# Patient Record
Sex: FEMALE | Race: WHITE | Employment: OTHER | URBAN - METROPOLITAN AREA
[De-identification: names, ages, dates, MRNs, and addresses within clinical notes are randomized per-mention and may not be internally consistent; named-entity substitution may affect disease eponyms.]

---

## 2018-06-01 LAB
ALBUMIN SERPL-MCNC: NORMAL G/DL
ALP BLD-CCNC: NORMAL U/L
ALT SERPL-CCNC: NORMAL U/L
ANION GAP SERPL CALCULATED.3IONS-SCNC: NORMAL MMOL/L
AST SERPL-CCNC: NORMAL U/L
BILIRUB SERPL-MCNC: NORMAL MG/DL (ref 0.1–1.4)
BUN BLDV-MCNC: NORMAL MG/DL
CALCIUM SERPL-MCNC: NORMAL MG/DL
CHLORIDE BLD-SCNC: NORMAL MMOL/L
CO2: NORMAL MMOL/L
CREAT SERPL-MCNC: 0.9 MG/DL
GFR CALCULATED: NORMAL
GLUCOSE BLD-MCNC: NORMAL MG/DL
POTASSIUM SERPL-SCNC: 4.4 MMOL/L
SODIUM BLD-SCNC: NORMAL MMOL/L
TOTAL PROTEIN: NORMAL

## 2019-05-16 RX ORDER — METOPROLOL SUCCINATE 25 MG/1
25 TABLET, EXTENDED RELEASE ORAL DAILY
COMMUNITY
End: 2019-06-12 | Stop reason: SDUPTHER

## 2019-05-16 RX ORDER — FUROSEMIDE 20 MG/1
20 TABLET ORAL DAILY
COMMUNITY
End: 2019-06-12 | Stop reason: SDUPTHER

## 2019-05-16 RX ORDER — LOVASTATIN 40 MG/1
40 TABLET ORAL NIGHTLY
COMMUNITY
End: 2019-06-12 | Stop reason: SDUPTHER

## 2019-05-16 RX ORDER — FERROUS SULFATE 325(65) MG
325 TABLET ORAL
COMMUNITY

## 2019-05-16 RX ORDER — AMOXICILLIN 250 MG
1 CAPSULE ORAL DAILY
COMMUNITY
End: 2021-04-13

## 2019-05-16 RX ORDER — FLUTICASONE PROPIONATE 50 MCG
2 SPRAY, SUSPENSION (ML) NASAL DAILY
COMMUNITY
End: 2019-11-20 | Stop reason: SDUPTHER

## 2019-06-12 ENCOUNTER — OFFICE VISIT (OUTPATIENT)
Dept: FAMILY MEDICINE CLINIC | Age: 84
End: 2019-06-12
Payer: MEDICARE

## 2019-06-12 VITALS
SYSTOLIC BLOOD PRESSURE: 120 MMHG | WEIGHT: 152.6 LBS | HEART RATE: 68 BPM | BODY MASS INDEX: 28.81 KG/M2 | HEIGHT: 61 IN | DIASTOLIC BLOOD PRESSURE: 76 MMHG

## 2019-06-12 DIAGNOSIS — Z86.73 HISTORY OF ARTERIAL ISCHEMIC STROKE: Primary | ICD-10-CM

## 2019-06-12 DIAGNOSIS — D64.9 ANEMIA, UNSPECIFIED TYPE: ICD-10-CM

## 2019-06-12 DIAGNOSIS — I10 ESSENTIAL HYPERTENSION: ICD-10-CM

## 2019-06-12 DIAGNOSIS — I35.0 AORTIC VALVE STENOSIS, ETIOLOGY OF CARDIAC VALVE DISEASE UNSPECIFIED: ICD-10-CM

## 2019-06-12 DIAGNOSIS — E78.2 MIXED HYPERLIPIDEMIA: ICD-10-CM

## 2019-06-12 DIAGNOSIS — I44.2 COMPLETE HEART BLOCK (HCC): ICD-10-CM

## 2019-06-12 DIAGNOSIS — I50.9 CONGESTIVE HEART FAILURE, UNSPECIFIED HF CHRONICITY, UNSPECIFIED HEART FAILURE TYPE (HCC): ICD-10-CM

## 2019-06-12 DIAGNOSIS — Q21.0 VSD (VENTRICULAR SEPTAL DEFECT): ICD-10-CM

## 2019-06-12 PROCEDURE — 4040F PNEUMOC VAC/ADMIN/RCVD: CPT | Performed by: FAMILY MEDICINE

## 2019-06-12 PROCEDURE — G8419 CALC BMI OUT NRM PARAM NOF/U: HCPCS | Performed by: FAMILY MEDICINE

## 2019-06-12 PROCEDURE — G8427 DOCREV CUR MEDS BY ELIG CLIN: HCPCS | Performed by: FAMILY MEDICINE

## 2019-06-12 PROCEDURE — 1123F ACP DISCUSS/DSCN MKR DOCD: CPT | Performed by: FAMILY MEDICINE

## 2019-06-12 PROCEDURE — 1036F TOBACCO NON-USER: CPT | Performed by: FAMILY MEDICINE

## 2019-06-12 PROCEDURE — 1090F PRES/ABSN URINE INCON ASSESS: CPT | Performed by: FAMILY MEDICINE

## 2019-06-12 PROCEDURE — 99213 OFFICE O/P EST LOW 20 MIN: CPT | Performed by: FAMILY MEDICINE

## 2019-06-12 RX ORDER — METOPROLOL SUCCINATE 25 MG/1
25 TABLET, EXTENDED RELEASE ORAL DAILY
Qty: 90 TABLET | Refills: 1 | Status: SHIPPED | OUTPATIENT
Start: 2019-06-12 | End: 2019-11-20 | Stop reason: SDUPTHER

## 2019-06-12 RX ORDER — POTASSIUM CHLORIDE 750 MG/1
10 TABLET, FILM COATED, EXTENDED RELEASE ORAL DAILY
Qty: 90 TABLET | Refills: 1 | Status: SHIPPED | OUTPATIENT
Start: 2019-06-12 | End: 2019-06-17 | Stop reason: DRUGHIGH

## 2019-06-12 RX ORDER — FUROSEMIDE 20 MG/1
20 TABLET ORAL DAILY
Qty: 90 TABLET | Refills: 1 | Status: SHIPPED | OUTPATIENT
Start: 2019-06-12 | End: 2019-11-20 | Stop reason: SDUPTHER

## 2019-06-12 RX ORDER — LOVASTATIN 40 MG/1
40 TABLET ORAL NIGHTLY
Qty: 90 TABLET | Refills: 1 | Status: SHIPPED | OUTPATIENT
Start: 2019-06-12 | End: 2019-11-20 | Stop reason: SDUPTHER

## 2019-06-12 ASSESSMENT — PATIENT HEALTH QUESTIONNAIRE - PHQ9
SUM OF ALL RESPONSES TO PHQ QUESTIONS 1-9: 0
1. LITTLE INTEREST OR PLEASURE IN DOING THINGS: 0
2. FEELING DOWN, DEPRESSED OR HOPELESS: 0
SUM OF ALL RESPONSES TO PHQ QUESTIONS 1-9: 0
SUM OF ALL RESPONSES TO PHQ9 QUESTIONS 1 & 2: 0

## 2019-06-12 ASSESSMENT — ENCOUNTER SYMPTOMS
ABDOMINAL PAIN: 0
COUGH: 1
SHORTNESS OF BREATH: 0

## 2019-06-12 NOTE — PROGRESS NOTES
service: None     Active member of club or organization: None     Attends meetings of clubs or organizations: None     Relationship status: None    Intimate partner violence:     Fear of current or ex partner: None     Emotionally abused: None     Physically abused: None     Forced sexual activity: None   Other Topics Concern    None   Social History Narrative    None        SURGICAL HISTORY  Past Surgical History:   Procedure Laterality Date    AORTIC VALVE SURGERY  06/29/2017    BRAIN SURGERY  11/27/2017    bleed from stroke    PACEMAKER PLACEMENT  06/29/2017       CURRENT MEDICATIONS  Current Outpatient Medications   Medication Sig Dispense Refill    furosemide (LASIX) 20 MG tablet Take 1 tablet by mouth daily 90 tablet 1    lovastatin (MEVACOR) 40 MG tablet Take 1 tablet by mouth nightly 90 tablet 1    metoprolol succinate (TOPROL XL) 25 MG extended release tablet Take 1 tablet by mouth daily 90 tablet 1    potassium chloride (KLOR-CON 10) 10 MEQ extended release tablet Take 1 tablet by mouth daily 90 tablet 1    aspirin 81 MG tablet Take 81 mg by mouth daily      ferrous sulfate 325 (65 Fe) MG tablet Take 325 mg by mouth daily (with breakfast)      fluticasone (FLONASE) 50 MCG/ACT nasal spray 2 sprays by Each Nare route daily      Multiple Vitamins-Minerals (MULTIVITAMIN ADULT PO) Take 1 tablet by mouth daily      senna-docusate (PERICOLACE) 8.6-50 MG per tablet Take 1 tablet by mouth daily Indications: 1/2 tab daily      Cholecalciferol (VITAMIN D3) 5000 units TABS Take 1 tablet by mouth daily       No current facility-administered medications for this visit. ALLERGIES  Allergies   Allergen Reactions    Alendronate Sodium Other (See Comments)    Ibandronic Acid Other (See Comments)    Milk-Related Compounds        PHYSICAL EXAM    Physical Exam   Constitutional: She is oriented to person, place, and time. She appears well-developed and well-nourished. No distress.    HENT:   Head: Normocephalic and atraumatic. Cardiovascular: Normal rate, regular rhythm and normal heart sounds. Pulmonary/Chest: Effort normal and breath sounds normal.   Patient is speaking in full sentences. Lungs CTA bilaterally. Abdominal: Soft. Bowel sounds are normal.   Neurological: She is alert and oriented to person, place, and time. Skin: Skin is warm and dry. She is not diaphoretic. Psychiatric: She has a normal mood and affect. Thought content normal.   Nursing note and vitals reviewed. ASSESSMENT & PLAN    1. History of arterial ischemic stroke  Continue taking medications as prescribed, and refills will be provided as necessary. Will complete lab work today including a CBC, CMP, lipid panel, thyroid studies, and anemia work-up as she has a history of anemia. 2. Essential hypertension  Continue taking medications as prescribed, and refills will be provided as necessary. Will complete lab work within the next week including a CBC, CMP, lipid panel, thyroid studies, and anemia work-up as she has a history of anemia. - metoprolol succinate (TOPROL XL) 25 MG extended release tablet; Take 1 tablet by mouth daily  Dispense: 90 tablet; Refill: 1  - potassium chloride (KLOR-CON 10) 10 MEQ extended release tablet; Take 1 tablet by mouth daily  Dispense: 90 tablet; Refill: 1  - Comprehensive Metabolic Panel; Future  - CBC Auto Differential; Future    5. Congestive heart failure, unspecified HF chronicity, unspecified heart failure type ([facility] [address]  She is to continue taking medications as prescribed, and refills will be provided as necessary.  - furosemide (LASIX) 20 MG tablet; Take 1 tablet by mouth daily  Dispense: 90 tablet; Refill: 1    6. Mixed hyperlipidemia  She is taking medications as prescribed, and refills will be necessary. We will have fasting lab work completed within the next week. Shins may be adjusted based on the lab work. - lovastatin (MEVACOR) 40 MG tablet;  Take 1 tablet by mouth nightly Dispense: 90 tablet; Refill: 1  - Lipid Panel; Future    8. Anemia, unspecified type  She has a history of anemia, and so we will continue to monitor her levels as she is B12, and an iron tablet. - CBC Auto Differential; Future  - TSH without Reflex; Future  - T4, Free; Future  - Vitamin B12; Future  - Ferritin; Future  - Folate; Future  - Iron Binding Capacity; Future    Patient is to contact us with any questions or concerns, otherwise is to follow-up in 3 months. We will notify her of lab work results and adjust occasions as needed based off of those results. Electronically signed by JOSÉ LUIS Reagan on 6/12/2019      Please note that this chart was generated using dragon dictation software. Although every effort was made to ensure the accuracy of this automated transcription, some errors in transcription may have occurred.

## 2019-06-17 ENCOUNTER — TELEPHONE (OUTPATIENT)
Dept: FAMILY MEDICINE CLINIC | Age: 84
End: 2019-06-17

## 2019-06-17 DIAGNOSIS — I10 ESSENTIAL HYPERTENSION: ICD-10-CM

## 2019-06-17 RX ORDER — POTASSIUM CHLORIDE 20 MEQ/1
20 TABLET, EXTENDED RELEASE ORAL DAILY
Qty: 90 TABLET | Refills: 1 | Status: SHIPPED | OUTPATIENT
Start: 2019-06-17 | End: 2019-11-20 | Stop reason: SDUPTHER

## 2019-06-17 RX ORDER — POTASSIUM CHLORIDE 750 MG/1
20 TABLET, FILM COATED, EXTENDED RELEASE ORAL DAILY
Qty: 90 TABLET | Refills: 1 | Status: SHIPPED | OUTPATIENT
Start: 2019-06-17 | End: 2019-06-17

## 2019-06-17 RX ORDER — POTASSIUM CHLORIDE 1.5 G/1.77G
20 POWDER, FOR SOLUTION ORAL DAILY
COMMUNITY
End: 2019-06-17

## 2019-06-17 NOTE — TELEPHONE ENCOUNTER
SPOKE WITH YANG LYNCH DAUGHTER STATES [facility] RECENTLY DELIVERED KLOR CON 10 MEQ 1QD THE PT TAKES 20 MEQ . PT RECEIVED A 90 DAY SUPPLY THIS PAST Friday, PT WOULD LIKE TO BE ADVISED ON WHAT HER BEST OPTION IS TO DO SO INSURANCE WILL COVER THIS PRESCRIPTION. WILL ROUTE TO  TO REVIEW AND ADVISE.   Electronically signed by Nohemi James LPN on 7/09/8650 at 3:21 PM

## 2019-06-17 NOTE — TELEPHONE ENCOUNTER
See if we can send in 90 day supply of 20 meq /day  RF x1- with note saying this is the correct dose. Pt should keep the 10 meq supply in reserve and could use 2 /day(of the 10 meq pils) till the 20 meq pills come in.

## 2019-06-17 NOTE — TELEPHONE ENCOUNTER
SENT KLOR CON 20 MEQ 1 QD #90/1 PER DR VARGAS.    SENT ELECTRONICALLY  PM  OK TO SEND PER  [facility]  Electronically signed by Razia Nieves LPN on 9/03/2277 at 2:44 PM

## 2019-06-28 ENCOUNTER — TELEPHONE (OUTPATIENT)
Dept: FAMILY MEDICINE CLINIC | Age: 84
End: 2019-06-28

## 2019-10-22 ENCOUNTER — TELEPHONE (OUTPATIENT)
Dept: FAMILY MEDICINE CLINIC | Age: 84
End: 2019-10-22

## 2019-11-20 ENCOUNTER — OFFICE VISIT (OUTPATIENT)
Dept: FAMILY MEDICINE CLINIC | Age: 84
End: 2019-11-20
Payer: MEDICARE

## 2019-11-20 VITALS
HEART RATE: 68 BPM | SYSTOLIC BLOOD PRESSURE: 110 MMHG | DIASTOLIC BLOOD PRESSURE: 48 MMHG | HEIGHT: 61 IN | WEIGHT: 150 LBS | BODY MASS INDEX: 28.32 KG/M2

## 2019-11-20 DIAGNOSIS — I10 ESSENTIAL HYPERTENSION: Primary | ICD-10-CM

## 2019-11-20 DIAGNOSIS — E78.49 OTHER HYPERLIPIDEMIA: ICD-10-CM

## 2019-11-20 DIAGNOSIS — Z23 NEED FOR PROPHYLACTIC VACCINATION AND INOCULATION AGAINST VARICELLA: ICD-10-CM

## 2019-11-20 DIAGNOSIS — I10 ESSENTIAL HYPERTENSION: ICD-10-CM

## 2019-11-20 DIAGNOSIS — D64.9 ANEMIA, UNSPECIFIED TYPE: ICD-10-CM

## 2019-11-20 DIAGNOSIS — E78.2 MIXED HYPERLIPIDEMIA: ICD-10-CM

## 2019-11-20 DIAGNOSIS — I50.9 CONGESTIVE HEART FAILURE, UNSPECIFIED HF CHRONICITY, UNSPECIFIED HEART FAILURE TYPE (HCC): ICD-10-CM

## 2019-11-20 DIAGNOSIS — Z23 NEEDS FLU SHOT: ICD-10-CM

## 2019-11-20 DIAGNOSIS — I63.529 CEREBROVASCULAR ACCIDENT (CVA) DUE TO STENOSIS OF ANTERIOR CEREBRAL ARTERY, UNSPECIFIED BLOOD VESSEL LATERALITY (HCC): ICD-10-CM

## 2019-11-20 DIAGNOSIS — Z95.0 CARDIAC PACEMAKER: ICD-10-CM

## 2019-11-20 PROBLEM — I63.9 CEREBROVASCULAR ACCIDENT (CVA) DUE TO VASCULAR STENOSIS (HCC): Status: ACTIVE | Noted: 2019-11-20

## 2019-11-20 LAB
A/G RATIO: 1.7 (ref 1.1–2.2)
ALBUMIN SERPL-MCNC: 4 G/DL (ref 3.4–5)
ALP BLD-CCNC: 107 U/L (ref 40–129)
ALT SERPL-CCNC: 10 U/L (ref 10–40)
ANION GAP SERPL CALCULATED.3IONS-SCNC: 13 MMOL/L (ref 3–16)
AST SERPL-CCNC: 22 U/L (ref 15–37)
BASOPHILS ABSOLUTE: 0.1 K/UL (ref 0–0.2)
BASOPHILS RELATIVE PERCENT: 1 %
BILIRUB SERPL-MCNC: 0.5 MG/DL (ref 0–1)
BUN BLDV-MCNC: 35 MG/DL (ref 7–20)
CALCIUM SERPL-MCNC: 9.6 MG/DL (ref 8.3–10.6)
CHLORIDE BLD-SCNC: 102 MMOL/L (ref 99–110)
CHOLESTEROL, FASTING: 153 MG/DL (ref 0–199)
CO2: 25 MMOL/L (ref 21–32)
CREAT SERPL-MCNC: 0.9 MG/DL (ref 0.6–1.2)
EOSINOPHILS ABSOLUTE: 0.1 K/UL (ref 0–0.6)
EOSINOPHILS RELATIVE PERCENT: 2.1 %
FERRITIN: 253.9 NG/ML (ref 15–150)
FOLATE: >20 NG/ML (ref 4.78–24.2)
GFR AFRICAN AMERICAN: >60
GFR NON-AFRICAN AMERICAN: 59
GLOBULIN: 2.3 G/DL
GLUCOSE BLD-MCNC: 82 MG/DL (ref 70–99)
HCT VFR BLD CALC: 35.6 % (ref 36–48)
HDLC SERPL-MCNC: 67 MG/DL (ref 40–60)
HEMOGLOBIN: 11.7 G/DL (ref 12–16)
LDL CHOLESTEROL CALCULATED: 69 MG/DL
LYMPHOCYTES ABSOLUTE: 1.2 K/UL (ref 1–5.1)
LYMPHOCYTES RELATIVE PERCENT: 18.5 %
MAGNESIUM: 2.2 MG/DL (ref 1.8–2.4)
MCH RBC QN AUTO: 31.2 PG (ref 26–34)
MCHC RBC AUTO-ENTMCNC: 32.8 G/DL (ref 31–36)
MCV RBC AUTO: 95 FL (ref 80–100)
MONOCYTES ABSOLUTE: 0.7 K/UL (ref 0–1.3)
MONOCYTES RELATIVE PERCENT: 10.8 %
NEUTROPHILS ABSOLUTE: 4.3 K/UL (ref 1.7–7.7)
NEUTROPHILS RELATIVE PERCENT: 67.6 %
PDW BLD-RTO: 15.7 % (ref 12.4–15.4)
PLATELET # BLD: 207 K/UL (ref 135–450)
PMV BLD AUTO: 8.8 FL (ref 5–10.5)
POTASSIUM SERPL-SCNC: 5 MMOL/L (ref 3.5–5.1)
RBC # BLD: 3.75 M/UL (ref 4–5.2)
SODIUM BLD-SCNC: 140 MMOL/L (ref 136–145)
T4 FREE: 1.3 NG/DL (ref 0.9–1.8)
TOTAL IRON BINDING CAPACITY: 243 UG/DL (ref 260–445)
TOTAL PROTEIN: 6.3 G/DL (ref 6.4–8.2)
TRIGLYCERIDE, FASTING: 85 MG/DL (ref 0–150)
TSH SERPL DL<=0.05 MIU/L-ACNC: 1.77 UIU/ML (ref 0.27–4.2)
VITAMIN B-12: 825 PG/ML (ref 211–911)
VLDLC SERPL CALC-MCNC: 17 MG/DL
WBC # BLD: 6.3 K/UL (ref 4–11)

## 2019-11-20 PROCEDURE — G8482 FLU IMMUNIZE ORDER/ADMIN: HCPCS | Performed by: FAMILY MEDICINE

## 2019-11-20 PROCEDURE — 1123F ACP DISCUSS/DSCN MKR DOCD: CPT | Performed by: FAMILY MEDICINE

## 2019-11-20 PROCEDURE — 99214 OFFICE O/P EST MOD 30 MIN: CPT | Performed by: FAMILY MEDICINE

## 2019-11-20 PROCEDURE — G8417 CALC BMI ABV UP PARAM F/U: HCPCS | Performed by: FAMILY MEDICINE

## 2019-11-20 PROCEDURE — 1036F TOBACCO NON-USER: CPT | Performed by: FAMILY MEDICINE

## 2019-11-20 PROCEDURE — 1090F PRES/ABSN URINE INCON ASSESS: CPT | Performed by: FAMILY MEDICINE

## 2019-11-20 PROCEDURE — G8427 DOCREV CUR MEDS BY ELIG CLIN: HCPCS | Performed by: FAMILY MEDICINE

## 2019-11-20 PROCEDURE — 90653 IIV ADJUVANT VACCINE IM: CPT | Performed by: FAMILY MEDICINE

## 2019-11-20 PROCEDURE — G0008 ADMIN INFLUENZA VIRUS VAC: HCPCS | Performed by: FAMILY MEDICINE

## 2019-11-20 PROCEDURE — 4040F PNEUMOC VAC/ADMIN/RCVD: CPT | Performed by: FAMILY MEDICINE

## 2019-11-20 PROCEDURE — G8598 ASA/ANTIPLAT THER USED: HCPCS | Performed by: FAMILY MEDICINE

## 2019-11-20 RX ORDER — POTASSIUM CHLORIDE 20 MEQ/1
20 TABLET, EXTENDED RELEASE ORAL DAILY
Qty: 90 TABLET | Refills: 1 | Status: SHIPPED | OUTPATIENT
Start: 2019-11-20 | End: 2020-04-08

## 2019-11-20 RX ORDER — METOPROLOL SUCCINATE 25 MG/1
25 TABLET, EXTENDED RELEASE ORAL DAILY
Qty: 90 TABLET | Refills: 1 | Status: SHIPPED | OUTPATIENT
Start: 2019-11-20 | End: 2020-06-19 | Stop reason: SDUPTHER

## 2019-11-20 RX ORDER — FLUTICASONE PROPIONATE 50 MCG
2 SPRAY, SUSPENSION (ML) NASAL DAILY
Qty: 3 BOTTLE | Refills: 1 | Status: SHIPPED | OUTPATIENT
Start: 2019-11-20 | End: 2020-05-08

## 2019-11-20 RX ORDER — FUROSEMIDE 20 MG/1
20 TABLET ORAL DAILY
Qty: 90 TABLET | Refills: 1 | Status: SHIPPED | OUTPATIENT
Start: 2019-11-20 | End: 2020-04-08

## 2019-11-20 RX ORDER — LOVASTATIN 40 MG/1
40 TABLET ORAL NIGHTLY
Qty: 90 TABLET | Refills: 1 | Status: SHIPPED | OUTPATIENT
Start: 2019-11-20 | End: 2020-04-08

## 2019-11-20 ASSESSMENT — ENCOUNTER SYMPTOMS
DIARRHEA: 0
VOMITING: 0
NAUSEA: 0
EYE PAIN: 0
SHORTNESS OF BREATH: 0
WHEEZING: 0
ABDOMINAL PAIN: 0
CHEST TIGHTNESS: 0
TROUBLE SWALLOWING: 0
BLOOD IN STOOL: 0

## 2020-04-08 RX ORDER — LOVASTATIN 40 MG/1
40 TABLET ORAL NIGHTLY
Qty: 90 TABLET | Refills: 0 | Status: SHIPPED | OUTPATIENT
Start: 2020-04-08 | End: 2020-06-19 | Stop reason: SDUPTHER

## 2020-04-08 RX ORDER — FUROSEMIDE 20 MG/1
20 TABLET ORAL DAILY
Qty: 90 TABLET | Refills: 0 | Status: SHIPPED | OUTPATIENT
Start: 2020-04-08 | End: 2020-06-19 | Stop reason: SDUPTHER

## 2020-04-08 RX ORDER — POTASSIUM CHLORIDE 20 MEQ/1
20 TABLET, EXTENDED RELEASE ORAL DAILY
Qty: 90 TABLET | Refills: 0 | Status: SHIPPED | OUTPATIENT
Start: 2020-04-08 | End: 2020-06-19 | Stop reason: SDUPTHER

## 2020-05-08 RX ORDER — FLUTICASONE PROPIONATE 50 MCG
SPRAY, SUSPENSION (ML) NASAL
Qty: 3 BOTTLE | Refills: 1 | Status: SHIPPED | OUTPATIENT
Start: 2020-05-08 | End: 2020-10-13 | Stop reason: SDUPTHER

## 2020-06-17 ENCOUNTER — VIRTUAL VISIT (OUTPATIENT)
Dept: FAMILY MEDICINE CLINIC | Age: 85
End: 2020-06-17
Payer: MEDICARE

## 2020-06-17 PROCEDURE — 99442 PR PHYS/QHP TELEPHONE EVALUATION 11-20 MIN: CPT | Performed by: FAMILY MEDICINE

## 2020-06-17 PROCEDURE — 1090F PRES/ABSN URINE INCON ASSESS: CPT | Performed by: FAMILY MEDICINE

## 2020-06-17 PROCEDURE — 1123F ACP DISCUSS/DSCN MKR DOCD: CPT | Performed by: FAMILY MEDICINE

## 2020-06-17 PROCEDURE — G8427 DOCREV CUR MEDS BY ELIG CLIN: HCPCS | Performed by: FAMILY MEDICINE

## 2020-06-17 PROCEDURE — 4040F PNEUMOC VAC/ADMIN/RCVD: CPT | Performed by: FAMILY MEDICINE

## 2020-06-17 PROCEDURE — G8417 CALC BMI ABV UP PARAM F/U: HCPCS | Performed by: FAMILY MEDICINE

## 2020-06-17 PROCEDURE — 1036F TOBACCO NON-USER: CPT | Performed by: FAMILY MEDICINE

## 2020-06-17 ASSESSMENT — PATIENT HEALTH QUESTIONNAIRE - PHQ9
2. FEELING DOWN, DEPRESSED OR HOPELESS: 1
SUM OF ALL RESPONSES TO PHQ QUESTIONS 1-9: 2
SUM OF ALL RESPONSES TO PHQ QUESTIONS 1-9: 2
SUM OF ALL RESPONSES TO PHQ9 QUESTIONS 1 & 2: 2
1. LITTLE INTEREST OR PLEASURE IN DOING THINGS: 1

## 2020-06-17 NOTE — PROGRESS NOTES
Charline Calvin is a 80 y.o. female evaluated via telephone on 6/17/2020. Consent:  She and/or health care decision maker is aware that that she may receive a bill for this telephone service, depending on her insurance coverage, and has provided verbal consent to proceed: Yes      Documentation:  I communicated with the patient and/or health care decision maker about hyperlipidemia, hypertension, advanced age, history of CHF and history of CVA. [place] and has very little outside exposure. Has had no fever cough or evidence of infection. Patient reports that her appetite is been good if no evidence of CHF no increase edema orthopnea reported. Patient denies any neurologic changes also. Mood has been reasonable and meds are well-tolerated. Her last labs were November 2019 and they were overall satisfactory. At this point I would keep her on the same regimen and provide refills on meds as needed. I will see her back in 4 months or sooner as needed call with questions or problems. Continue with healthy lifestyle and social distancing. Details of this discussion including any medical advice provided as above. I affirm this is a Patient Initiated Episode with a Patient who has not had a related appointment within my department in the past 7 days or scheduled within the next 24 hours.     Patient identification was verified at the start of the visit: Yes    Total Time: minutes: 11-20 minutes    Note: not billable if this call serves to triage the patient into an appointment for the relevant concern      Karis Paulino

## 2020-06-19 RX ORDER — LOVASTATIN 40 MG/1
40 TABLET ORAL NIGHTLY
Qty: 90 TABLET | Refills: 0 | Status: SHIPPED | OUTPATIENT
Start: 2020-06-19 | End: 2020-08-28

## 2020-06-19 RX ORDER — FUROSEMIDE 20 MG/1
20 TABLET ORAL DAILY
Qty: 90 TABLET | Refills: 0 | Status: SHIPPED | OUTPATIENT
Start: 2020-06-19 | End: 2020-08-28

## 2020-06-19 RX ORDER — POTASSIUM CHLORIDE 20 MEQ/1
20 TABLET, EXTENDED RELEASE ORAL DAILY
Qty: 90 TABLET | Refills: 0 | Status: SHIPPED | OUTPATIENT
Start: 2020-06-19 | End: 2020-08-28

## 2020-06-19 RX ORDER — METOPROLOL SUCCINATE 25 MG/1
25 TABLET, EXTENDED RELEASE ORAL DAILY
Qty: 90 TABLET | Refills: 1 | Status: SHIPPED | OUTPATIENT
Start: 2020-06-19 | End: 2020-10-13 | Stop reason: SDUPTHER

## 2020-08-28 RX ORDER — FUROSEMIDE 20 MG/1
20 TABLET ORAL DAILY
Qty: 90 TABLET | Refills: 0 | Status: SHIPPED | OUTPATIENT
Start: 2020-08-28 | End: 2020-10-13 | Stop reason: SDUPTHER

## 2020-08-28 RX ORDER — LOVASTATIN 40 MG/1
TABLET ORAL
Qty: 90 TABLET | Refills: 0 | Status: SHIPPED | OUTPATIENT
Start: 2020-08-28 | End: 2020-10-13 | Stop reason: SDUPTHER

## 2020-08-28 RX ORDER — POTASSIUM CHLORIDE 20 MEQ/1
20 TABLET, EXTENDED RELEASE ORAL DAILY
Qty: 90 TABLET | Refills: 0 | Status: SHIPPED | OUTPATIENT
Start: 2020-08-28 | End: 2020-10-13 | Stop reason: SDUPTHER

## 2020-08-28 NOTE — TELEPHONE ENCOUNTER
Requested Prescriptions     Signed Prescriptions Disp Refills    potassium chloride (KLOR-CON M) 20 MEQ extended release tablet 90 tablet 0     Sig: TAKE 1 TABLET BY MOUTH  DAILY     Authorizing Provider: Tanya Abernathy User: EFRAIN Millard    furosemide (LASIX) 20 MG tablet 90 tablet 0     Sig: TAKE 1 TABLET BY MOUTH  DAILY     Authorizing Provider: Tanya Abernathy User: EFRAIN Millard    lovastatin (MEVACOR) 40 MG tablet 90 tablet 0     Sig: TAKE 1 TABLET BY MOUTH AT  NIGHT     Authorizing Provider: Tanya Abernathy User: Justice Tomas

## 2020-10-13 ENCOUNTER — OFFICE VISIT (OUTPATIENT)
Dept: FAMILY MEDICINE CLINIC | Age: 85
End: 2020-10-13
Payer: MEDICARE

## 2020-10-13 VITALS
TEMPERATURE: 98.2 F | HEART RATE: 66 BPM | DIASTOLIC BLOOD PRESSURE: 68 MMHG | SYSTOLIC BLOOD PRESSURE: 118 MMHG | BODY MASS INDEX: 29.83 KG/M2 | HEIGHT: 61 IN | WEIGHT: 158 LBS | OXYGEN SATURATION: 98 %

## 2020-10-13 DIAGNOSIS — I63.529 CEREBROVASCULAR ACCIDENT (CVA) DUE TO STENOSIS OF ANTERIOR CEREBRAL ARTERY, UNSPECIFIED BLOOD VESSEL LATERALITY (HCC): ICD-10-CM

## 2020-10-13 DIAGNOSIS — I10 ESSENTIAL HYPERTENSION: ICD-10-CM

## 2020-10-13 PROBLEM — I44.2 CHB (COMPLETE HEART BLOCK) (HCC): Status: ACTIVE | Noted: 2020-10-13

## 2020-10-13 LAB
A/G RATIO: 1.7 (ref 1.1–2.2)
ALBUMIN SERPL-MCNC: 3.7 G/DL (ref 3.4–5)
ALP BLD-CCNC: 119 U/L (ref 40–129)
ALT SERPL-CCNC: 16 U/L (ref 10–40)
ANION GAP SERPL CALCULATED.3IONS-SCNC: 7 MMOL/L (ref 3–16)
AST SERPL-CCNC: 22 U/L (ref 15–37)
BILIRUB SERPL-MCNC: 0.4 MG/DL (ref 0–1)
BUN BLDV-MCNC: 39 MG/DL (ref 7–20)
CALCIUM SERPL-MCNC: 9.7 MG/DL (ref 8.3–10.6)
CHLORIDE BLD-SCNC: 104 MMOL/L (ref 99–110)
CHOLESTEROL, TOTAL: 142 MG/DL (ref 0–199)
CO2: 29 MMOL/L (ref 21–32)
CREAT SERPL-MCNC: 1 MG/DL (ref 0.6–1.2)
GFR AFRICAN AMERICAN: >60
GFR NON-AFRICAN AMERICAN: 52
GLOBULIN: 2.2 G/DL
GLUCOSE BLD-MCNC: 89 MG/DL (ref 70–99)
HCT VFR BLD CALC: 33.4 % (ref 36–48)
HDLC SERPL-MCNC: 64 MG/DL (ref 40–60)
HEMOGLOBIN: 11 G/DL (ref 12–16)
LDL CHOLESTEROL CALCULATED: 64 MG/DL
MCH RBC QN AUTO: 30.9 PG (ref 26–34)
MCHC RBC AUTO-ENTMCNC: 32.8 G/DL (ref 31–36)
MCV RBC AUTO: 94.2 FL (ref 80–100)
PDW BLD-RTO: 15.2 % (ref 12.4–15.4)
PLATELET # BLD: 190 K/UL (ref 135–450)
PMV BLD AUTO: 8.4 FL (ref 5–10.5)
POTASSIUM SERPL-SCNC: 5.1 MMOL/L (ref 3.5–5.1)
RBC # BLD: 3.54 M/UL (ref 4–5.2)
SODIUM BLD-SCNC: 140 MMOL/L (ref 136–145)
TOTAL PROTEIN: 5.9 G/DL (ref 6.4–8.2)
TRIGL SERPL-MCNC: 70 MG/DL (ref 0–150)
VLDLC SERPL CALC-MCNC: 14 MG/DL
WBC # BLD: 5.9 K/UL (ref 4–11)

## 2020-10-13 PROCEDURE — 99214 OFFICE O/P EST MOD 30 MIN: CPT | Performed by: FAMILY MEDICINE

## 2020-10-13 PROCEDURE — 1090F PRES/ABSN URINE INCON ASSESS: CPT | Performed by: FAMILY MEDICINE

## 2020-10-13 PROCEDURE — 90694 VACC AIIV4 NO PRSRV 0.5ML IM: CPT | Performed by: FAMILY MEDICINE

## 2020-10-13 PROCEDURE — G0008 ADMIN INFLUENZA VIRUS VAC: HCPCS | Performed by: FAMILY MEDICINE

## 2020-10-13 PROCEDURE — G8427 DOCREV CUR MEDS BY ELIG CLIN: HCPCS | Performed by: FAMILY MEDICINE

## 2020-10-13 PROCEDURE — 1036F TOBACCO NON-USER: CPT | Performed by: FAMILY MEDICINE

## 2020-10-13 PROCEDURE — 1123F ACP DISCUSS/DSCN MKR DOCD: CPT | Performed by: FAMILY MEDICINE

## 2020-10-13 PROCEDURE — 4040F PNEUMOC VAC/ADMIN/RCVD: CPT | Performed by: FAMILY MEDICINE

## 2020-10-13 PROCEDURE — G8484 FLU IMMUNIZE NO ADMIN: HCPCS | Performed by: FAMILY MEDICINE

## 2020-10-13 PROCEDURE — G8417 CALC BMI ABV UP PARAM F/U: HCPCS | Performed by: FAMILY MEDICINE

## 2020-10-13 RX ORDER — LOVASTATIN 40 MG/1
TABLET ORAL
Qty: 90 TABLET | Refills: 0 | Status: SHIPPED | OUTPATIENT
Start: 2020-10-13 | End: 2020-12-01

## 2020-10-13 RX ORDER — FUROSEMIDE 20 MG/1
20 TABLET ORAL DAILY
Qty: 90 TABLET | Refills: 0 | Status: SHIPPED | OUTPATIENT
Start: 2020-10-13 | End: 2020-12-01

## 2020-10-13 RX ORDER — METOPROLOL SUCCINATE 25 MG/1
25 TABLET, EXTENDED RELEASE ORAL DAILY
Qty: 90 TABLET | Refills: 1 | Status: SHIPPED | OUTPATIENT
Start: 2020-10-13 | End: 2021-04-13 | Stop reason: SDUPTHER

## 2020-10-13 RX ORDER — FLUTICASONE PROPIONATE 50 MCG
SPRAY, SUSPENSION (ML) NASAL
Qty: 3 BOTTLE | Refills: 1 | Status: SHIPPED | OUTPATIENT
Start: 2020-10-13 | End: 2021-04-13 | Stop reason: SDUPTHER

## 2020-10-13 RX ORDER — POTASSIUM CHLORIDE 20 MEQ/1
20 TABLET, EXTENDED RELEASE ORAL DAILY
Qty: 90 TABLET | Refills: 0 | Status: SHIPPED | OUTPATIENT
Start: 2020-10-13 | End: 2020-12-01

## 2020-10-13 ASSESSMENT — ENCOUNTER SYMPTOMS
BLOOD IN STOOL: 0
NAUSEA: 0
EYE PAIN: 0
ABDOMINAL PAIN: 0
DIARRHEA: 0
WHEEZING: 0
CHEST TIGHTNESS: 0
TROUBLE SWALLOWING: 0
VOMITING: 0
SHORTNESS OF BREATH: 0

## 2020-10-13 NOTE — PROGRESS NOTES
Vaccine Information Sheet, \"Influenza - Inactivated\"  given to Edwin Faria, or parent/legal guardian of  Edwin Faria and verbalized understanding. Patient responses:    Have you ever had a reaction to a flu vaccine? No  Do you have any current illness? No  Have you ever had Guillian Index Syndrome? No  Do you have a serious allergy to any of the follow: Neomycin, Polymyxin, Thimerosal, eggs or egg products? No    Flu vaccine given per order. Please see immunization tab. Risks and benefits explained. Current VIS given.       Immunizations Administered     Name Date Dose Route    Influenza, Quadv, adjuvanted, 65 yrs +, IM, PF (Fluad) 10/13/2020 0.5 mL Intramuscular    Site: Deltoid- Left    Lot: [id number]    NDC: 39982-311-63

## 2020-10-13 NOTE — PROGRESS NOTES
10/13/2020    Adrien Loving    Chief Complaint   Patient presents with   Olga Mcdonough Follow-up     4 months    Immunizations     pt requests flu shot    Cough     pts daughter states pt's cough has worsened    Medication Refill     needs all meds refiled       HPI  History was obtained from the patient and her daughter. Moni Quigley is a 80 y.o. female who presents today with routine follow-up on previous CVA, hypertension, hyperlipidemia, CHF, and pacemaker placement (for complete heart block). Patient denies chest pain or shortness of breath. She has been using a cane and has not fallen. Does have a slight increase in cough but is not associated with purulent sputum production or increased shortness of breath. No associated fever or chills. Patient does follow-up closely with cardiology, and on review she will need some refills along with screening labs and a high-dose flu shot. Mell Escobedo REVIEW OF SYMPTOMS    Review of Systems   Constitutional: Negative for activity change and fatigue. HENT: Negative for congestion, hearing loss, mouth sores and trouble swallowing. Eyes: Negative for pain and visual disturbance. Respiratory: Negative for chest tightness, shortness of breath and wheezing. Cardiovascular: Negative for chest pain and palpitations. Gastrointestinal: Negative for abdominal pain, blood in stool, diarrhea, nausea and vomiting. Endocrine: Negative for polydipsia and polyuria. Genitourinary: Negative for dysuria, frequency and urgency. Musculoskeletal: Negative for arthralgias, gait problem and neck stiffness. Skin: Negative for rash. Allergic/Immunologic: Negative for environmental allergies. Neurological: Negative for dizziness, seizures, speech difficulty and weakness. Hematological: Does not bruise/bleed easily. Psychiatric/Behavioral: Negative for agitation, confusion, hallucinations and self-injury. The patient is not nervous/anxious.         PAST MEDICAL HISTORY  No past medical history on file. FAMILY HISTORY  Family History   Problem Relation Age of Onset    Heart Disease Father     Heart Attack Father     Cancer Other        SOCIAL HISTORY  Social History     Socioeconomic History    Marital status:       Spouse name: None    Number of children: None    Years of education: None    Highest education level: None   Occupational History    None   Social Needs    Financial resource strain: None    Food insecurity     Worry: None     Inability: None    Transportation needs     Medical: None     Non-medical: None   Tobacco Use    Smoking status: Never Smoker    Smokeless tobacco: Never Used   Substance and Sexual Activity    Alcohol use: None    Drug use: None    Sexual activity: None   Lifestyle    Physical activity     Days per week: None     Minutes per session: None    Stress: None   Relationships    Social connections     Talks on phone: None     Gets together: None     Attends Islam service: None     Active member of club or organization: None     Attends meetings of clubs or organizations: None     Relationship status: None    Intimate partner violence     Fear of current or ex partner: None     Emotionally abused: None     Physically abused: None     Forced sexual activity: None   Other Topics Concern    None   Social History Narrative    None        SURGICAL HISTORY  Past Surgical History:   Procedure Laterality Date    AORTIC VALVE SURGERY  06/29/2017    BRAIN SURGERY  11/27/2017    bleed from stroke    PACEMAKER PLACEMENT  06/29/2017                 CURRENT MEDICATIONS  Current Outpatient Medications   Medication Sig Dispense Refill    metoprolol succinate (TOPROL XL) 25 MG extended release tablet Take 1 tablet by mouth daily 90 tablet 1    lovastatin (MEVACOR) 40 MG tablet TAKE 1 TABLET BY MOUTH AT  NIGHT 90 tablet 0    furosemide (LASIX) 20 MG tablet Take 1 tablet by mouth daily 90 tablet 0    fluticasone (FLONASE) 50 MCG/ACT nasal spray USE 2 SPRAYS IN EACH  NOSTRIL DAILY 3 Bottle 1    potassium chloride (KLOR-CON M) 20 MEQ extended release tablet Take 1 tablet by mouth daily 90 tablet 0    aspirin 81 MG tablet Take 81 mg by mouth daily      ferrous sulfate 325 (65 Fe) MG tablet Take 325 mg by mouth daily (with breakfast)      Multiple Vitamins-Minerals (MULTIVITAMIN ADULT PO) Take 1 tablet by mouth daily      senna-docusate (PERICOLACE) 8.6-50 MG per tablet Take 1 tablet by mouth daily Indications: 1/2 tab daily      Cholecalciferol (VITAMIN D3) 5000 units TABS Take 1 tablet by mouth daily       No current facility-administered medications for this visit. ALLERGIES  Allergies   Allergen Reactions    Alendronate Other (See Comments)    Alendronate Sodium Other (See Comments)    Ibandronic Acid Other (See Comments)    Milk-Related Compounds        PHYSICAL EXAM    /68   Pulse 66   Temp 98.2 °F (36.8 °C)   Ht 5' 1\" (1.549 m)   Wt 158 lb (71.7 kg)   SpO2 98%   BMI 29.85 kg/m²     Physical Exam  Vitals signs and nursing note reviewed. Constitutional:       Appearance: Normal appearance. She is well-developed. She is not ill-appearing. HENT:      Head: Normocephalic and atraumatic. Nose: Nose normal. No congestion. Mouth/Throat:      Mouth: Mucous membranes are moist.      Pharynx: Oropharynx is clear. No oropharyngeal exudate. Eyes:      Pupils: Pupils are equal, round, and reactive to light. Neck:      Musculoskeletal: Normal range of motion and neck supple. Cardiovascular:      Rate and Rhythm: Normal rate and regular rhythm. Heart sounds: Murmur present. No gallop. Pulmonary:      Effort: Pulmonary effort is normal.      Breath sounds: Normal breath sounds. Abdominal:      Palpations: Abdomen is soft. Musculoskeletal: Normal range of motion. General: Tenderness present. No swelling or deformity. Skin:     General: Skin is warm and dry.    Neurological:      General: No focal deficit present. Mental Status: She is alert and oriented to person, place, and time. Cranial Nerves: No cranial nerve deficit. Psychiatric:         Mood and Affect: Mood normal.         Behavior: Behavior normal.         Thought Content: Thought content normal.         ASSESSMENT & PLAN     Diagnosis Orders   1. Cerebrovascular accident (CVA) due to stenosis of anterior cerebral artery, unspecified blood vessel laterality ([facility] [address]  LIPID PANEL   2. Essential hypertension  metoprolol succinate (TOPROL XL) 25 MG extended release tablet    CBC    COMPREHENSIVE METABOLIC PANEL   3. Mixed hyperlipidemia  lovastatin (MEVACOR) 40 MG tablet   4. Congestive heart failure, unspecified HF chronicity, unspecified heart failure type (HCC)  furosemide (LASIX) 20 MG tablet   5. CHB (complete heart block) (HCC)     This point she did receive a high-dose flu shot. We will check a CBC, CMP, lipid panel, and provide refills on meds as needed. She is encouraged to stay physically active as possible, socially distance as needed, and call with questions or problems. Please note she is careful to ambulate with her cane. Return in about 6 months (around 4/13/2021).          Electronically signed by Augie Neville MD on 10/13/2020

## 2021-04-13 ENCOUNTER — VIRTUAL VISIT (OUTPATIENT)
Dept: FAMILY MEDICINE CLINIC | Age: 86
End: 2021-04-13
Payer: MEDICARE

## 2021-04-13 DIAGNOSIS — I63.529 CEREBROVASCULAR ACCIDENT (CVA) DUE TO STENOSIS OF ANTERIOR CEREBRAL ARTERY, UNSPECIFIED BLOOD VESSEL LATERALITY (HCC): ICD-10-CM

## 2021-04-13 DIAGNOSIS — I44.2 CHB (COMPLETE HEART BLOCK) (HCC): ICD-10-CM

## 2021-04-13 DIAGNOSIS — E78.49 OTHER HYPERLIPIDEMIA: ICD-10-CM

## 2021-04-13 DIAGNOSIS — Z95.2 S/P AVR (AORTIC VALVE REPLACEMENT): ICD-10-CM

## 2021-04-13 DIAGNOSIS — I10 ESSENTIAL HYPERTENSION: ICD-10-CM

## 2021-04-13 DIAGNOSIS — Z95.0 CARDIAC PACEMAKER: Primary | ICD-10-CM

## 2021-04-13 PROCEDURE — G8427 DOCREV CUR MEDS BY ELIG CLIN: HCPCS | Performed by: FAMILY MEDICINE

## 2021-04-13 PROCEDURE — 99214 OFFICE O/P EST MOD 30 MIN: CPT | Performed by: FAMILY MEDICINE

## 2021-04-13 PROCEDURE — G8417 CALC BMI ABV UP PARAM F/U: HCPCS | Performed by: FAMILY MEDICINE

## 2021-04-13 PROCEDURE — 1036F TOBACCO NON-USER: CPT | Performed by: FAMILY MEDICINE

## 2021-04-13 PROCEDURE — 4040F PNEUMOC VAC/ADMIN/RCVD: CPT | Performed by: FAMILY MEDICINE

## 2021-04-13 PROCEDURE — 1090F PRES/ABSN URINE INCON ASSESS: CPT | Performed by: FAMILY MEDICINE

## 2021-04-13 PROCEDURE — 1123F ACP DISCUSS/DSCN MKR DOCD: CPT | Performed by: FAMILY MEDICINE

## 2021-04-13 RX ORDER — METOPROLOL SUCCINATE 25 MG/1
25 TABLET, EXTENDED RELEASE ORAL DAILY
Qty: 90 TABLET | Refills: 1 | Status: SHIPPED | OUTPATIENT
Start: 2021-04-13 | End: 2021-12-23 | Stop reason: SDUPTHER

## 2021-04-13 RX ORDER — CALCIUM CARBONATE/VITAMIN D3 500 MG-10
TABLET ORAL
COMMUNITY

## 2021-04-13 RX ORDER — FLUTICASONE PROPIONATE 50 MCG
SPRAY, SUSPENSION (ML) NASAL
Qty: 3 BOTTLE | Refills: 1 | Status: SHIPPED | OUTPATIENT
Start: 2021-04-13 | End: 2022-10-21 | Stop reason: SDUPTHER

## 2021-04-13 RX ORDER — SENNA PLUS 8.6 MG/1
1 TABLET ORAL DAILY
COMMUNITY
End: 2021-08-20

## 2021-04-13 ASSESSMENT — PATIENT HEALTH QUESTIONNAIRE - PHQ9
SUM OF ALL RESPONSES TO PHQ QUESTIONS 1-9: 0
SUM OF ALL RESPONSES TO PHQ QUESTIONS 1-9: 0
1. LITTLE INTEREST OR PLEASURE IN DOING THINGS: 0
2. FEELING DOWN, DEPRESSED OR HOPELESS: 0
SUM OF ALL RESPONSES TO PHQ9 QUESTIONS 1 & 2: 0

## 2021-04-14 ASSESSMENT — ENCOUNTER SYMPTOMS
CHEST TIGHTNESS: 0
ABDOMINAL PAIN: 0
VOMITING: 0
TROUBLE SWALLOWING: 0
NAUSEA: 0
DIARRHEA: 0
BLOOD IN STOOL: 0
WHEEZING: 0
EYE PAIN: 0
SHORTNESS OF BREATH: 0

## 2021-04-14 NOTE — PROGRESS NOTES
2021    TELEHEALTH EVALUATION -- Audio/Visual (During [facility] public health emergency)    HPI:    Abel Ledezma (:  5/15/1929) has requested an audio/video evaluation for the following concern(s):    History of CVA, hypertension, history of CHF, history of complete heart block status post pacer, and hyperlipidemia. Shyla Marshall was family support continues to do well she is now 80 she follows with cardiology in [facility]. Mood remains positive. Vital signs today 658 systolic over 70-84 diastolic oximetry in the upper 90s. She does need to get Covid virus vaccine as soon as possible. She is reported no falls and no chest pain no increased shortness of breath noted. Appetite has been reasonable her last labs were done in 2020 these include a CBC, CMP and a lipid panel and they were satisfactory. Mood appears to be appropriate denies other intercurrent issues at this time    Review of Systems   Constitutional: Negative for activity change and fatigue. HENT: Negative for congestion, hearing loss, mouth sores and trouble swallowing. Eyes: Negative for pain and visual disturbance. Respiratory: Negative for chest tightness, shortness of breath and wheezing. Cardiovascular: Negative for chest pain and palpitations. Gastrointestinal: Negative for abdominal pain, blood in stool, diarrhea, nausea and vomiting. Endocrine: Negative. Genitourinary: Negative for dysuria, frequency and urgency. Musculoskeletal: Negative for arthralgias, gait problem and neck stiffness. Skin: Negative for rash. Allergic/Immunologic: Negative for environmental allergies. Neurological: Negative for dizziness, seizures, speech difficulty and weakness. Patient with history of CVA- no new or change in neurologic symptoms   Hematological: Does not bruise/bleed easily. Psychiatric/Behavioral: Negative for agitation, confusion, hallucinations and suicidal ideas. The patient is not nervous/anxious. person/place/time [x]Able to follow commands      Eyes:  EOM    [x]  Normal  [] Abnormal-  Sclera  [x]  Normal  [] Abnormal -         Discharge []  None visible  [] Abnormal -    HENT:   [x] Normocephalic, atraumatic. [] Abnormal   [x] Mouth/Throat: Mucous membranes are moist.     External Ears [] Normal  [] Abnormal-     Neck: [x] No visualized mass     Pulmonary/Chest: [x] Respiratory effort normal.  [x] No visualized signs of difficulty breathing or respiratory distress        [] Abnormal-      Musculoskeletal:   [] Normal gait with no signs of ataxia         [x] Normal range of motion of neck        [] Abnormal-       Neurological:        [] No Facial Asymmetry (Cranial nerve 7 motor function) (limited exam to video visit)          [x] No gaze palsy        [] Abnormal-         Skin:        [x] No significant exanthematous lesions or discoloration noted on facial skin         [] Abnormal-            Psychiatric:       [x] Normal Affect [] No Hallucinations        [] Abnormal-     Other pertinent observable physical exam findings-     ASSESSMENT/PLAN:  1. Essential hypertension    - metoprolol succinate (TOPROL XL) 25 MG extended release tablet; Take 1 tablet by mouth daily  Dispense: 90 tablet; Refill: 1    2. Cardiac pacemaker      3. Other hyperlipidemia      4. Cerebrovascular accident (CVA) due to stenosis of anterior cerebral artery, unspecified blood vessel laterality ([facility] [address]      5. CHB (complete heart block) (HCC)      6. S/P AVR (aortic valve replacement)  This point refills will be provided. Questions answered and encouragement given. Continue to stay as active as possible but continue to socially distance. Advise get Covid vaccine as soon as possible. Call with questions or problems. Return in about 6 months (around 10/13/2021). Kal Montez, was evaluated through a synchronous (real-time) audio-video encounter.  The patient (or guardian if applicable) is aware that this is a billable service. Verbal consent to proceed has been obtained within the past 12 months. The visit was conducted pursuant to the emergency declaration under the [address] [place] [address] and the [employer] General Act. Patient identification was verified, and a caregiver was present when appropriate. The patient was located in a state where the provider was credentialed to provide care. Total time spent on this encounter: Not billed by time    --Gianluca Jeronimo MD on 4/14/2021 at 7:56 AM    An electronic signature was used to authenticate this note.

## 2021-05-28 ENCOUNTER — OFFICE VISIT (OUTPATIENT)
Dept: FAMILY MEDICINE CLINIC | Age: 86
End: 2021-05-28
Payer: MEDICARE

## 2021-05-28 VITALS
SYSTOLIC BLOOD PRESSURE: 124 MMHG | HEIGHT: 61 IN | BODY MASS INDEX: 29.07 KG/M2 | HEART RATE: 75 BPM | WEIGHT: 154 LBS | DIASTOLIC BLOOD PRESSURE: 61 MMHG | RESPIRATION RATE: 16 BRPM | OXYGEN SATURATION: 95 %

## 2021-05-28 DIAGNOSIS — L03.115 CELLULITIS OF RIGHT LOWER EXTREMITY: Primary | ICD-10-CM

## 2021-05-28 PROCEDURE — 99213 OFFICE O/P EST LOW 20 MIN: CPT | Performed by: STUDENT IN AN ORGANIZED HEALTH CARE EDUCATION/TRAINING PROGRAM

## 2021-05-28 RX ORDER — CEPHALEXIN 500 MG/1
500 CAPSULE ORAL 4 TIMES DAILY
Qty: 40 CAPSULE | Refills: 0 | Status: SHIPPED | OUTPATIENT
Start: 2021-05-28 | End: 2021-06-07

## 2021-05-28 NOTE — PROGRESS NOTES
5/28/2021    Dominique French    Chief Complaint   Patient presents with   Tico Gaitan Wound Check     Had an incident where she scraped leg on screen door. HPI  History was obtained from patient. Accompanied by daughter HCP Maryellen Barba is a 80 y.o. female with a PMHx as listed below who presents today for wound check    5/15 injury scrapped leg on bottom of metal door. At first had some pustular drainage but now resolved. Daughter/HCP states the redness around would has been slowly growing. Emmy has been using silver sulfadiazine and at times triple antibiotic around wound  Last Tdap 11/14/2017  No hx. Of MRSA infection    1. Cellulitis of right lower extremity             REVIEW OF SYMPTOMS    Review of Systems   Constitutional: Negative for chills and fatigue. HENT: Negative for congestion and sore throat. Respiratory: Negative for shortness of breath and wheezing. Cardiovascular: Negative for chest pain and palpitations. Gastrointestinal: Negative for abdominal pain and nausea. Genitourinary: Negative for frequency and urgency. Skin: Positive for wound (right leg). Neurological: Negative for light-headedness. PAST MEDICAL HISTORY  History reviewed. No pertinent past medical history. FAMILY HISTORY  Family History   Problem Relation Age of Onset    Heart Disease Father     Heart Attack Father     Cancer Other        SOCIAL HISTORY  Social History     Socioeconomic History    Marital status:       Spouse name: None    Number of children: None    Years of education: None    Highest education level: None   Occupational History    None   Tobacco Use    Smoking status: Never Smoker    Smokeless tobacco: Never Used   Substance and Sexual Activity    Alcohol use: None    Drug use: None    Sexual activity: None   Other Topics Concern    None   Social History Narrative    None     Social Determinants of Health     Financial Resource Strain:     Difficulty of Paying sulfate 325 (65 Fe) MG tablet Take 325 mg by mouth daily (with breakfast)      Multiple Vitamins-Minerals (MULTIVITAMIN ADULT PO) Take 1 tablet by mouth daily      Cholecalciferol (VITAMIN D3) 5000 units TABS Take 1 tablet by mouth daily       No current facility-administered medications for this visit. ALLERGIES  Allergies   Allergen Reactions    Alendronate Other (See Comments)    Alendronate Sodium Other (See Comments)    Ibandronic Acid Other (See Comments)    Milk-Related Compounds        PHYSICAL EXAM    /61   Pulse 75   Resp 16   Ht 5' 1\" (1.549 m)   Wt 154 lb (69.9 kg)   SpO2 95%   BMI 29.10 kg/m²     Physical Exam  Constitutional:       Appearance: Normal appearance. HENT:      Head: Normocephalic and atraumatic. Eyes:      Extraocular Movements: Extraocular movements intact. Pupils: Pupils are equal, round, and reactive to light. Cardiovascular:      Rate and Rhythm: Normal rate and regular rhythm. Pulses: Normal pulses. Heart sounds: No murmur heard. No friction rub. No gallop. Skin:     General: Skin is warm and dry. Comments: Around 2 cm in diameter right leg scab/abrasion superficial depth up to epidermis with some mild pustular drainage erythema around 1cm in diameter around. No warmth   Neurological:      General: No focal deficit present. Mental Status: She is alert. Psychiatric:         Mood and Affect: Mood normal.         Behavior: Behavior normal.         ASSESSMENT & PLAN    1. Cellulitis of right lower extremity  -start keflex, superficial good granulation tissue healing well  -received tdap 11/2017  - cephALEXin (KEFLEX) 500 MG capsule; Take 1 capsule by mouth 4 times daily for 10 days  Dispense: 40 capsule; Refill: 0      Return for as scheduled.          Electronically signed by Cleopatra Strong DO on 5/28/2021

## 2021-06-03 DIAGNOSIS — I50.9 CONGESTIVE HEART FAILURE, UNSPECIFIED HF CHRONICITY, UNSPECIFIED HEART FAILURE TYPE (HCC): ICD-10-CM

## 2021-06-03 DIAGNOSIS — E78.2 MIXED HYPERLIPIDEMIA: ICD-10-CM

## 2021-06-03 RX ORDER — POTASSIUM CHLORIDE 20 MEQ/1
20 TABLET, EXTENDED RELEASE ORAL DAILY
Qty: 90 TABLET | Refills: 1 | Status: SHIPPED | OUTPATIENT
Start: 2021-06-03 | End: 2021-10-26

## 2021-06-03 RX ORDER — LOVASTATIN 40 MG/1
TABLET ORAL
Qty: 90 TABLET | Refills: 1 | Status: SHIPPED | OUTPATIENT
Start: 2021-06-03 | End: 2021-10-26

## 2021-06-03 RX ORDER — FUROSEMIDE 20 MG/1
20 TABLET ORAL DAILY
Qty: 90 TABLET | Refills: 1 | Status: SHIPPED | OUTPATIENT
Start: 2021-06-03 | End: 2021-10-26

## 2021-06-13 ASSESSMENT — ENCOUNTER SYMPTOMS
SHORTNESS OF BREATH: 0
ABDOMINAL PAIN: 0
SORE THROAT: 0
WHEEZING: 0
NAUSEA: 0

## 2021-07-09 ENCOUNTER — APPOINTMENT (OUTPATIENT)
Dept: CT IMAGING | Age: 86
DRG: 314 | End: 2021-07-09
Payer: MEDICARE

## 2021-07-09 ENCOUNTER — APPOINTMENT (OUTPATIENT)
Dept: GENERAL RADIOLOGY | Age: 86
DRG: 314 | End: 2021-07-09
Payer: MEDICARE

## 2021-07-09 ENCOUNTER — HOSPITAL ENCOUNTER (INPATIENT)
Age: 86
LOS: 11 days | Discharge: NURSING, REHAB OR LONG-TERM CARE | DRG: 314 | End: 2021-07-22
Attending: EMERGENCY MEDICINE
Payer: MEDICARE

## 2021-07-09 DIAGNOSIS — J90 BILATERAL PLEURAL EFFUSION: ICD-10-CM

## 2021-07-09 DIAGNOSIS — Z79.2 LONG TERM (CURRENT) USE OF ANTIBIOTICS: ICD-10-CM

## 2021-07-09 DIAGNOSIS — R53.1 GENERALIZED WEAKNESS: ICD-10-CM

## 2021-07-09 DIAGNOSIS — B95.5 BACTEREMIA DUE TO STREPTOCOCCUS: ICD-10-CM

## 2021-07-09 DIAGNOSIS — R78.81 BACTEREMIA DUE TO STREPTOCOCCUS: ICD-10-CM

## 2021-07-09 DIAGNOSIS — Z79.899 LONG TERM CURRENT USE OF DIURETIC: ICD-10-CM

## 2021-07-09 DIAGNOSIS — R77.8 TROPONIN I ABOVE REFERENCE RANGE: Primary | ICD-10-CM

## 2021-07-09 LAB
ALBUMIN SERPL-MCNC: 3.7 GM/DL (ref 3.4–5)
ALP BLD-CCNC: 134 IU/L (ref 40–128)
ALT SERPL-CCNC: 15 U/L (ref 10–40)
ANION GAP SERPL CALCULATED.3IONS-SCNC: 11 MMOL/L (ref 4–16)
AST SERPL-CCNC: 31 IU/L (ref 15–37)
BACTERIA: NEGATIVE /HPF
BASOPHILS ABSOLUTE: 0 K/CU MM
BASOPHILS RELATIVE PERCENT: 0.2 % (ref 0–1)
BILIRUB SERPL-MCNC: 0.9 MG/DL (ref 0–1)
BILIRUBIN URINE: NEGATIVE MG/DL
BLOOD, URINE: ABNORMAL
BUN BLDV-MCNC: 31 MG/DL (ref 6–23)
CALCIUM SERPL-MCNC: 9.6 MG/DL (ref 8.3–10.6)
CHLORIDE BLD-SCNC: 105 MMOL/L (ref 99–110)
CLARITY: CLEAR
CO2: 25 MMOL/L (ref 21–32)
COLOR: YELLOW
CREAT SERPL-MCNC: 0.9 MG/DL (ref 0.6–1.1)
DIFFERENTIAL TYPE: ABNORMAL
EKG ATRIAL RATE: 79 BPM
EKG DIAGNOSIS: NORMAL
EKG P AXIS: 58 DEGREES
EKG P-R INTERVAL: 122 MS
EKG Q-T INTERVAL: 436 MS
EKG QRS DURATION: 128 MS
EKG QTC CALCULATION (BAZETT): 499 MS
EKG R AXIS: -39 DEGREES
EKG T AXIS: 50 DEGREES
EKG VENTRICULAR RATE: 79 BPM
EOSINOPHILS ABSOLUTE: 0 K/CU MM
EOSINOPHILS RELATIVE PERCENT: 0 % (ref 0–3)
GFR AFRICAN AMERICAN: >60 ML/MIN/1.73M2
GFR NON-AFRICAN AMERICAN: 59 ML/MIN/1.73M2
GLUCOSE BLD-MCNC: 108 MG/DL (ref 70–99)
GLUCOSE, URINE: NEGATIVE MG/DL
HCT VFR BLD CALC: 35.1 % (ref 37–47)
HEMOGLOBIN: 11 GM/DL (ref 12.5–16)
IMMATURE NEUTROPHIL %: 0.7 % (ref 0–0.43)
KETONES, URINE: ABNORMAL MG/DL
LACTATE: 1.2 MMOL/L (ref 0.4–2)
LEUKOCYTE ESTERASE, URINE: ABNORMAL
LYMPHOCYTES ABSOLUTE: 0.3 K/CU MM
LYMPHOCYTES RELATIVE PERCENT: 2.7 % (ref 24–44)
MCH RBC QN AUTO: 30.1 PG (ref 27–31)
MCHC RBC AUTO-ENTMCNC: 31.3 % (ref 32–36)
MCV RBC AUTO: 95.9 FL (ref 78–100)
MONOCYTES ABSOLUTE: 1.1 K/CU MM
MONOCYTES RELATIVE PERCENT: 8.5 % (ref 0–4)
MUCUS: ABNORMAL HPF
NITRITE URINE, QUANTITATIVE: NEGATIVE
NUCLEATED RBC %: 0 %
PDW BLD-RTO: 14.7 % (ref 11.7–14.9)
PH, URINE: 7 (ref 5–8)
PLATELET # BLD: 171 K/CU MM (ref 140–440)
PMV BLD AUTO: 9.4 FL (ref 7.5–11.1)
POTASSIUM SERPL-SCNC: 4.6 MMOL/L (ref 3.5–5.1)
PRO-BNP: 6441 PG/ML
PROTEIN UA: NEGATIVE MG/DL
RBC # BLD: 3.66 M/CU MM (ref 4.2–5.4)
RBC URINE: 4 /HPF (ref 0–6)
SARS-COV-2, NAAT: NOT DETECTED
SEGMENTED NEUTROPHILS ABSOLUTE COUNT: 11 K/CU MM
SEGMENTED NEUTROPHILS RELATIVE PERCENT: 87.9 % (ref 36–66)
SODIUM BLD-SCNC: 141 MMOL/L (ref 135–145)
SOURCE: NORMAL
SPECIFIC GRAVITY UA: 1.02 (ref 1–1.03)
SQUAMOUS EPITHELIAL: 1 /HPF
TOTAL CK: 581 IU/L (ref 26–140)
TOTAL IMMATURE NEUTOROPHIL: 0.09 K/CU MM
TOTAL NUCLEATED RBC: 0 K/CU MM
TOTAL PROTEIN: 6.6 GM/DL (ref 6.4–8.2)
TRICHOMONAS: ABNORMAL /HPF
TROPONIN T: 0.01 NG/ML
UROBILINOGEN, URINE: NEGATIVE MG/DL (ref 0.2–1)
WBC # BLD: 12.5 K/CU MM (ref 4–10.5)
WBC UA: 1 /HPF (ref 0–5)

## 2021-07-09 PROCEDURE — 87635 SARS-COV-2 COVID-19 AMP PRB: CPT

## 2021-07-09 PROCEDURE — 6370000000 HC RX 637 (ALT 250 FOR IP): Performed by: INTERNAL MEDICINE

## 2021-07-09 PROCEDURE — 87077 CULTURE AEROBIC IDENTIFY: CPT

## 2021-07-09 PROCEDURE — 81001 URINALYSIS AUTO W/SCOPE: CPT

## 2021-07-09 PROCEDURE — 82550 ASSAY OF CK (CPK): CPT

## 2021-07-09 PROCEDURE — 80053 COMPREHEN METABOLIC PANEL: CPT

## 2021-07-09 PROCEDURE — 92610 EVALUATE SWALLOWING FUNCTION: CPT

## 2021-07-09 PROCEDURE — 74177 CT ABD & PELVIS W/CONTRAST: CPT

## 2021-07-09 PROCEDURE — 84484 ASSAY OF TROPONIN QUANT: CPT

## 2021-07-09 PROCEDURE — 6360000002 HC RX W HCPCS: Performed by: INTERNAL MEDICINE

## 2021-07-09 PROCEDURE — 99285 EMERGENCY DEPT VISIT HI MDM: CPT

## 2021-07-09 PROCEDURE — 93005 ELECTROCARDIOGRAM TRACING: CPT | Performed by: EMERGENCY MEDICINE

## 2021-07-09 PROCEDURE — 83605 ASSAY OF LACTIC ACID: CPT

## 2021-07-09 PROCEDURE — 96372 THER/PROPH/DIAG INJ SC/IM: CPT

## 2021-07-09 PROCEDURE — 70450 CT HEAD/BRAIN W/O DYE: CPT

## 2021-07-09 PROCEDURE — 87150 DNA/RNA AMPLIFIED PROBE: CPT

## 2021-07-09 PROCEDURE — 71260 CT THORAX DX C+: CPT

## 2021-07-09 PROCEDURE — 2580000003 HC RX 258: Performed by: INTERNAL MEDICINE

## 2021-07-09 PROCEDURE — 85025 COMPLETE CBC W/AUTO DIFF WBC: CPT

## 2021-07-09 PROCEDURE — 87040 BLOOD CULTURE FOR BACTERIA: CPT

## 2021-07-09 PROCEDURE — 6370000000 HC RX 637 (ALT 250 FOR IP): Performed by: EMERGENCY MEDICINE

## 2021-07-09 PROCEDURE — 87086 URINE CULTURE/COLONY COUNT: CPT

## 2021-07-09 PROCEDURE — 36415 COLL VENOUS BLD VENIPUNCTURE: CPT

## 2021-07-09 PROCEDURE — 99223 1ST HOSP IP/OBS HIGH 75: CPT | Performed by: INTERNAL MEDICINE

## 2021-07-09 PROCEDURE — 93010 ELECTROCARDIOGRAM REPORT: CPT | Performed by: INTERNAL MEDICINE

## 2021-07-09 PROCEDURE — 6360000004 HC RX CONTRAST MEDICATION: Performed by: EMERGENCY MEDICINE

## 2021-07-09 PROCEDURE — 2580000003 HC RX 258: Performed by: EMERGENCY MEDICINE

## 2021-07-09 PROCEDURE — 71045 X-RAY EXAM CHEST 1 VIEW: CPT

## 2021-07-09 PROCEDURE — G0378 HOSPITAL OBSERVATION PER HR: HCPCS

## 2021-07-09 PROCEDURE — 87186 SC STD MICRODIL/AGAR DIL: CPT

## 2021-07-09 PROCEDURE — 83880 ASSAY OF NATRIURETIC PEPTIDE: CPT

## 2021-07-09 RX ORDER — METOPROLOL SUCCINATE 25 MG/1
25 TABLET, EXTENDED RELEASE ORAL DAILY
Status: DISCONTINUED | OUTPATIENT
Start: 2021-07-10 | End: 2021-07-23 | Stop reason: HOSPADM

## 2021-07-09 RX ORDER — SODIUM CHLORIDE 0.9 % (FLUSH) 0.9 %
10 SYRINGE (ML) INJECTION PRN
Status: DISCONTINUED | OUTPATIENT
Start: 2021-07-09 | End: 2021-07-23 | Stop reason: HOSPADM

## 2021-07-09 RX ORDER — 0.9 % SODIUM CHLORIDE 0.9 %
500 INTRAVENOUS SOLUTION INTRAVENOUS ONCE
Status: COMPLETED | OUTPATIENT
Start: 2021-07-09 | End: 2021-07-09

## 2021-07-09 RX ORDER — SODIUM CHLORIDE 0.9 % (FLUSH) 0.9 %
5-40 SYRINGE (ML) INJECTION PRN
Status: DISCONTINUED | OUTPATIENT
Start: 2021-07-09 | End: 2021-07-23 | Stop reason: HOSPADM

## 2021-07-09 RX ORDER — POLYETHYLENE GLYCOL 3350 17 G/17G
17 POWDER, FOR SOLUTION ORAL DAILY PRN
Status: DISCONTINUED | OUTPATIENT
Start: 2021-07-09 | End: 2021-07-23 | Stop reason: HOSPADM

## 2021-07-09 RX ORDER — ACETAMINOPHEN 650 MG/1
650 SUPPOSITORY RECTAL EVERY 6 HOURS PRN
Status: DISCONTINUED | OUTPATIENT
Start: 2021-07-09 | End: 2021-07-23 | Stop reason: HOSPADM

## 2021-07-09 RX ORDER — ATORVASTATIN CALCIUM 10 MG/1
10 TABLET, FILM COATED ORAL DAILY
Status: CANCELLED | OUTPATIENT
Start: 2021-07-09

## 2021-07-09 RX ORDER — ASPIRIN 81 MG/1
81 TABLET, CHEWABLE ORAL DAILY
Status: DISCONTINUED | OUTPATIENT
Start: 2021-07-10 | End: 2021-07-23 | Stop reason: HOSPADM

## 2021-07-09 RX ORDER — ONDANSETRON 2 MG/ML
4 INJECTION INTRAMUSCULAR; INTRAVENOUS EVERY 6 HOURS PRN
Status: DISCONTINUED | OUTPATIENT
Start: 2021-07-09 | End: 2021-07-23 | Stop reason: HOSPADM

## 2021-07-09 RX ORDER — ACETAMINOPHEN 325 MG/1
650 TABLET ORAL ONCE
Status: COMPLETED | OUTPATIENT
Start: 2021-07-09 | End: 2021-07-09

## 2021-07-09 RX ORDER — ONDANSETRON 4 MG/1
4 TABLET, ORALLY DISINTEGRATING ORAL EVERY 8 HOURS PRN
Status: DISCONTINUED | OUTPATIENT
Start: 2021-07-09 | End: 2021-07-23 | Stop reason: HOSPADM

## 2021-07-09 RX ORDER — FLUTICASONE PROPIONATE 50 MCG
1 SPRAY, SUSPENSION (ML) NASAL DAILY
Status: DISCONTINUED | OUTPATIENT
Start: 2021-07-09 | End: 2021-07-23 | Stop reason: HOSPADM

## 2021-07-09 RX ORDER — SODIUM CHLORIDE 9 MG/ML
25 INJECTION, SOLUTION INTRAVENOUS PRN
Status: DISCONTINUED | OUTPATIENT
Start: 2021-07-09 | End: 2021-07-23 | Stop reason: HOSPADM

## 2021-07-09 RX ORDER — M-VIT,TX,IRON,MINS/CALC/FOLIC 27MG-0.4MG
1 TABLET ORAL DAILY
Status: DISCONTINUED | OUTPATIENT
Start: 2021-07-10 | End: 2021-07-23 | Stop reason: HOSPADM

## 2021-07-09 RX ORDER — ACETAMINOPHEN 325 MG/1
650 TABLET ORAL EVERY 6 HOURS PRN
Status: DISCONTINUED | OUTPATIENT
Start: 2021-07-09 | End: 2021-07-23 | Stop reason: HOSPADM

## 2021-07-09 RX ORDER — SODIUM CHLORIDE 0.9 % (FLUSH) 0.9 %
5-40 SYRINGE (ML) INJECTION EVERY 12 HOURS SCHEDULED
Status: DISCONTINUED | OUTPATIENT
Start: 2021-07-09 | End: 2021-07-23 | Stop reason: HOSPADM

## 2021-07-09 RX ADMIN — ACETAMINOPHEN 650 MG: 325 TABLET ORAL at 20:59

## 2021-07-09 RX ADMIN — ENOXAPARIN SODIUM 40 MG: 40 INJECTION SUBCUTANEOUS at 19:52

## 2021-07-09 RX ADMIN — IOPAMIDOL 80 ML: 755 INJECTION, SOLUTION INTRAVENOUS at 13:04

## 2021-07-09 RX ADMIN — SODIUM CHLORIDE, PRESERVATIVE FREE 10 ML: 5 INJECTION INTRAVENOUS at 21:00

## 2021-07-09 RX ADMIN — FLUTICASONE PROPIONATE 1 SPRAY: 50 SPRAY, METERED NASAL at 19:52

## 2021-07-09 RX ADMIN — SODIUM CHLORIDE, PRESERVATIVE FREE 10 ML: 5 INJECTION INTRAVENOUS at 13:04

## 2021-07-09 RX ADMIN — ACETAMINOPHEN 650 MG: 325 TABLET ORAL at 11:35

## 2021-07-09 RX ADMIN — SODIUM CHLORIDE 500 ML: 9 INJECTION, SOLUTION INTRAVENOUS at 11:46

## 2021-07-09 ASSESSMENT — PAIN SCALES - GENERAL
PAINLEVEL_OUTOF10: 0
PAINLEVEL_OUTOF10: 0

## 2021-07-09 NOTE — CONSULTS
CARDIOLOGY CONSULT NOTE   Reason for consultation:  abnormal troponin    Referring physician:  No admitting provider for patient encounter. Primary care physician: Concepcion Hidalgo MD      Dear  Dr. Kye Camargo admitting provider for patient encounter. Thanks for the consult. Chief Complaints :  Chief Complaint   Patient presents with    Altered Mental Status    Fatigue    Fever        History of present illness:Juana is a 80 y. o.year old who presents due to profound weakness apparently she was sitting on a commode and could not get up family numbers found that she was extremely weak and tired she was brought in. She does have history of complete heart block s/p pacemaker and paroxysmal atrial fibrillation she also is noted to have aortic stenosis s/p Watson sapient valve with perivalvular leak placed in 2017  Denying any chest pain but she is a poor historian  Follows up with cardiology Dr. Mecca Montalvo at [facility]  Her CK levels are noted to be elevated associated mildly elevated troponin level this x-ray is concerning for pulmonary congestion, echo shows preserved EF with LVH in 2018    Past medical history:    has no past medical history on file. Past surgical history:   has a past surgical history that includes Aortic valve surgery (06/29/2017); pacemaker placement (06/29/2017); and brain surgery (11/27/2017). Social History:   reports that she has never smoked.  She has never used smokeless tobacco.  Family history:   no family history of CAD, STROKE of DM at early age    Allergies   Allergen Reactions    Alendronate Other (See Comments)    Alendronate Sodium Other (See Comments)    Ibandronic Acid Other (See Comments)    Milk-Related Compounds        sodium chloride flush 0.9 % injection 10 mL, PRN      Current Facility-Administered Medications   Medication Dose Route Frequency Provider Last Rate Last Admin    sodium chloride flush 0.9 % injection 10 mL  10 mL Intravenous PRN Jeanine Singleton, MD   10 mL at 07/09/21 1304     Current Outpatient Medications   Medication Sig Dispense Refill    furosemide (LASIX) 20 MG tablet Take 1 tablet by mouth daily 90 tablet 1    potassium chloride (KLOR-CON M) 20 MEQ extended release tablet Take 1 tablet by mouth daily 90 tablet 1    lovastatin (MEVACOR) 40 MG tablet One daily 90 tablet 1    Calcium Carb-Cholecalciferol (OYSTER SHELL CALCIUM) 500-400 MG-UNIT TABS Calcium 500 + D 500 mg (1,250 mg)-400 unit tablet   Take by oral route.  senna (SENOKOT) 8.6 MG tablet Take 1 tablet by mouth daily      metoprolol succinate (TOPROL XL) 25 MG extended release tablet Take 1 tablet by mouth daily 90 tablet 1    fluticasone (FLONASE) 50 MCG/ACT nasal spray USE 2 SPRAYS IN EACH  NOSTRIL DAILY 3 Bottle 1    aspirin 81 MG tablet Take 81 mg by mouth daily      ferrous sulfate 325 (65 Fe) MG tablet Take 325 mg by mouth daily (with breakfast)      Multiple Vitamins-Minerals (MULTIVITAMIN ADULT PO) Take 1 tablet by mouth daily      Cholecalciferol (VITAMIN D3) 5000 units TABS Take 1 tablet by mouth daily       Review of Systems:   · Constitutional: No Fever or Weight Loss   · Eyes: No Decreased Vision  · ENT: No Headaches, Hearing Loss or Vertigo  · Cardiovascular: As per HPI  · Respiratory: As per HPI  · Gastrointestinal: No abdominal pain, appetite loss, blood in stools, constipation, diarrhea or heartburn  · Genitourinary: No dysuria, trouble voiding, or hematuria  · Musculoskeletal:  No gait disturbance, weakness or joint complaints  · Integumentary: No rash or pruritis  · Neurological: No TIA or stroke symptoms  · Psychiatric: No anxiety or depression  · Endocrine: No malaise, fatigue or temperature intolerance  · Hematologic/Lymphatic: No bleeding problems, blood clots or swollen lymph nodes  · Allergic/Immunologic: No nasal congestion or hives  All systems negative except as marked.         Physical Examination:    Vitals:    07/09/21 1408 07/09/21 1409 07/09/21 1410 07/09/21 1411   BP:       Pulse: 75 73 74 65   Resp:       Temp:       TempSrc:       SpO2: 95% 94% 95% 95%   Weight:       Height:           General Appearance:  No distress, conversant    Constitutional:  Well developed, Well nourished, No acute distress, Non-toxic appearance. HENT:  Normocephalic, Atraumatic, Bilateral external ears normal, Oropharynx moist, No oral exudates, Nose normal. Neck- Normal range of motion, No tenderness, Supple, No stridor,no apical-carotid delay  Lymphatics : no palpable lymph nodes  Eyes:  PERRL, EOMI, Conjunctiva normal, No discharge. Respiratory:  Normal breath sounds, No respiratory distress, No wheezing, No chest tenderness. ,no use of accessory muscles, crackles Present  Cardiovascular: (PMI) apex non displaced,no lifts no thrills, ankle swelling Present , 1+, s1 and s2 audible,Murmur. Present, JVD not noted    Abdomen /GI:  Bowel sounds normal, Soft, No tenderness, No masses, No gross visceromegaly   :  No costovertebral angle tenderness   Musculoskeletal:  No edema, no tenderness, no deformities.  Back- no tenderness  Integument:  Well hydrated, no rash   Lymphatic:  No lymphadenopathy noted   Neurologic:  Alert & oriented x 3, CN 2-12 normal, normal motor function, normal sensory function, no focal deficits noted           Medical decision making and Data review:    Lab Review   Recent Labs     07/09/21  1114   WBC 12.5*   HGB 11.0*   HCT 35.1*         Recent Labs     07/09/21  1114      K 4.6      CO2 25   BUN 31*   CREATININE 0.9     Recent Labs     07/09/21  1114   AST 31   ALT 15   BILITOT 0.9   ALKPHOS 134*     Recent Labs     07/09/21  1114   TROPONINT 0.015*       Recent Labs     07/09/21  1114   PROBNP 6,441*     No results found for: INR, PROTIME    EKG: (reviewed by myself)    ECHO:(reviewed by myself)    Chest Xray:(reviewed by myself)  CT HEAD WO CONTRAST    Result Date: 7/9/2021  EXAMINATION: CT OF THE HEAD WITHOUT CONTRAST  7/9/2021 12:51 pm TECHNIQUE: CT of the head was performed without the administration of intravenous contrast. Dose modulation, iterative reconstruction, and/or weight based adjustment of the mA/kV was utilized to reduce the radiation dose to as low as reasonably achievable. COMPARISON: None. HISTORY: ORDERING SYSTEM PROVIDED HISTORY: generalized weakness, ams TECHNOLOGIST PROVIDED HISTORY: Reason for exam:->generalized weakness, ams Has a \"code stroke\" or \"stroke alert\" been called? ->No Decision Support Exception - unselect if not a suspected or confirmed emergency medical condition->Emergency Medical Condition (MA) Reason for Exam: generalized weakness, ams Acuity: Acute Type of Exam: Initial FINDINGS: BRAIN/VENTRICLES: There is no acute intracranial hemorrhage, mass effect or midline shift. No abnormal extra-axial fluid collection. The gray-white differentiation is maintained without evidence of an acute infarct. There is no evidence of hydrocephalus. Periventricular white matter low densities most likely due to chronic microvascular ischemia. Bilateral basal ganglia calcifications are present. ORBITS: The visualized portion of the orbits demonstrate no acute abnormality. SINUSES: The visualized paranasal sinuses and mastoid air cells demonstrate no acute abnormality. SOFT TISSUES/SKULL:  2 separate carter hole defects of the left-sided calvarium noted. No acute osseous findings. No acute intracranial abnormality. CT CHEST W CONTRAST    Result Date: 7/9/2021  EXAMINATION: CT OF THE CHEST WITH CONTRAST; CT OF THE ABDOMEN AND PELVIS WITH CONTRAST 7/9/2021 12:52 pm TECHNIQUE: CT of the chest was performed with the administration of intravenous contrast. Multiplanar reformatted images are provided for review.  Dose modulation, iterative reconstruction, and/or weight based adjustment of the mA/kV was utilized to reduce the radiation dose to as low as reasonably achievable.; CT of the abdomen and pelvis was performed with the administration of intravenous contrast. Multiplanar reformatted images are provided for review. Dose modulation, iterative reconstruction, and/or weight based adjustment of the mA/kV was utilized to reduce the radiation dose to as low as reasonably achievable. COMPARISON: 11/14/2017 HISTORY: ORDERING SYSTEM PROVIDED HISTORY: ?ptx on cxr, generalized weakness, fever TECHNOLOGIST PROVIDED HISTORY: Reason for exam:->?ptx on cxr, generalized weakness, fever Decision Support Exception - unselect if not a suspected or confirmed emergency medical condition->Emergency Medical Condition (MA) Reason for Exam: ?ptx on cxr, generalized weakness, fever Acuity: Acute Type of Exam: Initial; ORDERING SYSTEM PROVIDED HISTORY: fever, AMS, generalized weakness TECHNOLOGIST PROVIDED HISTORY: Reason for exam:->fever, AMS, generalized weakness Additional Contrast?->None Decision Support Exception - unselect if not a suspected or confirmed emergency medical condition->Emergency Medical Condition (MA) Reason for Exam: fever, AMS, generalized weakness Acuity: Acute Type of Exam: Initial Chest: Mediastinum: No acute abnormality of the aorta or other mediastinal structures. No pericardial effusion. Normal size lymph nodes, no evidence of adenopathy. Lungs/pleura: Small bilateral dependent pleural effusions with adjacent compressive atelectasis. No pneumothorax. Detailed lung evaluation limited due to motion. No consolidation Soft Tissues/Bones: No acute fracture. No acute findings of the chest wall. Extensive degenerative changes of the thoracic spine. No abscess of the soft tissues of the chest wall. Abdomen/Pelvis: Organs: No acute findings of the organs throughout the abdomen. No evidence of pancreatitis or pyelonephritis. No ureteral stone or hydronephrosis. Normal appearance of the gallbladder. No acute findings of the liver. No evidence of biliary dilation/obstruction. 1.9 cm cyst at the dome incidentally noted. Detailed evaluation of the organs limited due to motion. Cyst of the central right kidney measuring 4 cm. Several low-density lesions of the spleen measuring 1.4 cm and less most likely cysts or hemangiomata. GI/Bowel: No obstruction or other acute findings. No evidence diverticulitis or colitis. There is a large amount of colonic diverticulosis but no evidence of diverticulitis. Pelvis: No evidence of cystitis. Bladder appears normal. Peritoneum/Retroperitoneum: No ascites or free air. No abscess. Bones/Soft Tissues: No acute fracture of the abdomen and pelvis. No abscess. Chest: Small bilateral dependent pleural effusions mild adjacent atelectasis. No evidence of acute process otherwise. Abdomen/pelvis: No evidence of acute process. CT ABDOMEN PELVIS W IV CONTRAST Additional Contrast? None    Result Date: 7/9/2021  EXAMINATION: CT OF THE CHEST WITH CONTRAST; CT OF THE ABDOMEN AND PELVIS WITH CONTRAST 7/9/2021 12:52 pm TECHNIQUE: CT of the chest was performed with the administration of intravenous contrast. Multiplanar reformatted images are provided for review. Dose modulation, iterative reconstruction, and/or weight based adjustment of the mA/kV was utilized to reduce the radiation dose to as low as reasonably achievable.; CT of the abdomen and pelvis was performed with the administration of intravenous contrast. Multiplanar reformatted images are provided for review. Dose modulation, iterative reconstruction, and/or weight based adjustment of the mA/kV was utilized to reduce the radiation dose to as low as reasonably achievable.  COMPARISON: 11/14/2017 HISTORY: ORDERING SYSTEM PROVIDED HISTORY: ?ptx on cxr, generalized weakness, fever TECHNOLOGIST PROVIDED HISTORY: Reason for exam:->?ptx on cxr, generalized weakness, fever Decision Support Exception - unselect if not a suspected or confirmed emergency medical condition->Emergency Medical Condition (MA) Reason for Exam: ?ptx on cxr, generalized weakness, fever Acuity: Acute Type of Exam: Initial; ORDERING SYSTEM PROVIDED HISTORY: fever, AMS, generalized weakness TECHNOLOGIST PROVIDED HISTORY: Reason for exam:->fever, AMS, generalized weakness Additional Contrast?->None Decision Support Exception - unselect if not a suspected or confirmed emergency medical condition->Emergency Medical Condition (MA) Reason for Exam: fever, AMS, generalized weakness Acuity: Acute Type of Exam: Initial Chest: Mediastinum: No acute abnormality of the aorta or other mediastinal structures. No pericardial effusion. Normal size lymph nodes, no evidence of adenopathy. Lungs/pleura: Small bilateral dependent pleural effusions with adjacent compressive atelectasis. No pneumothorax. Detailed lung evaluation limited due to motion. No consolidation Soft Tissues/Bones: No acute fracture. No acute findings of the chest wall. Extensive degenerative changes of the thoracic spine. No abscess of the soft tissues of the chest wall. Abdomen/Pelvis: Organs: No acute findings of the organs throughout the abdomen. No evidence of pancreatitis or pyelonephritis. No ureteral stone or hydronephrosis. Normal appearance of the gallbladder. No acute findings of the liver. No evidence of biliary dilation/obstruction. 1.9 cm cyst at the dome incidentally noted. Detailed evaluation of the organs limited due to motion. Cyst of the central right kidney measuring 4 cm. Several low-density lesions of the spleen measuring 1.4 cm and less most likely cysts or hemangiomata. GI/Bowel: No obstruction or other acute findings. No evidence diverticulitis or colitis. There is a large amount of colonic diverticulosis but no evidence of diverticulitis. Pelvis: No evidence of cystitis. Bladder appears normal. Peritoneum/Retroperitoneum: No ascites or free air. No abscess. Bones/Soft Tissues: No acute fracture of the abdomen and pelvis. No abscess.      Chest: Small bilateral dependent pleural effusions mild in 2018 with TAVR Watson valve  DVT prophylaxis if no contraindication  6. Dyslipidemia: continue statins           Thank you  much for consult and giving us the opportunity in contributing in the care of this patient. Please feel free to call me for any questions.        Renuka Hewitt MD, 7/9/2021 5:32 PM

## 2021-07-09 NOTE — ED NOTES
Bed: ED-30  Expected date:   Expected time:   Means of arrival:   Comments:  CVA alert     Oni Mark RN  07/09/21 0173

## 2021-07-09 NOTE — ED NOTES
Called to order pt food and didn't get an answer 3x in the last hour.       Fadumo West RN  07/09/21 6255

## 2021-07-09 NOTE — PROGRESS NOTES
Speech Language Pathology  Facility/Department: [id number] [facility]   CLINICAL BEDSIDE SWALLOW EVALUATION    NAME: Dante Blanca  : 5/15/1929  MRN: [id number]     IMPRESSIONS: Dante Blanca was referred for a bedside swallowing evaluation following admission to [address] [place] [address] for general weakness and AMS. No medical hx found in chart, per daughter pt has hx of CVA with residual aphasia. Speech/language/cognition were functional for completion of assessment. Pt was evaluated seated upright in bed, alert and cooperative. Oral mechanism examination was WFL/no asymmetry. Pt accepted PO trials of thin liquids by cup/straw, puree and regular solids. [facility] characterized by adequate mastication, oral clearance and AP transit. Pharyngeal stage appears WFL with no s/s of aspiration across PO trials. Recommend regular diet/thin liquids. Results/recommendations discussed with pt and pt's daughter who indicated understanding. No further SLP needs identified at this time. ADMISSION DATE: 2021  ADMITTING DIAGNOSIS: has Essential hypertension; Other hyperlipidemia; Cardiac pacemaker; Congestive heart failure ([address]  Cerebrovascular accident (CVA) due to vascular stenosis (HCC); CHB (complete heart block) ([address] and S/P AVR (aortic valve replacement) on their problem list.  ONSET DATE: this admission    Recent Chest Xray/CT of Chest: see chart    Date of Eval: 2021  Evaluating Therapist: Jackelin Burton    Current Diet level:  Current Diet : NPO  Current Liquid Diet : NPO      Primary Complaint  Patient Complaint: denies symptoms of dysphagia    Pain:  Pain Assessment  Pain Level: 0    Reason for Referral  Dante Blanca was referred for a bedside swallow evaluation to assess the efficiency of her swallow function, identify signs and symptoms of aspiration and make recommendations regarding safe dietary consistencies, effective compensatory strategies, and safe eating environment. Impression  Dysphagia Diagnosis: Swallow function appears grossly intact  Dysphagia Outcome Severity Scale: Level 6: Within functional limits/Modified independence     Treatment Plan  Requires SLP Intervention: No  Duration/Frequency of Treatment: n/a  D/C Recommendations: To be determined       Recommended Diet and Intervention  Diet Solids Recommendation: Regular  Liquid Consistency Recommendation: Thin  Recommended Form of Meds: PO          Compensatory Swallowing Strategies       Treatment/Goals  Short-term Goals  Timeframe for Short-term Goals: n/a    General  Chart Reviewed: Yes  Behavior/Cognition: Alert; Cooperative  Respiratory Status: Room air  O2 Device: None (Room air)  Communication Observation: Aphasia (per daughter report)  Follows Directions: Simple  Dentition: Adequate  Patient Positioning: Upright in bed  Baseline Vocal Quality: Normal  Prior Dysphagia History: none known prior to admission  Consistencies Administered: Reg solid; Dysphagia Pureed (Dysphagia I); Thin - straw; Thin - cup           Vision/Hearing  Vision  Vision: Impaired  Vision Exceptions: Wears glasses at all times  Hearing  Hearing: Exceptions to [facility]  Hearing Exceptions: Hard of hearing/hearing concerns ([place] vs auditory comprehension deficits)    Oral Motor Deficits  Oral/Motor  Oral Motor: Within functional limits    Oral Phase Dysfunction  Oral Phase  Oral Phase: WFL     Indicators of Pharyngeal Phase Dysfunction   Pharyngeal Phase  Pharyngeal Phase: WFL    Prognosis  Individuals consulted  Consulted and agree with results and recommendations: Patient; Family member  Family member consulted: daughter    Education  Patient Education: results/recommendations  Patient Education Response: Verbalizes understanding  Safety Devices in place: Yes  Type of devices:  All fall risk precautions in place       Therapy Time  SLP Individual Minutes  Time In: 1200  Time Out: 9222  Minutes: 15 Concha Rich SLP student  7/9/2021 1:17 PM

## 2021-07-09 NOTE — ED NOTES
[place], lactic,type screen and 1st set of cultures drawn on patient.      Papito Nieto  07/09/21 1127

## 2021-07-09 NOTE — ED PROVIDER NOTES
Disease Father     Heart Attack Father     Cancer Other      Social History     Socioeconomic History    Marital status:      Spouse name: Not on file    Number of children: Not on file    Years of education: Not on file    Highest education level: Not on file   Occupational History    Not on file   Tobacco Use    Smoking status: Never Smoker    Smokeless tobacco: Never Used   Substance and Sexual Activity    Alcohol use: Not on file    Drug use: Not on file    Sexual activity: Not on file   Other Topics Concern    Not on file   Social History Narrative    Not on file     Social Determinants of Health     Financial Resource Strain:     Difficulty of Paying Living Expenses:    Food Insecurity:     Worried About Running Out of Food in the Last Year:     [address] in the Last Year:    Transportation Needs:     Lack of Transportation (Medical):      Lack of Transportation (Non-Medical):    Physical Activity:     Days of Exercise per Week:     Minutes of Exercise per Session:    Stress:     Feeling of Stress :    Social Connections:     Frequency of Communication with Friends and Family:     Frequency of Social Gatherings with Friends and Family:     Attends Sikh Services:     Active Member of Clubs or Organizations:     Attends Club or Organization Meetings:     Marital Status:    Intimate Partner Violence:     Fear of Current or Ex-Partner:     Emotionally Abused:     Physically Abused:     Sexually Abused:      Current Facility-Administered Medications   Medication Dose Route Frequency Provider Last Rate Last Admin    sodium chloride flush 0.9 % injection 10 mL  10 mL Intravenous PRN Chata Townsend MD   10 mL at 07/09/21 1304     Current Outpatient Medications   Medication Sig Dispense Refill    furosemide (LASIX) 20 MG tablet Take 1 tablet by mouth daily 90 tablet 1    potassium chloride (KLOR-CON M) 20 MEQ extended release tablet Take 1 tablet by mouth daily 90 tablet 1    lovastatin (MEVACOR) 40 MG tablet One daily 90 tablet 1    Calcium Carb-Cholecalciferol (OYSTER SHELL CALCIUM) 500-400 MG-UNIT TABS Calcium 500 + D 500 mg (1,250 mg)-400 unit tablet   Take by oral route.  senna (SENOKOT) 8.6 MG tablet Take 1 tablet by mouth daily      metoprolol succinate (TOPROL XL) 25 MG extended release tablet Take 1 tablet by mouth daily 90 tablet 1    fluticasone (FLONASE) 50 MCG/ACT nasal spray USE 2 SPRAYS IN EACH  NOSTRIL DAILY 3 Bottle 1    aspirin 81 MG tablet Take 81 mg by mouth daily      ferrous sulfate 325 (65 Fe) MG tablet Take 325 mg by mouth daily (with breakfast)      Multiple Vitamins-Minerals (MULTIVITAMIN ADULT PO) Take 1 tablet by mouth daily      Cholecalciferol (VITAMIN D3) 5000 units TABS Take 1 tablet by mouth daily       Allergies   Allergen Reactions    Alendronate Other (See Comments)    Alendronate Sodium Other (See Comments)    Ibandronic Acid Other (See Comments)    Milk-Related Compounds        Nursing Notes Reviewed    Physical Exam:  ED Triage Vitals   Enc Vitals Group      BP       Pulse       Resp       Temp       Temp src       SpO2       Weight       Height       Head Circumference       Peak Flow       Pain Score       Pain Loc       Pain Edu? Excl. in [address]? My pulse ox interpretation is  normal    General appearance:  No acute distress. Appears stated age. Pleasant. Skin:  Warm. Dry. No diaphoresis. Eye:  Extraocular movements intact. Pupils are equal round react to light. Ears, nose, mouth and throat:  Oral mucosa moist   Neck:  Trachea midline. No meningismus. Extremity:  No swelling. Normal ROM     Heart:  Regular rate and rhythm, normal S1 & S2, systolic ejection murmur heard best along left sternal border. Perfusion:  Intact. Respiratory:  Lungs clear to auscultation bilaterally. Respirations nonlabored. Abdominal:  Normal bowel sounds. Soft. Nontender. Non distended.   Back:  No CVA tenderness to palpation     Neurological:  Alert and oriented times 3. No focal neuro deficits. NIH Stroke Scale     Interval: Baseline  Time: 2:03 PM  Person Administering Scale: Jeannie Singleton MD   Administer stroke scale items in the order listed. Record performance in each category after each subscale exam. Do not go back and change scores. Follow directions provided for each exam technique. Scores should reflect what the patient does, not what the clinician thinks the patient can do. The clinician should record answers while administering the exam and work quickly. Except where indicated, the patient should not be coached (i.e., repeated requests to patient to make a special effort). 1a  Level of consciousness: 0=alert; keenly responsive   1b. LOC questions:  0=Performs both tasks correctly   1c. LOC commands: 0=Performs both tasks correctly   2. Best Gaze: 0=normal   3. Visual: 0=No visual loss   4. Facial Palsy: 0=Normal symmetric movement   5a. Motor left arm: 0=No drift, limb holds 90 (or 45) degrees for full 10 seconds   5b. Motor right arm: 0=No drift, limb holds 90 (or 45) degrees for full 10 seconds   6a. motor left le=No drift, limb holds 90 (or 45) degrees for full 10 seconds   6b  Motor right le=No drift, limb holds 90 (or 45) degrees for full 10 seconds   7. Limb Ataxia: 0=Absent   8. Sensory: 0=Normal; no sensory loss   9. Best Language:  0=No aphasia, normal   10. Dysarthria: 0=Normal   11. Extinction and Inattention: 0=No abnormality   12.  Distal motor function: 0=Normal    Total:  0                 Psychiatric:  Appropriate    I have reviewed and interpreted all of the currently available lab results from this visit (if applicable):  Results for orders placed or performed during the hospital encounter of 21   COVID-19, Rapid    Specimen: Nasopharyngeal   Result Value Ref Range    Source UNKNOWN     SARS-CoV-2, NAAT NOT DETECTED NOT DETECTED   CBC auto diff   Result Value Ref Range    WBC 12.5 (H) 4.0 - 10.5 K/CU MM    RBC 3.66 (L) 4.2 - 5.4 M/CU MM    Hemoglobin 11.0 (L) 12.5 - 16.0 GM/DL    Hematocrit 35.1 (L) 37 - 47 %    MCV 95.9 78 - 100 FL    MCH 30.1 27 - 31 PG    MCHC 31.3 (L) 32.0 - 36.0 %    RDW 14.7 11.7 - 14.9 %    Platelets 671 389 - 169 K/CU MM    MPV 9.4 7.5 - 11.1 FL    Differential Type AUTOMATED DIFFERENTIAL     Segs Relative 87.9 (H) 36 - 66 %    Lymphocytes % 2.7 (L) 24 - 44 %    Monocytes % 8.5 (H) 0 - 4 %    Eosinophils % 0.0 0 - 3 %    Basophils % 0.2 0 - 1 %    Segs Absolute 11.0 K/CU MM    Lymphocytes Absolute 0.3 K/CU MM    Monocytes Absolute 1.1 K/CU MM    Eosinophils Absolute 0.0 K/CU MM    Basophils Absolute 0.0 K/CU MM    Nucleated RBC % 0.0 %    Total Nucleated RBC 0.0 K/CU MM    Total Immature Neutrophil 0.09 K/CU MM    Immature Neutrophil % 0.7 (H) 0 - 0.43 %   CMP   Result Value Ref Range    Sodium 141 135 - 145 MMOL/L    Potassium 4.6 3.5 - 5.1 MMOL/L    Chloride 105 99 - 110 mMol/L    CO2 25 21 - 32 MMOL/L    BUN 31 (H) 6 - 23 MG/DL    CREATININE 0.9 0.6 - 1.1 MG/DL    Glucose 108 (H) 70 - 99 MG/DL    Calcium 9.6 8.3 - 10.6 MG/DL    Albumin 3.7 3.4 - 5.0 GM/DL    Total Protein 6.6 6.4 - 8.2 GM/DL    Total Bilirubin 0.9 0.0 - 1.0 MG/DL    ALT 15 10 - 40 U/L    AST 31 15 - 37 IU/L    Alkaline Phosphatase 134 (H) 40 - 128 IU/L    GFR Non- 59 (L) >60 mL/min/1.73m2    GFR African American >60 >60 mL/min/1.73m2    Anion Gap 11 4 - 16   Lactic Acid, Plasma   Result Value Ref Range    Lactate 1.2 0.4 - 2.0 mMOL/L   Troponin   Result Value Ref Range    Troponin T 0.015 (H) <0.01 NG/ML   CK   Result Value Ref Range    Total  (H) 26 - 140 IU/L   Urinalysis with microscopic   Result Value Ref Range    Color, UA YELLOW YELLOW    Clarity, UA CLEAR CLEAR    Glucose, Urine NEGATIVE NEGATIVE MG/DL    Bilirubin Urine NEGATIVE NEGATIVE MG/DL    Ketones, Urine SMALL (A) NEGATIVE MG/DL    Specific Gravity, UA 1.021 1.001 - 1.035    Blood, Urine SMALL (A) NEGATIVE    pH, Urine 7.0 5.0 - 8.0    Protein, UA NEGATIVE NEGATIVE MG/DL    Urobilinogen, Urine NEGATIVE 0.2 - 1.0 MG/DL    Nitrite Urine, Quantitative NEGATIVE NEGATIVE    Leukocyte Esterase, Urine LARGE (A) NEGATIVE    RBC, UA 4 0 - 6 /HPF    WBC, UA 1 0 - 5 /HPF    Bacteria, UA NEGATIVE NEGATIVE /HPF    Squam Epithel, UA 1 /HPF    Mucus, UA RARE (A) NEGATIVE HPF    Trichomonas, UA NONE SEEN NONE SEEN /HPF   EKG 12 Lead   Result Value Ref Range    Ventricular Rate 79 BPM    Atrial Rate 79 BPM    P-R Interval 122 ms    QRS Duration 128 ms    Q-T Interval 436 ms    QTc Calculation (Bazett) 499 ms    P Axis 58 degrees    R Axis -39 degrees    T Axis 50 degrees    Diagnosis       Atrial-sensed ventricular-paced rhythm  Abnormal ECG  No previous ECGs available        Radiographs (if obtained):  [] The following radiograph was interpreted by myself in the absence of a radiologist:   [x] Radiologist's Report Reviewed:  CT ABDOMEN PELVIS W IV CONTRAST Additional Contrast? None   Final Result   Chest: Small bilateral dependent pleural effusions mild adjacent atelectasis. No evidence of acute process otherwise. Abdomen/pelvis: No evidence of acute process. CT CHEST W CONTRAST   Final Result   Chest: Small bilateral dependent pleural effusions mild adjacent atelectasis. No evidence of acute process otherwise. Abdomen/pelvis: No evidence of acute process. CT HEAD WO CONTRAST   Final Result   No acute intracranial abnormality. XR CHEST PORTABLE   Preliminary Result   1. Low lung volumes with pulmonary vascular congestion. 2. Suspected skin fold along the right lateral and upper chest. Underlying   pneumothorax cannot entirely be excluded. Follow-up radiographs are   recommended.                EKG (if obtained): (All EKG's are interpreted by myself in the absence of a cardiologist)  12 lead EKG per my interpretation:  Paced (AS-)  Axis is   Left axis deviation  QTc is slightly prolonged at 499. There is no specific T wave changes appreciated. There is no specific ST wave changes appreciated. No STEMI    Prior EKG to compare with was NOT available. Chart review shows recent radiographs:  No results found. MDM:  Pt presents as above. Emergent conditions considered. Presentation prompted initial immediate evaluation. Patient with NIH of 0 on arrival and code stroke is not pursued because of this. Patient is with generalized weakness and also with borderline fever I suspect metabolic process. EKG with atrial sensed V paced rhythm as above. CBC is with small leukocytosis and mild anemia. CMP is with mild hyperglycemia without anion gap metabolic acidosis. Lactic acid is not suggestive of significant tissue perfusion. CK elevated to 581 but not suggestive of rhabdomyolysis. Troponin is abnormal at 0.015 however patient's EKG is paced. Troponin will need to be trended. Urinalysis is not consistent with a urinary tract infection. CT imaging of patient's torso is negative for acute process but does demonstrate small bilateral dependent pleural effusions. CT head is negative for acute intracranial process. On reassessment patient reports that she feels much improved. Patient with no true fever here in the emergency department and is with an abnormal troponin. Given patient's age her generalized weakness may be secondary to underlying cardiac process. I did discuss the above with patient as well as patient's daughter and POA. They are amendable to pursuing further evaluation in the hospital.  Patient to be discussed with hospitalist and patient to be admitted to medicine. Questions sought and answered with the patient. They voice understanding and agree with plan. Clinical Impression:  1. Troponin I above reference range    2. Generalized weakness    3.  Bilateral pleural effusion      Disposition referral (if applicable):  No follow-up provider specified. Disposition medications (if applicable):  New Prescriptions    No medications on file       Comment: Please note this report has been produced using speech recognition software and may contain errors related to that system including errors in grammar, punctuation, and spelling, as well as words and phrases that may be inappropriate. If there are any questions or concerns please feel free to contact the dictating provider for clarification.        Lena Bianchi MD  07/09/21 2461

## 2021-07-09 NOTE — H&P
History and Physical      Name:  Dante Blanca /Age/Sex: 5/15/1929  (80 y.o. female)   MRN & CSN:  [id number] & [id number] Admission Date/Time: 2021 11:08 AM   Location:  ED30/ED-30 PCP: Parker Coker MD       Hospital Day: 1    Assessment and Plan:   Dante Blanca is a 80 y.o.  female  who presents with generalized weakness    1) Rhabdomyolysis; mild  -CK elevated  -Hold statin   -IVF not started at this point due to CT chest evidence of pleural effusion.   -Encouraged adequate oral intake     2) Generalized weakness  -Improved on exam  -PT/OT ordered    3) Elevated Troponin  -Not likely ACS  -Will trend    Other chronic medical condition; medication resumed unless contraindicated     Essential HTN  HLD  S/P PPM    Diet ADULT DIET; Regular; 3 carb choices (45 gm/meal); Low Sodium (2 gm)   DVT Prophylaxis [] Lovenox, []  Heparin, [] SCDs, [] Ambulation   GI Prophylaxis [] PPI,  [] H2 Blocker,  [] Carafate,  [] Diet/Tube Feeds   Code Status No Order   Disposition Patient requires continued admission due to weakness   MDM [] Low, [x] Moderate,[]  High  Patient's risk as above due to weakness     History of Present Illness:     Chief Complaint: <principal problem not specified>  Dante Blanca is a 80 y.o.  female  who presents with generalized weakness. Hx was obtained from patient and daughter. She was reportedly found sitting on the toilet seat and could not get up without help from daughter. Denied any chest pain, SOB, palpitations, dizziness, N/V/D. Patient is relatively independent and was in apparent good state of health yesterday. No focal weakness or speech abnormality reported. As a result, EMS was called. In the ED, CT head was negative for acute intracranial process.  Patient will be admitted for further treatment and possible rehab if indicated       Ten point ROS reviewed negative, unless as noted above    Objective:   No intake or output data in the 24 hours ending 07/09/21 1513   Vitals:   Vitals:    07/09/21 1411   BP:    Pulse: 65   Resp:    Temp:    SpO2: 95%     Physical Exam:   GEN Awake female, laying in bed in no apparent distress. Appears given age. EYES Pupils are equally round. No scleral erythema, discharge, or conjunctivitis. HENT Mucous membranes are moist. Oral pharynx without exudates, no evidence of thrush. NECK Supple, no apparent thyromegaly or masses. RESP Clear to auscultation, no wheezes, rales or rhonchi. Symmetric chest movement while on room air. CARDIO/VASC S1/S2 auscultated. Regular rate without appreciable murmurs, rubs, or gallops. No JVD or carotid bruits. Peripheral pulses equal bilaterally and palpable. No peripheral edema. GI Abdomen is soft without significant tenderness, masses, or guarding. Bowel sounds are normoactive. Rectal exam deferred.  No costovertebral angle tenderness. Goel catheter is not present. HEME/LYMPH No palpable cervical lymphadenopathy and no hepatosplenomegaly. No petechiae or ecchymoses. MSK No gross joint deformities. SKIN Normal coloration, warm, dry. NEURO Cranial nerves appear grossly intact, normal speech, no lateralizing weakness. PSYCH Awake, alert, oriented x 4. Affect appropriate. Past Medical History:    History reviewed. No pertinent past medical history. PSHX:  has a past surgical history that includes Aortic valve surgery (06/29/2017); pacemaker placement (06/29/2017); and brain surgery (11/27/2017). Allergies: Allergies   Allergen Reactions    Alendronate Other (See Comments)    Alendronate Sodium Other (See Comments)    Ibandronic Acid Other (See Comments)    Milk-Related Compounds        FAM HX: family history includes Cancer in an other family member; Heart Attack in her father; Heart Disease in her father. Soc HX:   Social History     Socioeconomic History    Marital status:       Spouse name: None    Number of children: None    Years of education: None    Highest education level: None   Occupational History    None   Tobacco Use    Smoking status: Never Smoker    Smokeless tobacco: Never Used   Substance and Sexual Activity    Alcohol use: None    Drug use: None    Sexual activity: None   Other Topics Concern    None   Social History Narrative    None     Social Determinants of Health     Financial Resource Strain:     Difficulty of Paying Living Expenses:    Food Insecurity:     Worried About Running Out of Food in the Last Year:     Ran Out of Food in the Last Year:    Transportation Needs:     Lack of Transportation (Medical):  Lack of Transportation (Non-Medical):    Physical Activity:     Days of Exercise per Week:     Minutes of Exercise per Session:    Stress:     Feeling of Stress :    Social Connections:     Frequency of Communication with Friends and Family:     Frequency of Social Gatherings with Friends and Family:     Attends Pentecostalism Services:     Active Member of Clubs or Organizations:     Attends Club or Organization Meetings:     Marital Status:    Intimate Partner Violence:     Fear of Current or Ex-Partner:     Emotionally Abused:     Physically Abused:     Sexually Abused:        Medications:   Medications:    Infusions:   PRN Meds: sodium chloride flush, 10 mL, PRN      Prior to Admission medications    Medication Sig Start Date End Date Taking? Authorizing Provider   furosemide (LASIX) 20 MG tablet Take 1 tablet by mouth daily 6/3/21   Ashley Gunter MD   potassium chloride (KLOR-CON M) 20 MEQ extended release tablet Take 1 tablet by mouth daily 6/3/21   Ashley Gunter MD   lovastatin (MEVACOR) 40 MG tablet One daily 6/3/21   Ashley Gunter MD   Calcium Carb-Cholecalciferol (OYSTER SHELL CALCIUM) 500-400 MG-UNIT TABS Calcium 500 + D 500 mg (1,250 mg)-400 unit tablet   Take by oral route.     Historical Provider, MD   senna (SENOKOT) 8.6 MG tablet Take 1 tablet by mouth daily    Historical Provider, MD metoprolol succinate (TOPROL XL) 25 MG extended release tablet Take 1 tablet by mouth daily 4/13/21   Louis Guillen MD   fluticasone [facility]) 50 MCG/ACT nasal spray USE 2 SPRAYS IN [facility]  NOSTRIL DAILY 4/13/21   Louis Guillen MD   aspirin 81 MG tablet Take 81 mg by mouth daily    Historical Provider, MD   ferrous sulfate 325 (65 Fe) MG tablet Take 325 mg by mouth daily (with breakfast)    Historical Provider, MD   Multiple Vitamins-Minerals (MULTIVITAMIN ADULT PO) Take 1 tablet by mouth daily    Historical Provider, MD   Cholecalciferol (VITAMIN D3) 5000 units TABS Take 1 tablet by mouth daily    Historical Provider, MD           Electronically signed by Kimberly Jeronimo MD on 7/9/2021 at 3:13 PM

## 2021-07-10 LAB
ANION GAP SERPL CALCULATED.3IONS-SCNC: 10 MMOL/L (ref 4–16)
BUN BLDV-MCNC: 29 MG/DL (ref 6–23)
CALCIUM SERPL-MCNC: 8.7 MG/DL (ref 8.3–10.6)
CHLORIDE BLD-SCNC: 104 MMOL/L (ref 99–110)
CO2: 25 MMOL/L (ref 21–32)
CREAT SERPL-MCNC: 1 MG/DL (ref 0.6–1.1)
CULTURE: NORMAL
GFR AFRICAN AMERICAN: >60 ML/MIN/1.73M2
GFR NON-AFRICAN AMERICAN: 52 ML/MIN/1.73M2
GLUCOSE BLD-MCNC: 91 MG/DL (ref 70–99)
Lab: NORMAL
POTASSIUM SERPL-SCNC: 4.3 MMOL/L (ref 3.5–5.1)
SODIUM BLD-SCNC: 139 MMOL/L (ref 135–145)
SPECIMEN: NORMAL
TOTAL CK: 2715 IU/L (ref 26–140)
TROPONIN T: 0.05 NG/ML

## 2021-07-10 PROCEDURE — 96372 THER/PROPH/DIAG INJ SC/IM: CPT

## 2021-07-10 PROCEDURE — 6360000002 HC RX W HCPCS: Performed by: INTERNAL MEDICINE

## 2021-07-10 PROCEDURE — 6370000000 HC RX 637 (ALT 250 FOR IP): Performed by: INTERNAL MEDICINE

## 2021-07-10 PROCEDURE — 2580000003 HC RX 258: Performed by: INTERNAL MEDICINE

## 2021-07-10 PROCEDURE — 99232 SBSQ HOSP IP/OBS MODERATE 35: CPT | Performed by: INTERNAL MEDICINE

## 2021-07-10 PROCEDURE — G0378 HOSPITAL OBSERVATION PER HR: HCPCS

## 2021-07-10 PROCEDURE — APPSS60 APP SPLIT SHARED TIME 46-60 MINUTES: Performed by: NURSE PRACTITIONER

## 2021-07-10 PROCEDURE — 2580000003 HC RX 258: Performed by: NURSE PRACTITIONER

## 2021-07-10 PROCEDURE — 80048 BASIC METABOLIC PNL TOTAL CA: CPT

## 2021-07-10 PROCEDURE — 82550 ASSAY OF CK (CPK): CPT

## 2021-07-10 PROCEDURE — 6370000000 HC RX 637 (ALT 250 FOR IP): Performed by: NURSE PRACTITIONER

## 2021-07-10 PROCEDURE — 96367 TX/PROPH/DG ADDL SEQ IV INF: CPT

## 2021-07-10 PROCEDURE — 96365 THER/PROPH/DIAG IV INF INIT: CPT

## 2021-07-10 PROCEDURE — 84484 ASSAY OF TROPONIN QUANT: CPT

## 2021-07-10 PROCEDURE — 36415 COLL VENOUS BLD VENIPUNCTURE: CPT

## 2021-07-10 PROCEDURE — 94761 N-INVAS EAR/PLS OXIMETRY MLT: CPT

## 2021-07-10 PROCEDURE — 6360000002 HC RX W HCPCS: Performed by: NURSE PRACTITIONER

## 2021-07-10 RX ORDER — SODIUM CHLORIDE 9 MG/ML
INJECTION, SOLUTION INTRAVENOUS CONTINUOUS
Status: DISCONTINUED | OUTPATIENT
Start: 2021-07-10 | End: 2021-07-15

## 2021-07-10 RX ORDER — PANTOPRAZOLE SODIUM 40 MG/1
40 TABLET, DELAYED RELEASE ORAL
Status: DISCONTINUED | OUTPATIENT
Start: 2021-07-10 | End: 2021-07-23 | Stop reason: HOSPADM

## 2021-07-10 RX ADMIN — SODIUM CHLORIDE, PRESERVATIVE FREE 10 ML: 5 INJECTION INTRAVENOUS at 22:39

## 2021-07-10 RX ADMIN — PANTOPRAZOLE SODIUM 40 MG: 40 TABLET, DELAYED RELEASE ORAL at 08:16

## 2021-07-10 RX ADMIN — ASPIRIN 81 MG: 81 TABLET, CHEWABLE ORAL at 08:06

## 2021-07-10 RX ADMIN — METOPROLOL SUCCINATE 25 MG: 25 TABLET, EXTENDED RELEASE ORAL at 08:06

## 2021-07-10 RX ADMIN — SODIUM CHLORIDE: 9 INJECTION, SOLUTION INTRAVENOUS at 09:55

## 2021-07-10 RX ADMIN — SODIUM CHLORIDE, PRESERVATIVE FREE 10 ML: 5 INJECTION INTRAVENOUS at 08:06

## 2021-07-10 RX ADMIN — ENOXAPARIN SODIUM 40 MG: 40 INJECTION SUBCUTANEOUS at 08:06

## 2021-07-10 RX ADMIN — SODIUM CHLORIDE: 9 INJECTION, SOLUTION INTRAVENOUS at 22:39

## 2021-07-10 RX ADMIN — VANCOMYCIN HYDROCHLORIDE 1000 MG: 1 INJECTION, POWDER, LYOPHILIZED, FOR SOLUTION INTRAVENOUS at 14:17

## 2021-07-10 RX ADMIN — CEFEPIME HYDROCHLORIDE 2000 MG: 2 INJECTION, POWDER, FOR SOLUTION INTRAVENOUS at 12:03

## 2021-07-10 RX ADMIN — Medication 1 TABLET: at 08:06

## 2021-07-10 RX ADMIN — FLUTICASONE PROPIONATE 1 SPRAY: 50 SPRAY, METERED NASAL at 08:06

## 2021-07-10 ASSESSMENT — PAIN SCALES - GENERAL
PAINLEVEL_OUTOF10: 0
PAINLEVEL_OUTOF10: 0

## 2021-07-10 NOTE — PROGRESS NOTES
Skin assessment completed with Germania Pantoja RN. Skin warm dry and intact, red spots to BLE noted and generalized bruising,  no other issues noted at this time.  Bed in lowest position, call light within reach, will continue to monitor

## 2021-07-10 NOTE — PROGRESS NOTES
[address] [facility]  consulted by Naveen Sanabria CNP for monitoring and adjustment. Indication for treatment: Bloodstream infection  Goal trough: 15 mcg/mL    Pertinent Laboratory Values:   Temp Readings from Last 3 Encounters:   07/10/21 98.6 °F (37 °C) (Oral)   10/13/20 98.2 °F (36.8 °C)     Recent Labs     07/09/21  1114   WBC 12.5*   LACTATE 1.2     Recent Labs     07/09/21  1114 07/10/21  0605   BUN 31* 29*   CREATININE 0.9 1.0     Estimated Creatinine Clearance: 32 mL/min (based on SCr of 1 mg/dL). No intake or output data in the 24 hours ending 07/10/21 1241    Pertinent Cultures:  Date    Source    Results  7/9               Urine     Negative  7/9                Blood     Streptococcus sp. (2/4 btls)    Vancomycin level:   TROUGH:  No results for input(s): VANCOTROUGH in the last 72 hours. RANDOM:  No results for input(s): VANCORANDOM in the last 72 hours. Assessment:  WBC and temperature: elevated, TMax 99.9 F  SCr, BUN, and urine output: WNL, no I/O data  Day(s) of therapy: # 1  Vancomycin concentration: to be collected. Plan:  Started on Vancomycin 1000mg IV every 24 hours  Predicted trough 17 and   Pharmacy will continue to monitor patient and adjust therapy as indicated    Sujata PreciadoJose FOR 7/12 @ 1300. Thank you for the consult.   Carrie Navarro, [facility], [facility]   7/10/2021 12:41 PM

## 2021-07-10 NOTE — PROGRESS NOTES
Progress Note  Date:7/10/2021       Room:-A  Patient Name:Juana Kline     Date of Birth:[birth year]     Age:89 y.o. Subjective    Subjective:  Symptoms:  Stable. No shortness of breath, cough, chest pain, anorexia or diarrhea. Diet:  Adequate intake. No nausea or vomiting. Activity level: Returning to normal.    Pain:  She reports pain is improving. Pain is well controlled. Review of Systems   Constitutional: Negative. Negative for fever. HENT: Negative for congestion and sore throat. Eyes: Negative. Respiratory: Negative for cough and shortness of breath. Cardiovascular: Negative for chest pain and leg swelling. Gastrointestinal: Negative for abdominal pain, anorexia, constipation, diarrhea, nausea and vomiting. Endocrine: Negative. Genitourinary: Negative for dysuria and hematuria. Musculoskeletal: Negative for neck pain. Skin: Negative for wound. Neurological: Negative for dizziness and light-headedness. All other systems reviewed and are negative. Objective         Vitals Last 24 Hours:  TEMPERATURE:  Temp  Av.2 °F (37.3 °C)  Min: 98.1 °F (36.7 °C)  Max: 100.8 °F (38.2 °C)  RESPIRATIONS RANGE: Resp  Av.5  Min: 16  Max: 22  PULSE OXIMETRY RANGE: SpO2  Av %  Min: 94 %  Max: 100 %  PULSE RANGE: Pulse  Av.1  Min: 65  Max: 105  BLOOD PRESSURE RANGE: Systolic (06LJB), DMR:680 , Min:108 , IFI:148   ; Diastolic (78VXT), LPK:55, Min:35, Max:78    I/O (24Hr): No intake or output data in the 24 hours ending 07/10/21 0932  Objective:  General Appearance:  Comfortable and in no acute distress. Vital signs: (most recent): Blood pressure (!) 108/48, pulse 99, temperature 98.4 °F (36.9 °C), temperature source Oral, resp. rate 18, height 5' 1\" (1.549 m), weight 156 lb 15.5 oz (71.2 kg), SpO2 94 %. Vital signs are normal.  No fever. Output: Producing urine and producing stool.     HEENT: Normal HEENT exam.    Lungs:  Normal effort and normal respiratory rate. She is not in respiratory distress. Heart: Normal rate. S1 normal and S2 normal.  No murmur, gallop or friction rub. Chest: Symmetric chest wall expansion. No chest wall tenderness. Abdomen: Abdomen is soft and flat. Bowel sounds are normal.   There is no abdominal tenderness. Pulses: Distal pulses are intact. Skin:  Warm. Labs/Imaging/Diagnostics    Labs:  CBC:  Recent Labs     07/09/21  1114   WBC 12.5*   RBC 3.66*   HGB 11.0*   HCT 35.1*   MCV 95.9   RDW 14.7        CHEMISTRIES:  Recent Labs     07/09/21  1114 07/10/21  0605    139   K 4.6 4.3    104   CO2 25 25   BUN 31* 29*   CREATININE 0.9 1.0   GLUCOSE 108* 91     PT/INR:No results for input(s): PROTIME, INR in the last 72 hours. APTT:No results for input(s): APTT in the last 72 hours. LIVER PROFILE:  Recent Labs     07/09/21  1114   AST 31   ALT 15   BILITOT 0.9   ALKPHOS 134*       Imaging Last 24 Hours:  CT HEAD WO CONTRAST    Result Date: 7/9/2021  EXAMINATION: CT OF THE HEAD WITHOUT CONTRAST  7/9/2021 12:51 pm TECHNIQUE: CT of the head was performed without the administration of intravenous contrast. Dose modulation, iterative reconstruction, and/or weight based adjustment of the mA/kV was utilized to reduce the radiation dose to as low as reasonably achievable. COMPARISON: None. HISTORY: ORDERING SYSTEM PROVIDED HISTORY: generalized weakness, ams TECHNOLOGIST PROVIDED HISTORY: Reason for exam:->generalized weakness, ams Has a \"code stroke\" or \"stroke alert\" been called? ->No Decision Support Exception - unselect if not a suspected or confirmed emergency medical condition->Emergency Medical Condition (MA) Reason for Exam: generalized weakness, ams Acuity: Acute Type of Exam: Initial FINDINGS: BRAIN/VENTRICLES: There is no acute intracranial hemorrhage, mass effect or midline shift. No abnormal extra-axial fluid collection.   The gray-white differentiation is maintained without evidence of an acute infarct. There is no evidence of hydrocephalus. Periventricular white matter low densities most likely due to chronic microvascular ischemia. Bilateral basal ganglia calcifications are present. ORBITS: The visualized portion of the orbits demonstrate no acute abnormality. SINUSES: The visualized paranasal sinuses and mastoid air cells demonstrate no acute abnormality. SOFT TISSUES/SKULL:  2 separate carter hole defects of the left-sided calvarium noted. No acute osseous findings. No acute intracranial abnormality. CT CHEST W CONTRAST    Result Date: 7/9/2021  EXAMINATION: CT OF THE CHEST WITH CONTRAST; CT OF THE ABDOMEN AND PELVIS WITH CONTRAST 7/9/2021 12:52 pm TECHNIQUE: CT of the chest was performed with the administration of intravenous contrast. Multiplanar reformatted images are provided for review. Dose modulation, iterative reconstruction, and/or weight based adjustment of the mA/kV was utilized to reduce the radiation dose to as low as reasonably achievable.; CT of the abdomen and pelvis was performed with the administration of intravenous contrast. Multiplanar reformatted images are provided for review. Dose modulation, iterative reconstruction, and/or weight based adjustment of the mA/kV was utilized to reduce the radiation dose to as low as reasonably achievable.  COMPARISON: 11/14/2017 HISTORY: ORDERING SYSTEM PROVIDED HISTORY: ?ptx on cxr, generalized weakness, fever TECHNOLOGIST PROVIDED HISTORY: Reason for exam:->?ptx on cxr, generalized weakness, fever Decision Support Exception - unselect if not a suspected or confirmed emergency medical condition->Emergency Medical Condition (MA) Reason for Exam: ?ptx on cxr, generalized weakness, fever Acuity: Acute Type of Exam: Initial; ORDERING SYSTEM PROVIDED HISTORY: fever, AMS, generalized weakness TECHNOLOGIST PROVIDED HISTORY: Reason for exam:->fever, AMS, generalized weakness Additional Contrast?->None Decision Support Exception - unselect if not a suspected or confirmed emergency medical condition->Emergency Medical Condition (MA) Reason for Exam: fever, AMS, generalized weakness Acuity: Acute Type of Exam: Initial Chest: Mediastinum: No acute abnormality of the aorta or other mediastinal structures. No pericardial effusion. Normal size lymph nodes, no evidence of adenopathy. Lungs/pleura: Small bilateral dependent pleural effusions with adjacent compressive atelectasis. No pneumothorax. Detailed lung evaluation limited due to motion. No consolidation Soft Tissues/Bones: No acute fracture. No acute findings of the chest wall. Extensive degenerative changes of the thoracic spine. No abscess of the soft tissues of the chest wall. Abdomen/Pelvis: Organs: No acute findings of the organs throughout the abdomen. No evidence of pancreatitis or pyelonephritis. No ureteral stone or hydronephrosis. Normal appearance of the gallbladder. No acute findings of the liver. No evidence of biliary dilation/obstruction. 1.9 cm cyst at the dome incidentally noted. Detailed evaluation of the organs limited due to motion. Cyst of the central right kidney measuring 4 cm. Several low-density lesions of the spleen measuring 1.4 cm and less most likely cysts or hemangiomata. GI/Bowel: No obstruction or other acute findings. No evidence diverticulitis or colitis. There is a large amount of colonic diverticulosis but no evidence of diverticulitis. Pelvis: No evidence of cystitis. Bladder appears normal. Peritoneum/Retroperitoneum: No ascites or free air. No abscess. Bones/Soft Tissues: No acute fracture of the abdomen and pelvis. No abscess. Chest: Small bilateral dependent pleural effusions mild adjacent atelectasis. No evidence of acute process otherwise. Abdomen/pelvis: No evidence of acute process.      CT ABDOMEN PELVIS W IV CONTRAST Additional Contrast? None    Result Date: 7/9/2021  EXAMINATION: CT OF THE CHEST WITH CONTRAST; CT OF THE ABDOMEN AND PELVIS WITH CONTRAST 7/9/2021 12:52 pm TECHNIQUE: CT of the chest was performed with the administration of intravenous contrast. Multiplanar reformatted images are provided for review. Dose modulation, iterative reconstruction, and/or weight based adjustment of the mA/kV was utilized to reduce the radiation dose to as low as reasonably achievable.; CT of the abdomen and pelvis was performed with the administration of intravenous contrast. Multiplanar reformatted images are provided for review. Dose modulation, iterative reconstruction, and/or weight based adjustment of the mA/kV was utilized to reduce the radiation dose to as low as reasonably achievable. COMPARISON: 11/14/2017 HISTORY: ORDERING SYSTEM PROVIDED HISTORY: ?ptx on cxr, generalized weakness, fever TECHNOLOGIST PROVIDED HISTORY: Reason for exam:->?ptx on cxr, generalized weakness, fever Decision Support Exception - unselect if not a suspected or confirmed emergency medical condition->Emergency Medical Condition (MA) Reason for Exam: ?ptx on cxr, generalized weakness, fever Acuity: Acute Type of Exam: Initial; ORDERING SYSTEM PROVIDED HISTORY: fever, AMS, generalized weakness TECHNOLOGIST PROVIDED HISTORY: Reason for exam:->fever, AMS, generalized weakness Additional Contrast?->None Decision Support Exception - unselect if not a suspected or confirmed emergency medical condition->Emergency Medical Condition (MA) Reason for Exam: fever, AMS, generalized weakness Acuity: Acute Type of Exam: Initial Chest: Mediastinum: No acute abnormality of the aorta or other mediastinal structures. No pericardial effusion. Normal size lymph nodes, no evidence of adenopathy. Lungs/pleura: Small bilateral dependent pleural effusions with adjacent compressive atelectasis. No pneumothorax. Detailed lung evaluation limited due to motion. No consolidation Soft Tissues/Bones: No acute fracture. No acute findings of the chest wall.  Extensive degenerative changes of the thoracic spine. No abscess of the soft tissues of the chest wall. Abdomen/Pelvis: Organs: No acute findings of the organs throughout the abdomen. No evidence of pancreatitis or pyelonephritis. No ureteral stone or hydronephrosis. Normal appearance of the gallbladder. No acute findings of the liver. No evidence of biliary dilation/obstruction. 1.9 cm cyst at the dome incidentally noted. Detailed evaluation of the organs limited due to motion. Cyst of the central right kidney measuring 4 cm. Several low-density lesions of the spleen measuring 1.4 cm and less most likely cysts or hemangiomata. GI/Bowel: No obstruction or other acute findings. No evidence diverticulitis or colitis. There is a large amount of colonic diverticulosis but no evidence of diverticulitis. Pelvis: No evidence of cystitis. Bladder appears normal. Peritoneum/Retroperitoneum: No ascites or free air. No abscess. Bones/Soft Tissues: No acute fracture of the abdomen and pelvis. No abscess. Chest: Small bilateral dependent pleural effusions mild adjacent atelectasis. No evidence of acute process otherwise. Abdomen/pelvis: No evidence of acute process. XR CHEST PORTABLE    Result Date: 7/9/2021  EXAMINATION: ONE XRAY VIEW OF THE CHEST 7/9/2021 11:48 am COMPARISON: None. HISTORY: ORDERING SYSTEM PROVIDED HISTORY: generalized weakness TECHNOLOGIST PROVIDED HISTORY: Reason for exam:->generalized weakness Reason for Exam: generalized weakness FINDINGS: The cardiac silhouette is at the upper limits of normal in size. There is a left-sided cardiac device as well as sequela of TAVR. There is sequela of old granulomatous disease. The lung volumes are low with mild pulmonary vascular congestion. There is a suspected skin fold along the right lateral chest.  Pneumothorax cannot entirely be excluded. Follow-up radiograph is recommended. Degenerative changes are noted in the [facility] and glenohumeral joints.      1. Low lung volumes with pulmonary vascular congestion. 2. Suspected skin fold along the right lateral and upper chest. Underlying pneumothorax cannot entirely be excluded. Follow-up radiographs are recommended. Assessment//Plan           Hospital Problems         Last Modified POA    Essential hypertension 7/9/2021 Yes    Cardiac pacemaker 7/9/2021 Yes    Cerebrovascular accident (CVA) due to vascular stenosis ([facility] [address] 7/9/2021 Yes    S/P AVR (aortic valve replacement) 7/9/2021 Yes    Weakness 7/9/2021 Yes        Assessment & Plan  Merissa Escobar is a 80 y.o.  female  who presents with generalized weakness     1) Rhabdomyolysis; mild  -CK elevated  -Hold statin , lasix   -CK trending down- Continue  MIV low dose 0.9 NS @ 75cc/hr- Gentle hydration 2/2 bilateral pleural effusions seen on CT chest   Continue Chem7, CK trend - Creatinine remains stable      2) Generalized weakness  -Improved on exam  -PT/OT ordered     3) Elevated Troponin  -Not likely ACS, likely 2/2 #1  -Will trend  -Echo pending   -EKG reviewed, V Paced rhythm     4) GBS Bacteremia-  + Strep SSP   - Start Vancoymcin and cefepime   -Pharmacy consult to dose   -Surface echo completed, pending, Seen by Cardio d/t h/o TAVR   - No evidence of soft tissue infection or septic joints on exam - Denies recent wounds      Other chronic medical condition; medication resumed unless contraindicated      Essential HTN  HLD  S/P PPM     Diet ADULT DIET; Regular; Low Sodium (2 gm)   DVT Prophylaxis [x]? Lovenox, []? Heparin, []? SCDs, []? Ambulation   GI Prophylaxis [x]? PPI,  []? H2 Blocker,  []? Carafate,  []? Diet/Tube Feeds   Code Status Full code    Disposition Patient requires continued admission due to elevated CK, weakness , positive blood cultures   MDM []? Low, []? Moderate,[x]?   High  Patient's risk as above due to weakness       Electronically signed by FELICIA Morgan - CNP on 7/10/21 at 7:34 AM EDT

## 2021-07-10 NOTE — PROGRESS NOTES
Cardiology Progress Note     Today's Plan: Sign off    Admit Date:  7/9/2021    Consult reason/ Seen today for: abnormal tropoinin    Subjective and  Overnight Events:  Patient reports weakness improved, denies any shortness of breath, or chest pain. [id number]      Assessment / Plan / Recommendation:     1. Fatigue tiredness weakness: Suspect rhabdomyolysis. 2. We will get serial troponin levels supportive care but I do not think she is having acute coronary syndrome. Mild elevation in troponins notable elevated CK likely secondary to rhabdomyolysis. 3. History of A. fib flutter not on anticoagulation, continue with ASA. 4. Is post pacemaker  5. Get echo she has significant systolic murmur s/p AVR in 2018 with TAVR Watson valve. Patient can also get echo as outpatient. 6. DVT prophylaxis if no contraindication  7. Dyslipidemia: continue statins   8. We will sign off, please reconsult for any new cardiac complaints concerns. History of Presenting Illness:    Chief complain on admission : 80 y. o.year old who is admitted for  Chief Complaint   Patient presents with    Altered Mental Status    Fatigue    Fever        Past medical history:    has no past medical history on file. Past surgical history:   has a past surgical history that includes Aortic valve surgery (06/29/2017); pacemaker placement (06/29/2017); and brain surgery (11/27/2017). Social History:   reports that she has never smoked. She has never used smokeless tobacco.  Family history:  family history includes Cancer in an other family member; Heart Attack in her father; Heart Disease in her father.     Allergies   Allergen Reactions    Alendronate Other (See Comments)    Alendronate Sodium Other (See Comments)    Ibandronic Acid Other (See Comments)    Milk-Related Compounds        Review of Systems:  Review of Systems   Cardiovascular: Negative for chest pain, palpitations and leg swelling. Musculoskeletal: Negative. Skin: Negative. Neurological: Negative for dizziness and weakness. All other systems reviewed and are negative. /78   Pulse 105   Temp 98.6 °F (37 °C) (Oral)   Resp 16   Ht 5' 1\" (1.549 m)   Wt 156 lb 15.5 oz (71.2 kg)   SpO2 97%   BMI 29.66 kg/m²   No intake or output data in the 24 hours ending 07/10/21 1120    Physical Exam:  Constitutional:  Well developed, Well nourished, No acute distress  HENT:  Normocephalic, Atraumatic, Bilateral external ears normal,  Nose normal.   Neck- trachea is midline  Eyes:  PERRL, Conjunctiva normal  Respiratory:  Normal breath sounds, No respiratory distress, No wheezing, No chest tenderness. Cardiovascular:  Normal heart rate, Normal rhythm, 3/6 murmurs appreciated, No rubs appreciated, No gallops appreciated, JVP not elevated  Abdomen/GI:  Soft, No tenderness  Musculoskeletal:  Intact distal pulses, no edema, No tenderness, No cyanosis, No clubbing. Integument:  Warm, Dry  Lymphatic:  No lymphadenopathy noted.    Neurologic:  Alert & oriented  Psychiatric:  Affect and Mood :pleasant     Medications:    pantoprazole  40 mg Oral QAM AC    vancomycin  1,000 mg Intravenous Q24H    cefepime  2,000 mg Intravenous Q12H    aspirin  81 mg Oral Daily    fluticasone  1 spray Each Nostril Daily    metoprolol succinate  25 mg Oral Daily    therapeutic multivitamin-minerals  1 tablet Oral Daily    sodium chloride flush  5-40 mL Intravenous 2 times per day    enoxaparin  40 mg Subcutaneous Daily      sodium chloride 75 mL/hr at 07/10/21 0955    sodium chloride       sodium chloride flush, sodium chloride flush, sodium chloride, ondansetron **OR** ondansetron, polyethylene glycol, acetaminophen **OR** acetaminophen    Lab Data:  CBC:   Recent Labs     07/09/21  1114   WBC 12.5*   HGB 11.0*   HCT 35.1*   MCV 95.9        BMP:   Recent Labs     07/09/21  1114 07/10/21  4427 pain or ischemic changes on EKG. As per clinical history patient was unable to get off commode. Suspect rhabdomyolysis and type II MI. No ischemic work-up planned at this point.   Continue with aspirin  Recommend outpatient follow-up  Please call us with any questions      Dr. Sobia Delatorre MD

## 2021-07-10 NOTE — ED NOTES
Pt assisted on and off bedpan. Pt c/o being cold and is shaking vigorously. Pt medicated with tylenol for fever. Daughter updated in plan of care.  Call light within reach     Myesha Ortiz RN  07/09/21 [id number]

## 2021-07-10 NOTE — ED NOTES
Consulted pts hospitalist to get something for her temperature of 99.9.       David Reyes, ALEXANDR  07/09/21 6215

## 2021-07-11 PROBLEM — R78.81 BACTEREMIA: Status: ACTIVE | Noted: 2021-07-11

## 2021-07-11 LAB
ANION GAP SERPL CALCULATED.3IONS-SCNC: 9 MMOL/L (ref 4–16)
BASOPHILS ABSOLUTE: 0 K/CU MM
BASOPHILS RELATIVE PERCENT: 0.3 % (ref 0–1)
BUN BLDV-MCNC: 32 MG/DL (ref 6–23)
CALCIUM SERPL-MCNC: 8.2 MG/DL (ref 8.3–10.6)
CHLORIDE BLD-SCNC: 106 MMOL/L (ref 99–110)
CO2: 22 MMOL/L (ref 21–32)
CREAT SERPL-MCNC: 0.9 MG/DL (ref 0.6–1.1)
CULTURE: ABNORMAL
CULTURE: ABNORMAL
DIFFERENTIAL TYPE: ABNORMAL
EOSINOPHILS ABSOLUTE: 0.1 K/CU MM
EOSINOPHILS RELATIVE PERCENT: 0.9 % (ref 0–3)
GFR AFRICAN AMERICAN: >60 ML/MIN/1.73M2
GFR NON-AFRICAN AMERICAN: 59 ML/MIN/1.73M2
GLUCOSE BLD-MCNC: 99 MG/DL (ref 70–99)
HCT VFR BLD CALC: 32.4 % (ref 37–47)
HEMOGLOBIN: 10 GM/DL (ref 12.5–16)
IMMATURE NEUTROPHIL %: 0.4 % (ref 0–0.43)
LYMPHOCYTES ABSOLUTE: 1 K/CU MM
LYMPHOCYTES RELATIVE PERCENT: 9.4 % (ref 24–44)
Lab: ABNORMAL
MCH RBC QN AUTO: 30 PG (ref 27–31)
MCHC RBC AUTO-ENTMCNC: 30.9 % (ref 32–36)
MCV RBC AUTO: 97.3 FL (ref 78–100)
MONOCYTES ABSOLUTE: 1.6 K/CU MM
MONOCYTES RELATIVE PERCENT: 15.2 % (ref 0–4)
NUCLEATED RBC %: 0 %
PDW BLD-RTO: 15.2 % (ref 11.7–14.9)
PLATELET # BLD: 134 K/CU MM (ref 140–440)
PMV BLD AUTO: 9.8 FL (ref 7.5–11.1)
POTASSIUM SERPL-SCNC: 4.1 MMOL/L (ref 3.5–5.1)
RBC # BLD: 3.33 M/CU MM (ref 4.2–5.4)
SEGMENTED NEUTROPHILS ABSOLUTE COUNT: 7.7 K/CU MM
SEGMENTED NEUTROPHILS RELATIVE PERCENT: 73.8 % (ref 36–66)
SODIUM BLD-SCNC: 137 MMOL/L (ref 135–145)
SPECIMEN: ABNORMAL
TOTAL CK: 1355 IU/L (ref 26–140)
TOTAL IMMATURE NEUTOROPHIL: 0.04 K/CU MM
TOTAL NUCLEATED RBC: 0 K/CU MM
WBC # BLD: 10.5 K/CU MM (ref 4–10.5)

## 2021-07-11 PROCEDURE — 85025 COMPLETE CBC W/AUTO DIFF WBC: CPT

## 2021-07-11 PROCEDURE — 6370000000 HC RX 637 (ALT 250 FOR IP): Performed by: NURSE PRACTITIONER

## 2021-07-11 PROCEDURE — 80048 BASIC METABOLIC PNL TOTAL CA: CPT

## 2021-07-11 PROCEDURE — 36415 COLL VENOUS BLD VENIPUNCTURE: CPT

## 2021-07-11 PROCEDURE — 6360000002 HC RX W HCPCS: Performed by: NURSE PRACTITIONER

## 2021-07-11 PROCEDURE — 96366 THER/PROPH/DIAG IV INF ADDON: CPT

## 2021-07-11 PROCEDURE — 97161 PT EVAL LOW COMPLEX 20 MIN: CPT

## 2021-07-11 PROCEDURE — 97116 GAIT TRAINING THERAPY: CPT

## 2021-07-11 PROCEDURE — G0378 HOSPITAL OBSERVATION PER HR: HCPCS

## 2021-07-11 PROCEDURE — 1200000000 HC SEMI PRIVATE

## 2021-07-11 PROCEDURE — 93005 ELECTROCARDIOGRAM TRACING: CPT | Performed by: NURSE PRACTITIONER

## 2021-07-11 PROCEDURE — 2580000003 HC RX 258: Performed by: NURSE PRACTITIONER

## 2021-07-11 PROCEDURE — 82550 ASSAY OF CK (CPK): CPT

## 2021-07-11 PROCEDURE — 6360000002 HC RX W HCPCS: Performed by: INTERNAL MEDICINE

## 2021-07-11 PROCEDURE — 6370000000 HC RX 637 (ALT 250 FOR IP): Performed by: INTERNAL MEDICINE

## 2021-07-11 PROCEDURE — 2580000003 HC RX 258: Performed by: INTERNAL MEDICINE

## 2021-07-11 PROCEDURE — 96372 THER/PROPH/DIAG INJ SC/IM: CPT

## 2021-07-11 RX ADMIN — VANCOMYCIN HYDROCHLORIDE 1000 MG: 1 INJECTION, POWDER, LYOPHILIZED, FOR SOLUTION INTRAVENOUS at 15:12

## 2021-07-11 RX ADMIN — CEFEPIME HYDROCHLORIDE 2000 MG: 2 INJECTION, POWDER, FOR SOLUTION INTRAVENOUS at 10:30

## 2021-07-11 RX ADMIN — PANTOPRAZOLE SODIUM 40 MG: 40 TABLET, DELAYED RELEASE ORAL at 06:56

## 2021-07-11 RX ADMIN — FLUTICASONE PROPIONATE 1 SPRAY: 50 SPRAY, METERED NASAL at 10:35

## 2021-07-11 RX ADMIN — ASPIRIN 81 MG: 81 TABLET, CHEWABLE ORAL at 10:30

## 2021-07-11 RX ADMIN — CEFEPIME HYDROCHLORIDE 2000 MG: 2 INJECTION, POWDER, FOR SOLUTION INTRAVENOUS at 00:40

## 2021-07-11 RX ADMIN — ENOXAPARIN SODIUM 40 MG: 40 INJECTION SUBCUTANEOUS at 10:31

## 2021-07-11 RX ADMIN — SODIUM CHLORIDE: 9 INJECTION, SOLUTION INTRAVENOUS at 15:12

## 2021-07-11 RX ADMIN — METOPROLOL SUCCINATE 25 MG: 25 TABLET, EXTENDED RELEASE ORAL at 10:30

## 2021-07-11 RX ADMIN — CEFEPIME HYDROCHLORIDE 2000 MG: 2 INJECTION, POWDER, FOR SOLUTION INTRAVENOUS at 23:51

## 2021-07-11 RX ADMIN — SODIUM CHLORIDE, PRESERVATIVE FREE 10 ML: 5 INJECTION INTRAVENOUS at 20:26

## 2021-07-11 RX ADMIN — SODIUM CHLORIDE, PRESERVATIVE FREE 10 ML: 5 INJECTION INTRAVENOUS at 10:31

## 2021-07-11 RX ADMIN — Medication 1 TABLET: at 10:30

## 2021-07-11 ASSESSMENT — ENCOUNTER SYMPTOMS
EYES NEGATIVE: 1
COUGH: 0
VOMITING: 0
SHORTNESS OF BREATH: 0
ABDOMINAL PAIN: 0
SORE THROAT: 0
CONSTIPATION: 0
NAUSEA: 0
DIARRHEA: 0

## 2021-07-11 ASSESSMENT — PAIN SCALES - GENERAL
PAINLEVEL_OUTOF10: 0
PAINLEVEL_OUTOF10: 0

## 2021-07-11 NOTE — PROGRESS NOTES
[address] [facility]  consulted by Viry Strange CNP for monitoring and adjustment. Indication for treatment: Bloodstream infection  Goal trough: 15 mcg/mL    Pertinent Laboratory Values:   Temp Readings from Last 3 Encounters:   07/11/21 97.5 °F (36.4 °C) (Oral)   10/13/20 98.2 °F (36.8 °C)     Recent Labs     07/09/21  1114 07/11/21  0524   WBC 12.5* 10.5   LACTATE 1.2  --      Recent Labs     07/09/21  1114 07/10/21  0605 07/11/21  0524   BUN 31* 29* 32*   CREATININE 0.9 1.0 0.9     Estimated Creatinine Clearance: 36 mL/min (based on SCr of 0.9 mg/dL). Intake/Output Summary (Last 24 hours) at 7/11/2021 1435  Last data filed at 7/11/2021 0918  Gross per 24 hour   Intake 1113.75 ml   Output 250 ml   Net 863.75 ml       Pertinent Cultures:  Date    Source    Results  7/9               Urine     Negative  7/9                Blood     Streptococcus sp. (4/4 btls)    Vancomycin level:   TROUGH:  No results for input(s): VANCOTROUGH in the last 72 hours. RANDOM:  No results for input(s): VANCORANDOM in the last 72 hours. Assessment:  · WBC and temperature: elevated, TMax 99.9 F  · SCr, BUN, and urine output: WNL, low UOP data  · Day(s) of therapy: # 3  · Vancomycin concentration: to be collected. Plan:  · Started on Vancomycin 1000mg IV every 24 hours  · Predicted trough 17 and   · Level due tomorrow PM  · Pharmacy will continue to monitor patient and adjust therapy as indicated    Aris 3 7/12 @ 1300. Thank you for the consult.   Marvin Marcus, [facility],  7/11/2021 2:35 PM

## 2021-07-11 NOTE — PROGRESS NOTES
Output: Producing urine and producing stool. HEENT: Normal HEENT exam.    Lungs:  Normal effort and normal respiratory rate. She is not in respiratory distress. Heart: Normal rate. S1 normal and S2 normal.  No murmur, gallop or friction rub. Chest: Symmetric chest wall expansion. No chest wall tenderness. Abdomen: Abdomen is soft and flat. Bowel sounds are normal.   There is no abdominal tenderness. Pulses: Distal pulses are intact. Skin:  Warm. Labs/Imaging/Diagnostics    Labs:  CBC:  Recent Labs     07/09/21  1114 07/11/21  0524   WBC 12.5* 10.5   RBC 3.66* 3.33*   HGB 11.0* 10.0*   HCT 35.1* 32.4*   MCV 95.9 97.3   RDW 14.7 15.2*    134*     CHEMISTRIES:  Recent Labs     07/09/21  1114 07/10/21  0605 07/11/21  0524    139 137   K 4.6 4.3 4.1    104 106   CO2 25 25 22   BUN 31* 29* 32*   CREATININE 0.9 1.0 0.9   GLUCOSE 108* 91 99     PT/INR:No results for input(s): PROTIME, INR in the last 72 hours. APTT:No results for input(s): APTT in the last 72 hours. LIVER PROFILE:  Recent Labs     07/09/21  1114   AST 31   ALT 15   BILITOT 0.9   ALKPHOS 134*       Imaging Last 24 Hours:  CT HEAD WO CONTRAST    Result Date: 7/9/2021  EXAMINATION: CT OF THE HEAD WITHOUT CONTRAST  7/9/2021 12:51 pm TECHNIQUE: CT of the head was performed without the administration of intravenous contrast. Dose modulation, iterative reconstruction, and/or weight based adjustment of the mA/kV was utilized to reduce the radiation dose to as low as reasonably achievable. COMPARISON: None. HISTORY: ORDERING SYSTEM PROVIDED HISTORY: generalized weakness, ams TECHNOLOGIST PROVIDED HISTORY: Reason for exam:->generalized weakness, ams Has a \"code stroke\" or \"stroke alert\" been called? ->No Decision Support Exception - unselect if not a suspected or confirmed emergency medical condition->Emergency Medical Condition (MA) Reason for Exam: generalized weakness, ams Acuity: Acute Type of Exam: Initial FINDINGS: BRAIN/VENTRICLES: There is no acute intracranial hemorrhage, mass effect or midline shift. No abnormal extra-axial fluid collection. The gray-white differentiation is maintained without evidence of an acute infarct. There is no evidence of hydrocephalus. Periventricular white matter low densities most likely due to chronic microvascular ischemia. Bilateral basal ganglia calcifications are present. ORBITS: The visualized portion of the orbits demonstrate no acute abnormality. SINUSES: The visualized paranasal sinuses and mastoid air cells demonstrate no acute abnormality. SOFT TISSUES/SKULL:  2 separate carter hole defects of the left-sided calvarium noted. No acute osseous findings. No acute intracranial abnormality. CT CHEST W CONTRAST    Result Date: 7/9/2021  EXAMINATION: CT OF THE CHEST WITH CONTRAST; CT OF THE ABDOMEN AND PELVIS WITH CONTRAST 7/9/2021 12:52 pm TECHNIQUE: CT of the chest was performed with the administration of intravenous contrast. Multiplanar reformatted images are provided for review. Dose modulation, iterative reconstruction, and/or weight based adjustment of the mA/kV was utilized to reduce the radiation dose to as low as reasonably achievable.; CT of the abdomen and pelvis was performed with the administration of intravenous contrast. Multiplanar reformatted images are provided for review. Dose modulation, iterative reconstruction, and/or weight based adjustment of the mA/kV was utilized to reduce the radiation dose to as low as reasonably achievable.  COMPARISON: 11/14/2017 HISTORY: ORDERING SYSTEM PROVIDED HISTORY: ?ptx on cxr, generalized weakness, fever TECHNOLOGIST PROVIDED HISTORY: Reason for exam:->?ptx on cxr, generalized weakness, fever Decision Support Exception - unselect if not a suspected or confirmed emergency medical condition->Emergency Medical Condition (MA) Reason for Exam: ?ptx on cxr, generalized weakness, fever Acuity: Acute Type of Exam: Initial; ORDERING SYSTEM PROVIDED HISTORY: fever, AMS, generalized weakness TECHNOLOGIST PROVIDED HISTORY: Reason for exam:->fever, AMS, generalized weakness Additional Contrast?->None Decision Support Exception - unselect if not a suspected or confirmed emergency medical condition->Emergency Medical Condition (MA) Reason for Exam: fever, AMS, generalized weakness Acuity: Acute Type of Exam: Initial Chest: Mediastinum: No acute abnormality of the aorta or other mediastinal structures. No pericardial effusion. Normal size lymph nodes, no evidence of adenopathy. Lungs/pleura: Small bilateral dependent pleural effusions with adjacent compressive atelectasis. No pneumothorax. Detailed lung evaluation limited due to motion. No consolidation Soft Tissues/Bones: No acute fracture. No acute findings of the chest wall. Extensive degenerative changes of the thoracic spine. No abscess of the soft tissues of the chest wall. Abdomen/Pelvis: Organs: No acute findings of the organs throughout the abdomen. No evidence of pancreatitis or pyelonephritis. No ureteral stone or hydronephrosis. Normal appearance of the gallbladder. No acute findings of the liver. No evidence of biliary dilation/obstruction. 1.9 cm cyst at the dome incidentally noted. Detailed evaluation of the organs limited due to motion. Cyst of the central right kidney measuring 4 cm. Several low-density lesions of the spleen measuring 1.4 cm and less most likely cysts or hemangiomata. GI/Bowel: No obstruction or other acute findings. No evidence diverticulitis or colitis. There is a large amount of colonic diverticulosis but no evidence of diverticulitis. Pelvis: No evidence of cystitis. Bladder appears normal. Peritoneum/Retroperitoneum: No ascites or free air. No abscess. Bones/Soft Tissues: No acute fracture of the abdomen and pelvis. No abscess. Chest: Small bilateral dependent pleural effusions mild adjacent atelectasis.  No evidence of acute process otherwise. Abdomen/pelvis: No evidence of acute process. CT ABDOMEN PELVIS W IV CONTRAST Additional Contrast? None    Result Date: 7/9/2021  EXAMINATION: CT OF THE CHEST WITH CONTRAST; CT OF THE ABDOMEN AND PELVIS WITH CONTRAST 7/9/2021 12:52 pm TECHNIQUE: CT of the chest was performed with the administration of intravenous contrast. Multiplanar reformatted images are provided for review. Dose modulation, iterative reconstruction, and/or weight based adjustment of the mA/kV was utilized to reduce the radiation dose to as low as reasonably achievable.; CT of the abdomen and pelvis was performed with the administration of intravenous contrast. Multiplanar reformatted images are provided for review. Dose modulation, iterative reconstruction, and/or weight based adjustment of the mA/kV was utilized to reduce the radiation dose to as low as reasonably achievable. COMPARISON: 11/14/2017 HISTORY: ORDERING SYSTEM PROVIDED HISTORY: ?ptx on cxr, generalized weakness, fever TECHNOLOGIST PROVIDED HISTORY: Reason for exam:->?ptx on cxr, generalized weakness, fever Decision Support Exception - unselect if not a suspected or confirmed emergency medical condition->Emergency Medical Condition (MA) Reason for Exam: ?ptx on cxr, generalized weakness, fever Acuity: Acute Type of Exam: Initial; ORDERING SYSTEM PROVIDED HISTORY: fever, AMS, generalized weakness TECHNOLOGIST PROVIDED HISTORY: Reason for exam:->fever, AMS, generalized weakness Additional Contrast?->None Decision Support Exception - unselect if not a suspected or confirmed emergency medical condition->Emergency Medical Condition (MA) Reason for Exam: fever, AMS, generalized weakness Acuity: Acute Type of Exam: Initial Chest: Mediastinum: No acute abnormality of the aorta or other mediastinal structures. No pericardial effusion. Normal size lymph nodes, no evidence of adenopathy.  Lungs/pleura: Small bilateral dependent pleural effusions with adjacent compressive atelectasis. No pneumothorax. Detailed lung evaluation limited due to motion. No consolidation Soft Tissues/Bones: No acute fracture. No acute findings of the chest wall. Extensive degenerative changes of the thoracic spine. No abscess of the soft tissues of the chest wall. Abdomen/Pelvis: Organs: No acute findings of the organs throughout the abdomen. No evidence of pancreatitis or pyelonephritis. No ureteral stone or hydronephrosis. Normal appearance of the gallbladder. No acute findings of the liver. No evidence of biliary dilation/obstruction. 1.9 cm cyst at the dome incidentally noted. Detailed evaluation of the organs limited due to motion. Cyst of the central right kidney measuring 4 cm. Several low-density lesions of the spleen measuring 1.4 cm and less most likely cysts or hemangiomata. GI/Bowel: No obstruction or other acute findings. No evidence diverticulitis or colitis. There is a large amount of colonic diverticulosis but no evidence of diverticulitis. Pelvis: No evidence of cystitis. Bladder appears normal. Peritoneum/Retroperitoneum: No ascites or free air. No abscess. Bones/Soft Tissues: No acute fracture of the abdomen and pelvis. No abscess. Chest: Small bilateral dependent pleural effusions mild adjacent atelectasis. No evidence of acute process otherwise. Abdomen/pelvis: No evidence of acute process. XR CHEST PORTABLE    Result Date: 7/9/2021  EXAMINATION: ONE XRAY VIEW OF THE CHEST 7/9/2021 11:48 am COMPARISON: None. HISTORY: ORDERING SYSTEM PROVIDED HISTORY: generalized weakness TECHNOLOGIST PROVIDED HISTORY: Reason for exam:->generalized weakness Reason for Exam: generalized weakness FINDINGS: The cardiac silhouette is at the upper limits of normal in size. There is a left-sided cardiac device as well as sequela of TAVR. There is sequela of old granulomatous disease. The lung volumes are low with mild pulmonary vascular congestion.   There is a suspected skin fold along the right lateral chest.  Pneumothorax cannot entirely be excluded. Follow-up radiograph is recommended. Degenerative changes are noted in the [facility] and glenohumeral joints. 1. Low lung volumes with pulmonary vascular congestion. 2. Suspected skin fold along the right lateral and upper chest. Underlying pneumothorax cannot entirely be excluded. Follow-up radiographs are recommended. Assessment//Plan           Hospital Problems         Last Modified POA    Essential hypertension 7/9/2021 Yes    Cardiac pacemaker 7/9/2021 Yes    Cerebrovascular accident (CVA) due to vascular stenosis ([address] 7/9/2021 Yes    S/P AVR (aortic valve replacement) 7/9/2021 Yes    Weakness 7/9/2021 Yes    Troponin I above reference range 7/10/2021 Yes        Assessment & Plan  Thiago Mendoza is a 80 y.o.  female  who presents with generalized weakness     1) Rhabdomyolysis; mild  -CK elevated  -Hold statin , lasix   -CK trending down- Continue  MIV low dose 0.9 NS @ 75cc/hr- Gentle hydration 2/2 bilateral pleural effusions seen on CT chest   Continue Chem7, CK trend - Creatinine remains stable      2) Generalized weakness  -Improved on exam  -PT/OT ordered     3) Elevated Troponin  -Not likely ACS, likely 2/2 #1  -Will trend  -Echo pending   -EKG reviewed, V Paced rhythm     4) GBS Bacteremia-  + Strep SSP   - Start Vancoymcin and cefepime   -Pharmacy consult to dose   -Consult ID for asssistance   -Surface echo completed, pending, Seen by Cardio d/t h/o TAVR   - No evidence of soft tissue infection or septic joints on exam - Denies recent wounds      Other chronic medical condition; medication resumed unless contraindicated      Essential HTN  HLD  S/P PPM     Diet ADULT DIET; Regular; Low Sodium (2 gm)   DVT Prophylaxis [x]? Lovenox, []? Heparin, []? SCDs, []? Ambulation   GI Prophylaxis [x]? PPI,  []? H2 Blocker,  []? Carafate,  []?  Diet/Tube Feeds   Code Status Full code    Disposition Patient requires continued

## 2021-07-11 NOTE — CONSULTS
[address], 5/15/1929, 2018/2018-A, 7/11/2021    History  [place]:  The primary encounter diagnosis was Troponin I above reference range. Diagnoses of Generalized weakness and Bilateral pleural effusion were also pertinent to this visit. Patient  has no past medical history on file. Patient  has a past surgical history that includes Aortic valve surgery (06/29/2017); pacemaker placement (06/29/2017); and brain surgery (11/27/2017). Subjective:  Patient states: \"Oh good! We get to walk? \"    Pain:  Denies pain. Communication with other providers:  Handoff to RN  Restrictions: general precautions, fall risk    Home Setup/Prior level of function  Social/Functional History  Lives With: Daughter  Type of Home: House  Home Layout: One level  Home Access: Stairs to enter without rails  Entrance Stairs - Number of Steps: 1  Bathroom Shower/Tub: Tub/Shower unit  Bathroom Toilet: Standard  Home Equipment: Cane (Uses cane in community but not in home)  ADL Assistance: Independent  Homemaking Assistance: Independent  Homemaking Responsibilities: Yes  Ambulation Assistance: Independent  Transfer Assistance: Independent  Active : No    Examination of body systems (includes body structures/functions, activity/participation limitations):  · Observation:  Pt up in chair upon arrival and agreeable to therapy  · Vision:  Wears glasses at all times  · Hearing:  Slightly [place]  · Cardiopulmonary:  No O2 needs  · Cognition: WFL, see OT/SLP note for further evaluation. Musculoskeletal  · ROM R/L:  WFL. · Strength R/L:  4/5, minimal impairment in function and endurance.     · Neuro:  Denies any N/t      Mobility:  · Rolling L/R:  NT, pt up in chair at beginning and end of session  · Supine to sit:  NT, pt up in chair at beginning and end of session  · Transfers: Pt completed STS to/from chair and to/from commode CGA with cues for hand placement on grab bars/chair  · Sitting balance:  good. · Standing balance:  Fair, pt with good standing balance with unilateral UE support but slight LOB requiring min A to recover when no UE support. · Gait: Pt ambulated 50' + 15' CGA while pushing own IV pole with no LOB throughout. Pt ambulated with decreased sherwin, narrow EFFIE, and decreased step length and height bilaterally. Cues provided for pathway negotiation throughout. [facility] 6 Clicks Inpatient Mobility:  AM-PAC Inpatient Mobility Raw Score : 18    Safety: patient left in chair with alarm on, call light within reach, RN notified, gait belt used. Assessment:  Pt is a 80 y.o. female admitted to the hospital for weakness. Pt is typically modified independent with all ambulation and transfers with a SPC. Pt currently requires CGA for transfers and ambulating 50' CGA while pushing IV pole. Pt is presenting with decreased endurance, impaired balance, impaired transfers, and impaired gait. Pt would benefit from continued acute care PT as well as [place] [id number] PT upon discharge to continue to address impairments. Complexity: Low    Prognosis: Good, no significant barriers to participation at this time.      Plan Times per week: 3+/week     Equipment: none, pt owns all necessary equipment    Goals:  Short term goals  Time Frame for Short term goals: 1 week  Short term goal 1: Pt to complete all bed mobility mod I  Short term goal 2: Pt to complete all STS transfers to/from bed, commode, and chair mod I  Short term goal 3: Pt to ambulate 48' with LRAD mod I       Treatment plan:  Bed mobility, transfers, balance, gait, TA, TX    Recommendations for NURSING mobility: amb with CGA and cane    Time:    Time in: 1202  Time out: 1227  Timed treatment minutes: 10  Total time: 25    Electronically signed by:    Ran Mendiola PT  7/11/2021, 12:57 PM

## 2021-07-12 LAB
ANION GAP SERPL CALCULATED.3IONS-SCNC: 9 MMOL/L (ref 4–16)
BASOPHILS ABSOLUTE: 0 K/CU MM
BASOPHILS RELATIVE PERCENT: 0.3 % (ref 0–1)
BUN BLDV-MCNC: 24 MG/DL (ref 6–23)
CALCIUM SERPL-MCNC: 8.4 MG/DL (ref 8.3–10.6)
CHLORIDE BLD-SCNC: 108 MMOL/L (ref 99–110)
CO2: 20 MMOL/L (ref 21–32)
CREAT SERPL-MCNC: 0.7 MG/DL (ref 0.6–1.1)
DIFFERENTIAL TYPE: ABNORMAL
DOSE AMOUNT: ABNORMAL
DOSE TIME: ABNORMAL
EOSINOPHILS ABSOLUTE: 0.2 K/CU MM
EOSINOPHILS RELATIVE PERCENT: 2.8 % (ref 0–3)
GFR AFRICAN AMERICAN: >60 ML/MIN/1.73M2
GFR NON-AFRICAN AMERICAN: >60 ML/MIN/1.73M2
GLUCOSE BLD-MCNC: 97 MG/DL (ref 70–99)
HCT VFR BLD CALC: 32.1 % (ref 37–47)
HEMOGLOBIN: 10.2 GM/DL (ref 12.5–16)
IMMATURE NEUTROPHIL %: 0.2 % (ref 0–0.43)
LV EF: 53 %
LVEF MODALITY: NORMAL
LYMPHOCYTES ABSOLUTE: 0.8 K/CU MM
LYMPHOCYTES RELATIVE PERCENT: 12.9 % (ref 24–44)
MCH RBC QN AUTO: 30.8 PG (ref 27–31)
MCHC RBC AUTO-ENTMCNC: 31.8 % (ref 32–36)
MCV RBC AUTO: 97 FL (ref 78–100)
MONOCYTES ABSOLUTE: 1.4 K/CU MM
MONOCYTES RELATIVE PERCENT: 21.3 % (ref 0–4)
PDW BLD-RTO: 15.3 % (ref 11.7–14.9)
PLATELET # BLD: 138 K/CU MM (ref 140–440)
PMV BLD AUTO: 9.6 FL (ref 7.5–11.1)
POTASSIUM SERPL-SCNC: 3.8 MMOL/L (ref 3.5–5.1)
RBC # BLD: 3.31 M/CU MM (ref 4.2–5.4)
SEGMENTED NEUTROPHILS ABSOLUTE COUNT: 4.1 K/CU MM
SEGMENTED NEUTROPHILS RELATIVE PERCENT: 62.5 % (ref 36–66)
SODIUM BLD-SCNC: 137 MMOL/L (ref 135–145)
TOTAL CK: 606 IU/L (ref 26–140)
TOTAL IMMATURE NEUTOROPHIL: 0.01 K/CU MM
VANCOMYCIN TROUGH: 9 UG/ML (ref 10–20)
WBC # BLD: 6.5 K/CU MM (ref 4–10.5)

## 2021-07-12 PROCEDURE — 94761 N-INVAS EAR/PLS OXIMETRY MLT: CPT

## 2021-07-12 PROCEDURE — 2580000003 HC RX 258: Performed by: INTERNAL MEDICINE

## 2021-07-12 PROCEDURE — 6360000002 HC RX W HCPCS: Performed by: INTERNAL MEDICINE

## 2021-07-12 PROCEDURE — 80048 BASIC METABOLIC PNL TOTAL CA: CPT

## 2021-07-12 PROCEDURE — 1200000000 HC SEMI PRIVATE

## 2021-07-12 PROCEDURE — 6360000002 HC RX W HCPCS: Performed by: NURSE PRACTITIONER

## 2021-07-12 PROCEDURE — 99223 1ST HOSP IP/OBS HIGH 75: CPT | Performed by: INTERNAL MEDICINE

## 2021-07-12 PROCEDURE — 80202 ASSAY OF VANCOMYCIN: CPT

## 2021-07-12 PROCEDURE — 6370000000 HC RX 637 (ALT 250 FOR IP): Performed by: NURSE PRACTITIONER

## 2021-07-12 PROCEDURE — 82550 ASSAY OF CK (CPK): CPT

## 2021-07-12 PROCEDURE — 2580000003 HC RX 258: Performed by: NURSE PRACTITIONER

## 2021-07-12 PROCEDURE — 97110 THERAPEUTIC EXERCISES: CPT

## 2021-07-12 PROCEDURE — 6370000000 HC RX 637 (ALT 250 FOR IP): Performed by: INTERNAL MEDICINE

## 2021-07-12 PROCEDURE — 93306 TTE W/DOPPLER COMPLETE: CPT

## 2021-07-12 PROCEDURE — 97116 GAIT TRAINING THERAPY: CPT

## 2021-07-12 PROCEDURE — 99233 SBSQ HOSP IP/OBS HIGH 50: CPT | Performed by: INTERNAL MEDICINE

## 2021-07-12 PROCEDURE — 36415 COLL VENOUS BLD VENIPUNCTURE: CPT

## 2021-07-12 PROCEDURE — 85025 COMPLETE CBC W/AUTO DIFF WBC: CPT

## 2021-07-12 RX ADMIN — Medication 1 TABLET: at 07:54

## 2021-07-12 RX ADMIN — CEFTRIAXONE SODIUM 2000 MG: 2 INJECTION, POWDER, FOR SOLUTION INTRAMUSCULAR; INTRAVENOUS at 10:50

## 2021-07-12 RX ADMIN — SODIUM CHLORIDE: 9 INJECTION, SOLUTION INTRAVENOUS at 11:57

## 2021-07-12 RX ADMIN — FLUTICASONE PROPIONATE 1 SPRAY: 50 SPRAY, METERED NASAL at 07:55

## 2021-07-12 RX ADMIN — PANTOPRAZOLE SODIUM 40 MG: 40 TABLET, DELAYED RELEASE ORAL at 06:31

## 2021-07-12 RX ADMIN — METOPROLOL SUCCINATE 25 MG: 25 TABLET, EXTENDED RELEASE ORAL at 07:54

## 2021-07-12 RX ADMIN — ENOXAPARIN SODIUM 40 MG: 40 INJECTION SUBCUTANEOUS at 07:54

## 2021-07-12 RX ADMIN — ASPIRIN 81 MG: 81 TABLET, CHEWABLE ORAL at 07:54

## 2021-07-12 RX ADMIN — VANCOMYCIN HYDROCHLORIDE 1000 MG: 1 INJECTION, POWDER, LYOPHILIZED, FOR SOLUTION INTRAVENOUS at 15:53

## 2021-07-12 ASSESSMENT — ENCOUNTER SYMPTOMS
SHORTNESS OF BREATH: 0
NAUSEA: 0
COUGH: 0
SORE THROAT: 0
EYES NEGATIVE: 1
CONSTIPATION: 0
ABDOMINAL PAIN: 0
DIARRHEA: 0
VOMITING: 0

## 2021-07-12 ASSESSMENT — PAIN SCALES - GENERAL
PAINLEVEL_OUTOF10: 0

## 2021-07-12 NOTE — PROGRESS NOTES
Physical Therapy Treatment Note  Name: Elva Newell MRN: [id number] :   5/15/1929   Date:  2021   Admission Date: 2021 Room:  -A     Restrictions/Precautions:        general precautions, fall risk    Communication with other providers:  RN    Subjective:  Patient states:  \"I'm ready to get up and walk! \"  Pain:   Location, Type, Intensity (0/10 to 10/10):  Pt denies pain this session. Objective:    Observation:  Pt up in chair upon entry and agreeable to therapy. Treatment, including education/measures:  Transfers: Pt completed STS to/from chair CGA. Pt required cuing for proper hand placement, and slow eccentric control when sitting. Gait: Pt ambulated 150' CGA with SPC. Pt demonstrated decreased sherwin, decreased step length bilaterally, and narrow EFFIE. Pt required cues for pathway negotiation throughout ambulation. Trialed SPC this session; pt with more stability and further distance that last session. Therapeutic Exercise:   STS CGA to/from chair x10  Pt required 3 attempts for first STS before being able to complete. Pt used UE assistance on arm rests during each attempt. Pt demonstrated decreased eccentric control, cues provided for slow descent. Assessment / Impression:    Pt left in up in chair with chair alarm on and call light within reach at end of session. Patient's tolerance of treatment:  good   Adverse Reaction: n/a  Significant change in status and impact:  n/a  Barriers to improvement:  Decreased endurance, decreased strength, impaired transfers, impaired gait, impaired balance    Plan for Next Session:    Continue progressing ambulation distances as tolerated.   Time in:  1057  Time out:  1129  Timed treatment minutes:  32  Total treatment time:  32    Previously filed items:  Social/Functional History  Lives With: Daughter  Type of Home: House  Home Layout: One level  Home Access: Stairs to enter without rails  Entrance Stairs - Number of Steps: 1  Bathroom Shower/Tub: Tub/Shower unit  Bathroom Toilet: Standard  Home Equipment: Cane (Uses cane in community but not in home)  ADL Assistance: [address]: Independent  Homemaking Responsibilities: Yes  Ambulation Assistance: Independent  Transfer Assistance: Independent  Active : No  Short term goals  Time Frame for Short term goals: 1 week  Short term goal 1: Pt to complete all bed mobility mod I  Short term goal 2: Pt to complete all STS transfers to/from bed, commode, and chair mod I  Short term goal 3: Pt to ambulate 48' with LRAD mod I       Electronically signed by:    Robb Daugherty  7/12/2021, 11:54 AM  \"I, the qualified professional, was present and in the room for the entire session. The student participates in the delivery of services when the qualified practitioner is directing the service, making the skilled judgment, and is responsible for the assessment and treatment. \"

## 2021-07-12 NOTE — CONSULTS
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 CT of the abdomen and pelvis shows colonic diverticulosis, but no mass.  Hyperlipidemia   Hypertension   CHF   Multi-morbidity: per PMHx  Plan:   Therapeutic: Discontinue cefepime,  Continue vancomycin and ceftriaxone   Diagnostic: Repeat blood cultures every 48 hours, CRP, ESR, procalcitonin   F/u identification and susceptibility of cultures, will then adjust antimicrobials when available   F/u test: Transthoracic echocardiogram, if negative the patient will require VIKTORIA and EP consult. 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 A day prior to admission the lady was been able to walk around, however the following morning she was found on the toilet unable to get up. She initially received a clinical diagnosis of mild rhabdomyolysis, generalized weakness, elevated troponins. COVID-19 nucleic acid antigen test was negative. 2 sets of blood cultures drawn on 2021 are positive for Streptococcus species. She denies any nausea, vomiting or diarrheashe denies any recent dental procedure. .    Infectious diseases service was consulted to evaluate the pt, and recommend further investigative and therapeutic measures.   Review and summary of old records:  ROS: Other systems reviewed Including eyes, ENT, respiratory, cardiovascular, GI, , dermatologic, neurologic, psych, hem/lymphatic, musculoskeletal and endocrine were negative other than what is mentioned above. Patient Active Problem List    Diagnosis Date Noted    Bacteremia 07/11/2021    Troponin I above reference range     Weakness 07/09/2021    S/P AVR (aortic valve replacement) 04/13/2021    CHB (complete heart block) ([facility] 10/13/2020    Essential hypertension 11/20/2019    Other hyperlipidemia 11/20/2019    Cardiac pacemaker 11/20/2019    Congestive heart failure ([address] 11/20/2019    Cerebrovascular accident (CVA) due to vascular stenosis ([facility] 11/20/2019     History reviewed. No pertinent past medical history. Past Surgical History:   Procedure Laterality Date    AORTIC VALVE SURGERY  06/29/2017    BRAIN SURGERY  11/27/2017    bleed from stroke    PACEMAKER PLACEMENT  06/29/2017      Family History   Problem Relation Age of Onset    Heart Disease Father     Heart Attack Father     Cancer Other       Infectious disease related family history - not contibutory. SOCIAL HISTORY  Social History     Tobacco Use    Smoking status: Never Smoker    Smokeless tobacco: Never Used   Substance Use Topics    Alcohol use: Not on file       Born:  [facility] Occupation:   No recent travel of significance.  No recent unusual exposures.  NO pets    ? ALLERGIES  Allergies   Allergen Reactions    Alendronate Other (See Comments)    Alendronate Sodium Other (See Comments)    Ibandronic Acid Other (See Comments)    Milk-Related Compounds       MEDICATIONS  Reviewed and are per the chart/EMR.    IMMUNIZATION HISTORY  Immunization History   Administered Date(s) Administered    Influenza A ([id number]) Vaccine PF IM 01/21/2010    Influenza Vaccine, unspecified formulation 11/19/2009, 10/01/2012, 10/10/2014    Influenza Virus Vaccine 11/19/2009, 10/01/2012, 10/10/2014, 09/22/2015    Influenza, High Dose (Fluzone 65 yrs and older) 10/18/2016, 09/22/2017, 10/10/2018    Influenza, Quadv, adjuvanted, 65 yrs +, IM, PF (Fluad) 10/13/2020    Influenza, Triv, inactivated, subunit, adjuvanted, IM (Fluad 65 yrs and older) 11/20/2019    Pneumococcal Conjugate 13-valent ([id number]) 12/02/2014    Pneumococcal Polysaccharide ([id number]) 08/27/2003, 01/01/2017    Tdap (Boostrix, Adacel) 04/04/2012, 01/01/2017, 11/14/2017    Zoster Live (Zostavax) 08/27/2007    Zoster Recombinant (Shingrix) 12/02/2019     ? Antibiotics:     ?  -------------------------------------------------------------------------------------------------------------------    Vital Signs:  Vitals:    07/12/21 0402   BP: (!) 117/42   Pulse: 85   Resp: 16   Temp: 97.9 °F (36.6 °C)   SpO2: 95%         Exam:    VS: noted; wt 71.8 kg   Gen: alert and oriented X3, no distress  Skin: no stigmata of endocarditis  Wounds: C/D/I  HEMT: AT/NC Oropharynx pink, moist, and without lesions or exudates; dentition in good state of repair  Eyes: PERRLA, EOMI, conjunctiva pink, sclera anicteric. Neck: Supple. Trachea midline. No LAD. Chest: no distress and CTA. Good air movement. Heart: systolic murmur in all cardiac areas  Abd: soft, non-distended, no tenderness, no hepatomegaly. Normoactive bowel sounds. Ext: no clubbing, cyanosis, or edema  Catheter Site: without erythema or tenderness  LDA: CVC:  Neuro: Mental status intact. CN 2-12 intact and no focal sensory or motor deficits    ? Diagnostic Studies: reviewed  ? ? I have examined this patient and available medical records on this date and have made the above observations, conclusions and recommendations.   Electronically signed by: Electronically signed by Asha Gann MD on 7/12/2021 at 6:48 AM

## 2021-07-12 NOTE — PROGRESS NOTES
Cardiology Progress Note     Today's Plan: Request for VIKTORIA    Admit Date:  7/9/2021    Consult reason/ Seen today for: abnormal tropoinin    Subjective and  Overnight Events:      No acute events overnight. [id number]      Assessment / Plan / Recommendation:       1. Bacteremia: ID following. Patient has prior history of TAVR as well as pacemaker. Will obtain VIKTORIA in the morning to rule out infective endocarditis. 2. Mildly elevated troponin non-ACS: No further work-up. Continue with aspirin  3. S/p perm pacemaker: Pacemaker dependent. 4. Aortic stenosis s/p TAVR. Echo today shows preserved EF. Moderate perivalvular leak. Slight prolapse of the anterior mitral leaflet with moderate MR. Will obtain VIKTORIA in the morning. 5. Generalized weakness: Possible secondary to rhabdomyolysis versus sepsis. Chief complain on admission : 80 y. o.year old who is admitted for  Chief Complaint   Patient presents with    Altered Mental Status    Fatigue    Fever        Past medical history:    has no past medical history on file. Past surgical history:   has a past surgical history that includes Aortic valve surgery (06/29/2017); pacemaker placement (06/29/2017); and brain surgery (11/27/2017). Social History:   reports that she has never smoked. She has never used smokeless tobacco.  Family history:  family history includes Cancer in an other family member; Heart Attack in her father; Heart Disease in her father. Allergies   Allergen Reactions    Alendronate Other (See Comments)    Alendronate Sodium Other (See Comments)    Ibandronic Acid Other (See Comments)    Milk-Related Compounds        Review of Systems:  Review of Systems   Cardiovascular: Negative for chest pain, palpitations and leg swelling. Musculoskeletal: Negative. Skin: Negative. Neurological: Negative for dizziness and weakness.    All other systems reviewed and are negative. BP (!) 132/116   Pulse 96   Temp 97.9 °F (36.6 °C) (Oral)   Resp 14   Ht 5' 1\" (1.549 m)   Wt 158 lb 4.6 oz (71.8 kg)   SpO2 99%   BMI 29.91 kg/m²       Intake/Output Summary (Last 24 hours) at 7/12/2021 1737  Last data filed at 7/12/2021 1200  Gross per 24 hour   Intake 1952.5 ml   Output 1250 ml   Net 702.5 ml       Physical Exam:  Constitutional:  Well developed, Well nourished, No acute distress  HENT:  Normocephalic, Atraumatic, Bilateral external ears normal,  Nose normal.   Neck- trachea is midline  Eyes:  PERRL, Conjunctiva normal  Respiratory:  Normal breath sounds, No respiratory distress, No wheezing, No chest tenderness. Cardiovascular:  Normal heart rate, Normal rhythm, 3/6 murmurs appreciated, No rubs appreciated, No gallops appreciated, JVP not elevated  Abdomen/GI:  Soft, No tenderness  Musculoskeletal:  Intact distal pulses, no edema, No tenderness, No cyanosis, No clubbing. Integument:  Warm, Dry  Lymphatic:  No lymphadenopathy noted.    Neurologic:  Alert & oriented  Psychiatric:  Affect and Mood :pleasant     Medications:    cefTRIAXone (ROCEPHIN) IV  2,000 mg Intravenous Q24H    pantoprazole  40 mg Oral QAM AC    vancomycin  1,000 mg Intravenous Q24H    aspirin  81 mg Oral Daily    fluticasone  1 spray Each Nostril Daily    metoprolol succinate  25 mg Oral Daily    therapeutic multivitamin-minerals  1 tablet Oral Daily    sodium chloride flush  5-40 mL Intravenous 2 times per day    enoxaparin  40 mg Subcutaneous Daily      sodium chloride 50 mL/hr at 07/12/21 1157    sodium chloride       sodium chloride flush, sodium chloride flush, sodium chloride, ondansetron **OR** ondansetron, polyethylene glycol, acetaminophen **OR** acetaminophen    Lab Data:  CBC:   Recent Labs     07/11/21  0524 07/12/21  0620   WBC 10.5 6.5   HGB 10.0* 10.2*   HCT 32.4* 32.1*   MCV 97.3 97.0   * 138*     BMP:   Recent Labs     07/10/21  0605 07/11/21  0524 07/12/21  0620    137 137   K 4.3 4.1 3.8    106 108   CO2 25 22 20*   BUN 29* 32* 24*   CREATININE 1.0 0.9 0.7     PT/INR: No results for input(s): PROTIME, INR in the last 72 hours. BNP:    No results for input(s): PROBNP in the last 72 hours. TROPONIN:   Recent Labs     07/10/21  0605   TROPONINT 0.053*          Impression:  Active Problems:    Essential hypertension    Cardiac pacemaker    Cerebrovascular accident (CVA) due to vascular stenosis (HCC)    S/P AVR (aortic valve replacement)    Weakness    Troponin I above reference range    Bacteremia  Resolved Problems:    * No resolved hospital problems. *       All labs, medications and tests reviewed by myself, continue all other medications of all above medical condition listed as is except for changes mentioned above. Thank you   Please call with questions.     Electronically signed by Marielos Flynn MD on 7/12/2021 at 5:37 PM

## 2021-07-12 NOTE — CARE COORDINATION
Spoke with patient in room . Patient up in bedside chair. Patient is from home with her daughter and reported using a cane outside of the home and added that her daughter supervises her when she is doing her ADL's. Patient has PCP and insurnace to assist with RX coverage. CM discussed therapy recommending HHC and patient politely declined. Patient plans to return home with her daughter and denied having any discharge needs. Case Management available in case needs would arise.

## 2021-07-12 NOTE — PROGRESS NOTES
[address]  consulted by Lilliana Johnson CNP for monitoring and adjustment. Indication for treatment: Endocarditis   Goal trough: 15 mcg/mL, -600     Pertinent Laboratory Values:   Temp Readings from Last 3 Encounters:   07/12/21 97.6 °F (36.4 °C) (Oral)   10/13/20 98.2 °F (36.8 °C)     Recent Labs     07/09/21  1114 07/11/21  0524 07/12/21  0620   WBC 12.5* 10.5 6.5   LACTATE 1.2  --   --      Recent Labs     07/10/21  0605 07/11/21  0524 07/12/21  0620   BUN 29* 32* 24*   CREATININE 1.0 0.9 0.7     Estimated Creatinine Clearance: 46 mL/min (based on SCr of 0.7 mg/dL). Intake/Output Summary (Last 24 hours) at 7/12/2021 1106  Last data filed at 7/12/2021 0752  Gross per 24 hour   Intake 1302.5 ml   Output 1250 ml   Net 52.5 ml       Pertinent Cultures:  Date    Source    Results  7/9               Urine     Negative  7/9                Blood     Streptococcus sp. (4/4 btls)    Vancomycin level:   TROUGH:    Recent Labs     07/12/21  1215   VANCOTROUGH 9.0*     RANDOM:  No results for input(s): VANCORANDOM in the last 72 hours. Assessment:  · WBC and temperature: WBC WNL, afebrile   · SCr, BUN, and urine output: stable  · Day(s) of therapy: #3  · Vancomycin concentration:  · 7/12: 9.0, therapeutic on 1000 mg q24h ()    Plan:  · Continue vancomycin 1000mg q24h  · Clearance is as expected with level prior to third dose   · Accumulation will continue  · Therapeutic AUC at steady state   · Pharmacy will continue to monitor patient and adjust therapy as indicated    Thank you for the consult.   [facility]  7/12/2021 11:06 AM

## 2021-07-13 PROBLEM — B95.5 BACTEREMIA DUE TO STREPTOCOCCUS: Status: ACTIVE | Noted: 2021-07-11

## 2021-07-13 LAB
ANION GAP SERPL CALCULATED.3IONS-SCNC: 13 MMOL/L (ref 4–16)
BASOPHILS ABSOLUTE: 0 K/CU MM
BASOPHILS RELATIVE PERCENT: 0.6 % (ref 0–1)
BUN BLDV-MCNC: 20 MG/DL (ref 6–23)
CALCIUM SERPL-MCNC: 8.2 MG/DL (ref 8.3–10.6)
CHLORIDE BLD-SCNC: 106 MMOL/L (ref 99–110)
CO2: 19 MMOL/L (ref 21–32)
CREAT SERPL-MCNC: 0.7 MG/DL (ref 0.6–1.1)
CULTURE: ABNORMAL
CULTURE: ABNORMAL
DIFFERENTIAL TYPE: ABNORMAL
EOSINOPHILS ABSOLUTE: 0.2 K/CU MM
EOSINOPHILS RELATIVE PERCENT: 2.4 % (ref 0–3)
ERYTHROCYTE SEDIMENTATION RATE: 20 MM/HR (ref 0–30)
GFR AFRICAN AMERICAN: >60 ML/MIN/1.73M2
GFR NON-AFRICAN AMERICAN: >60 ML/MIN/1.73M2
GLUCOSE BLD-MCNC: 92 MG/DL (ref 70–99)
HCT VFR BLD CALC: 31.9 % (ref 37–47)
HEMOGLOBIN: 9.8 GM/DL (ref 12.5–16)
HIGH SENSITIVE C-REACTIVE PROTEIN: 49.5 MG/L
IMMATURE NEUTROPHIL %: 0.6 % (ref 0–0.43)
LYMPHOCYTES ABSOLUTE: 1.1 K/CU MM
LYMPHOCYTES RELATIVE PERCENT: 16.4 % (ref 24–44)
Lab: ABNORMAL
MCH RBC QN AUTO: 29.3 PG (ref 27–31)
MCHC RBC AUTO-ENTMCNC: 30.7 % (ref 32–36)
MCV RBC AUTO: 95.5 FL (ref 78–100)
MONOCYTES ABSOLUTE: 1.3 K/CU MM
MONOCYTES RELATIVE PERCENT: 19 % (ref 0–4)
NUCLEATED RBC %: 0 %
PDW BLD-RTO: 15.4 % (ref 11.7–14.9)
PLATELET # BLD: 172 K/CU MM (ref 140–440)
PMV BLD AUTO: 9.9 FL (ref 7.5–11.1)
POTASSIUM SERPL-SCNC: 4.1 MMOL/L (ref 3.5–5.1)
PROCALCITONIN: 0.32
RBC # BLD: 3.34 M/CU MM (ref 4.2–5.4)
SEGMENTED NEUTROPHILS ABSOLUTE COUNT: 4.2 K/CU MM
SEGMENTED NEUTROPHILS RELATIVE PERCENT: 61 % (ref 36–66)
SODIUM BLD-SCNC: 138 MMOL/L (ref 135–145)
SPECIMEN: ABNORMAL
TOTAL CK: 306 IU/L (ref 26–140)
TOTAL IMMATURE NEUTOROPHIL: 0.04 K/CU MM
TOTAL NUCLEATED RBC: 0 K/CU MM
WBC # BLD: 6.9 K/CU MM (ref 4–10.5)

## 2021-07-13 PROCEDURE — 97535 SELF CARE MNGMENT TRAINING: CPT

## 2021-07-13 PROCEDURE — 36415 COLL VENOUS BLD VENIPUNCTURE: CPT

## 2021-07-13 PROCEDURE — APPSS60 APP SPLIT SHARED TIME 46-60 MINUTES: Performed by: NURSE PRACTITIONER

## 2021-07-13 PROCEDURE — 85025 COMPLETE CBC W/AUTO DIFF WBC: CPT

## 2021-07-13 PROCEDURE — 86141 C-REACTIVE PROTEIN HS: CPT

## 2021-07-13 PROCEDURE — 99233 SBSQ HOSP IP/OBS HIGH 50: CPT | Performed by: INTERNAL MEDICINE

## 2021-07-13 PROCEDURE — 82550 ASSAY OF CK (CPK): CPT

## 2021-07-13 PROCEDURE — 97165 OT EVAL LOW COMPLEX 30 MIN: CPT

## 2021-07-13 PROCEDURE — 94761 N-INVAS EAR/PLS OXIMETRY MLT: CPT

## 2021-07-13 PROCEDURE — 7100000001 HC PACU RECOVERY - ADDTL 15 MIN

## 2021-07-13 PROCEDURE — 6370000000 HC RX 637 (ALT 250 FOR IP): Performed by: NURSE PRACTITIONER

## 2021-07-13 PROCEDURE — 93312 ECHO TRANSESOPHAGEAL: CPT

## 2021-07-13 PROCEDURE — 84145 PROCALCITONIN (PCT): CPT

## 2021-07-13 PROCEDURE — 80048 BASIC METABOLIC PNL TOTAL CA: CPT

## 2021-07-13 PROCEDURE — 6370000000 HC RX 637 (ALT 250 FOR IP): Performed by: INTERNAL MEDICINE

## 2021-07-13 PROCEDURE — 85652 RBC SED RATE AUTOMATED: CPT

## 2021-07-13 PROCEDURE — 2580000003 HC RX 258: Performed by: NURSE PRACTITIONER

## 2021-07-13 PROCEDURE — 1200000000 HC SEMI PRIVATE

## 2021-07-13 PROCEDURE — APPNB60 APP NON BILLABLE TIME 46-60 MINS: Performed by: NURSE PRACTITIONER

## 2021-07-13 PROCEDURE — 87040 BLOOD CULTURE FOR BACTERIA: CPT

## 2021-07-13 PROCEDURE — 6360000002 HC RX W HCPCS: Performed by: NURSE PRACTITIONER

## 2021-07-13 PROCEDURE — 6360000002 HC RX W HCPCS: Performed by: INTERNAL MEDICINE

## 2021-07-13 PROCEDURE — 7100000000 HC PACU RECOVERY - FIRST 15 MIN

## 2021-07-13 PROCEDURE — 99223 1ST HOSP IP/OBS HIGH 75: CPT | Performed by: INTERNAL MEDICINE

## 2021-07-13 RX ADMIN — METOPROLOL SUCCINATE 25 MG: 25 TABLET, EXTENDED RELEASE ORAL at 14:27

## 2021-07-13 RX ADMIN — SODIUM CHLORIDE: 9 INJECTION, SOLUTION INTRAVENOUS at 11:17

## 2021-07-13 RX ADMIN — PANTOPRAZOLE SODIUM 40 MG: 40 TABLET, DELAYED RELEASE ORAL at 14:35

## 2021-07-13 RX ADMIN — Medication 1 TABLET: at 14:27

## 2021-07-13 RX ADMIN — ENOXAPARIN SODIUM 40 MG: 40 INJECTION SUBCUTANEOUS at 14:26

## 2021-07-13 RX ADMIN — ASPIRIN 81 MG: 81 TABLET, CHEWABLE ORAL at 14:35

## 2021-07-13 RX ADMIN — VANCOMYCIN HYDROCHLORIDE 1000 MG: 1 INJECTION, POWDER, LYOPHILIZED, FOR SOLUTION INTRAVENOUS at 14:38

## 2021-07-13 ASSESSMENT — ENCOUNTER SYMPTOMS
EYES NEGATIVE: 1
CONSTIPATION: 0
PHOTOPHOBIA: 0
WHEEZING: 0
COLOR CHANGE: 0
NAUSEA: 0
ABDOMINAL PAIN: 0
SHORTNESS OF BREATH: 0
VOMITING: 0
DIARRHEA: 0
CHEST TIGHTNESS: 0
BLOOD IN STOOL: 0
COUGH: 0
EYE PAIN: 0
BACK PAIN: 0
SORE THROAT: 0

## 2021-07-13 ASSESSMENT — PAIN SCALES - GENERAL: PAINLEVEL_OUTOF10: 0

## 2021-07-13 NOTE — PROCEDURES
VIKTORIA    Rule out infective endocarditis. ASA Mallampati 2/2    Full report to follow. Normal functioning bioprosthetic aortic valve with no vegetation or AI noted. Moderate to severe MR, no vegetation noted. Pacer wire noted crossing from SVC, RA, RV.   No vegetation noted  PFO bubble study negative    Conclusion:    No evidence of infective endocarditis noted on today's exam.

## 2021-07-13 NOTE — FLOWSHEET NOTE
Initial SC assessment. Pt sitting on the side of her bed working on a puzzle. Pt accepted a visit. Pt became tearful when talking about what happened and the recent death of her son in law. \"We have had a hard time in our family lately. \" Pt appeared confused about the details of her health and what brought her to the hospital. Provided active listening and encouragement.

## 2021-07-13 NOTE — PROGRESS NOTES
Cardiology Progress Note     Today's Plan:VIKTORIA today     Admit Date:  7/9/2021    Consult reason/ Seen today for: abnormal tropoinin    Subjective and  Overnight Events:  Patient having intermittent confusion. [id number]      Assessment / Plan / Recommendation:       1. Bacteremia: ID following. Patient has prior history of TAVR as well as pacemaker. Will obtain VIKTORIA today to rule out infective endocarditis. 2. Mildly elevated troponin non-ACS: No further work-up. Continue with aspirin  3. S/p perm pacemaker: Pacemaker dependent. 4. Aortic stenosis s/p TAVR. Echo today shows preserved EF. Moderate perivalvular leak. Slight prolapse of the anterior mitral leaflet with moderate MR. Will obtain VIKTORIA today. 5. Generalized weakness: Possible secondary to rhabdomyolysis versus sepsis. Chief complain on admission : 80 y. o.year old who is admitted for  Chief Complaint   Patient presents with    Altered Mental Status    Fatigue    Fever        Past medical history:    has no past medical history on file. Past surgical history:   has a past surgical history that includes Aortic valve surgery (06/29/2017); pacemaker placement (06/29/2017); and brain surgery (11/27/2017). Social History:   reports that she has never smoked. She has never used smokeless tobacco.  Family history:  family history includes Cancer in an other family member; Heart Attack in her father; Heart Disease in her father. Allergies   Allergen Reactions    Alendronate Other (See Comments)    Alendronate Sodium Other (See Comments)    Ibandronic Acid Other (See Comments)    Milk-Related Compounds        Review of Systems:  Review of Systems   Cardiovascular: Negative for chest pain, palpitations and leg swelling. Musculoskeletal: Negative. Skin: Negative. Neurological: Negative for dizziness and weakness.    All other systems reviewed and are negative. /71   Pulse 101   Temp 98.2 °F (36.8 °C) (Oral)   Resp 20   Ht 5' 1\" (1.549 m)   Wt 158 lb 4.6 oz (71.8 kg)   SpO2 99%   BMI 29.91 kg/m²       Intake/Output Summary (Last 24 hours) at 7/13/2021 1055  Last data filed at 7/13/2021 0405  Gross per 24 hour   Intake 1438.33 ml   Output 300 ml   Net 1138.33 ml       Physical Exam:  Constitutional:  Well developed, Well nourished, No acute distress  HENT:  Normocephalic, Atraumatic, Bilateral external ears normal,  Nose normal.   Neck- trachea is midline  Eyes:  PERRL, Conjunctiva normal  Respiratory:  Normal breath sounds, No respiratory distress, No wheezing, No chest tenderness. Cardiovascular:  Normal heart rate, Normal rhythm, 3/6 murmurs appreciated, No rubs appreciated, No gallops appreciated, JVP not elevated  Abdomen/GI:  Soft, No tenderness  Musculoskeletal:  Intact distal pulses, no edema, No tenderness, No cyanosis, No clubbing. Integument:  Warm, Dry  Lymphatic:  No lymphadenopathy noted.    Neurologic:  Alert & oriented  Psychiatric:  Affect and Mood :pleasant     Medications:    cefTRIAXone (ROCEPHIN) IV  2,000 mg Intravenous Q24H    pantoprazole  40 mg Oral QAM AC    vancomycin  1,000 mg Intravenous Q24H    aspirin  81 mg Oral Daily    fluticasone  1 spray Each Nostril Daily    metoprolol succinate  25 mg Oral Daily    therapeutic multivitamin-minerals  1 tablet Oral Daily    sodium chloride flush  5-40 mL Intravenous 2 times per day    enoxaparin  40 mg Subcutaneous Daily      sodium chloride 50 mL/hr at 07/12/21 1157    sodium chloride       sodium chloride flush, sodium chloride flush, sodium chloride, ondansetron **OR** ondansetron, polyethylene glycol, acetaminophen **OR** acetaminophen    Lab Data:  CBC:   Recent Labs     07/11/21  0524 07/12/21  0620 07/13/21  0655   WBC 10.5 6.5 6.9   HGB 10.0* 10.2* 9.8*   HCT 32.4* 32.1* 31.9*   MCV 97.3 97.0 95.5   * 138* 172     BMP:   Recent Labs 07/11/21  0524 07/12/21  0620 07/13/21  0655    137 138   K 4.1 3.8 4.1    108 106   CO2 22 20* 19*   BUN 32* 24* 20   CREATININE 0.9 0.7 0.7     PT/INR: No results for input(s): PROTIME, INR in the last 72 hours. BNP:    No results for input(s): PROBNP in the last 72 hours. TROPONIN:   No results for input(s): TROPONINT in the last 72 hours. Impression:  Active Problems:    Essential hypertension    Cardiac pacemaker    Cerebrovascular accident (CVA) due to vascular stenosis (HCC)    S/P AVR (aortic valve replacement)    Weakness    Troponin I above reference range    Bacteremia due to Streptococcus  Resolved Problems:    * No resolved hospital problems. *       All labs, medications and tests reviewed by myself, continue all other medications of all above medical condition listed as is except for changes mentioned above. Thank you   Please call with questions. Electronically signed by Lashaun Rubio. Aaron , FELICIA - CNP on 7/13/2021 at 10:55 AM         CARDIOLOGY ATTENDING ADDENDUM    I have seen, spoken to and examined this patient personally, independently of the nurse practitioner. I have reviewed the hospital care given to date and reviewed all pertinent labs and imaging. The plan was developed mutually at the time of the visit with the patient,  NP   and myself. I have spoken with patient, nursing staff and provided written and verbal instructions . The above note has been reviewed and I agree with the assessment, diagnosis, and treatment plan with changes made by me as follows       HPI:  I have reviewed the above HPI  And agree with above   Please review addendum/changes made to note above     Interval history:        Physical Exam:  General:  Awake, alert, NAD  Head:normal  Eye:normal  Neck:  No JVD   Chest:  Clear to auscultation, respiration easy  Cardiovascular:  s1s2  Abdomen:   nontender  Extremities:  0 edema  Pulses; palpable  Neuro: grossly normal      MEDICAL DECISION MAKING;    I agree with the above plan, which was planned by myself and discussed with NP.       Denies any active complaints  VIKTORIA today        Dr. Hamilton Lee MD

## 2021-07-13 NOTE — PROGRESS NOTES
Infectious Disease Progress Note  2021   Patient Name: Angelica Carballo : 5/15/1929       Reason for visit: F/u Streptococcus sanguis bacteremia, suspected prosthetic valve endocarditis versus cardiac implantable electronic device endocarditis ? History:? Interval history noted  Denies n/v/d/f or untoward effects of antimicrobials  Physical Exam:  Vital Signs: /63   Pulse 102   Temp 97.8 °F (36.6 °C) (Oral)   Resp 18   Ht 5' 1\" (1.549 m)   Wt 158 lb 4.6 oz (71.8 kg)   SpO2 95%   BMI 29.91 kg/m²     Gen: alert and oriented X3, no distress  Skin: no stigmata of endocarditis  Wounds: C/D/I  HEMT: AT/NC Oropharynx pink, moist, and without lesions or exudates; dentition in good state of repair  Eyes: PERRLA, EOMI, conjunctiva pink, sclera anicteric. Neck: Supple. Trachea midline. No LAD. Chest: no distress and CTA. Good air movement. Heart: systolic murmur in all cardiac areas  Abd: soft, non-distended, no tenderness, no hepatomegaly. Normoactive bowel sounds. Ext: no clubbing, cyanosis, or edema  Catheter Site: without erythema or tenderness  LDA: CVC:  Neuro: Mental status intact. CN 2-12 intact and no focal sensory or motor deficits     Radiologic / Imaging / TESTING  No results found. Labs:    Recent Results (from the past 24 hour(s))   Vancomycin, trough    Collection Time: 21 12:15 PM   Result Value Ref Range    Vancomycin Tr 9.0 (L) 10 - 20 UG/ML    DOSE AMOUNT DOSE AMT.  GIVEN - 1000mg     DOSE TIME DOSE TIME GIVEN - 1300      CULTURE results: Invalid input(s): BLOOD CULTURE,  URINE CULTURE, SURGICAL CULTURE    Diagnosis:  Patient Active Problem List   Diagnosis    Essential hypertension    Other hyperlipidemia    Cardiac pacemaker    Congestive heart failure ([facility] [address]    Cerebrovascular accident (CVA) due to vascular stenosis (HCC)    CHB (complete heart block) (HCC)    S/P AVR (aortic valve replacement)    Weakness    Troponin I above reference range    Bacteremia due to Streptococcus       Active Problems  Active Problems:    Cardiac pacemaker    S/P AVR (aortic valve replacement)    Bacteremia due to Streptococcus    Essential hypertension    Cerebrovascular accident (CVA) due to vascular stenosis (HCC)    Weakness    Troponin I above reference range  Resolved Problems:    * No resolved hospital problems. *      Impression and plan   Summary and rationale: Patient is a 80 y.o.  female with a medical history of hypertension, hyperlipidemia, CHF, status post TAVR for aortic stenosis, status post permanent pacemaker admitted for fever and generalized weakness. 2 out of 2 sets of blood cultures on 7/9/2021 + for Streptococcus sanguis.   Need to rule out endocarditis-prosthetic valve versus CI ED   Clinical status: Improving   Therapeutic: Continue vancomycin and ceftriaxone; pending susceptibility results   Diagnostic:   F/u: VIKTORIA   Other:        Electronically signed by: Electronically signed by Cristhian Velasquez MD on 7/13/2021 at 6:27 AM

## 2021-07-13 NOTE — PLAN OF CARE
Problem: Falls - Risk of:  Goal: Will remain free from falls  Description: Will remain free from falls  7/13/2021 1217 by Shelby Erickson RN  Outcome: Ongoing  7/13/2021 0348 by Shahzad Mcduffie RN  Outcome: Ongoing  Goal: Absence of physical injury  Description: Absence of physical injury  7/13/2021 1217 by Shelby Erickson RN  Outcome: Ongoing  7/13/2021 0348 by Shahzad Mcduffie RN  Outcome: Ongoing     Problem: Pain:  Goal: Pain level will decrease  Description: Pain level will decrease  7/13/2021 1217 by Shelby Erickson RN  Outcome: Ongoing  7/13/2021 0348 by Shahzad Mcduffie RN  Outcome: Ongoing  Goal: Control of acute pain  Description: Control of acute pain  7/13/2021 1217 by Shelby Erickson RN  Outcome: Ongoing  7/13/2021 0348 by Shahzad Mcduffie RN  Outcome: Ongoing  Goal: Control of chronic pain  Description: Control of chronic pain  7/13/2021 1217 by Shelby Erickson RN  Outcome: Ongoing  7/13/2021 0348 by Shahzad Mcduffie RN  Outcome: Ongoing  Goal: Patient's pain/discomfort is manageable  Description: Patient's pain/discomfort is manageable  7/13/2021 1217 by Shelby Erickson RN  Outcome: Ongoing  7/13/2021 0348 by Shahzad Mcduffie RN  Outcome: Ongoing     Problem: Infection:  Goal: Will remain free from infection  Description: Will remain free from infection  7/13/2021 1217 by Shelby Erickson RN  Outcome: Ongoing  7/13/2021 0348 by Shahzad Mcduffie RN  Outcome: Ongoing     Problem: Safety:  Goal: Free from accidental physical injury  Description: Free from accidental physical injury  7/13/2021 1217 by Shelby Erickson RN  Outcome: Ongoing  7/13/2021 0348 by Shahzad Mcduffie RN  Outcome: Ongoing  Goal: Free from intentional harm  Description: Free from intentional harm  7/13/2021 1217 by Shelby Erickson RN  Outcome: Ongoing  7/13/2021 0348 by Shahzad Mcduffie RN  Outcome: Ongoing     Problem: Daily Care:  Goal: Daily care needs are met  Description: Daily care needs are met  7/13/2021 1217 by Shelby Erickson RN  Outcome: Ongoing  7/13/2021 0348 by Rajesh Quarles RN  Outcome: Ongoing     Problem: Skin Integrity:  Goal: Skin integrity will stabilize  Description: Skin integrity will stabilize  7/13/2021 1217 by Eric Rajput RN  Outcome: Ongoing  7/13/2021 0348 by Rajesh Quarles RN  Outcome: Ongoing     Problem: Discharge Planning:  Goal: Patients continuum of care needs are met  Description: Patients continuum of care needs are met  7/13/2021 1217 by Eric Rajput RN  Outcome: Ongoing  7/13/2021 0348 by Rajesh Quarles RN  Outcome: Ongoing     Problem: Skin Integrity:  Goal: Will show no infection signs and symptoms  Description: Will show no infection signs and symptoms  Outcome: Ongoing  Goal: Absence of new skin breakdown  Description: Absence of new skin breakdown  Outcome: Ongoing

## 2021-07-13 NOTE — PROGRESS NOTES
[facility] [facility]  consulted by Dea Tse CNP for monitoring and adjustment. Indication for treatment: Endocarditis   Goal trough: 10-15 mcg/mL, AUC <500 (strep sp.)      Pertinent Laboratory Values:   Temp Readings from Last 3 Encounters:   07/13/21 98.2 °F (36.8 °C) (Oral)   10/13/20 98.2 °F (36.8 °C)     Recent Labs     07/11/21  0524 07/12/21  0620 07/13/21  0655   WBC 10.5 6.5 6.9     Recent Labs     07/11/21  0524 07/12/21  0620 07/13/21  0655   BUN 32* 24* 20   CREATININE 0.9 0.7 0.7     Estimated Creatinine Clearance: 46 mL/min (based on SCr of 0.7 mg/dL). Intake/Output Summary (Last 24 hours) at 7/13/2021 1026  Last data filed at 7/13/2021 0405  Gross per 24 hour   Intake 1438.33 ml   Output 300 ml   Net 1138.33 ml       Pertinent Cultures:  Date    Source    Results  7/9               Urine     Negative  7/9                Blood     Streptococcus sp. (4/4 btls)  7/12    Blood    Pending     Vancomycin level:   TROUGH:    Recent Labs     07/12/21  1215   VANCOTROUGH 9.0*     RANDOM:  No results for input(s): VANCORANDOM in the last 72 hours. Assessment:  · WBC and temperature: WBC WNL, afebrile   · SCr, BUN, and urine output: stable  · Day(s) of therapy: 4  · Vancomycin concentration:  · 7/12: 9.0, therapeutic on 1000 mg q24h ()     Plan:  · Continue vancomycin 1000mg q24h  · Clearance is as expected with level prior to third dose   · Accumulation will continue, repeat level 7/15 AM   · Therapeutic AUC at steady state   · Pharmacy will continue to monitor patient and adjust therapy as indicated    Thank you for the consult.   Jules Jerome [facility]  7/13/2021 10:26 AM

## 2021-07-13 NOTE — PROGRESS NOTES
Occupational Therapy   Occupational Therapy Initial Assessment  Date: 2021   Patient Name: Adrien Loving  MRN: [id number]     : 5/15/1929    Date of Service: 2021    Discharge Recommendations:  Home with assist PRN, Home with Home health OT, S Level 1  OT Equipment Recommendations  Other: defer    Assessment   Performance deficits / Impairments: Decreased functional mobility ; Decreased endurance;Decreased ADL status; Decreased balance;Decreased strength;Decreased safe awareness;Decreased high-level IADLs  Assessment: Pt is a 80 y.o. F admitted from home. Pt at baseline is independent w/ functional mobility/transfers using a cane. Pt currently presents w/ above deficits and would benefit from continued acute care OT services w/ discharge to home w/ [facility] in order to return to [facility]. Treatment Diagnosis: weakness  Prognosis: Good  Decision Making: Low Complexity  OT Education: OT Role;Plan of Care;Precautions;Transfer Training;Energy Conservation;Equipment  REQUIRES OT FOLLOW UP: Yes  Activity Tolerance  Activity Tolerance: Patient Tolerated treatment well  Safety Devices  Safety Devices in place: Yes  Type of devices: Left in chair; All fall risk precautions in place;Call light within reach;Nurse notified; Chair alarm in place;Gait belt  Restraints  Initially in place: No           Patient Diagnosis(es): The primary encounter diagnosis was Troponin I above reference range. Diagnoses of Generalized weakness and Bilateral pleural effusion were also pertinent to this visit. has no past medical history on file. has a past surgical history that includes Aortic valve surgery (2017); pacemaker placement (2017); and brain surgery (2017).     Treatment Diagnosis: weakness      Restrictions  Restrictions/Precautions  Restrictions/Precautions: Fall Risk, General Precautions  Required Braces or Orthoses?: No    Subjective   General  Chart Reviewed: Yes  Patient assessed for rehabilitation services?: Yes  Response to previous treatment: Patient with no complaints from previous session  Family / Caregiver Present: No  Subjective  Subjective: Pt states: \"I need to brush my teeth and then we'll walk. \"     Social/Functional History  Social/Functional History  Lives With: Daughter  Type of Home: House  Home Layout: One level  Home Access: Stairs to enter without rails  Entrance Stairs - Number of Steps: 1  Bathroom Shower/Tub: Tub/Shower unit  Bathroom Toilet: Standard  Home Equipment: Cane (Uses cane in community but not in home)  ADL Assistance: Independent  Homemaking Assistance: Independent  Homemaking Responsibilities: Yes  Ambulation Assistance: Independent  Transfer Assistance: Independent  Active : No  Additional Comments: Pt reports 1 fall in 6 months. Objective        Orientation  Overall Orientation Status: Within Normal Limits  Observation/Palpation  Posture: Good  Observation: Pt seated in recliner and agreeable to therapy. Balance  Sitting Balance: Independent  Standing Balance: Stand by assistance  Standing Balance  Comment: Pt completed oral hygiene in stand at sink ~ 3mins. Functional Mobility  Functional - Mobility Device: Rolling Walker  Activity: To/from bathroom; Other (Pt completed functional mobility ~ 120ft in hallway.)  Assist Level: Contact guard assistance  Functional Mobility Comments: Pt required min VCs for walker placement.   ADL  Feeding: Independent  Grooming: Stand by assistance (Pt completed oral hygiene in stand at sink.)  UE Bathing: Supervision  LE Bathing: Contact guard assistance  UE Dressing: Supervision  LE Dressing: Contact guard assistance  Toileting: Contact guard assistance  Tone RUE  RUE Tone: Normotonic  Tone LUE  LUE Tone: Normotonic  Coordination  Movements Are Fluid And Coordinated: Yes        Transfers  Sit to stand: Contact guard assistance  Stand to sit: Contact guard assistance  Transfer Comments: w/ RW  Vision - Basic Assessment  Prior Vision: Wears glasses all the time  Visual History: No significant visual history  Patient Visual Report: No visual complaint reported. Cognition  Overall Cognitive Status: Exceptions  Arousal/Alertness: Appropriate responses to stimuli  Following Commands: Follows all commands without difficulty  Attention Span: Attends with cues to redirect  Safety Judgement: Decreased awareness of need for assistance;Decreased awareness of need for safety  Problem Solving: Decreased awareness of errors  Insights: Decreased awareness of deficits  Initiation: Does not require cues  Sequencing: Does not require cues  Perception  Overall Perceptual Status: WFL     Sensation  Overall Sensation Status: WNL        LUE AROM (degrees)  LUE AROM : WNL  Left Hand AROM (degrees)  Left Hand AROM: WNL  RUE AROM (degrees)  RUE AROM : WNL  Right Hand AROM (degrees)  Right Hand AROM: WNL  LUE Strength  Gross LUE Strength: WFL  L Hand General: 4+/5  RUE Strength  Gross RUE Strength: WFL  R Hand General: 4+/5       Therapeutic Activity Training:   Therapeutic activity training was instructed today. Cues were given for safety, sequence, UE/LE placement, awareness, and balance. Activities performed today included functional mobility training, stand-sit, sit-stand. Self Care Training:   Cues were given for safety, sequence, UE/LE placement, visual cues, and balance. Activities performed today included oral hygiene.               Plan   Plan  Times per week: 2x+  Times per day: Daily  Current Treatment Recommendations: Strengthening, Patient/Caregiver Education & Training, Home Management Training, Equipment Evaluation, Education, & procurement, Balance Training, Functional Mobility Training, Positioning, Endurance Training, Cognitive/Perceptual Training, Safety Education & Training, Self-Care / ADL      [facility] Score        [facility] Inpatient Daily Activity Raw Score: 20 (07/13/21 6068)  AM-[facility] Inpatient ADL T-Scale Score : 42.03 (07/13/21 [id number])  ADL Inpatient CMS 0-100% Score: 38.32 (07/13/21 1638)  ADL Inpatient CMS G-Code Modifier : Gerhard Yao (07/13/21 1638)    Goals  Short term goals  Time Frame for Short term goals: discharge  Short term goal 1: Pt will complete UB/LB bathing independently. Short term goal 2: Pt will complete UB/LB dressing independently. Short term goal 3: Pt will complete all aspects of toileting independently. Short term goal 4: Pt will complete oral hygiene/grooming in stand at sink independently. Short term goal 5: Pt will perform ther ex/ther act to inc UB strength from 4+/5 to 5/5.        Therapy Time   Individual Concurrent Group Co-treatment   Time In 9311         Time Out 1622         Minutes 19             Total treatment time: 19  Treatment minutes: Nica Connolly S/OT

## 2021-07-13 NOTE — CONSULTS
furosemide (LASIX) 20 MG tablet Take 1 tablet by mouth daily 90 tablet 1    potassium chloride (KLOR-CON M) 20 MEQ extended release tablet Take 1 tablet by mouth daily 90 tablet 1    lovastatin (MEVACOR) 40 MG tablet One daily 90 tablet 1    Calcium Carb-Cholecalciferol (OYSTER SHELL CALCIUM) 500-400 MG-UNIT TABS Calcium 500 + D 500 mg (1,250 mg)-400 unit tablet   Take by oral route.  senna (SENOKOT) 8.6 MG tablet Take 1 tablet by mouth daily      metoprolol succinate (TOPROL XL) 25 MG extended release tablet Take 1 tablet by mouth daily 90 tablet 1    fluticasone (FLONASE) 50 MCG/ACT nasal spray USE 2 SPRAYS IN EACH  NOSTRIL DAILY 3 Bottle 1    aspirin 81 MG tablet Take 81 mg by mouth daily      ferrous sulfate 325 (65 Fe) MG tablet Take 325 mg by mouth daily (with breakfast)      Multiple Vitamins-Minerals (MULTIVITAMIN ADULT PO) Take 1 tablet by mouth daily      Cholecalciferol (VITAMIN D3) 5000 units TABS Take 1 tablet by mouth daily         Review of Systems:   Review of Systems   Constitutional: Positive for activity change, fatigue and fever. Negative for chills. HENT: Negative for congestion, ear pain and tinnitus. Eyes: Negative for photophobia, pain and visual disturbance. Respiratory: Negative for cough, chest tightness, shortness of breath and wheezing. Cardiovascular: Negative for chest pain, palpitations and leg swelling. Gastrointestinal: Negative for abdominal pain, blood in stool, constipation, diarrhea, nausea and vomiting. Endocrine: Negative for cold intolerance and heat intolerance. Genitourinary: Negative for dysuria, flank pain and hematuria. Musculoskeletal: Positive for arthralgias. Negative for back pain, myalgias and neck stiffness. Skin: Negative for color change and rash. Allergic/Immunologic: Negative for food allergies. Neurological: Positive for weakness. Negative for dizziness, light-headedness, numbness and headaches.    Hematological: Does not bruise/bleed easily. Psychiatric/Behavioral: Positive for confusion. Negative for agitation and behavioral problems. Examination:      Vitals:    07/13/21 1310 07/13/21 1322 07/13/21 1400 07/13/21 1427   BP: (!) 118/41 (!) 107/44  118/74   Pulse: 90 92 97 94   Resp: 20 16     Temp:       TempSrc:       SpO2: 99% 96%     Weight:       Height:            Body mass index is 29.91 kg/m². Physical Exam  Constitutional:       Appearance: Normal appearance. She is not ill-appearing. HENT:      Head: Normocephalic and atraumatic. Mouth/Throat:      Mouth: Mucous membranes are moist.   Eyes:      Conjunctiva/sclera: Conjunctivae normal.   Cardiovascular:      Rate and Rhythm: Normal rate and regular rhythm. Heart sounds: No murmur heard. Pulmonary:      Effort: Pulmonary effort is normal.      Breath sounds: No rales. Abdominal:      General: Abdomen is flat. Palpations: Abdomen is soft. Musculoskeletal:         General: No tenderness. Normal range of motion. Cervical back: Normal range of motion. Right lower leg: No edema. Left lower leg: No edema. Skin:     General: Skin is warm and dry. Findings: Lesion (Scabs noted on the lower legs) present. Neurological:      General: No focal deficit present. Mental Status: She is alert and oriented to person, place, and time.                CBC:   Lab Results   Component Value Date    WBC 6.9 07/13/2021    HGB 9.8 07/13/2021    HCT 31.9 07/13/2021     07/13/2021     Lipids:  Lab Results   Component Value Date    CHOL 142 10/13/2020    TRIG 70 10/13/2020    HDL 64 (H) 10/13/2020    LDLCALC 64 10/13/2020     PT/INR: No results found for: INR     BMP:    Lab Results   Component Value Date     07/13/2021    K 4.1 07/13/2021     07/13/2021    CO2 19 (L) 07/13/2021    BUN 20 07/13/2021     CMP:   Lab Results   Component Value Date    AST 31 07/09/2021    PROT 6.6 07/09/2021    BILITOT 0.9 07/09/2021 ALKPHOS 134 (H) 07/09/2021     TSH:    Lab Results   Component Value Date    TSH 1.77 11/20/2019       EKGINTERPRETATION - EKG Interpretation:        IMPRESSION / RECOMMENDATIONS:     1.  Streptococcus sanguis bacteremia  2. Mild elevated troponin  3. Complete heart block s/p BiV pacer  4. Aortic stenosis s/p TAVR      Patient is in complete heart block and dependent on the device  Patient has We checked for underlying and she has escape rhythm in 30s. VIKTORIA was performed and no vegetations were noted  Patient daughter reports that infection was from her leg. I discussed with him that if the recurrent bacterial cultures have been positive despite antibiotics then we might have to consider that the device can be infected because leads are in the blood. But given the patient is dependent on device and they are hesitant to have any procedure done which is reasonable    If recurrent blood cultures are positive then patient will need long-term antibiotics and that is what the patient and family wants rather than any procedure        Thanks again for allowing me to participate in care of this patient. Please call me if you have any questions. With best regards. Charles Quintana MD, 7/13/2021 3:11 PM     Please note this report has been partially produced using speech recognition software and may contain errors related to that system including errors in grammar, punctuation, and spelling, as well as words and phrases that may be inappropriate. If there are any questions or concerns please feel free to contact the dictating provider for clarification.

## 2021-07-14 ENCOUNTER — APPOINTMENT (OUTPATIENT)
Dept: ULTRASOUND IMAGING | Age: 86
DRG: 314 | End: 2021-07-14
Payer: MEDICARE

## 2021-07-14 ENCOUNTER — TELEPHONE (OUTPATIENT)
Dept: FAMILY MEDICINE CLINIC | Age: 86
End: 2021-07-14

## 2021-07-14 LAB
ANION GAP SERPL CALCULATED.3IONS-SCNC: 9 MMOL/L (ref 4–16)
BASOPHILS ABSOLUTE: 0.1 K/CU MM
BASOPHILS RELATIVE PERCENT: 0.8 % (ref 0–1)
BUN BLDV-MCNC: 24 MG/DL (ref 6–23)
CALCIUM SERPL-MCNC: 8.1 MG/DL (ref 8.3–10.6)
CHLORIDE BLD-SCNC: 109 MMOL/L (ref 99–110)
CO2: 21 MMOL/L (ref 21–32)
CREAT SERPL-MCNC: 0.8 MG/DL (ref 0.6–1.1)
DIFFERENTIAL TYPE: ABNORMAL
EKG ATRIAL RATE: 300 BPM
EKG DIAGNOSIS: NORMAL
EKG Q-T INTERVAL: 418 MS
EKG QRS DURATION: 142 MS
EKG QTC CALCULATION (BAZETT): 470 MS
EKG R AXIS: 196 DEGREES
EKG T AXIS: 38 DEGREES
EKG VENTRICULAR RATE: 76 BPM
EOSINOPHILS ABSOLUTE: 0.2 K/CU MM
EOSINOPHILS RELATIVE PERCENT: 2.8 % (ref 0–3)
GFR AFRICAN AMERICAN: >60 ML/MIN/1.73M2
GFR NON-AFRICAN AMERICAN: >60 ML/MIN/1.73M2
GLUCOSE BLD-MCNC: 101 MG/DL (ref 70–99)
HCT VFR BLD CALC: 30.7 % (ref 37–47)
HEMOGLOBIN: 9.6 GM/DL (ref 12.5–16)
HIGH SENSITIVE C-REACTIVE PROTEIN: 41 MG/L
IMMATURE NEUTROPHIL %: 2 % (ref 0–0.43)
LYMPHOCYTES ABSOLUTE: 1.1 K/CU MM
LYMPHOCYTES RELATIVE PERCENT: 15.1 % (ref 24–44)
MCH RBC QN AUTO: 30.8 PG (ref 27–31)
MCHC RBC AUTO-ENTMCNC: 31.3 % (ref 32–36)
MCV RBC AUTO: 98.4 FL (ref 78–100)
MONOCYTES ABSOLUTE: 1.2 K/CU MM
MONOCYTES RELATIVE PERCENT: 15.9 % (ref 0–4)
NUCLEATED RBC %: 0 %
PDW BLD-RTO: 15.3 % (ref 11.7–14.9)
PLATELET # BLD: 171 K/CU MM (ref 140–440)
PMV BLD AUTO: 9.3 FL (ref 7.5–11.1)
POTASSIUM SERPL-SCNC: 4.1 MMOL/L (ref 3.5–5.1)
RBC # BLD: 3.12 M/CU MM (ref 4.2–5.4)
SEGMENTED NEUTROPHILS ABSOLUTE COUNT: 4.8 K/CU MM
SEGMENTED NEUTROPHILS RELATIVE PERCENT: 63.4 % (ref 36–66)
SODIUM BLD-SCNC: 139 MMOL/L (ref 135–145)
TOTAL CK: 179 IU/L (ref 26–140)
TOTAL IMMATURE NEUTOROPHIL: 0.15 K/CU MM
TOTAL NUCLEATED RBC: 0 K/CU MM
WBC # BLD: 7.5 K/CU MM (ref 4–10.5)

## 2021-07-14 PROCEDURE — 2580000003 HC RX 258: Performed by: INTERNAL MEDICINE

## 2021-07-14 PROCEDURE — 2580000003 HC RX 258: Performed by: NURSE PRACTITIONER

## 2021-07-14 PROCEDURE — 93010 ELECTROCARDIOGRAM REPORT: CPT | Performed by: INTERNAL MEDICINE

## 2021-07-14 PROCEDURE — 6360000002 HC RX W HCPCS: Performed by: INTERNAL MEDICINE

## 2021-07-14 PROCEDURE — 36415 COLL VENOUS BLD VENIPUNCTURE: CPT

## 2021-07-14 PROCEDURE — 85025 COMPLETE CBC W/AUTO DIFF WBC: CPT

## 2021-07-14 PROCEDURE — 86141 C-REACTIVE PROTEIN HS: CPT

## 2021-07-14 PROCEDURE — 99233 SBSQ HOSP IP/OBS HIGH 50: CPT | Performed by: INTERNAL MEDICINE

## 2021-07-14 PROCEDURE — 82550 ASSAY OF CK (CPK): CPT

## 2021-07-14 PROCEDURE — 76937 US GUIDE VASCULAR ACCESS: CPT

## 2021-07-14 PROCEDURE — 6360000002 HC RX W HCPCS: Performed by: NURSE PRACTITIONER

## 2021-07-14 PROCEDURE — 6370000000 HC RX 637 (ALT 250 FOR IP): Performed by: NURSE PRACTITIONER

## 2021-07-14 PROCEDURE — 6370000000 HC RX 637 (ALT 250 FOR IP): Performed by: INTERNAL MEDICINE

## 2021-07-14 PROCEDURE — 80048 BASIC METABOLIC PNL TOTAL CA: CPT

## 2021-07-14 PROCEDURE — 94761 N-INVAS EAR/PLS OXIMETRY MLT: CPT

## 2021-07-14 PROCEDURE — 1200000000 HC SEMI PRIVATE

## 2021-07-14 RX ADMIN — SODIUM CHLORIDE, PRESERVATIVE FREE 10 ML: 5 INJECTION INTRAVENOUS at 20:16

## 2021-07-14 RX ADMIN — SODIUM CHLORIDE, PRESERVATIVE FREE 10 ML: 5 INJECTION INTRAVENOUS at 08:55

## 2021-07-14 RX ADMIN — CEFTRIAXONE SODIUM 2000 MG: 2 INJECTION, POWDER, FOR SOLUTION INTRAMUSCULAR; INTRAVENOUS at 08:55

## 2021-07-14 RX ADMIN — ACETAMINOPHEN 650 MG: 325 TABLET ORAL at 10:32

## 2021-07-14 RX ADMIN — FLUTICASONE PROPIONATE 1 SPRAY: 50 SPRAY, METERED NASAL at 10:32

## 2021-07-14 RX ADMIN — METOPROLOL SUCCINATE 25 MG: 25 TABLET, EXTENDED RELEASE ORAL at 08:55

## 2021-07-14 RX ADMIN — ASPIRIN 81 MG: 81 TABLET, CHEWABLE ORAL at 08:55

## 2021-07-14 RX ADMIN — Medication 1 TABLET: at 08:55

## 2021-07-14 RX ADMIN — ENOXAPARIN SODIUM 40 MG: 40 INJECTION SUBCUTANEOUS at 08:55

## 2021-07-14 RX ADMIN — SODIUM CHLORIDE: 9 INJECTION, SOLUTION INTRAVENOUS at 16:03

## 2021-07-14 RX ADMIN — PANTOPRAZOLE SODIUM 40 MG: 40 TABLET, DELAYED RELEASE ORAL at 06:31

## 2021-07-14 RX ADMIN — VANCOMYCIN HYDROCHLORIDE 1000 MG: 1 INJECTION, POWDER, LYOPHILIZED, FOR SOLUTION INTRAVENOUS at 13:38

## 2021-07-14 ASSESSMENT — PAIN SCALES - GENERAL
PAINLEVEL_OUTOF10: 0
PAINLEVEL_OUTOF10: 2
PAINLEVEL_OUTOF10: 0
PAINLEVEL_OUTOF10: 0

## 2021-07-14 NOTE — CARE COORDINATION
LSW followed up with pt regarding her discharge plans. Pt still plans to go home with dght. Pt continues to decline HC services. CM available if needed.

## 2021-07-14 NOTE — TELEPHONE ENCOUNTER
Patient is currently in [facility] she is discharged, Angélica Cancino (daughter) would like to have patient released to a Step-Down Unit and Respite Care. Angélica Cancino does not know how to start this process. Angélica Cancino said patient will most likely have to have a drip line ongoing. Hughfercho Cancino does not know how to do anything like that to assist her Mother.     Please advise

## 2021-07-14 NOTE — CARE COORDINATION
LSW received a call from pt's Charisse/POA  Adolm Erm. Anthony Delgado is wanting pt to go to a NH possibly after discharge. Anthony Delgado informed this LSW that Dr. Seferino Espinosa told her that pt will be coming home on IV anti-bio. LSW read Dr. Joceline Auguste note and it is not indicated however it looks like he is waiting on results. Anthony Danny is wanting pt to possibly go to [facility] skilled for a few days for the IV and possible rehab. LSW looked at PT./Ot recommendations for [facility].. LSW explained how Medicare works however this LSW will have [facility] look over pt info and see if they can find smoothing to skill pt. Anthony Delgado asked if LSW could find out what a respite stay will cost if pt can not go skilled. LSW called Tea with [facility] and gave referral.  De Paula stated they will look over pt information and get back with tis LSW. LSW asked if pt may have to come under respite what would be the cost.  De Paula stated it would be cheaper for pt to go to the [facility] for respite and if pt is only needed IV every 24 hours they can accommodate. The cost would be $1200 for a week. De Paula is looking at pt info to check if pt can be skilled first through Medicare. Waiting on a return call form Tea.

## 2021-07-14 NOTE — PROGRESS NOTES
ATTENDING PHYSICIAN'S PROGRESS NOTES    Patient:  Eunice Tabares      Unit/Bed:2018/2018-A    YOB: 1929    MRN: [id number]     Acct: [de-identified]     Admit date: 7/9/2021    Patient Seen, Chart, Consults notes, Labs, Radiology studies reviewed. SUBJECTIVE:   Day 3 of stay     Eunice Tabares is a 80 y.o.  female  who presents with generalized weakness. Patient was admitted for rhabdomyolysis and generalized weakness. Patient has past medical history of hypertension, hyperlipidemia, CHF, post s/p LOPEZ for aortic stenosis, status post permanent pacemaker implantation. Patient was admitted for fever, generalized weakness and rhabdomyolysis. 2 out of 2 sets sets of blood cultures 7/9/2021 grew Streptococcus Sanguis. Will need to rule out endocarditis-prosthetic valve versus cardiovascular implantable electronic device infection ( [facility]) per ID. Interval history:   Patient was seen in her room. She reports she is feeling okay. Denies chest pain, shortness of breath or palpitation. It is noticed that her legs are very swollen. Patient reports the legs have been swollen and this worsened in the hospital stay. Likely this is because she did not ambulate a lot. I ordered the venous Doppler to rule out a DVT of the lower extremities. But nurse reports that the patient declined the study in front of the ultrasound staffing. Patient underwent VIKTORIA, which showed no vegetation in the heart valve. All other ROS negative except noted in HPI    Past, Family, Social History unchanged from admission. Diet:  ADULT DIET;  Regular    Medications:  Scheduled Meds:   cefTRIAXone (ROCEPHIN) IV  2,000 mg Intravenous Q24H    pantoprazole  40 mg Oral QAM AC    vancomycin  1,000 mg Intravenous Q24H    aspirin  81 mg Oral Daily    fluticasone  1 spray Each Nostril Daily    metoprolol succinate  25 mg Oral Daily    therapeutic multivitamin-minerals  1 tablet Oral Daily    sodium chloride flush  5-40 mL Intravenous 2 times per day    enoxaparin  40 mg Subcutaneous Daily     Continuous Infusions:   sodium chloride 50 mL/hr at 07/14/21 1603    sodium chloride       PRN Meds:sodium chloride flush, sodium chloride flush, sodium chloride, ondansetron **OR** ondansetron, polyethylene glycol, acetaminophen **OR** acetaminophen    OBJECTIVE:    CBC:   Recent Labs     07/12/21 0620 07/13/21 0655 07/14/21  0350   WBC 6.5 6.9 7.5   HGB 10.2* 9.8* 9.6*   * 172 171     BMP:    Recent Labs     07/12/21 0620 07/13/21  0655 07/14/21  0350    138 139   K 3.8 4.1 4.1    106 109   CO2 20* 19* 21   BUN 24* 20 24*   CREATININE 0.7 0.7 0.8   GLUCOSE 97 92 101*     Calcium:  Recent Labs     07/14/21  0350   CALCIUM 8.1*       Recent Labs     07/12/21 0620 07/13/21 0655 07/14/21  0350   CKTOTAL 606* 306* 179*       Result Date: 7/9/2021  EXAMINATION: CT OF THE HEAD WITHOUT CONTRAST  7/9/2021 12:51 pm TECHNIQUE: CT of the head was performed without the administration of intravenous contrast. Dose modulation, iterative reconstruction, and/or weight based adjustment of the mA/kV was utilized to reduce the radiation dose to as low as reasonably achievable. COMPARISON: None. HISTORY: ORDERING SYSTEM PROVIDED HISTORY: generalized weakness, ams TECHNOLOGIST PROVIDED HISTORY: Reason for exam:->generalized weakness, ams Has a \"code stroke\" or \"stroke alert\" been called? ->No Decision Support Exception - unselect if not a suspected or confirmed emergency medical condition->Emergency Medical Condition (MA) Reason for Exam: generalized weakness, ams Acuity: Acute Type of Exam: Initial FINDINGS: BRAIN/VENTRICLES: There is no acute intracranial hemorrhage, mass effect or midline shift. No abnormal extra-axial fluid collection. The gray-white differentiation is maintained without evidence of an acute infarct. There is no evidence of hydrocephalus.  Periventricular white matter low densities most likely due to chronic microvascular ischemia. Bilateral basal ganglia calcifications are present. ORBITS: The visualized portion of the orbits demonstrate no acute abnormality. SINUSES: The visualized paranasal sinuses and mastoid air cells demonstrate no acute abnormality. SOFT TISSUES/SKULL:  2 separate carter hole defects of the left-sided calvarium noted. No acute osseous findings. No acute intracranial abnormality. CT CHEST W CONTRAST    Result Date: 7/9/2021  EXAMINATION: CT OF THE CHEST WITH CONTRAST; CT OF THE ABDOMEN AND PELVIS WITH CONTRAST 7/9/2021 12:52 pm   COMPARISON: 11/14/2017 HISTORY: ORDERING SYSTEM PROVIDED HISTORY: ?ptx on cxr, generalized weakness, fever Exception - unselect if not a suspected or confirmed emergency medical condition->Emergency Medical Condition (MA) Reason for Exam: fever, AMS, generalized weakness Acuity: Acute Type of Exam:     Initial Chest: Mediastinum: No acute abnormality of the aorta or other mediastinal structures. No pericardial effusion. Normal size lymph nodes, no evidence of adenopathy. Lungs/pleura: Small bilateral dependent pleural effusions with adjacent compressive atelectasis. No pneumothorax. Detailed lung evaluation limited due to motion. No consolidation Soft Tissues/Bones: No acute fracture. No acute findings of the chest wall. Extensive degenerative changes of the thoracic spine. No abscess of the soft tissues of the chest wall. Abdomen/Pelvis: Organs: No acute findings of the organs throughout the abdomen. No evidence of pancreatitis or pyelonephritis. No ureteral stone or hydronephrosis. Normal appearance of the gallbladder. No acute findings of the liver. No evidence of biliary dilation/obstruction. 1.9 cm cyst at the dome incidentally noted. Detailed evaluation of the organs limited due to motion. Cyst of the central right kidney measuring 4 cm.   Several low-density lesions of the spleen measuring 1.4 cm and less most likely cysts or hemangiomata. GI/Bowel: No obstruction or other acute findings. No evidence diverticulitis or colitis. There is a large amount of colonic diverticulosis but no evidence of diverticulitis. Pelvis: No evidence of cystitis. Bladder appears normal. Peritoneum/Retroperitoneum: No ascites or free air. No abscess. Bones/Soft Tissues: No acute fracture of the abdomen and pelvis. No abscess. Chest: Small bilateral dependent pleural effusions mild adjacent atelectasis. No evidence of acute process otherwise. Abdomen/pelvis: No evidence of acute process. CT ABDOMEN PELVIS W IV CONTRAST Additional Contrast? None    Result Date: 7/9/2021     Initial Chest: Mediastinum: No acute abnormality of the aorta or other mediastinal structures. No pericardial effusion. Normal size lymph nodes, no evidence of adenopathy. Lungs/pleura: Small bilateral dependent pleural effusions with adjacent compressive atelectasis. No pneumothorax. Detailed lung evaluation limited due to motion. No consolidation Soft Tissues/Bones: No acute fracture. No acute findings of the chest wall. Extensive degenerative changes of the thoracic spine. No abscess of the soft tissues of the chest wall. Abdomen/Pelvis: Organs: No acute findings of the organs throughout the abdomen. No evidence of pancreatitis or pyelonephritis. No ureteral stone or hydronephrosis. Normal appearance of the gallbladder. No acute findings of the liver. No evidence of biliary dilation/obstruction. 1.9 cm cyst at the dome incidentally noted. Detailed evaluation of the organs limited due to motion. Cyst of the central right kidney measuring 4 cm. Several low-density lesions of the spleen measuring 1.4 cm and less most likely cysts or hemangiomata. GI/Bowel: No obstruction or other acute findings. No evidence diverticulitis or colitis. There is a large amount of colonic diverticulosis but no evidence of diverticulitis. Pelvis: No evidence of cystitis.   Bladder appears normal. Peritoneum/Retroperitoneum: No ascites or free air. No abscess. Bones/Soft Tissues: No acute fracture of the abdomen and pelvis. No abscess. Chest: Small bilateral dependent pleural effusions mild adjacent atelectasis. No evidence of acute process otherwise. Abdomen/pelvis: No evidence of acute process. XR CHEST PORTABLE    Result Date: 7/9/2021  EXAMINATION: ONE XRAY VIEW OF THE CHEST 7/9/2021 11:48 am COMPARISON: None. HISTORY: ORDERING SYSTEM PROVIDED HISTORY: generalized weakness TECHNOLOGIST PROVIDED HISTORY: Reason for exam:->generalized weakness Reason for Exam: generalized weakness FINDINGS: The cardiac silhouette is at the upper limits of normal in size. There is a left-sided cardiac device as well as sequela of TAVR. There is sequela of old granulomatous disease. The lung volumes are low with mild pulmonary vascular congestion. There is a suspected skin fold along the right lateral chest.  Pneumothorax cannot entirely be excluded. Follow-up radiograph is recommended. Degenerative changes are noted in the [facility] and glenohumeral joints. 1. Low lung volumes with pulmonary vascular congestion. 2. Suspected skin fold along the right lateral and upper chest. Underlying pneumothorax cannot entirely be excluded. Follow-up radiographs are recommended. Physical Exam:  Vitals: BP (!) 104/42   Pulse 98   Temp 97.8 °F (36.6 °C) (Oral)   Resp 18   Ht 5' 1\" (1.549 m)   Wt 175 lb 0.7 oz (79.4 kg)   SpO2 96%   BMI 33.07 kg/m²   24 hour intake/output:No intake or output data in the 24 hours ending 07/14/21 1734  Last 3 weights:   Wt Readings from Last 3 Encounters:   07/14/21 175 lb 0.7 oz (79.4 kg)   05/28/21 154 lb (69.9 kg)   10/13/20 158 lb (71.7 kg)       General: Elderly frail woman, sitting in the chair, not in acute distress  Head: Normocephalic atraumatic  Eyes: PERRL, EOMI, anicteric sclerae  ENT: No nasal discharge, oral mucosa moist, hearing normal  Neck: Supple, trachea in dysfunction. Severe biatrial enlargement. PPM wiring visualized within the right heart. History of TAVR (#29 Watson Nancy 3) with moderate perivalvular leak. Slight prolapse of the anterior mitral valve leaflet with moderate mitral   valve regurgitation. Severe tricuspid regurgitation; RVSP: 69 mmHg. Mild to moderate pulmonic regurgitation present. No evidence of any pericardial effusion. Right pleural effusion. 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 CT negative for evidence of infection              - Seen by ID- Abx changed to Vancomycin and Ceftriaxone- Continue              - Repeat blood cultures every 48 hours, CRP, ESR, procalcitonin             -TTE  completed,no evidence of vegitation             -Underwent VIKTORIA 7/13/2021 without significant evidence of vegetations, per EP discussion documented w/ family, patient will likely stay on suppressive abx without aggressive intervention of pacemaker removal   -Repeat blood cultures sent per ID recs, last blood cxs from 7/9/2021 positive               Echocardiogram 7/13/2021:    Summary  Pacemaker wire is free from vegetation. S/p TAVR: #29 Watson Nancy 3  Moderate to severe mitral regurgitation is present. There was no thrombus noted in the left atrial appendage. Negative bubble study.  No PFO or ASD noted.       Other chronic medical condition; medication resumed unless contraindicated      Essential HTN  HLD  S/P PPM   S/P AVT    Electronically signed by Riky Linn MD on 7/14/2021 at 5:34 PM    Rounding Hospitalist     Correction of the Physical Exam  - MSK: bL lower extremities pitting edema 3+    A/P  LE edema  -likely from CHF (diastolic dysfunction grade III)  -was on lasix, and has been holding due to rhabdomyolysis  -patient declined the venous US to rule out DVT, however

## 2021-07-14 NOTE — PROGRESS NOTES
Progress Note  Date:2021       Room:-A  Patient Name:Juana Garcia     Date of Birth:[birth year]     Age:89 y.o. Subjective    Subjective:  Symptoms:  Stable. No shortness of breath, cough, chest pain, anorexia or diarrhea. Diet:  Adequate intake. No nausea or vomiting. Activity level: Returning to normal.    Pain:  She reports pain is improving. Pain is well controlled. Plan for VIKTORIA today, no acute issues reported    Review of Systems   Constitutional: Negative. Negative for fever. HENT: Negative for congestion and sore throat. Eyes: Negative. Respiratory: Negative for cough and shortness of breath. Cardiovascular: Negative for chest pain and leg swelling. Gastrointestinal: Negative for abdominal pain, anorexia, constipation, diarrhea, nausea and vomiting. Endocrine: Negative. Genitourinary: Negative for dysuria and hematuria. Musculoskeletal: Negative for neck pain. Skin: Negative for wound. Neurological: Negative for dizziness and light-headedness. All other systems reviewed and are negative. Objective         Vitals Last 24 Hours:  TEMPERATURE:  Temp  Av.8 °F (36.6 °C)  Min: 97.4 °F (36.3 °C)  Max: 98.2 °F (36.8 °C)  RESPIRATIONS RANGE: Resp  Av.1  Min: 15  Max: 20  PULSE OXIMETRY RANGE: SpO2  Av.8 %  Min: 95 %  Max: 100 %  PULSE RANGE: Pulse  Av.9  Min: 83  Max: 102  BLOOD PRESSURE RANGE: Systolic (45XND), USM:665 , Min:107 , NGC:845   ; Diastolic (76OEA), VXQ:07, Min:40, Max:107    I/O (24Hr): Intake/Output Summary (Last 24 hours) at 2021  Last data filed at 2021 0405  Gross per 24 hour   Intake    Output 300 ml   Net -300 ml     Objective:  General Appearance:  Comfortable and in no acute distress. Vital signs: (most recent): Blood pressure 118/74, pulse 94, temperature 98.2 °F (36.8 °C), temperature source Oral, resp. rate 16, height 5' 1\" (1.549 m), weight 158 lb 4.6 oz (71.8 kg), SpO2 96 %. Vital signs are normal.  No fever. Output: Producing urine and producing stool. HEENT: Normal HEENT exam.    Lungs:  Normal effort and normal respiratory rate. She is not in respiratory distress. Heart: Normal rate. S1 normal and S2 normal.  No murmur, gallop or friction rub. Chest: Symmetric chest wall expansion. No chest wall tenderness. Abdomen: Abdomen is soft and flat. Bowel sounds are normal.   There is no abdominal tenderness. Pulses: Distal pulses are intact. Skin:  Warm. Labs/Imaging/Diagnostics    Labs:  CBC:  Recent Labs     07/11/21 0524 07/12/21 0620 07/13/21  0655   WBC 10.5 6.5 6.9   RBC 3.33* 3.31* 3.34*   HGB 10.0* 10.2* 9.8*   HCT 32.4* 32.1* 31.9*   MCV 97.3 97.0 95.5   RDW 15.2* 15.3* 15.4*   * 138* 172     CHEMISTRIES:  Recent Labs     07/11/21 0524 07/12/21 0620 07/13/21  0655    137 138   K 4.1 3.8 4.1    108 106   CO2 22 20* 19*   BUN 32* 24* 20   CREATININE 0.9 0.7 0.7   GLUCOSE 99 97 92     PT/INR:No results for input(s): PROTIME, INR in the last 72 hours. APTT:No results for input(s): APTT in the last 72 hours. LIVER PROFILE:  No results for input(s): AST, ALT, BILIDIR, BILITOT, ALKPHOS in the last 72 hours. Imaging Last 24 Hours:  CT HEAD WO CONTRAST    Result Date: 7/9/2021  EXAMINATION: CT OF THE HEAD WITHOUT CONTRAST  7/9/2021 12:51 pm TECHNIQUE: CT of the head was performed without the administration of intravenous contrast. Dose modulation, iterative reconstruction, and/or weight based adjustment of the mA/kV was utilized to reduce the radiation dose to as low as reasonably achievable. COMPARISON: None. HISTORY: ORDERING SYSTEM PROVIDED HISTORY: generalized weakness, ams TECHNOLOGIST PROVIDED HISTORY: Reason for exam:->generalized weakness, ams Has a \"code stroke\" or \"stroke alert\" been called? ->No Decision Support Exception - unselect if not a suspected or confirmed emergency medical condition->Emergency Medical Condition (MA) ?ptx on cxr, generalized weakness, fever Acuity: Acute Type of Exam: Initial; ORDERING SYSTEM PROVIDED HISTORY: fever, AMS, generalized weakness TECHNOLOGIST PROVIDED HISTORY: Reason for exam:->fever, AMS, generalized weakness Additional Contrast?->None Decision Support Exception - unselect if not a suspected or confirmed emergency medical condition->Emergency Medical Condition (MA) Reason for Exam: fever, AMS, generalized weakness Acuity: Acute Type of Exam: Initial Chest: Mediastinum: No acute abnormality of the aorta or other mediastinal structures. No pericardial effusion. Normal size lymph nodes, no evidence of adenopathy. Lungs/pleura: Small bilateral dependent pleural effusions with adjacent compressive atelectasis. No pneumothorax. Detailed lung evaluation limited due to motion. No consolidation Soft Tissues/Bones: No acute fracture. No acute findings of the chest wall. Extensive degenerative changes of the thoracic spine. No abscess of the soft tissues of the chest wall. Abdomen/Pelvis: Organs: No acute findings of the organs throughout the abdomen. No evidence of pancreatitis or pyelonephritis. No ureteral stone or hydronephrosis. Normal appearance of the gallbladder. No acute findings of the liver. No evidence of biliary dilation/obstruction. 1.9 cm cyst at the dome incidentally noted. Detailed evaluation of the organs limited due to motion. Cyst of the central right kidney measuring 4 cm. Several low-density lesions of the spleen measuring 1.4 cm and less most likely cysts or hemangiomata. GI/Bowel: No obstruction or other acute findings. No evidence diverticulitis or colitis. There is a large amount of colonic diverticulosis but no evidence of diverticulitis. Pelvis: No evidence of cystitis. Bladder appears normal. Peritoneum/Retroperitoneum: No ascites or free air. No abscess. Bones/Soft Tissues: No acute fracture of the abdomen and pelvis. No abscess.      Chest: Small bilateral dependent pleural effusions mild adjacent atelectasis. No evidence of acute process otherwise. Abdomen/pelvis: No evidence of acute process. CT ABDOMEN PELVIS W IV CONTRAST Additional Contrast? None    Result Date: 7/9/2021  EXAMINATION: CT OF THE CHEST WITH CONTRAST; CT OF THE ABDOMEN AND PELVIS WITH CONTRAST 7/9/2021 12:52 pm TECHNIQUE: CT of the chest was performed with the administration of intravenous contrast. Multiplanar reformatted images are provided for review. Dose modulation, iterative reconstruction, and/or weight based adjustment of the mA/kV was utilized to reduce the radiation dose to as low as reasonably achievable.; CT of the abdomen and pelvis was performed with the administration of intravenous contrast. Multiplanar reformatted images are provided for review. Dose modulation, iterative reconstruction, and/or weight based adjustment of the mA/kV was utilized to reduce the radiation dose to as low as reasonably achievable. COMPARISON: 11/14/2017 HISTORY: ORDERING SYSTEM PROVIDED HISTORY: ?ptx on cxr, generalized weakness, fever TECHNOLOGIST PROVIDED HISTORY: Reason for exam:->?ptx on cxr, generalized weakness, fever Decision Support Exception - unselect if not a suspected or confirmed emergency medical condition->Emergency Medical Condition (MA) Reason for Exam: ?ptx on cxr, generalized weakness, fever Acuity: Acute Type of Exam: Initial; ORDERING SYSTEM PROVIDED HISTORY: fever, AMS, generalized weakness TECHNOLOGIST PROVIDED HISTORY: Reason for exam:->fever, AMS, generalized weakness Additional Contrast?->None Decision Support Exception - unselect if not a suspected or confirmed emergency medical condition->Emergency Medical Condition (MA) Reason for Exam: fever, AMS, generalized weakness Acuity: Acute Type of Exam: Initial Chest: Mediastinum: No acute abnormality of the aorta or other mediastinal structures. No pericardial effusion. Normal size lymph nodes, no evidence of adenopathy. Lungs/pleura: Small bilateral dependent pleural effusions with adjacent compressive atelectasis. No pneumothorax. Detailed lung evaluation limited due to motion. No consolidation Soft Tissues/Bones: No acute fracture. No acute findings of the chest wall. Extensive degenerative changes of the thoracic spine. No abscess of the soft tissues of the chest wall. Abdomen/Pelvis: Organs: No acute findings of the organs throughout the abdomen. No evidence of pancreatitis or pyelonephritis. No ureteral stone or hydronephrosis. Normal appearance of the gallbladder. No acute findings of the liver. No evidence of biliary dilation/obstruction. 1.9 cm cyst at the dome incidentally noted. Detailed evaluation of the organs limited due to motion. Cyst of the central right kidney measuring 4 cm. Several low-density lesions of the spleen measuring 1.4 cm and less most likely cysts or hemangiomata. GI/Bowel: No obstruction or other acute findings. No evidence diverticulitis or colitis. There is a large amount of colonic diverticulosis but no evidence of diverticulitis. Pelvis: No evidence of cystitis. Bladder appears normal. Peritoneum/Retroperitoneum: No ascites or free air. No abscess. Bones/Soft Tissues: No acute fracture of the abdomen and pelvis. No abscess. Chest: Small bilateral dependent pleural effusions mild adjacent atelectasis. No evidence of acute process otherwise. Abdomen/pelvis: No evidence of acute process. XR CHEST PORTABLE    Result Date: 7/9/2021  EXAMINATION: ONE XRAY VIEW OF THE CHEST 7/9/2021 11:48 am COMPARISON: None. HISTORY: ORDERING SYSTEM PROVIDED HISTORY: generalized weakness TECHNOLOGIST PROVIDED HISTORY: Reason for exam:->generalized weakness Reason for Exam: generalized weakness FINDINGS: The cardiac silhouette is at the upper limits of normal in size. There is a left-sided cardiac device as well as sequela of TAVR. There is sequela of old granulomatous disease.   The lung volumes are low with mild pulmonary vascular congestion. There is a suspected skin fold along the right lateral chest.  Pneumothorax cannot entirely be excluded. Follow-up radiograph is recommended. Degenerative changes are noted in the [facility] and glenohumeral joints. 1. Low lung volumes with pulmonary vascular congestion. 2. Suspected skin fold along the right lateral and upper chest. Underlying pneumothorax cannot entirely be excluded. Follow-up radiographs are recommended.      Assessment//Plan           Hospital Problems         Last Modified POA    Essential hypertension 7/9/2021 Yes    Cardiac pacemaker 7/13/2021 Yes    Cerebrovascular accident (CVA) due to vascular stenosis ([address] 7/9/2021 Yes    S/P AVR (aortic valve replacement) 7/13/2021 Yes    Weakness 7/9/2021 Yes    Troponin I above reference range 7/10/2021 Yes    Bacteremia due to Streptococcus 7/13/2021 Yes        Assessment & Plan  Weston Rivas is a 80 y.o.  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 CT negative for evidence of infection    - Seen by ID- Abx changed to Vancomycin and Ceftriaxone- Continue    - Repeat blood cultures every 48 hours, CRP, ESR, procalcitonin   -Surface echo completed,no evidence of vegitation   -Underwent VIKTORIA today w/out significant evidence of vegetations, per EP discussion documented w/ family, patient will likely stay on suppressive abx without aggressive intervention of pacemaker removal   -I ordered STAT blood cultures per ID recs, last blood cxs from 7/9/2021 positive        Other chronic medical condition; medication resumed unless contraindicated      Essential HTN  HLD  S/P PPM     Diet ADULT DIET; Regular; Low Sodium (2 gm)   DVT Prophylaxis [x]? Lovenox, []? Heparin, []? SCDs, []? Ambulation   GI Prophylaxis [x]? PPI,  []? H2 Blocker,  []? Carafate,  []? Diet/Tube Feeds   Code Status Full code    Disposition Patient requires continued admission due to elevated CK, weakness , positive blood cultures   MDM []? Low, []? Moderate,[x]?   High  Patient's risk as above due to weakness       Electronically signed by FELICIA Dorantes CNP on 7/10/21 at 7:34 AM EDT

## 2021-07-14 NOTE — PROGRESS NOTES
Infectious Disease Progress Note  2021   Patient Name: Zev Montes : 5/15/1929       Reason for visit: F/u Streptococcus sanguis bacteremia, suspected prosthetic valve endocarditis versus cardiac implantable electronic device endocarditis ? History:? Interval history noted  Denies n/v/d/f or untoward effects of antimicrobials  Physical Exam:  Vital Signs: BP (!) 128/45   Pulse 102   Temp 97.8 °F (36.6 °C) (Oral)   Resp 17   Ht 5' 1\" (1.549 m)   Wt 175 lb 0.7 oz (79.4 kg)   SpO2 95%   BMI 33.07 kg/m²     Gen: alert and oriented X3, no distress  Skin: no stigmata of endocarditis  Wounds: C/D/I  HEMT: AT/NC Oropharynx pink, moist, and without lesions or exudates; dentition in good state of repair  Eyes: PERRLA, EOMI, conjunctiva pink, sclera anicteric. Neck: Supple. Trachea midline. No LAD. Chest: no distress and CTA. Good air movement. Heart: systolic murmur in all cardiac areas  Abd: soft, non-distended, no tenderness, no hepatomegaly. Normoactive bowel sounds. Ext: no clubbing, cyanosis, or edema  Catheter Site: without erythema or tenderness  LDA: CVC:  Neuro: Mental status intact. CN 2-12 intact and no focal sensory or motor deficits     Radiologic / Imaging / TESTING  No results found.      Labs:    Recent Results (from the past 24 hour(s))   Basic metabolic panel    Collection Time: 21  3:50 AM   Result Value Ref Range    Sodium 139 135 - 145 MMOL/L    Potassium 4.1 3.5 - 5.1 MMOL/L    Chloride 109 99 - 110 mMol/L    CO2 21 21 - 32 MMOL/L    Anion Gap 9 4 - 16    BUN 24 (H) 6 - 23 MG/DL    CREATININE 0.8 0.6 - 1.1 MG/DL    Glucose 101 (H) 70 - 99 MG/DL    Calcium 8.1 (L) 8.3 - 10.6 MG/DL    GFR Non-African American >60 >60 mL/min/1.73m2    GFR African American >60 >60 mL/min/1.73m2   CK    Collection Time: 21  3:50 AM   Result Value Ref Range    Total  (H) 26 - 140 IU/L   CBC auto differential    Collection Time: 21  3:50 AM   Result Value Ref Range    WBC 7.5 4.0 - 10.5 K/CU MM    RBC 3.12 (L) 4.2 - 5.4 M/CU MM    Hemoglobin 9.6 (L) 12.5 - 16.0 GM/DL    Hematocrit 30.7 (L) 37 - 47 %    MCV 98.4 78 - 100 FL    MCH 30.8 27 - 31 PG    MCHC 31.3 (L) 32.0 - 36.0 %    RDW 15.3 (H) 11.7 - 14.9 %    Platelets 036 596 - 812 K/CU MM    MPV 9.3 7.5 - 11.1 FL    Differential Type AUTOMATED DIFFERENTIAL     Segs Relative 63.4 36 - 66 %    Lymphocytes % 15.1 (L) 24 - 44 %    Monocytes % 15.9 (H) 0 - 4 %    Eosinophils % 2.8 0 - 3 %    Basophils % 0.8 0 - 1 %    Segs Absolute 4.8 K/CU MM    Lymphocytes Absolute 1.1 K/CU MM    Monocytes Absolute 1.2 K/CU MM    Eosinophils Absolute 0.2 K/CU MM    Basophils Absolute 0.1 K/CU MM    Nucleated RBC % 0.0 %    Total Nucleated RBC 0.0 K/CU MM    Total Immature Neutrophil 0.15 K/CU MM    Immature Neutrophil % 2.0 (H) 0 - 0.43 %   C-Reactive Protein    Collection Time: 07/14/21  3:50 AM   Result Value Ref Range    CRP, High Sensitivity 41.0 mg/L     CULTURE results: Invalid input(s): BLOOD CULTURE,  URINE CULTURE, SURGICAL CULTURE    Diagnosis:  Patient Active Problem List   Diagnosis    Essential hypertension    Other hyperlipidemia    Cardiac pacemaker    Congestive heart failure (HCC)    Cerebrovascular accident (CVA) due to vascular stenosis (HCC)    CHB (complete heart block) (HCC)    S/P AVR (aortic valve replacement)    Weakness    Troponin I above reference range    Bacteremia due to Streptococcus       Active Problems  Active Problems:    Cardiac pacemaker    S/P AVR (aortic valve replacement)    Bacteremia due to Streptococcus    Essential hypertension    Cerebrovascular accident (CVA) due to vascular stenosis (HCC)    Weakness    Troponin I above reference range  Resolved Problems:    * No resolved hospital problems. *      Impression and plan   Summary and rationale: Patient is a 80 y.o.   female with a medical history of hypertension, hyperlipidemia, CHF, status post TAVR for aortic stenosis, status post permanent pacemaker admitted for fever and generalized weakness. 2 out of 2 sets of blood cultures on 7/9/2021 + for Streptococcus sanguis. Probable GI source. Need to rule out endocarditis-prosthetic valve versus CI ED. No vegetation seen on VIKTORIA but this does not rule out involvement.  Clinical status: Improving   Therapeutic: Continue vancomycin and ceftriaxone; pending susceptibility results.  Will prefer ampicillin (narrow spectrum lessens risk of C difficile infection), and requires 6 weeks of treatment   Diagnostic: rend CRP and pct   F/u:    Other:        Electronically signed by: Electronically signed by Bryce Tubbs MD on 7/14/2021 at 8:19 AM

## 2021-07-14 NOTE — PROGRESS NOTES
[address] [facility]  consulted by Bonita Darnell CNP for monitoring and adjustment. Indication for treatment: Endocarditis   Goal trough: 10-15 mcg/mL, AUC <500 (strep sp.)      Pertinent Laboratory Values:   Temp Readings from Last 3 Encounters:   07/14/21 97.8 °F (36.6 °C) (Oral)   10/13/20 98.2 °F (36.8 °C)     Recent Labs     07/12/21  0620 07/13/21  0655 07/14/21  0350   WBC 6.5 6.9 7.5     Recent Labs     07/12/21  0620 07/13/21  0655 07/14/21  0350   BUN 24* 20 24*   CREATININE 0.7 0.7 0.8     Estimated Creatinine Clearance: 43 mL/min (based on SCr of 0.8 mg/dL). No intake or output data in the 24 hours ending 07/14/21 1114    Pertinent Cultures:  Date    Source    Results  7/9               Urine     Negative  7/9                Blood     Streptococcus sp. (4/4 btls)  7/12    Blood    Pending     Vancomycin level:   TROUGH:    Recent Labs     07/12/21  1215   VANCOTROUGH 9.0*     RANDOM:  No results for input(s): VANCORANDOM in the last 72 hours. Assessment:  · WBC and temperature: WBC WNL, afebrile   · SCr, BUN, and urine output: stable  · Day(s) of therapy: 5  · Vancomycin concentration:  · 7/12: 9.0, therapeutic on 1000 mg q24h ()     Plan:  · Continue vancomycin 1000 mg q24h  · Clearance is as expected with level prior to third dose   · Accumulation will continue, repeat level 7/15 AM   · Therapeutic AUC at steady state   · Pharmacy will continue to monitor patient and adjust therapy as indicated    Thank you for the consult.   Arleth Santos, [facility]  7/14/2021 11:14 AM

## 2021-07-15 ENCOUNTER — APPOINTMENT (OUTPATIENT)
Dept: ULTRASOUND IMAGING | Age: 86
DRG: 314 | End: 2021-07-15
Payer: MEDICARE

## 2021-07-15 LAB
ALBUMIN SERPL-MCNC: 3.1 GM/DL (ref 3.4–5)
ALP BLD-CCNC: 117 IU/L (ref 40–128)
ALT SERPL-CCNC: 44 U/L (ref 10–40)
ANION GAP SERPL CALCULATED.3IONS-SCNC: 9 MMOL/L (ref 4–16)
AST SERPL-CCNC: 46 IU/L (ref 15–37)
BASOPHILS ABSOLUTE: 0.1 K/CU MM
BASOPHILS RELATIVE PERCENT: 0.5 % (ref 0–1)
BILIRUB SERPL-MCNC: 0.2 MG/DL (ref 0–1)
BUN BLDV-MCNC: 27 MG/DL (ref 6–23)
CALCIUM SERPL-MCNC: 8.6 MG/DL (ref 8.3–10.6)
CHLORIDE BLD-SCNC: 106 MMOL/L (ref 99–110)
CO2: 23 MMOL/L (ref 21–32)
CREAT SERPL-MCNC: 0.9 MG/DL (ref 0.6–1.1)
DIFFERENTIAL TYPE: ABNORMAL
EOSINOPHILS ABSOLUTE: 0.3 K/CU MM
EOSINOPHILS RELATIVE PERCENT: 2.9 % (ref 0–3)
GFR AFRICAN AMERICAN: >60 ML/MIN/1.73M2
GFR NON-AFRICAN AMERICAN: 59 ML/MIN/1.73M2
GLUCOSE BLD-MCNC: 102 MG/DL (ref 70–99)
HCT VFR BLD CALC: 32.1 % (ref 37–47)
HEMOGLOBIN: 9.6 GM/DL (ref 12.5–16)
HIGH SENSITIVE C-REACTIVE PROTEIN: 34.4 MG/L
IMMATURE NEUTROPHIL %: 3 % (ref 0–0.43)
LYMPHOCYTES ABSOLUTE: 1.3 K/CU MM
LYMPHOCYTES RELATIVE PERCENT: 11.5 % (ref 24–44)
MCH RBC QN AUTO: 29.6 PG (ref 27–31)
MCHC RBC AUTO-ENTMCNC: 29.9 % (ref 32–36)
MCV RBC AUTO: 99.1 FL (ref 78–100)
MONOCYTES ABSOLUTE: 1.5 K/CU MM
MONOCYTES RELATIVE PERCENT: 12.9 % (ref 0–4)
NUCLEATED RBC %: 0.3 %
PDW BLD-RTO: 15.3 % (ref 11.7–14.9)
PLATELET # BLD: 213 K/CU MM (ref 140–440)
PMV BLD AUTO: 9.4 FL (ref 7.5–11.1)
POTASSIUM SERPL-SCNC: 4.5 MMOL/L (ref 3.5–5.1)
PROCALCITONIN: 0.2
RBC # BLD: 3.24 M/CU MM (ref 4.2–5.4)
SEGMENTED NEUTROPHILS ABSOLUTE COUNT: 7.7 K/CU MM
SEGMENTED NEUTROPHILS RELATIVE PERCENT: 69.2 % (ref 36–66)
SODIUM BLD-SCNC: 138 MMOL/L (ref 135–145)
TOTAL CK: 119 IU/L (ref 26–140)
TOTAL IMMATURE NEUTOROPHIL: 0.34 K/CU MM
TOTAL NUCLEATED RBC: 0 K/CU MM
TOTAL PROTEIN: 5.1 GM/DL (ref 6.4–8.2)
WBC # BLD: 11.2 K/CU MM (ref 4–10.5)

## 2021-07-15 PROCEDURE — 85025 COMPLETE CBC W/AUTO DIFF WBC: CPT

## 2021-07-15 PROCEDURE — 93970 EXTREMITY STUDY: CPT

## 2021-07-15 PROCEDURE — 94761 N-INVAS EAR/PLS OXIMETRY MLT: CPT

## 2021-07-15 PROCEDURE — 36415 COLL VENOUS BLD VENIPUNCTURE: CPT

## 2021-07-15 PROCEDURE — 6360000002 HC RX W HCPCS: Performed by: INTERNAL MEDICINE

## 2021-07-15 PROCEDURE — 2580000003 HC RX 258: Performed by: INTERNAL MEDICINE

## 2021-07-15 PROCEDURE — 6370000000 HC RX 637 (ALT 250 FOR IP): Performed by: INTERNAL MEDICINE

## 2021-07-15 PROCEDURE — 97116 GAIT TRAINING THERAPY: CPT

## 2021-07-15 PROCEDURE — 80053 COMPREHEN METABOLIC PANEL: CPT

## 2021-07-15 PROCEDURE — 80048 BASIC METABOLIC PNL TOTAL CA: CPT

## 2021-07-15 PROCEDURE — 86141 C-REACTIVE PROTEIN HS: CPT

## 2021-07-15 PROCEDURE — 82550 ASSAY OF CK (CPK): CPT

## 2021-07-15 PROCEDURE — 87040 BLOOD CULTURE FOR BACTERIA: CPT

## 2021-07-15 PROCEDURE — 97530 THERAPEUTIC ACTIVITIES: CPT

## 2021-07-15 PROCEDURE — 80202 ASSAY OF VANCOMYCIN: CPT

## 2021-07-15 PROCEDURE — 1200000000 HC SEMI PRIVATE

## 2021-07-15 PROCEDURE — 6370000000 HC RX 637 (ALT 250 FOR IP): Performed by: NURSE PRACTITIONER

## 2021-07-15 PROCEDURE — 84145 PROCALCITONIN (PCT): CPT

## 2021-07-15 PROCEDURE — 97110 THERAPEUTIC EXERCISES: CPT

## 2021-07-15 RX ADMIN — ASPIRIN 81 MG: 81 TABLET, CHEWABLE ORAL at 08:13

## 2021-07-15 RX ADMIN — CEFTRIAXONE SODIUM 2000 MG: 2 INJECTION, POWDER, FOR SOLUTION INTRAMUSCULAR; INTRAVENOUS at 08:10

## 2021-07-15 RX ADMIN — SODIUM CHLORIDE, PRESERVATIVE FREE 10 ML: 5 INJECTION INTRAVENOUS at 08:14

## 2021-07-15 RX ADMIN — PANTOPRAZOLE SODIUM 40 MG: 40 TABLET, DELAYED RELEASE ORAL at 06:12

## 2021-07-15 RX ADMIN — METOPROLOL SUCCINATE 25 MG: 25 TABLET, EXTENDED RELEASE ORAL at 08:13

## 2021-07-15 RX ADMIN — SODIUM CHLORIDE, PRESERVATIVE FREE 10 ML: 5 INJECTION INTRAVENOUS at 20:23

## 2021-07-15 RX ADMIN — ENOXAPARIN SODIUM 40 MG: 40 INJECTION SUBCUTANEOUS at 08:13

## 2021-07-15 RX ADMIN — Medication 1 TABLET: at 08:13

## 2021-07-15 RX ADMIN — FLUTICASONE PROPIONATE 1 SPRAY: 50 SPRAY, METERED NASAL at 08:18

## 2021-07-15 ASSESSMENT — PAIN SCALES - GENERAL
PAINLEVEL_OUTOF10: 0

## 2021-07-15 NOTE — PROGRESS NOTES
Infectious Disease Progress Note  7/15/2021   Patient Name: Unice Gaucher : 5/15/1929       Reason for visit: F/u Streptococcus sanguis bacteremia, suspected prosthetic valve endocarditis versus cardiac implantable electronic device endocarditis ? History:? Interval history noted  Denies n/v/d/f or untoward effects of antimicrobials  Physical Exam:  Vital Signs: BP (!) 149/58   Pulse 117   Temp 97.8 °F (36.6 °C) (Oral)   Resp 24   Ht 5' 1\" (1.549 m)   Wt 175 lb 0.7 oz (79.4 kg)   SpO2 98%   BMI 33.07 kg/m²     Gen: alert and oriented X3, no distress  Skin: no stigmata of endocarditis  Wounds: C/D/I  HEMT: AT/NC Oropharynx pink, moist, and without lesions or exudates; dentition in good state of repair  Eyes: PERRLA, EOMI, conjunctiva pink, sclera anicteric. Neck: Supple. Trachea midline. No LAD. Chest: no distress and CTA. Good air movement. Heart: systolic murmur in all cardiac areas  Abd: soft, non-distended, no tenderness, no hepatomegaly. Normoactive bowel sounds. Ext: no clubbing, cyanosis, or edema  Catheter Site: without erythema or tenderness  LDA: CVC:  Neuro: Mental status intact. CN 2-12 intact and no focal sensory or motor deficits     Radiologic / Imaging / TESTING  No results found.      Labs:    Recent Results (from the past 24 hour(s))   CK    Collection Time: 07/15/21  2:02 AM   Result Value Ref Range    Total  26 - 140 IU/L   CBC auto differential    Collection Time: 07/15/21  2:02 AM   Result Value Ref Range    WBC 11.2 (H) 4.0 - 10.5 K/CU MM    RBC 3.24 (L) 4.2 - 5.4 M/CU MM    Hemoglobin 9.6 (L) 12.5 - 16.0 GM/DL    Hematocrit 32.1 (L) 37 - 47 %    MCV 99.1 78 - 100 FL    MCH 29.6 27 - 31 PG    MCHC 29.9 (L) 32.0 - 36.0 %    RDW 15.3 (H) 11.7 - 14.9 %    Platelets 360 598 - 984 K/CU MM    MPV 9.4 7.5 - 11.1 FL    Differential Type AUTOMATED DIFFERENTIAL     Segs Relative 69.2 (H) 36 - 66 %    Lymphocytes % 11.5 (L) 24 - 44 %    Monocytes % 12.9 (H) 0 - 4 %    Eosinophils % 2.9 0 - 3 %    Basophils % 0.5 0 - 1 %    Segs Absolute 7.7 K/CU MM    Lymphocytes Absolute 1.3 K/CU MM    Monocytes Absolute 1.5 K/CU MM    Eosinophils Absolute 0.3 K/CU MM    Basophils Absolute 0.1 K/CU MM    Nucleated RBC % 0.3 %    Total Nucleated RBC 0.0 K/CU MM    Total Immature Neutrophil 0.34 K/CU MM    Immature Neutrophil % 3.0 (H) 0 - 0.43 %   C-Reactive Protein    Collection Time: 07/15/21  2:02 AM   Result Value Ref Range    CRP, High Sensitivity 34.4 mg/L   Procalcitonin    Collection Time: 07/15/21  2:02 AM   Result Value Ref Range    Procalcitonin 0.197    Comprehensive Metabolic Panel    Collection Time: 07/15/21  2:02 AM   Result Value Ref Range    Sodium 138 135 - 145 MMOL/L    Potassium 4.5 3.5 - 5.1 MMOL/L    Chloride 106 99 - 110 mMol/L    CO2 23 21 - 32 MMOL/L    BUN 27 (H) 6 - 23 MG/DL    CREATININE 0.9 0.6 - 1.1 MG/DL    Glucose 102 (H) 70 - 99 MG/DL    Calcium 8.6 8.3 - 10.6 MG/DL    Albumin 3.1 (L) 3.4 - 5.0 GM/DL    Total Protein 5.1 (L) 6.4 - 8.2 GM/DL    Total Bilirubin 0.2 0.0 - 1.0 MG/DL    ALT 44 (H) 10 - 40 U/L    AST 46 (H) 15 - 37 IU/L    Alkaline Phosphatase 117 40 - 128 IU/L    GFR Non- 59 (L) >60 mL/min/1.73m2    GFR African American >60 >60 mL/min/1.73m2    Anion Gap 9 4 - 16     CULTURE results: Invalid input(s): BLOOD CULTURE,  URINE CULTURE, SURGICAL CULTURE    Diagnosis:  Patient Active Problem List   Diagnosis    Essential hypertension    Other hyperlipidemia    Cardiac pacemaker    Congestive heart failure (HCC)    Cerebrovascular accident (CVA) due to vascular stenosis (HCC)    CHB (complete heart block) (HCC)    S/P AVR (aortic valve replacement)    Weakness    Troponin I above reference range    Bacteremia due to Streptococcus       Active Problems  Active Problems:    Cardiac pacemaker    S/P AVR (aortic valve replacement)    Bacteremia due to Streptococcus    Essential hypertension    Cerebrovascular accident (CVA) due to vascular stenosis ([address]    Weakness    Troponin I above reference range  Resolved Problems:    * No resolved hospital problems. *      Impression and plan   Summary and rationale: Patient is a 80 y.o.  female with a medical history of hypertension, hyperlipidemia, CHF, status post TAVR for aortic stenosis, status post permanent pacemaker admitted for fever and generalized weakness. 2 out of 2 sets of blood cultures on 7/9/2021 + for Streptococcus sanguis. Probable GI source. Need to rule out endocarditis-prosthetic valve versus CI ED. No vegetation seen on VIKTORIA but this does not rule out involvement.  Clinical status: Improving   Therapeutic: Continue vancomycin and ceftriaxone; pending susceptibility results.  Will prefer ampicillin (narrow spectrum lessens risk of C difficile infection), and requires 6 weeks of treatment   Diagnostic: rend CRP and pct   F/u:    Other:        Electronically signed by: Electronically signed by Yomaira Wing MD on 7/15/2021 at 1:54 PM

## 2021-07-15 NOTE — PROGRESS NOTES
Physical Therapy Treatment Note  Name: Zev Montes MRN: [id number] :   5/15/1929   Date:  7/15/2021   Admission Date: 2021 Room:  -A     Restrictions/Precautions:  Restrictions/Precautions  Restrictions/Precautions: Fall Risk, General Precautions  Required Braces or Orthoses?: No     general precautions, fall risk    Communication with other providers:  RN, noted inflammation of R forearm and bilateral LEs, RN aware and in room at end of session. Subjective:  Patient states:  \"I feel like I can stand up from a chair better at home than I do here! \"  Pain:   Location, Type, Intensity (0/10 to 10/10):  Pt denies pain this session. Objective:    Observation:  Pt up in chair upon entry and agreeable to therapy. Treatment, including education/measures:  Transfers: Pt completed STS transfer to/from chair and to/from commode x2 with CGA. Pt required cuing for cane management, proper stance and foot placement prior to stand, proper hand placement, as well as using UEs on to control slow descent to chair. Gait: Pt ambulated 150' CGA with SPC on R. Pt demonstrated initial need for UE support on walls, but was able to correct as ambulation progressed. Pt demonstrated decreased sherwin, decreased step length bilaterally, and narrow EFFIE. Pt required cues for pathway negotiation, and was able to correct. Standing balance: Fair; Pt stood ~3 minutes x2 with CGA. Therapeutic Exercise:  STS CGA to/from chair x14 with intermittent seated rest breaks. Pt required verbal and tactile cues for maintaining R LE close to chair when standing. Pt cued for placing both UEs on surface when standing and sitting, and using UEs for slow descent to chair. Pt initially required Min A for sitting control, but progressed to CGA with cuing. Assessment / Impression:    Pt left up in chair with family and nurse in room, call light within reach at end of session.   Patient's tolerance of treatment:

## 2021-07-15 NOTE — PROGRESS NOTES
Occupational Therapy      Attempted to see pt today in AM and PM. PT saw pt in AM and in PM pt requested therapy to return at a later time due to fatigue.  Will re-attempt later when pt is appropriate    Mario Regulus OTR/L [id number]  1:53 PM,7/15/2021

## 2021-07-15 NOTE — PLAN OF CARE
PerfectServed Dr Juliana Ramsay regarding venous ultrasound.   Pt refused exam, order cancelled by [address] department

## 2021-07-15 NOTE — PLAN OF CARE
Attempted to perform US portably. When tech arrived to room, Pt stated that she has not spoken to her daughter yet and would like to speak with her daughter before having the [address]. Pt then asked what the US was for and the tech informed her that the [address] was to examine the veins in her legs to rule out a blood clot as the cause of her swelling. PT stated that she knows the cause of her swelling and it is not due to blood clots. PT then stated that her legs are swelling d/t not being able to walk daily. The tech called Kari Mason, Pt's RN ([id number]), and informed her of PT's statements and refusal.  RN stated that the PT has stated all of the same things to the hospitalist and that she would inform the hospitalist of the PT's refusal of US at this time.

## 2021-07-16 LAB
ALBUMIN SERPL-MCNC: 2.9 GM/DL (ref 3.4–5)
ALP BLD-CCNC: 84 IU/L (ref 40–128)
ALT SERPL-CCNC: 37 U/L (ref 10–40)
ANION GAP SERPL CALCULATED.3IONS-SCNC: 9 MMOL/L (ref 4–16)
AST SERPL-CCNC: 32 IU/L (ref 15–37)
BASOPHILS ABSOLUTE: 0.1 K/CU MM
BASOPHILS RELATIVE PERCENT: 0.4 % (ref 0–1)
BILIRUB SERPL-MCNC: 0.3 MG/DL (ref 0–1)
BUN BLDV-MCNC: 20 MG/DL (ref 6–23)
CALCIUM SERPL-MCNC: 8.6 MG/DL (ref 8.3–10.6)
CHLORIDE BLD-SCNC: 107 MMOL/L (ref 99–110)
CO2: 24 MMOL/L (ref 21–32)
CREAT SERPL-MCNC: 0.7 MG/DL (ref 0.6–1.1)
DIFFERENTIAL TYPE: ABNORMAL
EOSINOPHILS ABSOLUTE: 0.3 K/CU MM
EOSINOPHILS RELATIVE PERCENT: 2.2 % (ref 0–3)
GFR AFRICAN AMERICAN: >60 ML/MIN/1.73M2
GFR NON-AFRICAN AMERICAN: >60 ML/MIN/1.73M2
GLUCOSE BLD-MCNC: 87 MG/DL (ref 70–99)
HCT VFR BLD CALC: 30.2 % (ref 37–47)
HEMOGLOBIN: 9.3 GM/DL (ref 12.5–16)
HIGH SENSITIVE C-REACTIVE PROTEIN: 84.5 MG/L
IMMATURE NEUTROPHIL %: 2.3 % (ref 0–0.43)
LACTIC ACID, SEPSIS: 1.2 MMOL/L (ref 0.5–1.9)
LYMPHOCYTES ABSOLUTE: 1.4 K/CU MM
LYMPHOCYTES RELATIVE PERCENT: 10.2 % (ref 24–44)
MCH RBC QN AUTO: 30 PG (ref 27–31)
MCHC RBC AUTO-ENTMCNC: 30.8 % (ref 32–36)
MCV RBC AUTO: 97.4 FL (ref 78–100)
MONOCYTES ABSOLUTE: 1.5 K/CU MM
MONOCYTES RELATIVE PERCENT: 11.4 % (ref 0–4)
NUCLEATED RBC %: 0 %
PDW BLD-RTO: 15.6 % (ref 11.7–14.9)
PLATELET # BLD: 226 K/CU MM (ref 140–440)
PMV BLD AUTO: 9.4 FL (ref 7.5–11.1)
POTASSIUM SERPL-SCNC: 3.9 MMOL/L (ref 3.5–5.1)
PROCALCITONIN: 0.17
RBC # BLD: 3.1 M/CU MM (ref 4.2–5.4)
SEGMENTED NEUTROPHILS ABSOLUTE COUNT: 9.9 K/CU MM
SEGMENTED NEUTROPHILS RELATIVE PERCENT: 73.5 % (ref 36–66)
SODIUM BLD-SCNC: 140 MMOL/L (ref 135–145)
TOTAL CK: 71 IU/L (ref 26–140)
TOTAL IMMATURE NEUTOROPHIL: 0.31 K/CU MM
TOTAL NUCLEATED RBC: 0 K/CU MM
TOTAL PROTEIN: 5 GM/DL (ref 6.4–8.2)
TROPONIN T: 0.01 NG/ML
WBC # BLD: 13.5 K/CU MM (ref 4–10.5)

## 2021-07-16 PROCEDURE — 6360000002 HC RX W HCPCS: Performed by: INTERNAL MEDICINE

## 2021-07-16 PROCEDURE — 6370000000 HC RX 637 (ALT 250 FOR IP): Performed by: NURSE PRACTITIONER

## 2021-07-16 PROCEDURE — 6370000000 HC RX 637 (ALT 250 FOR IP): Performed by: INTERNAL MEDICINE

## 2021-07-16 PROCEDURE — 84484 ASSAY OF TROPONIN QUANT: CPT

## 2021-07-16 PROCEDURE — 83605 ASSAY OF LACTIC ACID: CPT

## 2021-07-16 PROCEDURE — 94761 N-INVAS EAR/PLS OXIMETRY MLT: CPT

## 2021-07-16 PROCEDURE — 86141 C-REACTIVE PROTEIN HS: CPT

## 2021-07-16 PROCEDURE — 80053 COMPREHEN METABOLIC PANEL: CPT

## 2021-07-16 PROCEDURE — 2580000003 HC RX 258: Performed by: INTERNAL MEDICINE

## 2021-07-16 PROCEDURE — 80048 BASIC METABOLIC PNL TOTAL CA: CPT

## 2021-07-16 PROCEDURE — 82550 ASSAY OF CK (CPK): CPT

## 2021-07-16 PROCEDURE — 36415 COLL VENOUS BLD VENIPUNCTURE: CPT

## 2021-07-16 PROCEDURE — 87040 BLOOD CULTURE FOR BACTERIA: CPT

## 2021-07-16 PROCEDURE — 84145 PROCALCITONIN (PCT): CPT

## 2021-07-16 PROCEDURE — 85025 COMPLETE CBC W/AUTO DIFF WBC: CPT

## 2021-07-16 PROCEDURE — 1200000000 HC SEMI PRIVATE

## 2021-07-16 RX ADMIN — ASPIRIN 81 MG: 81 TABLET, CHEWABLE ORAL at 09:06

## 2021-07-16 RX ADMIN — FLUTICASONE PROPIONATE 1 SPRAY: 50 SPRAY, METERED NASAL at 09:06

## 2021-07-16 RX ADMIN — METOPROLOL SUCCINATE 25 MG: 25 TABLET, EXTENDED RELEASE ORAL at 09:08

## 2021-07-16 RX ADMIN — CEFTRIAXONE SODIUM 2000 MG: 2 INJECTION, POWDER, FOR SOLUTION INTRAMUSCULAR; INTRAVENOUS at 09:06

## 2021-07-16 RX ADMIN — Medication 1 TABLET: at 09:06

## 2021-07-16 RX ADMIN — PANTOPRAZOLE SODIUM 40 MG: 40 TABLET, DELAYED RELEASE ORAL at 05:57

## 2021-07-16 RX ADMIN — ENOXAPARIN SODIUM 40 MG: 40 INJECTION SUBCUTANEOUS at 09:08

## 2021-07-16 ASSESSMENT — PAIN SCALES - GENERAL
PAINLEVEL_OUTOF10: 0

## 2021-07-16 NOTE — PROGRESS NOTES
ATTENDING PHYSICIAN'S PROGRESS NOTES    Patient:  Luis Friedman      Unit/Bed:2018/2018-A    YOB: 1929    MRN: [id number]     Acct: [de-identified]     Admit date: 7/9/2021    Patient Seen, Chart, Consults notes, Labs, Radiology studies reviewed. SUBJECTIVE:   Day 4 of stay    Deric Dela Cruz a 80 y. o.  female  who presents with generalized weakness. Patient has past medical history of hypertension, hyperlipidemia, CHF, post s/p LOPEZ for aortic stenosis, status post permanent pacemaker implantation. Patient was admitted for fever, generalized weakness and rhabdomyolysis. 2 out of 2 sets sets of blood cultures 7/9/2021 grew Streptococcus Sanguis. Will need to rule out endocarditis-prosthetic valve versus cardiovascular implantable electronic device infection ( [facility]) per ID. Interval history:   Patient was seen in her room. No acute events overnight. Her power of , the daughter, was present during the round. The daughter would like to have the venous Doppler done to rule out DVT. The study was reordered. The daughter told the patient that she should have the study done . The patient was very unwilling to have this but would agree with the decision of her daughter. IVF with NS was discontinued. VIKTORIA negative for intracardiac infection. Will awaiting ID for discharge antibiotics regimen    All other ROS negative except noted in HPI    Past, Family, Social History unchanged from admission. Diet:  ADULT DIET;  Regular    Medications:  Scheduled Meds:   cefTRIAXone (ROCEPHIN) IV  2,000 mg Intravenous Q24H    pantoprazole  40 mg Oral QAM AC    aspirin  81 mg Oral Daily    fluticasone  1 spray Each Nostril Daily    metoprolol succinate  25 mg Oral Daily    therapeutic multivitamin-minerals  1 tablet Oral Daily    sodium chloride flush  5-40 mL Intravenous 2 times per day    enoxaparin  40 mg Subcutaneous Daily     Continuous Infusions:   sodium chloride PRN Meds:sodium chloride flush, sodium chloride flush, sodium chloride, ondansetron **OR** ondansetron, polyethylene glycol, acetaminophen **OR** acetaminophen    OBJECTIVE:    CBC:   Recent Labs     07/13/21  0655 07/14/21 0350 07/15/21  0202   WBC 6.9 7.5 11.2*   HGB 9.8* 9.6* 9.6*    171 213     BMP:    Recent Labs     07/13/21  0655 07/14/21  0350 07/15/21  0202    139 138   K 4.1 4.1 4.5    109 106   CO2 19* 21 23   BUN 20 24* 27*   CREATININE 0.7 0.8 0.9   GLUCOSE 92 101* 102*     Calcium:  Recent Labs     07/15/21  0202   CALCIUM 8.6     Hepatic:   Recent Labs     07/15/21  0202   ALKPHOS 117   ALT 44*   AST 46*   PROT 5.1*   BILITOT 0.2   LABALBU 3.1*     Troponin:   Recent Labs     07/13/21  0655 07/14/21  0350 07/15/21  0202   CKTOTAL 306* 179* 119     Radiology reports as per the Radiologist  Radiology: CT HEAD WO CONTRAST    Result Date: 7/9/2021  EXAMINATION: CT OF THE HEAD WITHOUT CONTRAST  7/9/2021 12:51 pm TECHNIQUE: CT of the head was performed without the administration of intravenous contrast. Dose modulation, iterative reconstruction, and/or weight based adjustment of the mA/kV was utilized to reduce the radiation dose to as low as reasonably achievable. COMPARISON: None. HISTORY: ORDERING SYSTEM PROVIDED HISTORY: generalized weakness, ams TECHNOLOGIST PROVIDED HISTORY: Reason for exam:->generalized weakness, ams Has a \"code stroke\" or \"stroke alert\" been called? ->No Decision Support Exception - unselect if not a suspected or confirmed emergency medical condition->Emergency Medical Condition (MA) Reason for Exam: generalized weakness, ams Acuity: Acute Type of Exam: Initial FINDINGS: BRAIN/VENTRICLES: There is no acute intracranial hemorrhage, mass effect or midline shift. No abnormal extra-axial fluid collection. The gray-white differentiation is maintained without evidence of an acute infarct. There is no evidence of hydrocephalus.  Periventricular white matter low densities most likely due to chronic microvascular ischemia. Bilateral basal ganglia calcifications are present. ORBITS: The visualized portion of the orbits demonstrate no acute abnormality. SINUSES: The visualized paranasal sinuses and mastoid air cells demonstrate no acute abnormality. SOFT TISSUES/SKULL:  2 separate carter hole defects of the left-sided calvarium noted. No acute osseous findings. No acute intracranial abnormality. CT CHEST W CONTRAST    Result Date: 7/9/2021  EXAMINATION: CT OF THE CHEST WITH CONTRAST; CT OF THE ABDOMEN AND PELVIS WITH CONTRAST 7/9/2021 12:52 pm TECHNIQUE: CT of the chest was performed with the administration of intravenous contrast. Multiplanar reformatted images are provided for review. Dose modulation, iterative reconstruction, and/or weight based adjustment of the mA/kV was utilized to reduce the radiation dose to as low as reasonably achievable.; CT of the abdomen and pelvis was performed with the administration of intravenous contrast. Multiplanar reformatted images are provided for review. Dose modulation, iterative reconstruction, and/or weight based adjustment of the mA/kV was utilized to reduce the radiation dose to as low as reasonably achievable.  COMPARISON: 11/14/2017 HISTORY: ORDERING SYSTEM PROVIDED HISTORY: ?ptx on cxr, generalized weakness, fever TECHNOLOGIST PROVIDED HISTORY: Reason for exam:->?ptx on cxr, generalized weakness, fever Decision Support Exception - unselect if not a suspected or confirmed emergency medical condition->Emergency Medical Condition (MA) Reason for Exam: ?ptx on cxr, generalized weakness, fever Acuity: Acute Type of Exam: Initial; ORDERING SYSTEM PROVIDED HISTORY: fever, AMS, generalized weakness TECHNOLOGIST PROVIDED HISTORY: Reason for exam:->fever, AMS, generalized weakness Additional Contrast?->None Decision Support Exception - unselect if not a suspected or confirmed emergency medical condition->Emergency Medical Condition (MA) Reason for Exam: fever, AMS, generalized weakness Acuity: Acute Type of Exam: Initial Chest: Mediastinum: No acute abnormality of the aorta or other mediastinal structures. No pericardial effusion. Normal size lymph nodes, no evidence of adenopathy. Lungs/pleura: Small bilateral dependent pleural effusions with adjacent compressive atelectasis. No pneumothorax. Detailed lung evaluation limited due to motion. No consolidation Soft Tissues/Bones: No acute fracture. No acute findings of the chest wall. Extensive degenerative changes of the thoracic spine. No abscess of the soft tissues of the chest wall. Abdomen/Pelvis: Organs: No acute findings of the organs throughout the abdomen. No evidence of pancreatitis or pyelonephritis. No ureteral stone or hydronephrosis. Normal appearance of the gallbladder. No acute findings of the liver. No evidence of biliary dilation/obstruction. 1.9 cm cyst at the dome incidentally noted. Detailed evaluation of the organs limited due to motion. Cyst of the central right kidney measuring 4 cm. Several low-density lesions of the spleen measuring 1.4 cm and less most likely cysts or hemangiomata. GI/Bowel: No obstruction or other acute findings. No evidence diverticulitis or colitis. There is a large amount of colonic diverticulosis but no evidence of diverticulitis. Pelvis: No evidence of cystitis. Bladder appears normal. Peritoneum/Retroperitoneum: No ascites or free air. No abscess. Bones/Soft Tissues: No acute fracture of the abdomen and pelvis. No abscess. Chest: Small bilateral dependent pleural effusions mild adjacent atelectasis. No evidence of acute process otherwise. Abdomen/pelvis: No evidence of acute process.      CT ABDOMEN PELVIS W IV CONTRAST Additional Contrast? None    Result Date: 7/9/2021  EXAMINATION: CT OF THE CHEST WITH CONTRAST; CT OF THE ABDOMEN AND PELVIS WITH CONTRAST 7/9/2021 12:52 pm TECHNIQUE: CT of the chest was performed with the administration of intravenous contrast. Multiplanar reformatted images are provided for review. Dose modulation, iterative reconstruction, and/or weight based adjustment of the mA/kV was utilized to reduce the radiation dose to as low as reasonably achievable.; CT of the abdomen and pelvis was performed with the administration of intravenous contrast. Multiplanar reformatted images are provided for review. Dose modulation, iterative reconstruction, and/or weight based adjustment of the mA/kV was utilized to reduce the radiation dose to as low as reasonably achievable. COMPARISON: 11/14/2017 HISTORY: ORDERING SYSTEM PROVIDED HISTORY: ?ptx on cxr, generalized weakness, fever TECHNOLOGIST PROVIDED HISTORY: Reason for exam:->?ptx on cxr, generalized weakness, fever Decision Support Exception - unselect if not a suspected or confirmed emergency medical condition->Emergency Medical Condition (MA) Reason for Exam: ?ptx on cxr, generalized weakness, fever Acuity: Acute Type of Exam: Initial; ORDERING SYSTEM PROVIDED HISTORY: fever, AMS, generalized weakness TECHNOLOGIST PROVIDED HISTORY: Reason for exam:->fever, AMS, generalized weakness Additional Contrast?->None Decision Support Exception - unselect if not a suspected or confirmed emergency medical condition->Emergency Medical Condition (MA) Reason for Exam: fever, AMS, generalized weakness Acuity: Acute Type of Exam: Initial Chest: Mediastinum: No acute abnormality of the aorta or other mediastinal structures. No pericardial effusion. Normal size lymph nodes, no evidence of adenopathy. Lungs/pleura: Small bilateral dependent pleural effusions with adjacent compressive atelectasis. No pneumothorax. Detailed lung evaluation limited due to motion. No consolidation Soft Tissues/Bones: No acute fracture. No acute findings of the chest wall. Extensive degenerative changes of the thoracic spine. No abscess of the soft tissues of the chest wall. Abdomen/Pelvis: Organs:  No acute findings of the organs throughout the abdomen. No evidence of pancreatitis or pyelonephritis. No ureteral stone or hydronephrosis. Normal appearance of the gallbladder. No acute findings of the liver. No evidence of biliary dilation/obstruction. 1.9 cm cyst at the dome incidentally noted. Detailed evaluation of the organs limited due to motion. Cyst of the central right kidney measuring 4 cm. Several low-density lesions of the spleen measuring 1.4 cm and less most likely cysts or hemangiomata. GI/Bowel: No obstruction or other acute findings. No evidence diverticulitis or colitis. There is a large amount of colonic diverticulosis but no evidence of diverticulitis. Pelvis: No evidence of cystitis. Bladder appears normal. Peritoneum/Retroperitoneum: No ascites or free air. No abscess. Bones/Soft Tissues: No acute fracture of the abdomen and pelvis. No abscess. Chest: Small bilateral dependent pleural effusions mild adjacent atelectasis. No evidence of acute process otherwise. Abdomen/pelvis: No evidence of acute process. XR CHEST PORTABLE    Result Date: 7/9/2021  EXAMINATION: ONE XRAY VIEW OF THE CHEST 7/9/2021 11:48 am COMPARISON: None. HISTORY: ORDERING SYSTEM PROVIDED HISTORY: generalized weakness TECHNOLOGIST PROVIDED HISTORY: Reason for exam:->generalized weakness Reason for Exam: generalized weakness FINDINGS: The cardiac silhouette is at the upper limits of normal in size. There is a left-sided cardiac device as well as sequela of TAVR. There is sequela of old granulomatous disease. The lung volumes are low with mild pulmonary vascular congestion. There is a suspected skin fold along the right lateral chest.  Pneumothorax cannot entirely be excluded. Follow-up radiograph is recommended. Degenerative changes are noted in the [facility] and glenohumeral joints. 1. Low lung volumes with pulmonary vascular congestion.  2. Suspected skin fold along the right lateral and upper chest. Underlying pneumothorax cannot entirely be excluded. Follow-up radiographs are recommended. Physical Exam:  Vitals: BP (!) 112/49   Pulse 101   Temp 99.8 °F (37.7 °C) (Oral)   Resp 19   Ht 5' 1\" (1.549 m)   Wt 175 lb 0.7 oz (79.4 kg)   SpO2 95%   BMI 33.07 kg/m²   24 hour intake/output:    Intake/Output Summary (Last 24 hours) at 7/15/2021 2106  Last data filed at 7/15/2021 2023  Gross per 24 hour   Intake 283 ml   Output    Net 283 ml     Last 3 weights: Wt Readings from Last 3 Encounters:   07/14/21 175 lb 0.7 oz (79.4 kg)   05/28/21 154 lb (69.9 kg)   10/13/20 158 lb (71.7 kg)     General: Elderly frail woman, sitting in the chair, not in acute distress  Head: Normocephalic atraumatic  Eyes: PERRL, EOMI, anicteric sclerae  ENT: No nasal discharge, oral mucosa moist, hearing normal  Neck: Supple, trachea in midline, no thyromegaly, no JVD, no carotid bruits  Chest: Normal respiratory effort, symmetric expansion of the chest, no rales, rhonchi or wheezes; PM  in the precordial region; no signs of infection (redness, swelling or skin break)  CV: S1-S2 audible, regular rate and rhythm, 2/6 systolic murmur rub noted; no gallop, pulses 2+ upper extremities  Abdomen: Bowel sounds present, abdomen soft, nontender nondistended  Musculoskeletal: bL lower extremities pitting edema 3+. No cyanosis or clubbing.   No obvious deformity, range of motion grossly within normal limit  Neurology: AAOx3, CN 2-12 grossly intact, no focal deficits  Psychiatric: Normal mood, affect is congruent with age and the environment  Skin: Warm and dry, no rash in the exposed post area       DVT prophylaxis: [] Lovenox                                 [] SCDs                                 [] SQ Heparin                                 [] Encourage ambulation           [] Already on Anticoagulation                 ASSESSMENT / PLAN :    Active Problems:    Essential hypertension    Cardiac pacemaker    Cerebrovascular accident (CVA) due to vascular stenosis (HCC)    S/P AVR (aortic valve replacement)    Weakness    Troponin I above reference range    Bacteremia due to Streptococcus  Resolved Problems:    * No resolved hospital problems. *    A/P    Onur Wooduser is a 80 y. o.  female  who presents with generalized weakness     1) Rhabdomyolysis; mild  -CK elevated  -Hold statin , lasix   -CK trending down and wnl now, will discontinue NS    -Continue Chem7, CK trend - Creatinine remains stable      2) Generalized weakness  -Improved on exam  -PT/OT ordered     3) Elevated Troponin  -Not likely ACS, likely 2/2 #1  -Will trend  -Echo as below , EF preserved 50-55%.  Diastolic dysfunction grade III;   -EKG reviewed, V Paced rhythm       Echocardiogram 7/12/2021       Left ventricular systolic function is normal.   Ejection fraction is visually estimated at 50-55%.   Grade III diastolic dysfunction.   Severe biatrial enlargement.   PPM wiring visualized within the right heart.   History of TAVR (#29 Watson Nancy 3) with moderate perivalvular leak.   Slight prolapse of the anterior mitral valve leaflet with moderate mitral   valve regurgitation.   Severe tricuspid regurgitation; RVSP: 69 mmHg.   Mild to moderate pulmonic regurgitation present.   No evidence of any pericardial effusion.   Right pleural effusion.           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 CT negative for evidence of infection; VIKTORIA:  Pacemaker wire is free from vegetation.             - Seen by ID- Abx changed to Vancomycin and Ceftriaxone- Continue              - follow up with repeat blood cultures every 48 hours, CRP, ESR, procalcitonin             -TTE  completed,no evidence of vegitation             -Underwent VIKTORIA 7/13/2021 without significant evidence of vegetations, per EP discussion documented w/ family, patient will likely stay on suppressive abx without aggressive intervention of pacemaker removal   -Repeat blood cultures sent per ID recs, last blood cxs from 7/9/2021 positive               Echocardiogram VIKTORIA 7/13/2021:    Summary  Pacemaker wire is free from vegetation. S/p TAVR: #29 Watson Nancy 3  Moderate to severe mitral regurgitation is present. There was no thrombus noted in the left atrial appendage. Negative bubble study. No PFO or ASD noted.       5) LE edema  -likely from CHF (diastolic dysfunction grade III)  -was on lasix, and has been holding due to rhabdomyolysis  -re order venous US to rule out DVT per patient's POA, her daughter's request  - NS ivf was discontinued      Other chronic medical condition; medication resumed unless contraindicated      Essential HTN  HLD  S/P PPM   S/P AVT       Electronically signed by Blaine Cutler MD on 7/15/2021 at 9:06 PM    [address] [place] [address]

## 2021-07-16 NOTE — PROGRESS NOTES
Nutrition Assessment     Type and Reason for Visit: Initial, RD Nutrition Re-Screen/LOS    Nutrition Recommendations/Plan: Pt assessed due to a length of stay. Pt has been on a regular diet and is consuming % of most of her meals. Pt does not have a wound and her weight appears stable. Pt is at low risk at this time.      Electronically signed by Marium Camargo RD, NALLELY on 6/87/86 at 3:54 PM EDT    Contact: [phone]

## 2021-07-16 NOTE — CARE COORDINATION
Received VM from [address] at [facility]. She stated that they can accept patient on discharge .  Notified physician via PS

## 2021-07-17 ENCOUNTER — APPOINTMENT (OUTPATIENT)
Dept: GENERAL RADIOLOGY | Age: 86
DRG: 314 | End: 2021-07-17
Payer: MEDICARE

## 2021-07-17 LAB
ALBUMIN SERPL-MCNC: 3.1 GM/DL (ref 3.4–5)
ALP BLD-CCNC: 105 IU/L (ref 40–128)
ALT SERPL-CCNC: 35 U/L (ref 10–40)
ANION GAP SERPL CALCULATED.3IONS-SCNC: 9 MMOL/L (ref 4–16)
AST SERPL-CCNC: 30 IU/L (ref 15–37)
BASOPHILS ABSOLUTE: 0.1 K/CU MM
BASOPHILS RELATIVE PERCENT: 0.5 % (ref 0–1)
BILIRUB SERPL-MCNC: 0.2 MG/DL (ref 0–1)
BUN BLDV-MCNC: 22 MG/DL (ref 6–23)
CALCIUM SERPL-MCNC: 8.6 MG/DL (ref 8.3–10.6)
CHLORIDE BLD-SCNC: 105 MMOL/L (ref 99–110)
CO2: 23 MMOL/L (ref 21–32)
CREAT SERPL-MCNC: 0.7 MG/DL (ref 0.6–1.1)
DIFFERENTIAL TYPE: ABNORMAL
EOSINOPHILS ABSOLUTE: 0.2 K/CU MM
EOSINOPHILS RELATIVE PERCENT: 2.3 % (ref 0–3)
GFR AFRICAN AMERICAN: >60 ML/MIN/1.73M2
GFR NON-AFRICAN AMERICAN: >60 ML/MIN/1.73M2
GLUCOSE BLD-MCNC: 115 MG/DL (ref 70–99)
HCT VFR BLD CALC: 32.2 % (ref 37–47)
HEMOGLOBIN: 9.7 GM/DL (ref 12.5–16)
IMMATURE NEUTROPHIL %: 2.5 % (ref 0–0.43)
LYMPHOCYTES ABSOLUTE: 1 K/CU MM
LYMPHOCYTES RELATIVE PERCENT: 10.2 % (ref 24–44)
MCH RBC QN AUTO: 29.8 PG (ref 27–31)
MCHC RBC AUTO-ENTMCNC: 30.1 % (ref 32–36)
MCV RBC AUTO: 99.1 FL (ref 78–100)
MONOCYTES ABSOLUTE: 1.2 K/CU MM
MONOCYTES RELATIVE PERCENT: 11.9 % (ref 0–4)
NUCLEATED RBC %: 0 %
PDW BLD-RTO: 15.8 % (ref 11.7–14.9)
PLATELET # BLD: 251 K/CU MM (ref 140–440)
PMV BLD AUTO: 9.1 FL (ref 7.5–11.1)
POTASSIUM SERPL-SCNC: 3.4 MMOL/L (ref 3.5–5.1)
PROCALCITONIN: 0.14
RBC # BLD: 3.25 M/CU MM (ref 4.2–5.4)
SEGMENTED NEUTROPHILS ABSOLUTE COUNT: 7.2 K/CU MM
SEGMENTED NEUTROPHILS RELATIVE PERCENT: 72.6 % (ref 36–66)
SODIUM BLD-SCNC: 137 MMOL/L (ref 135–145)
TOTAL CK: 44 IU/L (ref 26–140)
TOTAL IMMATURE NEUTOROPHIL: 0.25 K/CU MM
TOTAL NUCLEATED RBC: 0 K/CU MM
TOTAL PROTEIN: 5.3 GM/DL (ref 6.4–8.2)
WBC # BLD: 9.9 K/CU MM (ref 4–10.5)

## 2021-07-17 PROCEDURE — 6360000002 HC RX W HCPCS: Performed by: INTERNAL MEDICINE

## 2021-07-17 PROCEDURE — 6370000000 HC RX 637 (ALT 250 FOR IP): Performed by: NURSE PRACTITIONER

## 2021-07-17 PROCEDURE — 36415 COLL VENOUS BLD VENIPUNCTURE: CPT

## 2021-07-17 PROCEDURE — 2580000003 HC RX 258: Performed by: INTERNAL MEDICINE

## 2021-07-17 PROCEDURE — 84145 PROCALCITONIN (PCT): CPT

## 2021-07-17 PROCEDURE — 6370000000 HC RX 637 (ALT 250 FOR IP): Performed by: INTERNAL MEDICINE

## 2021-07-17 PROCEDURE — 1200000000 HC SEMI PRIVATE

## 2021-07-17 PROCEDURE — 82550 ASSAY OF CK (CPK): CPT

## 2021-07-17 PROCEDURE — 80048 BASIC METABOLIC PNL TOTAL CA: CPT

## 2021-07-17 PROCEDURE — 87086 URINE CULTURE/COLONY COUNT: CPT

## 2021-07-17 PROCEDURE — 71045 X-RAY EXAM CHEST 1 VIEW: CPT

## 2021-07-17 PROCEDURE — 85025 COMPLETE CBC W/AUTO DIFF WBC: CPT

## 2021-07-17 PROCEDURE — 80053 COMPREHEN METABOLIC PANEL: CPT

## 2021-07-17 RX ORDER — POTASSIUM CHLORIDE 20 MEQ/1
40 TABLET, EXTENDED RELEASE ORAL ONCE
Status: COMPLETED | OUTPATIENT
Start: 2021-07-17 | End: 2021-07-17

## 2021-07-17 RX ADMIN — POTASSIUM CHLORIDE 40 MEQ: 1500 TABLET, EXTENDED RELEASE ORAL at 19:23

## 2021-07-17 RX ADMIN — Medication 1 TABLET: at 08:44

## 2021-07-17 RX ADMIN — ASPIRIN 81 MG: 81 TABLET, CHEWABLE ORAL at 08:44

## 2021-07-17 RX ADMIN — PANTOPRAZOLE SODIUM 40 MG: 40 TABLET, DELAYED RELEASE ORAL at 06:23

## 2021-07-17 RX ADMIN — FLUTICASONE PROPIONATE 1 SPRAY: 50 SPRAY, METERED NASAL at 08:44

## 2021-07-17 RX ADMIN — CEFTRIAXONE SODIUM 2000 MG: 2 INJECTION, POWDER, FOR SOLUTION INTRAMUSCULAR; INTRAVENOUS at 08:44

## 2021-07-17 RX ADMIN — ENOXAPARIN SODIUM 40 MG: 40 INJECTION SUBCUTANEOUS at 08:43

## 2021-07-17 RX ADMIN — METOPROLOL SUCCINATE 25 MG: 25 TABLET, EXTENDED RELEASE ORAL at 08:44

## 2021-07-17 RX ADMIN — ACETAMINOPHEN 650 MG: 325 TABLET ORAL at 02:20

## 2021-07-17 RX ADMIN — SODIUM CHLORIDE, PRESERVATIVE FREE 10 ML: 5 INJECTION INTRAVENOUS at 08:44

## 2021-07-17 ASSESSMENT — PAIN SCALES - GENERAL
PAINLEVEL_OUTOF10: 0
PAINLEVEL_OUTOF10: 2

## 2021-07-17 NOTE — PROGRESS NOTES
ATTENDING PHYSICIAN'S PROGRESS NOTES    Patient:  Wilbert Holliday      Unit/Bed:2018/2018-A    YOB: 1929    MRN: [id number]     Acct: [de-identified]     Admit date: 7/9/2021    Patient Seen, Chart, Consults notes, Labs, Radiology studies reviewed. SUBJECTIVE:   Day 5 of stay     Dejah Larson a 80 y. o.  female  who presents with generalized weakness. Patient has past medical history of hypertension, hyperlipidemia, CHF, s/p LOPEZ for aortic stenosis, status post permanent pacemaker implantation.       Patient was admitted for fever, generalized weakness and rhabdomyolysis.  2 out of 2 sets of blood cultures 7/9/2021 grew Streptococcus Sanguis. Will need to rule out endocarditis-prosthetic valve versus cardiovascular implantable electronic device infection ( [facility]) per ID. VIKTORIA negative for intracardiac infection. Will awaiting ID for discharge antibiotics regimen.      Interval history:   Patient was seen in her room. No acute events overnight. She was rushing to the bathroom with the assistance of nurse tech. She states she has no new complaints. Her white count elevated 13.5 from 7.5. She is tachycardiac. Check blood culture, lactic acid, CRP and pro calcitonin. Patient is afebrile, not hypertensive. All other ROS negative except noted in HPI    Past, Family, Social History unchanged from admission. Diet:  ADULT DIET;  Regular    Medications:  Scheduled Meds:   cefTRIAXone (ROCEPHIN) IV  2,000 mg Intravenous Q24H    pantoprazole  40 mg Oral QAM AC    aspirin  81 mg Oral Daily    fluticasone  1 spray Each Nostril Daily    metoprolol succinate  25 mg Oral Daily    therapeutic multivitamin-minerals  1 tablet Oral Daily    sodium chloride flush  5-40 mL Intravenous 2 times per day    enoxaparin  40 mg Subcutaneous Daily     Continuous Infusions:   sodium chloride       PRN Meds:sodium chloride flush, sodium chloride flush, sodium chloride, ondansetron **OR** ondansetron, polyethylene glycol, acetaminophen **OR** acetaminophen    OBJECTIVE:    CBC:   Recent Labs     07/14/21  0350 07/15/21  0202 07/16/21  0341   WBC 7.5 11.2* 13.5*   HGB 9.6* 9.6* 9.3*    213 226     BMP:    Recent Labs     07/14/21  0350 07/15/21  0202 07/16/21  0341    138 140   K 4.1 4.5 3.9    106 107   CO2 21 23 24   BUN 24* 27* 20   CREATININE 0.8 0.9 0.7   GLUCOSE 101* 102* 87     Calcium:  Recent Labs     07/16/21 0341   CALCIUM 8.6       Hepatic:   Recent Labs     07/16/21 0341   ALKPHOS 84   ALT 37   AST 32   PROT 5.0*   BILITOT 0.3   LABALBU 2.9*     Troponin:   Recent Labs     07/14/21  0350 07/15/21  0202 07/16/21  0341   CKTOTAL 179* 119 71   TROPONINT  --   --  0.012*     BNP: No results for input(s): BNP in the last 72 hours. Lipids: No results for input(s): CHOL, TRIG, HDL, LDLCALC in the last 72 hours. Invalid input(s): LDL  ABGs: No results found for: PH, PCO2, PO2, HCO3, O2SAT    Radiology reports as per the Radiologist  Radiology: CT HEAD WO CONTRAST    Result Date: 7/9/2021  EXAMINATION: CT OF THE HEAD WITHOUT CONTRAST  7/9/2021 12:51 pm TECHNIQUE: CT of the head was performed without the administration of intravenous contrast. Dose modulation, iterative reconstruction, and/or weight based adjustment of the mA/kV was utilized to reduce the radiation dose to as low as reasonably achievable. COMPARISON: None. HISTORY: ORDERING SYSTEM PROVIDED HISTORY: generalized weakness, ams TECHNOLOGIST PROVIDED HISTORY: Reason for exam:->generalized weakness, ams Has a \"code stroke\" or \"stroke alert\" been called? ->No Decision Support Exception - unselect if not a suspected or confirmed emergency medical condition->Emergency Medical Condition (MA) Reason for Exam: generalized weakness, ams Acuity: Acute Type of Exam: Initial FINDINGS: BRAIN/VENTRICLES: There is no acute intracranial hemorrhage, mass effect or midline shift. No abnormal extra-axial fluid collection.   The gray-white differentiation is maintained without evidence of an acute infarct. There is no evidence of hydrocephalus. Periventricular white matter low densities most likely due to chronic microvascular ischemia. Bilateral basal ganglia calcifications are present. ORBITS: The visualized portion of the orbits demonstrate no acute abnormality. SINUSES: The visualized paranasal sinuses and mastoid air cells demonstrate no acute abnormality. SOFT TISSUES/SKULL:  2 separate carter hole defects of the left-sided calvarium noted. No acute osseous findings. No acute intracranial abnormality. CT CHEST W CONTRAST    Result Date: 7/9/2021  EXAMINATION: CT OF THE CHEST WITH CONTRAST; CT OF THE ABDOMEN AND PELVIS WITH CONTRAST 7/9/2021 12:52 pm TECHNIQUE: CT of the chest was performed with the administration of intravenous contrast. Multiplanar reformatted images are provided for review. Dose modulation, iterative reconstruction, and/or weight based adjustment of the mA/kV was utilized to reduce the radiation dose to as low as reasonably achievable.; CT of the abdomen and pelvis was performed with the administration of intravenous contrast. Multiplanar reformatted images are provided for review. Dose modulation, iterative reconstruction, and/or weight based adjustment of the mA/kV was utilized to reduce the radiation dose to as low as reasonably achievable.  COMPARISON: 11/14/2017 HISTORY: ORDERING SYSTEM PROVIDED HISTORY: ?ptx on cxr, generalized weakness, fever TECHNOLOGIST PROVIDED HISTORY: Reason for exam:->?ptx on cxr, generalized weakness, fever Decision Support Exception - unselect if not a suspected or confirmed emergency medical condition->Emergency Medical Condition (MA) Reason for Exam: ?ptx on cxr, generalized weakness, fever Acuity: Acute Type of Exam: Initial; ORDERING SYSTEM PROVIDED HISTORY: fever, AMS, generalized weakness TECHNOLOGIST PROVIDED HISTORY: Reason for exam:->fever, AMS, generalized weakness Additional Contrast?->None Decision Support Exception - unselect if not a suspected or confirmed emergency medical condition->Emergency Medical Condition (MA) Reason for Exam: fever, AMS, generalized weakness Acuity: Acute Type of Exam: Initial Chest: Mediastinum: No acute abnormality of the aorta or other mediastinal structures. No pericardial effusion. Normal size lymph nodes, no evidence of adenopathy. Lungs/pleura: Small bilateral dependent pleural effusions with adjacent compressive atelectasis. No pneumothorax. Detailed lung evaluation limited due to motion. No consolidation Soft Tissues/Bones: No acute fracture. No acute findings of the chest wall. Extensive degenerative changes of the thoracic spine. No abscess of the soft tissues of the chest wall. Abdomen/Pelvis: Organs: No acute findings of the organs throughout the abdomen. No evidence of pancreatitis or pyelonephritis. No ureteral stone or hydronephrosis. Normal appearance of the gallbladder. No acute findings of the liver. No evidence of biliary dilation/obstruction. 1.9 cm cyst at the dome incidentally noted. Detailed evaluation of the organs limited due to motion. Cyst of the central right kidney measuring 4 cm. Several low-density lesions of the spleen measuring 1.4 cm and less most likely cysts or hemangiomata. GI/Bowel: No obstruction or other acute findings. No evidence diverticulitis or colitis. There is a large amount of colonic diverticulosis but no evidence of diverticulitis. Pelvis: No evidence of cystitis. Bladder appears normal. Peritoneum/Retroperitoneum: No ascites or free air. No abscess. Bones/Soft Tissues: No acute fracture of the abdomen and pelvis. No abscess. Chest: Small bilateral dependent pleural effusions mild adjacent atelectasis. No evidence of acute process otherwise. Abdomen/pelvis: No evidence of acute process.      CT ABDOMEN PELVIS W IV CONTRAST Additional Contrast? None    Result Date: 7/9/2021  EXAMINATION: CT OF THE CHEST WITH CONTRAST; CT OF THE ABDOMEN AND PELVIS WITH CONTRAST 7/9/2021 12:52 pm TECHNIQUE: CT of the chest was performed with the administration of intravenous contrast. Multiplanar reformatted images are provided for review. Dose modulation, iterative reconstruction, and/or weight based adjustment of the mA/kV was utilized to reduce the radiation dose to as low as reasonably achievable.; CT of the abdomen and pelvis was performed with the administration of intravenous contrast. Multiplanar reformatted images are provided for review. Dose modulation, iterative reconstruction, and/or weight based adjustment of the mA/kV was utilized to reduce the radiation dose to as low as reasonably achievable. COMPARISON: 11/14/2017 HISTORY: ORDERING SYSTEM PROVIDED HISTORY: ?ptx on cxr, generalized weakness, fever TECHNOLOGIST PROVIDED HISTORY: Reason for exam:->?ptx on cxr, generalized weakness, fever Decision Support Exception - unselect if not a suspected or confirmed emergency medical condition->Emergency Medical Condition (MA) Reason for Exam: ?ptx on cxr, generalized weakness, fever Acuity: Acute Type of Exam: Initial; ORDERING SYSTEM PROVIDED HISTORY: fever, AMS, generalized weakness TECHNOLOGIST PROVIDED HISTORY: Reason for exam:->fever, AMS, generalized weakness Additional Contrast?->None Decision Support Exception - unselect if not a suspected or confirmed emergency medical condition->Emergency Medical Condition (MA) Reason for Exam: fever, AMS, generalized weakness Acuity: Acute Type of Exam: Initial Chest: Mediastinum: No acute abnormality of the aorta or other mediastinal structures. No pericardial effusion. Normal size lymph nodes, no evidence of adenopathy. Lungs/pleura: Small bilateral dependent pleural effusions with adjacent compressive atelectasis. No pneumothorax. Detailed lung evaluation limited due to motion. No consolidation Soft Tissues/Bones: No acute fracture.   No acute findings of the chest wall. Extensive degenerative changes of the thoracic spine. No abscess of the soft tissues of the chest wall. Abdomen/Pelvis: Organs: No acute findings of the organs throughout the abdomen. No evidence of pancreatitis or pyelonephritis. No ureteral stone or hydronephrosis. Normal appearance of the gallbladder. No acute findings of the liver. No evidence of biliary dilation/obstruction. 1.9 cm cyst at the dome incidentally noted. Detailed evaluation of the organs limited due to motion. Cyst of the central right kidney measuring 4 cm. Several low-density lesions of the spleen measuring 1.4 cm and less most likely cysts or hemangiomata. GI/Bowel: No obstruction or other acute findings. No evidence diverticulitis or colitis. There is a large amount of colonic diverticulosis but no evidence of diverticulitis. Pelvis: No evidence of cystitis. Bladder appears normal. Peritoneum/Retroperitoneum: No ascites or free air. No abscess. Bones/Soft Tissues: No acute fracture of the abdomen and pelvis. No abscess. Chest: Small bilateral dependent pleural effusions mild adjacent atelectasis. No evidence of acute process otherwise. Abdomen/pelvis: No evidence of acute process. XR CHEST PORTABLE    Result Date: 7/9/2021  EXAMINATION: ONE XRAY VIEW OF THE CHEST 7/9/2021 11:48 am COMPARISON: None. HISTORY: ORDERING SYSTEM PROVIDED HISTORY: generalized weakness TECHNOLOGIST PROVIDED HISTORY: Reason for exam:->generalized weakness Reason for Exam: generalized weakness FINDINGS: The cardiac silhouette is at the upper limits of normal in size. There is a left-sided cardiac device as well as sequela of TAVR. There is sequela of old granulomatous disease. The lung volumes are low with mild pulmonary vascular congestion. There is a suspected skin fold along the right lateral chest.  Pneumothorax cannot entirely be excluded. Follow-up radiograph is recommended.  Degenerative changes are noted in the [facility] and glenohumeral joints. 1. Low lung volumes with pulmonary vascular congestion. 2. Suspected skin fold along the right lateral and upper chest. Underlying pneumothorax cannot entirely be excluded. Follow-up radiographs are recommended. Physical Exam:  Vitals: /61   Pulse 111   Temp 98.4 °F (36.9 °C) (Oral)   Resp 18   Ht 5' 1\" (1.549 m)   Wt 175 lb 0.7 oz (79.4 kg)   SpO2 97%   BMI 33.07 kg/m²   24 hour intake/output:No intake or output data in the 24 hours ending 07/16/21 2150  Last 3 weights: Wt Readings from Last 3 Encounters:   07/14/21 175 lb 0.7 oz (79.4 kg)   05/28/21 154 lb (69.9 kg)   10/13/20 158 lb (71.7 kg)     General: Elderly frail woman, rushing to the bathroom with assistance, not in acute distress  ENT: No nasal discharge, oral mucosa moist, hearing normal  Neck: Supple, trachea in midline, no thyromegaly, no JVD, no carotid bruits  Chest: Normal respiratory effort, symmetric expansion of the chest, no rales, rhonchi or wheezes; PM  in the precordial region; no signs of infection  CV: S1-S2 audible, tachycardiac, regular rate and rhythm, 2/6 systolic murmur rub noted; no gallop, pulses 2+ upper extremities  Abdomen: Bowel sounds present, abdomen soft, nontender nondistended  Musculoskeletal: bilateral lower extremities pitting edema 3+.  No cyanosis or clubbing.  No obvious deformity, range of motion grossly within normal limit  Neurology: AAOx3, CN 2-12 grossly intact, no focal deficits  Psychiatric: Normal mood, affect is congruent with age and the environment  Skin: Warm and dry, no rash in the exposed post area        DVT prophylaxis: [x] Lovenox                                 [] SCDs                                 [] SQ Heparin                                 [] Encourage ambulation           [] Already on Anticoagulation                 ASSESSMENT / PLAN :    Active Problems:    Essential hypertension    Cardiac pacemaker    Cerebrovascular accident (CVA) due to vascular stenosis (HCC)    S/P AVR (aortic valve replacement)    Weakness    Troponin I above reference range    Bacteremia due to Streptococcus  Resolved Problems:    * No resolved hospital problems. *    A/P  Janeth Walker is a 80 y. o.  female  who presents with generalized weakness     1) Rhabdomyolysis; mild  -CK elevated  -Hold statin , lasix   -CK trending down and wnl now, will discontinue NS    -Continue Chem7, CK trend - Creatinine remains stable      2) Generalized weakness  -Improved on exam  -PT/OT ordered     3) Elevated Troponin  -Not likely ACS, likely 2/2 #1  -Will trend  -Echo as below , EF preserved 50-55%. Diastolic dysfunction grade III;   -EKG reviewed, V Paced rhythm       Echocardiogram 7/12/2021       Left ventricular systolic function is normal.   Ejection fraction is visually estimated at 50-55%.   Grade III diastolic dysfunction.   Severe biatrial enlargement.   PPM wiring visualized within the right heart.   History of TAVR (#29 Watson Nancy 3) with moderate perivalvular leak.   Slight prolapse of the anterior mitral valve leaflet with moderate mitral   valve regurgitation.   Severe tricuspid regurgitation; RVSP: 69 mmHg.   Mild to moderate pulmonic regurgitation present.   No evidence of any pericardial effusion.   Right pleural effusion.    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 CT negative for evidence of infection; VIKTORIA:  Pacemaker wire is free from vegetation.             - ID on board- Abx changed to Vancomycin and Ceftriaxone.  Continue      - repeat blood cultures on 7/13,15,16 no growth; follow CRP, ESR, procalcitonin             -TTE  completed,no evidence of vegitation             -Underwent VIKTORIA 7/13/2021 without significant evidence of vegetations, per EP discussion documented w/ family, patient will likely stay on suppressive abx without aggressive intervention of pacemaker removal   -Repeat blood cultures sent per ID recs, last blood cxs from 7/9/2021 positive               Echocardiogram VIKTORIA 7/13/2021:    Summary  Pacemaker wire is free from vegetation. S/p TAVR: #29 Watson Nancy 3  Moderate to severe mitral regurgitation is present. There was no thrombus noted in the left atrial appendage. Negative bubble study.  No PFO or ASD noted.        5) LE edema  -likely from CHF (diastolic dysfunction grade III)  -was on lasix, and has been holding due to rhabdomyolysis  -re order venous US to rule out DVT per patient's POA, her daughter's request  - NS ivf was discontinued      Other chronic medical condition; medication resumed unless contraindicated      Essential HTN  HLD  S/P PPM   S/P AVT    Electronically signed by Maritza Aguilar MD on 7/16/2021 at 9:50 PM    [address] [place] [address]

## 2021-07-18 LAB
ALBUMIN SERPL-MCNC: 3.2 GM/DL (ref 3.4–5)
ALP BLD-CCNC: 115 IU/L (ref 40–128)
ALT SERPL-CCNC: 33 U/L (ref 10–40)
ANION GAP SERPL CALCULATED.3IONS-SCNC: 9 MMOL/L (ref 4–16)
AST SERPL-CCNC: 27 IU/L (ref 15–37)
BACTERIA: ABNORMAL /HPF
BASOPHILS ABSOLUTE: 0.1 K/CU MM
BASOPHILS RELATIVE PERCENT: 0.4 % (ref 0–1)
BILIRUB SERPL-MCNC: 0.4 MG/DL (ref 0–1)
BILIRUBIN URINE: NEGATIVE MG/DL
BLOOD, URINE: ABNORMAL
BUN BLDV-MCNC: 20 MG/DL (ref 6–23)
CALCIUM SERPL-MCNC: 8.6 MG/DL (ref 8.3–10.6)
CHLORIDE BLD-SCNC: 109 MMOL/L (ref 99–110)
CLARITY: CLEAR
CO2: 23 MMOL/L (ref 21–32)
COLOR: YELLOW
CREAT SERPL-MCNC: 0.6 MG/DL (ref 0.6–1.1)
CULTURE: NORMAL
CULTURE: NORMAL
DIFFERENTIAL TYPE: ABNORMAL
EOSINOPHILS ABSOLUTE: 0.2 K/CU MM
EOSINOPHILS RELATIVE PERCENT: 2.1 % (ref 0–3)
GFR AFRICAN AMERICAN: >60 ML/MIN/1.73M2
GFR NON-AFRICAN AMERICAN: >60 ML/MIN/1.73M2
GLUCOSE BLD-MCNC: 103 MG/DL (ref 70–99)
GLUCOSE, URINE: NEGATIVE MG/DL
HCT VFR BLD CALC: 29.9 % (ref 37–47)
HEMOGLOBIN: 9.5 GM/DL (ref 12.5–16)
IMMATURE NEUTROPHIL %: 1.6 % (ref 0–0.43)
KETONES, URINE: NEGATIVE MG/DL
LEUKOCYTE ESTERASE, URINE: ABNORMAL
LYMPHOCYTES ABSOLUTE: 1.4 K/CU MM
LYMPHOCYTES RELATIVE PERCENT: 12.3 % (ref 24–44)
Lab: NORMAL
Lab: NORMAL
MCH RBC QN AUTO: 30.7 PG (ref 27–31)
MCHC RBC AUTO-ENTMCNC: 31.8 % (ref 32–36)
MCV RBC AUTO: 96.8 FL (ref 78–100)
MONOCYTES ABSOLUTE: 1.2 K/CU MM
MONOCYTES RELATIVE PERCENT: 10.7 % (ref 0–4)
MUCUS: ABNORMAL HPF
NITRITE URINE, QUANTITATIVE: NEGATIVE
NUCLEATED RBC %: 0 %
PDW BLD-RTO: 16 % (ref 11.7–14.9)
PH, URINE: 5 (ref 5–8)
PLATELET # BLD: 291 K/CU MM (ref 140–440)
PMV BLD AUTO: 9.4 FL (ref 7.5–11.1)
POTASSIUM SERPL-SCNC: 4.2 MMOL/L (ref 3.5–5.1)
PROTEIN UA: NEGATIVE MG/DL
RBC # BLD: 3.09 M/CU MM (ref 4.2–5.4)
RBC URINE: 3 /HPF (ref 0–6)
SEGMENTED NEUTROPHILS ABSOLUTE COUNT: 8.4 K/CU MM
SEGMENTED NEUTROPHILS RELATIVE PERCENT: 72.9 % (ref 36–66)
SODIUM BLD-SCNC: 141 MMOL/L (ref 135–145)
SPECIFIC GRAVITY UA: 1.02 (ref 1–1.03)
SPECIMEN: NORMAL
SPECIMEN: NORMAL
SQUAMOUS EPITHELIAL: 4 /HPF
TOTAL CK: 39 IU/L (ref 26–140)
TOTAL IMMATURE NEUTOROPHIL: 0.18 K/CU MM
TOTAL NUCLEATED RBC: 0 K/CU MM
TOTAL PROTEIN: 5.4 GM/DL (ref 6.4–8.2)
TRICHOMONAS: ABNORMAL /HPF
UROBILINOGEN, URINE: NEGATIVE MG/DL (ref 0.2–1)
WBC # BLD: 11.5 K/CU MM (ref 4–10.5)
WBC UA: 9 /HPF (ref 0–5)
YEAST: ABNORMAL /HPF

## 2021-07-18 PROCEDURE — 36415 COLL VENOUS BLD VENIPUNCTURE: CPT

## 2021-07-18 PROCEDURE — 80053 COMPREHEN METABOLIC PANEL: CPT

## 2021-07-18 PROCEDURE — 6360000002 HC RX W HCPCS: Performed by: INTERNAL MEDICINE

## 2021-07-18 PROCEDURE — 81001 URINALYSIS AUTO W/SCOPE: CPT

## 2021-07-18 PROCEDURE — 2580000003 HC RX 258: Performed by: INTERNAL MEDICINE

## 2021-07-18 PROCEDURE — 80048 BASIC METABOLIC PNL TOTAL CA: CPT

## 2021-07-18 PROCEDURE — 6370000000 HC RX 637 (ALT 250 FOR IP): Performed by: NURSE PRACTITIONER

## 2021-07-18 PROCEDURE — 1200000000 HC SEMI PRIVATE

## 2021-07-18 PROCEDURE — 82550 ASSAY OF CK (CPK): CPT

## 2021-07-18 PROCEDURE — 6370000000 HC RX 637 (ALT 250 FOR IP): Performed by: INTERNAL MEDICINE

## 2021-07-18 PROCEDURE — 94761 N-INVAS EAR/PLS OXIMETRY MLT: CPT

## 2021-07-18 PROCEDURE — 85025 COMPLETE CBC W/AUTO DIFF WBC: CPT

## 2021-07-18 RX ADMIN — ASPIRIN 81 MG: 81 TABLET, CHEWABLE ORAL at 09:26

## 2021-07-18 RX ADMIN — SODIUM CHLORIDE, PRESERVATIVE FREE 10 ML: 5 INJECTION INTRAVENOUS at 09:27

## 2021-07-18 RX ADMIN — PANTOPRAZOLE SODIUM 40 MG: 40 TABLET, DELAYED RELEASE ORAL at 05:16

## 2021-07-18 RX ADMIN — FLUTICASONE PROPIONATE 1 SPRAY: 50 SPRAY, METERED NASAL at 09:27

## 2021-07-18 RX ADMIN — Medication 1 TABLET: at 09:26

## 2021-07-18 RX ADMIN — ENOXAPARIN SODIUM 40 MG: 40 INJECTION SUBCUTANEOUS at 09:26

## 2021-07-18 RX ADMIN — ACETAMINOPHEN 650 MG: 325 TABLET ORAL at 09:27

## 2021-07-18 RX ADMIN — SODIUM CHLORIDE, PRESERVATIVE FREE 10 ML: 5 INJECTION INTRAVENOUS at 20:05

## 2021-07-18 RX ADMIN — CEFTRIAXONE SODIUM 2000 MG: 2 INJECTION, POWDER, FOR SOLUTION INTRAMUSCULAR; INTRAVENOUS at 09:26

## 2021-07-18 RX ADMIN — METOPROLOL SUCCINATE 25 MG: 25 TABLET, EXTENDED RELEASE ORAL at 09:26

## 2021-07-18 ASSESSMENT — PAIN DESCRIPTION - LOCATION: LOCATION: GENERALIZED

## 2021-07-18 ASSESSMENT — PAIN DESCRIPTION - DESCRIPTORS: DESCRIPTORS: DISCOMFORT

## 2021-07-18 ASSESSMENT — PAIN SCALES - GENERAL
PAINLEVEL_OUTOF10: 0
PAINLEVEL_OUTOF10: 3
PAINLEVEL_OUTOF10: 0

## 2021-07-18 ASSESSMENT — PAIN - FUNCTIONAL ASSESSMENT: PAIN_FUNCTIONAL_ASSESSMENT: ACTIVITIES ARE NOT PREVENTED

## 2021-07-18 ASSESSMENT — PAIN DESCRIPTION - FREQUENCY: FREQUENCY: INTERMITTENT

## 2021-07-18 ASSESSMENT — PAIN DESCRIPTION - ONSET: ONSET: PROGRESSIVE

## 2021-07-18 ASSESSMENT — PAIN DESCRIPTION - PROGRESSION: CLINICAL_PROGRESSION: GRADUALLY WORSENING

## 2021-07-18 NOTE — PROGRESS NOTES
ATTENDING PHYSICIAN'S PROGRESS NOTES    Patient:  Dianne Maloney      Unit/Bed:2018/2018-A    YOB: 1929    MRN: [id number]     Acct: [de-identified]     Admit date: 7/9/2021    Patient Seen, Chart, Consults notes, Labs, Radiology studies reviewed. SUBJECTIVE:   Day 7 of stay     Francesco Velasquez a 80 y. o.  female  who presents with generalized weakness. Patient has past medical history of hypertension, hyperlipidemia, CHF, s/p LOPEZ for aortic stenosis, status post permanent pacemaker implantation.       Patient was admitted for fever, generalized weakness and rhabdomyolysis.  2 out of 2 sets of blood cultures 7/9/2021 grew Streptococcus Sanguis. Will need to rule out endocarditis-prosthetic valve versus cardiovascular implantable electronic device infection ( [facility]) per ID. VIKTORIA negative for intracardiac infection. Patient is now on iv Rocephin per ID awaiting ID for discharge antibiotics regimen.      Interval history:   Patient seen and examined today. Patient still reports diarrhea for a couple of times. Denies abdominal pain , nausea or vomiting She is afebrile. All other ROS negative except noted in HPI    Past, Family, Social History unchanged from admission. Diet:  ADULT DIET;  Regular    Medications:  Scheduled Meds:   cefTRIAXone (ROCEPHIN) IV  2,000 mg Intravenous Q24H    pantoprazole  40 mg Oral QAM AC    aspirin  81 mg Oral Daily    fluticasone  1 spray Each Nostril Daily    metoprolol succinate  25 mg Oral Daily    therapeutic multivitamin-minerals  1 tablet Oral Daily    sodium chloride flush  5-40 mL Intravenous 2 times per day    enoxaparin  40 mg Subcutaneous Daily     Continuous Infusions:   sodium chloride       PRN Meds:sodium chloride flush, sodium chloride flush, sodium chloride, ondansetron **OR** ondansetron, polyethylene glycol, acetaminophen **OR** acetaminophen    OBJECTIVE:    CBC:   Recent Labs     07/16/21  0341 07/17/21  0953 07/18/21  0702   WBC 13.5* 9.9 11.5*   HGB 9.3* 9.7* 9.5*    251 291     BMP:    Recent Labs     07/16/21  0341 07/17/21  0953 07/18/21  0702    137 141   K 3.9 3.4* 4.2    105 109   CO2 24 23 23   BUN 20 22 20   CREATININE 0.7 0.7 0.6   GLUCOSE 87 115* 103*     Calcium:  Recent Labs     07/18/21  0702   CALCIUM 8.6     Ionized Calcium:No results for input(s): IONCA in the last 72 hours. Magnesium:No results for input(s): MG in the last 72 hours. Phosphorus:No results for input(s): PHOS in the last 72 hours. BNP:No results for input(s): BNP in the last 72 hours. Glucose:No results for input(s): POCGLU in the last 72 hours. HgbA1C: No results for input(s): LABA1C in the last 72 hours. INR: No results for input(s): INR in the last 72 hours. Hepatic:   Recent Labs     07/18/21  0702   ALKPHOS 115   ALT 33   AST 27   PROT 5.4*   BILITOT 0.4   LABALBU 3.2*     Amylase and Lipase:No results for input(s): LACTA, AMYLASE in the last 72 hours. Lactic Acid: No results for input(s): LACTA in the last 72 hours. Troponin:   Recent Labs     07/16/21  0341 07/17/21  0953 07/18/21  0702   CKTOTAL 71 44 39   TROPONINT 0.012*  --   --      BNP: No results for input(s): BNP in the last 72 hours. Lipids: No results for input(s): CHOL, TRIG, HDL, LDLCALC in the last 72 hours. Invalid input(s): LDL  ABGs: No results found for: PH, PCO2, PO2, HCO3, O2SAT    Radiology reports as per the Radiologist  Radiology: CT HEAD WO CONTRAST    Result Date: 7/9/2021  EXAMINATION: CT OF THE HEAD WITHOUT CONTRAST  7/9/2021 12:51 pm TECHNIQUE: CT of the head was performed without the administration of intravenous contrast. Dose modulation, iterative reconstruction, and/or weight based adjustment of the mA/kV was utilized to reduce the radiation dose to as low as reasonably achievable. COMPARISON: None.  HISTORY: ORDERING SYSTEM PROVIDED HISTORY: generalized weakness, ams TECHNOLOGIST PROVIDED HISTORY: Reason for exam:->generalized weakness, ams Has a \"code stroke\" or \"stroke alert\" been called? ->No Decision Support Exception - unselect if not a suspected or confirmed emergency medical condition->Emergency Medical Condition (MA) Reason for Exam: generalized weakness, ams Acuity: Acute Type of Exam: Initial FINDINGS: BRAIN/VENTRICLES: There is no acute intracranial hemorrhage, mass effect or midline shift. No abnormal extra-axial fluid collection. The gray-white differentiation is maintained without evidence of an acute infarct. There is no evidence of hydrocephalus. Periventricular white matter low densities most likely due to chronic microvascular ischemia. Bilateral basal ganglia calcifications are present. ORBITS: The visualized portion of the orbits demonstrate no acute abnormality. SINUSES: The visualized paranasal sinuses and mastoid air cells demonstrate no acute abnormality. SOFT TISSUES/SKULL:  2 separate carter hole defects of the left-sided calvarium noted. No acute osseous findings. No acute intracranial abnormality. CT CHEST W CONTRAST    Result Date: 7/9/2021  EXAMINATION: CT OF THE CHEST WITH CONTRAST; CT OF THE ABDOMEN AND PELVIS WITH CONTRAST 7/9/2021 12:52 pm TECHNIQUE: CT of the chest was performed with the administration of intravenous contrast. Multiplanar reformatted images are provided for review. Dose modulation, iterative reconstruction, and/or weight based adjustment of the mA/kV was utilized to reduce the radiation dose to as low as reasonably achievable.; CT of the abdomen and pelvis was performed with the administration of intravenous contrast. Multiplanar reformatted images are provided for review. Dose modulation, iterative reconstruction, and/or weight based adjustment of the mA/kV was utilized to reduce the radiation dose to as low as reasonably achievable.  COMPARISON: 11/14/2017 HISTORY: ORDERING SYSTEM PROVIDED HISTORY: ?ptx on cxr, generalized weakness, fever TECHNOLOGIST No evidence of cystitis. Bladder appears normal. Peritoneum/Retroperitoneum: No ascites or free air. No abscess. Bones/Soft Tissues: No acute fracture of the abdomen and pelvis. No abscess. Chest: Small bilateral dependent pleural effusions mild adjacent atelectasis. No evidence of acute process otherwise. Abdomen/pelvis: No evidence of acute process. CT ABDOMEN PELVIS W IV CONTRAST Additional Contrast? None    Result Date: 7/9/2021  EXAMINATION: CT OF THE CHEST WITH CONTRAST; CT OF THE ABDOMEN AND PELVIS WITH CONTRAST 7/9/2021 12:52 pm TECHNIQUE: CT of the chest was performed with the administration of intravenous contrast. Multiplanar reformatted images are provided for review. Dose modulation, iterative reconstruction, and/or weight based adjustment of the mA/kV was utilized to reduce the radiation dose to as low as reasonably achievable.; CT of the abdomen and pelvis was performed with the administration of intravenous contrast. Multiplanar reformatted images are provided for review. Dose modulation, iterative reconstruction, and/or weight based adjustment of the mA/kV was utilized to reduce the radiation dose to as low as reasonably achievable.  COMPARISON: 11/14/2017 HISTORY: ORDERING SYSTEM PROVIDED HISTORY: ?ptx on cxr, generalized weakness, fever TECHNOLOGIST PROVIDED HISTORY: Reason for exam:->?ptx on cxr, generalized weakness, fever Decision Support Exception - unselect if not a suspected or confirmed emergency medical condition->Emergency Medical Condition (MA) Reason for Exam: ?ptx on cxr, generalized weakness, fever Acuity: Acute Type of Exam: Initial; ORDERING SYSTEM PROVIDED HISTORY: fever, AMS, generalized weakness TECHNOLOGIST PROVIDED HISTORY: Reason for exam:->fever, AMS, generalized weakness Additional Contrast?->None Decision Support Exception - unselect if not a suspected or confirmed emergency medical condition->Emergency Medical Condition (MA) Reason for Exam: fever, AMS, generalized weakness Acuity: Acute Type of Exam: Initial Chest: Mediastinum: No acute abnormality of the aorta or other mediastinal structures. No pericardial effusion. Normal size lymph nodes, no evidence of adenopathy. Lungs/pleura: Small bilateral dependent pleural effusions with adjacent compressive atelectasis. No pneumothorax. Detailed lung evaluation limited due to motion. No consolidation Soft Tissues/Bones: No acute fracture. No acute findings of the chest wall. Extensive degenerative changes of the thoracic spine. No abscess of the soft tissues of the chest wall. Abdomen/Pelvis: Organs: No acute findings of the organs throughout the abdomen. No evidence of pancreatitis or pyelonephritis. No ureteral stone or hydronephrosis. Normal appearance of the gallbladder. No acute findings of the liver. No evidence of biliary dilation/obstruction. 1.9 cm cyst at the dome incidentally noted. Detailed evaluation of the organs limited due to motion. Cyst of the central right kidney measuring 4 cm. Several low-density lesions of the spleen measuring 1.4 cm and less most likely cysts or hemangiomata. GI/Bowel: No obstruction or other acute findings. No evidence diverticulitis or colitis. There is a large amount of colonic diverticulosis but no evidence of diverticulitis. Pelvis: No evidence of cystitis. Bladder appears normal. Peritoneum/Retroperitoneum: No ascites or free air. No abscess. Bones/Soft Tissues: No acute fracture of the abdomen and pelvis. No abscess. Chest: Small bilateral dependent pleural effusions mild adjacent atelectasis. No evidence of acute process otherwise. Abdomen/pelvis: No evidence of acute process. XR CHEST PORTABLE    Result Date: 7/9/2021  EXAMINATION: ONE XRAY VIEW OF THE CHEST 7/9/2021 11:48 am COMPARISON: None.  HISTORY: ORDERING SYSTEM PROVIDED HISTORY: generalized weakness TECHNOLOGIST PROVIDED HISTORY: Reason for exam:->generalized weakness Reason for Exam: generalized weakness FINDINGS: The cardiac silhouette is at the upper limits of normal in size. There is a left-sided cardiac device as well as sequela of TAVR. There is sequela of old granulomatous disease. The lung volumes are low with mild pulmonary vascular congestion. There is a suspected skin fold along the right lateral chest.  Pneumothorax cannot entirely be excluded. Follow-up radiograph is recommended. Degenerative changes are noted in the [facility] and glenohumeral joints. 1. Low lung volumes with pulmonary vascular congestion. 2. Suspected skin fold along the right lateral and upper chest. Underlying pneumothorax cannot entirely be excluded. Follow-up radiographs are recommended. Physical Exam:  Vitals: BP (!) 111/53   Pulse 96   Temp 98.1 °F (36.7 °C) (Oral)   Resp 16   Ht 5' 1\" (1.549 m)   Wt 175 lb 0.7 oz (79.4 kg)   SpO2 97%   BMI 33.07 kg/m²   24 hour intake/output:No intake or output data in the 24 hours ending 07/18/21 1921  Last 3 weights: Wt Readings from Last 3 Encounters:   07/14/21 175 lb 0.7 oz (79.4 kg)   05/28/21 154 lb (69.9 kg)   10/13/20 158 lb (71.7 kg)     General: Elderly frail woman, rushing to the bathroom with assistance, not in acute distress  ENT: No nasal discharge, oral mucosa moist, hearing normal  Neck: Supple, trachea in midline, no thyromegaly, no JVD, no carotid bruits  Chest: Normal respiratory effort, symmetric expansion of the chest, no rales, rhonchi or wheezes; PM  in the precordial region; no signs of infection  CV: S1-S2 audible, tachycardiac, regular rate and rhythm, 2/6 systolic murmur rub noted; no gallop, pulses 2+ upper extremities  Abdomen:  Bowel sounds present, abdomen soft, nontender nondistended  Musculoskeletal: bilateral lower extremities pitting edema 1-2+. No cyanosis or clubbing.  No obvious deformity, range of motion grossly within normal limit  Neurology: AAOx3, CN 2-12 grossly intact, no focal deficits  Psychiatric: Normal mood, affect is congruent with age and the environment  Skin: Warm and dry, no rash in the exposed post area       DVT prophylaxis: [x] Lovenox                                 [] SCDs                                 [] SQ Heparin                                 [] Encourage ambulation           [] Already on Anticoagulation                 ASSESSMENT / PLAN :    Active Problems:    Essential hypertension    Cardiac pacemaker    Cerebrovascular accident (CVA) due to vascular stenosis ([address]    S/P AVR (aortic valve replacement)    Weakness    Troponin I above reference range    Bacteremia due to Streptococcus  Resolved Problems:    * No resolved hospital problems. *    A/P  Onur Oconnor [employer] is a 80 y. o.  female  who presents with generalized weakness     1) Rhabdomyolysis; mild  resolved  -CK elevated  -Hold statin , lasix   -CK trending down and wnl now, will discontinue NS    -Continue Chem7 - Creatinine remains stable      2) Generalized weakness  -Improved on exam  -PT/OT ordered     3) Elevated Troponin  -Not likely ACS, likely 2/2 #1  -tropI/EKG serial negative for ACS  -Echo as below , EF preserved 50-55%. Diastolic dysfunction grade III;   -EKG reviewed, V Paced rhythm       Echocardiogram 7/12/2021           Left ventricular systolic function is normal.           Ejection fraction is visually estimated at 50-55%.          Grade III diastolic dysfunction.          Severe biatrial enlargement.          PPM wiring visualized within the right heart.          History of TAVR (#29 [address] [place] 3) with moderate perivalvular leak.          Slight prolapse of the anterior mitral valve leaflet with moderate mitral           valve regurgitation.          Severe tricuspid regurgitation; RVSP: 69 mmHg.           Mild to moderate pulmonic regurgitation present.           No evidence of any pericardial effusion.           Right pleural effusion.      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 CT negative for evidence of infection; VIKTORIA:  Pacemaker wire is free from vegetation.             - ID on board- Abx changed to Ceftriaxone. Vanco/cefepime were discontinued now. - repeat blood cultures on 7/13,15,16 no growth so far; follow CRP, ESR, procalcitonin  -TTE  completed,no evidence of vegitation 7/13/2021    No  significant evidence of vegetations, per EP discussion documented w/ family, patient will likely stay on suppressive abx without aggressive intervention of pacemaker removal   -Repeat blood cultures sent per ID recs, last blood cxs from 7/9/2021 positive               Echocardiogram VIKTORIA 7/13/2021:    Summary  Pacemaker wire is free from vegetation. S/p TAVR: #29 [address] [place] 3  Moderate to severe mitral regurgitation is present. There was no thrombus noted in the left atrial appendage. Negative bubble study. No PFO or ASD noted.        5) LE edema  -likely from CHF (diastolic dysfunction grade III)  -was on lasix, and has been holding due to rhabdomyolysis  - venous US  has ruled out DVT of the lower extremities  - NS ivf was discontinued      6) sepsis  resolved  Leukocytosis and tachycardia  -blood cultures negative so far, CRP elevated 84.5< 49.5 ( > 3 high risk), pro calcitonin wnl, lactic acid 1.2 wnl; no hypotension  -on iv antibiotics now  -Chest xray 7/17/ showing mild bibasilar airspace disease, greater on the right. This is most likely reflects presence of atelectasis, but edema and pneumonia remain in the differential. and urinalysis suggests UTI, will follow culture rests. -close monitor signs of infection     Other chronic medical condition; medication resumed unless contraindicated      - Essential HTN  - HLD  - S/P PPM   - S/P AVT     Disposition: patient and family likely would prefer home.  Will follow .       Electronically signed by Rome Guevara MD on 7/18/2021 at 7:21 PM    Rounding Hospitalist

## 2021-07-18 NOTE — PROGRESS NOTES
ATTENDING PHYSICIAN'S PROGRESS NOTES    Patient:  Laura Duke      Unit/Bed:2018/2018-A    YOB: 1929    MRN: [id number]     Acct: [de-identified]     Admit date: 7/9/2021    Patient Seen, Chart, Consults notes, Labs, Radiology studies reviewed. SUBJECTIVE:   Day 6 of stay     Garcia Jimenes a 80 y. o.  female  who presents with generalized weakness. Patient has past medical history of hypertension, hyperlipidemia, CHF, s/p LOPEZ for aortic stenosis, status post permanent pacemaker implantation.       Patient was admitted for fever, generalized weakness and rhabdomyolysis.  2 out of 2 sets of blood cultures 7/9/2021 grew Streptococcus Sanguis. Will need to rule out endocarditis-prosthetic valve versus cardiovascular implantable electronic device infection ( [facility]) per ID. VIKTORIA negative for intracardiac infection. Patient is now on iv Rocephin per ID awaiting ID for discharge antibiotics regimen.      Interval history:   Patient seen and examined today. She is stable. She complains of diarrhea , 2 loose bowel movements today. RN reports patient had very small amount of stool today. Patient denies abdominal pain, nausea or vomiting. No fever or chills. Bilateral lower extremities venous doppler has ruled out DVT. All other ROS negative except noted in HPI    Past, Family, Social History unchanged from admission. Diet:  ADULT DIET;  Regular    Medications:  Scheduled Meds:   cefTRIAXone (ROCEPHIN) IV  2,000 mg Intravenous Q24H    pantoprazole  40 mg Oral QAM AC    aspirin  81 mg Oral Daily    fluticasone  1 spray Each Nostril Daily    metoprolol succinate  25 mg Oral Daily    therapeutic multivitamin-minerals  1 tablet Oral Daily    sodium chloride flush  5-40 mL Intravenous 2 times per day    enoxaparin  40 mg Subcutaneous Daily     Continuous Infusions:   sodium chloride       PRN Meds:sodium chloride flush, sodium chloride flush, sodium chloride, ondansetron **OR** ondansetron, polyethylene glycol, acetaminophen **OR** acetaminophen    OBJECTIVE:    CBC:   Recent Labs     07/15/21  0202 07/16/21  0341 07/17/21  0953   WBC 11.2* 13.5* 9.9   HGB 9.6* 9.3* 9.7*    226 251     BMP:    Recent Labs     07/15/21  0202 07/16/21  0341 07/17/21  0953    140 137   K 4.5 3.9 3.4*    107 105   CO2 23 24 23   BUN 27* 20 22   CREATININE 0.9 0.7 0.7   GLUCOSE 102* 87 115*     Calcium:  Recent Labs     07/17/21  0953   CALCIUM 8.6     Hepatic:   Recent Labs     07/17/21  0953   ALKPHOS 105   ALT 35   AST 30   PROT 5.3*   BILITOT 0.2   LABALBU 3.1*     Troponin:   Recent Labs     07/15/21  0202 07/16/21  0341 07/17/21  0953   CKTOTAL 119 71 44   TROPONINT  --  0.012*  --      Radiology reports as per the Radiologist  Radiology: CT HEAD WO CONTRAST    Result Date: 7/9/2021  EXAMINATION: CT OF THE HEAD WITHOUT CONTRAST  7/9/2021 12:51 pm TECHNIQUE: CT of the head was performed without the administration of intravenous contrast. Dose modulation, iterative reconstruction, and/or weight based adjustment of the mA/kV was utilized to reduce the radiation dose to as low as reasonably achievable. COMPARISON: None. HISTORY: ORDERING SYSTEM PROVIDED HISTORY: generalized weakness, ams TECHNOLOGIST PROVIDED HISTORY: Reason for exam:->generalized weakness, ams Has a \"code stroke\" or \"stroke alert\" been called? ->No Decision Support Exception - unselect if not a suspected or confirmed emergency medical condition->Emergency Medical Condition (MA) Reason for Exam: generalized weakness, ams Acuity: Acute Type of Exam: Initial FINDINGS: BRAIN/VENTRICLES: There is no acute intracranial hemorrhage, mass effect or midline shift. No abnormal extra-axial fluid collection. The gray-white differentiation is maintained without evidence of an acute infarct. There is no evidence of hydrocephalus. Periventricular white matter low densities most likely due to chronic microvascular ischemia. Bilateral basal ganglia calcifications are present. ORBITS: The visualized portion of the orbits demonstrate no acute abnormality. SINUSES: The visualized paranasal sinuses and mastoid air cells demonstrate no acute abnormality. SOFT TISSUES/SKULL:  2 separate carter hole defects of the left-sided calvarium noted. No acute osseous findings. No acute intracranial abnormality. CT CHEST W CONTRAST    Result Date: 7/9/2021  EXAMINATION: CT OF THE CHEST WITH CONTRAST; CT OF THE ABDOMEN AND PELVIS WITH CONTRAST 7/9/2021 12:52 pm TECHNIQUE: CT of the chest was performed with the administration of intravenous contrast. Multiplanar reformatted images are provided for review. Dose modulation, iterative reconstruction, and/or weight based adjustment of the mA/kV was utilized to reduce the radiation dose to as low as reasonably achievable.; CT of the abdomen and pelvis was performed with the administration of intravenous contrast. Multiplanar reformatted images are provided for review. Dose modulation, iterative reconstruction, and/or weight based adjustment of the mA/kV was utilized to reduce the radiation dose to as low as reasonably achievable.  COMPARISON: 11/14/2017 HISTORY: ORDERING SYSTEM PROVIDED HISTORY: ?ptx on cxr, generalized weakness, fever TECHNOLOGIST PROVIDED HISTORY: Reason for exam:->?ptx on cxr, generalized weakness, fever Decision Support Exception - unselect if not a suspected or confirmed emergency medical condition->Emergency Medical Condition (MA) Reason for Exam: ?ptx on cxr, generalized weakness, fever Acuity: Acute Type of Exam: Initial; ORDERING SYSTEM PROVIDED HISTORY: fever, AMS, generalized weakness TECHNOLOGIST PROVIDED HISTORY: Reason for exam:->fever, AMS, generalized weakness Additional Contrast?->None Decision Support Exception - unselect if not a suspected or confirmed emergency medical condition->Emergency Medical Condition (MA) Reason for Exam: fever, AMS, generalized weakness Acuity: Acute Type of Exam: Initial Chest: Mediastinum: No acute abnormality of the aorta or other mediastinal structures. No pericardial effusion. Normal size lymph nodes, no evidence of adenopathy. Lungs/pleura: Small bilateral dependent pleural effusions with adjacent compressive atelectasis. No pneumothorax. Detailed lung evaluation limited due to motion. No consolidation Soft Tissues/Bones: No acute fracture. No acute findings of the chest wall. Extensive degenerative changes of the thoracic spine. No abscess of the soft tissues of the chest wall. Abdomen/Pelvis: Organs: No acute findings of the organs throughout the abdomen. No evidence of pancreatitis or pyelonephritis. No ureteral stone or hydronephrosis. Normal appearance of the gallbladder. No acute findings of the liver. No evidence of biliary dilation/obstruction. 1.9 cm cyst at the dome incidentally noted. Detailed evaluation of the organs limited due to motion. Cyst of the central right kidney measuring 4 cm. Several low-density lesions of the spleen measuring 1.4 cm and less most likely cysts or hemangiomata. GI/Bowel: No obstruction or other acute findings. No evidence diverticulitis or colitis. There is a large amount of colonic diverticulosis but no evidence of diverticulitis. Pelvis: No evidence of cystitis. Bladder appears normal. Peritoneum/Retroperitoneum: No ascites or free air. No abscess. Bones/Soft Tissues: No acute fracture of the abdomen and pelvis. No abscess. Chest: Small bilateral dependent pleural effusions mild adjacent atelectasis. No evidence of acute process otherwise. Abdomen/pelvis: No evidence of acute process.      CT ABDOMEN PELVIS W IV CONTRAST Additional Contrast? None    Result Date: 7/9/2021  EXAMINATION: CT OF THE CHEST WITH CONTRAST; CT OF THE ABDOMEN AND PELVIS WITH CONTRAST 7/9/2021 12:52 pm TECHNIQUE: CT of the chest was performed with the administration of intravenous contrast. Multiplanar reformatted images are provided for review. Dose modulation, iterative reconstruction, and/or weight based adjustment of the mA/kV was utilized to reduce the radiation dose to as low as reasonably achievable.; CT of the abdomen and pelvis was performed with the administration of intravenous contrast. Multiplanar reformatted images are provided for review. Dose modulation, iterative reconstruction, and/or weight based adjustment of the mA/kV was utilized to reduce the radiation dose to as low as reasonably achievable. COMPARISON: 11/14/2017 HISTORY: ORDERING SYSTEM PROVIDED HISTORY: ?ptx on cxr, generalized weakness, fever TECHNOLOGIST PROVIDED HISTORY: Reason for exam:->?ptx on cxr, generalized weakness, fever Decision Support Exception - unselect if not a suspected or confirmed emergency medical condition->Emergency Medical Condition (MA) Reason for Exam: ?ptx on cxr, generalized weakness, fever Acuity: Acute Type of Exam: Initial; ORDERING SYSTEM PROVIDED HISTORY: fever, AMS, generalized weakness TECHNOLOGIST PROVIDED HISTORY: Reason for exam:->fever, AMS, generalized weakness Additional Contrast?->None Decision Support Exception - unselect if not a suspected or confirmed emergency medical condition->Emergency Medical Condition (MA) Reason for Exam: fever, AMS, generalized weakness Acuity: Acute Type of Exam: Initial Chest: Mediastinum: No acute abnormality of the aorta or other mediastinal structures. No pericardial effusion. Normal size lymph nodes, no evidence of adenopathy. Lungs/pleura: Small bilateral dependent pleural effusions with adjacent compressive atelectasis. No pneumothorax. Detailed lung evaluation limited due to motion. No consolidation Soft Tissues/Bones: No acute fracture. No acute findings of the chest wall. Extensive degenerative changes of the thoracic spine. No abscess of the soft tissues of the chest wall. Abdomen/Pelvis: Organs: No acute findings of the organs throughout the abdomen. No evidence of pancreatitis or pyelonephritis. No ureteral stone or hydronephrosis. Normal appearance of the gallbladder. No acute findings of the liver. No evidence of biliary dilation/obstruction. 1.9 cm cyst at the dome incidentally noted. Detailed evaluation of the organs limited due to motion. Cyst of the central right kidney measuring 4 cm. Several low-density lesions of the spleen measuring 1.4 cm and less most likely cysts or hemangiomata. GI/Bowel: No obstruction or other acute findings. No evidence diverticulitis or colitis. There is a large amount of colonic diverticulosis but no evidence of diverticulitis. Pelvis: No evidence of cystitis. Bladder appears normal. Peritoneum/Retroperitoneum: No ascites or free air. No abscess. Bones/Soft Tissues: No acute fracture of the abdomen and pelvis. No abscess. Chest: Small bilateral dependent pleural effusions mild adjacent atelectasis. No evidence of acute process otherwise. Abdomen/pelvis: No evidence of acute process. XR CHEST PORTABLE    Result Date: 7/9/2021  EXAMINATION: ONE XRAY VIEW OF THE CHEST 7/9/2021 11:48 am COMPARISON: None. HISTORY: ORDERING SYSTEM PROVIDED HISTORY: generalized weakness TECHNOLOGIST PROVIDED HISTORY: Reason for exam:->generalized weakness Reason for Exam: generalized weakness FINDINGS: The cardiac silhouette is at the upper limits of normal in size. There is a left-sided cardiac device as well as sequela of TAVR. There is sequela of old granulomatous disease. The lung volumes are low with mild pulmonary vascular congestion. There is a suspected skin fold along the right lateral chest.  Pneumothorax cannot entirely be excluded. Follow-up radiograph is recommended. Degenerative changes are noted in the [facility] and glenohumeral joints. 1. Low lung volumes with pulmonary vascular congestion. 2. Suspected skin fold along the right lateral and upper chest. Underlying pneumothorax cannot entirely be excluded.  Follow-up radiographs are recommended. Physical Exam:  Vitals: BP (!) 124/58   Pulse 109   Temp 97.6 °F (36.4 °C) (Oral)   Resp 16   Ht 5' 1\" (1.549 m)   Wt 175 lb 0.7 oz (79.4 kg)   SpO2 98%   BMI 33.07 kg/m²   24 hour intake/output:    Intake/Output Summary (Last 24 hours) at 7/17/2021 2145  Last data filed at 7/17/2021 1855  Gross per 24 hour   Intake 580 ml   Output    Net 580 ml     Last 3 weights: Wt Readings from Last 3 Encounters:   07/14/21 175 lb 0.7 oz (79.4 kg)   05/28/21 154 lb (69.9 kg)   10/13/20 158 lb (71.7 kg)     General: Elderly frail woman, rushing to the bathroom with assistance, not in acute distress  ENT: No nasal discharge, oral mucosa moist, hearing normal  Neck: Supple, trachea in midline, no thyromegaly, no JVD, no carotid bruits  Chest: Normal respiratory effort, symmetric expansion of the chest, no rales, rhonchi or wheezes; PM  in the precordial region; no signs of infection  CV: S1-S2 audible, tachycardiac, regular rate and rhythm, 2/6 systolic murmur rub noted; no gallop, pulses 2+ upper extremities  Abdomen:  Bowel sounds present, abdomen soft, nontender nondistended  Musculoskeletal: bilateral lower extremities pitting edema 3+. No cyanosis or clubbing.  No obvious deformity, range of motion grossly within normal limit  Neurology: AAOx3, CN 2-12 grossly intact, no focal deficits  Psychiatric: Normal mood, affect is congruent with age and the environment  Skin: Warm and dry, no rash in the exposed post area         DVT prophylaxis: [x] Lovenox                                 [] SCDs                                 [] SQ Heparin                                 [] Encourage ambulation           [] Already on Anticoagulation                 ASSESSMENT / PLAN :    Active Problems:    Essential hypertension    Cardiac pacemaker    Cerebrovascular accident (CVA) due to vascular stenosis ([address]    S/P AVR (aortic valve replacement)    Weakness    Troponin I above reference range Bacteremia due to Streptococcus  Resolved Problems:    * No resolved hospital problems. *    A/P  Key Wooduser is a 80 y. o.  female  who presents with generalized weakness     1) Rhabdomyolysis; mild  resolved  -CK elevated  -Hold statin , lasix   -CK trending down and wnl now, will discontinue NS    -Continue Chem7 - Creatinine remains stable      2) Generalized weakness  -Improved on exam  -PT/OT ordered     3) Elevated Troponin  -Not likely ACS, likely 2/2 #1  -tropI/EKG serial negative for ACS  -Echo as below , EF preserved 50-55%. Diastolic dysfunction grade III;   -EKG reviewed, V Paced rhythm       Echocardiogram 7/12/2021           Left ventricular systolic function is normal.    Ejection fraction is visually estimated at 50-55%.    Grade III diastolic dysfunction.    Severe biatrial enlargement.    PPM wiring visualized within the right heart.    History of TAVR (#29 [place] 3) with moderate perivalvular leak.    Slight prolapse of the anterior mitral valve leaflet with moderate mitral    valve regurgitation.    Severe tricuspid regurgitation; RVSP: 69 mmHg.    Mild to moderate pulmonic regurgitation present.    No evidence of any pericardial effusion.    Right pleural effusion.    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 CT negative for evidence of infection; VIKTORIA:  Pacemaker wire is free from vegetation.             - ID on board- Abx changed to Ceftriaxone. Vanco/cefepime were discontinued now.    - repeat blood cultures on 7/13,15,16 no growth so far; follow CRP, ESR, procalcitonin  -TTE  completed,no evidence of vegitation 7/13/2021    No  significant evidence of vegetations, per EP discussion documented w/ family, patient will likely stay on suppressive abx without aggressive intervention of pacemaker removal   -Repeat blood cultures sent per ID recs, last blood cxs from 7/9/2021

## 2021-07-19 LAB
ANION GAP SERPL CALCULATED.3IONS-SCNC: 11 MMOL/L (ref 4–16)
BASOPHILS ABSOLUTE: 0.1 K/CU MM
BASOPHILS RELATIVE PERCENT: 0.5 % (ref 0–1)
BUN BLDV-MCNC: 19 MG/DL (ref 6–23)
CALCIUM SERPL-MCNC: 8.5 MG/DL (ref 8.3–10.6)
CHLORIDE BLD-SCNC: 106 MMOL/L (ref 99–110)
CO2: 24 MMOL/L (ref 21–32)
CREAT SERPL-MCNC: 0.6 MG/DL (ref 0.6–1.1)
CULTURE: NORMAL
DIFFERENTIAL TYPE: ABNORMAL
EOSINOPHILS ABSOLUTE: 0.2 K/CU MM
EOSINOPHILS RELATIVE PERCENT: 2 % (ref 0–3)
GFR AFRICAN AMERICAN: >60 ML/MIN/1.73M2
GFR NON-AFRICAN AMERICAN: >60 ML/MIN/1.73M2
GLUCOSE BLD-MCNC: 95 MG/DL (ref 70–99)
HCT VFR BLD CALC: 30 % (ref 37–47)
HEMOGLOBIN: 9.3 GM/DL (ref 12.5–16)
IMMATURE NEUTROPHIL %: 1.3 % (ref 0–0.43)
LYMPHOCYTES ABSOLUTE: 1.5 K/CU MM
LYMPHOCYTES RELATIVE PERCENT: 13 % (ref 24–44)
Lab: NORMAL
MCH RBC QN AUTO: 29.6 PG (ref 27–31)
MCHC RBC AUTO-ENTMCNC: 31 % (ref 32–36)
MCV RBC AUTO: 95.5 FL (ref 78–100)
MONOCYTES ABSOLUTE: 1 K/CU MM
MONOCYTES RELATIVE PERCENT: 8.7 % (ref 0–4)
NUCLEATED RBC %: 0 %
PDW BLD-RTO: 15.7 % (ref 11.7–14.9)
PLATELET # BLD: 284 K/CU MM (ref 140–440)
PMV BLD AUTO: 9.1 FL (ref 7.5–11.1)
POTASSIUM SERPL-SCNC: 4 MMOL/L (ref 3.5–5.1)
RBC # BLD: 3.14 M/CU MM (ref 4.2–5.4)
SEGMENTED NEUTROPHILS ABSOLUTE COUNT: 8.7 K/CU MM
SEGMENTED NEUTROPHILS RELATIVE PERCENT: 74.5 % (ref 36–66)
SODIUM BLD-SCNC: 141 MMOL/L (ref 135–145)
SPECIMEN: NORMAL
TOTAL CK: 31 IU/L (ref 26–140)
TOTAL IMMATURE NEUTOROPHIL: 0.15 K/CU MM
TOTAL NUCLEATED RBC: 0 K/CU MM
WBC # BLD: 11.7 K/CU MM (ref 4–10.5)

## 2021-07-19 PROCEDURE — 1200000000 HC SEMI PRIVATE

## 2021-07-19 PROCEDURE — 85025 COMPLETE CBC W/AUTO DIFF WBC: CPT

## 2021-07-19 PROCEDURE — 6370000000 HC RX 637 (ALT 250 FOR IP): Performed by: NURSE PRACTITIONER

## 2021-07-19 PROCEDURE — 94761 N-INVAS EAR/PLS OXIMETRY MLT: CPT

## 2021-07-19 PROCEDURE — 82550 ASSAY OF CK (CPK): CPT

## 2021-07-19 PROCEDURE — 99233 SBSQ HOSP IP/OBS HIGH 50: CPT | Performed by: INTERNAL MEDICINE

## 2021-07-19 PROCEDURE — 6370000000 HC RX 637 (ALT 250 FOR IP): Performed by: INTERNAL MEDICINE

## 2021-07-19 PROCEDURE — 6360000002 HC RX W HCPCS: Performed by: INTERNAL MEDICINE

## 2021-07-19 PROCEDURE — 80048 BASIC METABOLIC PNL TOTAL CA: CPT

## 2021-07-19 PROCEDURE — 2580000003 HC RX 258: Performed by: INTERNAL MEDICINE

## 2021-07-19 PROCEDURE — 97116 GAIT TRAINING THERAPY: CPT

## 2021-07-19 PROCEDURE — 97110 THERAPEUTIC EXERCISES: CPT

## 2021-07-19 PROCEDURE — 36415 COLL VENOUS BLD VENIPUNCTURE: CPT

## 2021-07-19 RX ORDER — LACTOBACILLUS RHAMNOSUS GG 10B CELL
1 CAPSULE ORAL
Status: DISCONTINUED | OUTPATIENT
Start: 2021-07-20 | End: 2021-07-23 | Stop reason: HOSPADM

## 2021-07-19 RX ORDER — CEFAZOLIN SODIUM 2 G/100ML
2000 INJECTION, SOLUTION INTRAVENOUS EVERY 8 HOURS
Status: DISCONTINUED | OUTPATIENT
Start: 2021-07-19 | End: 2021-07-23 | Stop reason: HOSPADM

## 2021-07-19 RX ADMIN — CEFTRIAXONE SODIUM 2000 MG: 2 INJECTION, POWDER, FOR SOLUTION INTRAMUSCULAR; INTRAVENOUS at 09:19

## 2021-07-19 RX ADMIN — METOPROLOL SUCCINATE 25 MG: 25 TABLET, EXTENDED RELEASE ORAL at 09:20

## 2021-07-19 RX ADMIN — PANTOPRAZOLE SODIUM 40 MG: 40 TABLET, DELAYED RELEASE ORAL at 06:50

## 2021-07-19 RX ADMIN — FLUTICASONE PROPIONATE 1 SPRAY: 50 SPRAY, METERED NASAL at 09:21

## 2021-07-19 RX ADMIN — SODIUM CHLORIDE, PRESERVATIVE FREE 10 ML: 5 INJECTION INTRAVENOUS at 20:49

## 2021-07-19 RX ADMIN — ENOXAPARIN SODIUM 40 MG: 40 INJECTION SUBCUTANEOUS at 09:20

## 2021-07-19 RX ADMIN — Medication 1 TABLET: at 09:19

## 2021-07-19 RX ADMIN — CEFAZOLIN SODIUM 2000 MG: 2 INJECTION, SOLUTION INTRAVENOUS at 17:14

## 2021-07-19 RX ADMIN — SODIUM CHLORIDE, PRESERVATIVE FREE 10 ML: 5 INJECTION INTRAVENOUS at 09:20

## 2021-07-19 RX ADMIN — ASPIRIN 81 MG: 81 TABLET, CHEWABLE ORAL at 09:20

## 2021-07-19 ASSESSMENT — PAIN SCALES - GENERAL
PAINLEVEL_OUTOF10: 0
PAINLEVEL_OUTOF10: 0

## 2021-07-19 NOTE — CARE COORDINATION
Phoned and spoke with Tiffanie Bynum at [facility] to let her know that patient will be coming in IV ATB's . CM let her know ATB and she stated that they can accept on that ATB but she requested for order to be every 12 hours or 24 hours if possible.  PS to physician to request

## 2021-07-19 NOTE — CARE COORDINATION
ESTUARDO confirmed with Dr Jairo Mendosa that patient will be discharging on Cefazolin 2g q 8 hr. Notified Flavia Grace at [facility].  She is going to check and make sure that she can staff that and return call to CM

## 2021-07-19 NOTE — PROGRESS NOTES
Physical Therapy Treatment Note  Name: Julee Aaron MRN: [id number] :   5/15/1929   Date:  2021   Admission Date: 2021 Room:  -A     Restrictions/Precautions:  Restrictions/Precautions  Restrictions/Precautions: Fall Risk, General Precautions  Required Braces or Orthoses?: No         Communication with other providers:  RN    Subjective:  Patient states:  \"I feel a lot better this afternoon! \"  Pain:   Location, Type, Intensity (0/10 to 10/10): Pt reports low back pain this session but does not rate. Objective:    Observation:  Pt up in chair with family in room upon entry and agreeable to therapy. Treatment, including education/measures:  Transfers: Pt completed STS to/from chair CGA. Pt provided cues for proper hand placement and slow descent to chair. Gait: Pt ambulated 25' + 100' CGA with SPC. Pt performed stair training between bouts. Pt demonstrated decreased sherwin, decreased step length bilaterally, and narrow EFFIE. Pt also demonstrated decreased pathway negotiation requiring cues to correct. Stair training: Pt ascended/descend 1 stair x1 with SPC CGA and hand rail assist on R, and ascended/descended 1 stair x1 with SPC CGA and hand rail assist on L. Pt deferred further stair training due to fatigue. Therapeutic Exercise: Pt completed STS to/from chair x10 with CGA. Pt cued for proper hand placement and slow descent to chair throughout. Assessment / Impression:    Pt left in chair with alarm on and call light within reach at end of session. Patient's tolerance of treatment:  good   Adverse Reaction: n/a  Significant change in status and impact:  n/a  Barriers to improvement:  Decreased strength, decreased endurance, impaired bed mobility, impaired transfers, impaired gait    Plan for Next Session:    Pt to progress ambulation distance and stair training as tolerated.   Time in:  1254  Time out:  1318  Timed treatment minutes:  24  Total treatment time: 25    Previously filed items:  Social/Functional History  Lives With: Daughter  Type of Home: House  Home Layout: One level  Home Access: Stairs to enter without rails  Entrance Stairs - Number of Steps: 1  Bathroom Shower/Tub: Tub/Shower unit  Bathroom Toilet: Standard  Home Equipment: Cane (Uses cane in community but not in home)  ADL Assistance: Independent  Homemaking Assistance: Independent  Homemaking Responsibilities: Yes  Ambulation Assistance: Independent  Transfer Assistance: Independent  Active : No  Additional Comments: Pt reports 1 fall in 6 months. Short term goals  Time Frame for Short term goals: 1 week  Short term goal 1: Pt to complete all bed mobility mod I  Short term goal 2: Pt to complete all STS transfers to/from bed, commode, and chair mod I  Short term goal 3: Pt to ambulate 48' with LRAD mod I       Electronically signed by:    Varun Aguirre  7/19/2021, 2:08 PM   \"I, the qualified professional, was present and in the room for the entire session. The student participates in the delivery of services when the qualified practitioner is directing the service, making the skilled judgment, and is responsible for the assessment and treatment. \"

## 2021-07-19 NOTE — PROGRESS NOTES
ATTENDING PHYSICIAN'S PROGRESS NOTES    Patient:  Luis Friedman      Unit/Bed:2018/2018-A    YOB: 1929    MRN: [id number]     Acct: [de-identified]     Admit date: 7/9/2021    Patient Seen, Chart, Consults notes, Labs, Radiology studies reviewed. SUBJECTIVE:   Day 8 of stay     Deric Dela Cruz a 80 y. o. woman  who presents with generalized weakness. Patient has past medical history of hypertension, hyperlipidemia, CHF, s/p LOPEZ for aortic stenosis, status post permanent pacemaker implantation.       Patient was admitted for fever, generalized weakness and rhabdomyolysis.  2 out of 2 sets of blood cultures 7/9/2021 grew Streptococcus Sanguis. Probably GI source per ID. Will need to rule out endocarditis-prosthetic valve versus cardiovascular implantable electronic device infection ( CI ED) per ID. VIKTORIA negative for intracardiac infection. Patient is now on iv Rocephin per ID awaiting ID for discharge antibiotics regimen.      Interval history:   Patient still reports loose stool today with some abdominal pain. She has had 8 episodes since 7: 30am today. She was given loperamide. Denies nausea or vomiting. ID follows the patient. Will consider change antibiotics to ampicilin iv but will require q6rs. Or change to cefazolin iv q8hrs. All other ROS negative except noted in HPI    Past, Family, Social History unchanged from admission. Diet:  ADULT DIET;  Regular    Medications:  Scheduled Meds:   [START ON 7/20/2021] lactobacillus  1 capsule Oral Daily with breakfast    ceFAZolin  2,000 mg Intravenous Q8H    pantoprazole  40 mg Oral QAM AC    aspirin  81 mg Oral Daily    fluticasone  1 spray Each Nostril Daily    metoprolol succinate  25 mg Oral Daily    therapeutic multivitamin-minerals  1 tablet Oral Daily    sodium chloride flush  5-40 mL Intravenous 2 times per day    enoxaparin  40 mg Subcutaneous Daily     Continuous Infusions:   sodium chloride       PRN Meds:sodium chloride flush, sodium chloride flush, sodium chloride, ondansetron **OR** ondansetron, polyethylene glycol, acetaminophen **OR** acetaminophen    OBJECTIVE:    CBC:   Recent Labs     07/17/21  0953 07/18/21 0702 07/19/21  0542   WBC 9.9 11.5* 11.7*   HGB 9.7* 9.5* 9.3*    291 284     BMP:    Recent Labs     07/17/21  0953 07/18/21  0702 07/19/21  0542    141 141   K 3.4* 4.2 4.0    109 106   CO2 23 23 24   BUN 22 20 19   CREATININE 0.7 0.6 0.6   GLUCOSE 115* 103* 95     Calcium:  Recent Labs     07/19/21  0542   CALCIUM 8.5     Ionized Calcium:No results for input(s): IONCA in the last 72 hours. Magnesium:No results for input(s): MG in the last 72 hours. Phosphorus:No results for input(s): PHOS in the last 72 hours. BNP:No results for input(s): BNP in the last 72 hours. Glucose:No results for input(s): POCGLU in the last 72 hours. HgbA1C: No results for input(s): LABA1C in the last 72 hours. INR: No results for input(s): INR in the last 72 hours. Hepatic:   Recent Labs     07/18/21 0702   ALKPHOS 115   ALT 33   AST 27   PROT 5.4*   BILITOT 0.4   LABALBU 3.2*     Amylase and Lipase:No results for input(s): LACTA, AMYLASE in the last 72 hours. Lactic Acid: No results for input(s): LACTA in the last 72 hours. Troponin:   Recent Labs     07/17/21  0953 07/18/21  0702 07/19/21  0542   CKTOTAL 44 39 31     BNP: No results for input(s): BNP in the last 72 hours. Lipids: No results for input(s): CHOL, TRIG, HDL, LDLCALC in the last 72 hours.     Invalid input(s): LDL  ABGs: No results found for: PH, PCO2, PO2, HCO3, O2SAT    Radiology reports as per the Radiologist  Radiology: CT HEAD WO CONTRAST    Result Date: 7/9/2021  EXAMINATION: CT OF THE HEAD WITHOUT CONTRAST  7/9/2021 12:51 pm TECHNIQUE: CT of the head was performed without the administration of intravenous contrast. Dose modulation, iterative reconstruction, and/or weight based adjustment of the mA/kV was utilized to reduce the radiation dose to as low as reasonably achievable. COMPARISON: None. HISTORY: ORDERING SYSTEM PROVIDED HISTORY: generalized weakness, ams TECHNOLOGIST PROVIDED HISTORY: Reason for exam:->generalized weakness, ams Has a \"code stroke\" or \"stroke alert\" been called? ->No Decision Support Exception - unselect if not a suspected or confirmed emergency medical condition->Emergency Medical Condition (MA) Reason for Exam: generalized weakness, ams Acuity: Acute Type of Exam: Initial FINDINGS: BRAIN/VENTRICLES: There is no acute intracranial hemorrhage, mass effect or midline shift. No abnormal extra-axial fluid collection. The gray-white differentiation is maintained without evidence of an acute infarct. There is no evidence of hydrocephalus. Periventricular white matter low densities most likely due to chronic microvascular ischemia. Bilateral basal ganglia calcifications are present. ORBITS: The visualized portion of the orbits demonstrate no acute abnormality. SINUSES: The visualized paranasal sinuses and mastoid air cells demonstrate no acute abnormality. SOFT TISSUES/SKULL:  2 separate carter hole defects of the left-sided calvarium noted. No acute osseous findings. No acute intracranial abnormality. CT CHEST W CONTRAST    Result Date: 7/9/2021  EXAMINATION: CT OF THE CHEST WITH CONTRAST; CT OF THE ABDOMEN AND PELVIS WITH CONTRAST 7/9/2021 12:52 pm TECHNIQUE: CT of the chest was performed with the administration of intravenous contrast. Multiplanar reformatted images are provided for review. Dose modulation, iterative reconstruction, and/or weight based adjustment of the mA/kV was utilized to reduce the radiation dose to as low as reasonably achievable.; CT of the abdomen and pelvis was performed with the administration of intravenous contrast. Multiplanar reformatted images are provided for review.  Dose modulation, iterative reconstruction, and/or weight based adjustment of the mA/kV was utilized to reduce the radiation dose to as low as reasonably achievable. COMPARISON: 11/14/2017 HISTORY: ORDERING SYSTEM PROVIDED HISTORY: ?ptx on cxr, generalized weakness, fever TECHNOLOGIST PROVIDED HISTORY: Reason for exam:->?ptx on cxr, generalized weakness, fever Decision Support Exception - unselect if not a suspected or confirmed emergency medical condition->Emergency Medical Condition (MA) Reason for Exam: ?ptx on cxr, generalized weakness, fever Acuity: Acute Type of Exam: Initial; ORDERING SYSTEM PROVIDED HISTORY: fever, AMS, generalized weakness TECHNOLOGIST PROVIDED HISTORY: Reason for exam:->fever, AMS, generalized weakness Additional Contrast?->None Decision Support Exception - unselect if not a suspected or confirmed emergency medical condition->Emergency Medical Condition (MA) Reason for Exam: fever, AMS, generalized weakness Acuity: Acute Type of Exam: Initial Chest: Mediastinum: No acute abnormality of the aorta or other mediastinal structures. No pericardial effusion. Normal size lymph nodes, no evidence of adenopathy. Lungs/pleura: Small bilateral dependent pleural effusions with adjacent compressive atelectasis. No pneumothorax. Detailed lung evaluation limited due to motion. No consolidation Soft Tissues/Bones: No acute fracture. No acute findings of the chest wall. Extensive degenerative changes of the thoracic spine. No abscess of the soft tissues of the chest wall. Abdomen/Pelvis: Organs: No acute findings of the organs throughout the abdomen. No evidence of pancreatitis or pyelonephritis. No ureteral stone or hydronephrosis. Normal appearance of the gallbladder. No acute findings of the liver. No evidence of biliary dilation/obstruction. 1.9 cm cyst at the dome incidentally noted. Detailed evaluation of the organs limited due to motion. Cyst of the central right kidney measuring 4 cm.   Several low-density lesions of the spleen measuring 1.4 cm and less most likely cysts or hemangiomata. GI/Bowel: No obstruction or other acute findings. No evidence diverticulitis or colitis. There is a large amount of colonic diverticulosis but no evidence of diverticulitis. Pelvis: No evidence of cystitis. Bladder appears normal. Peritoneum/Retroperitoneum: No ascites or free air. No abscess. Bones/Soft Tissues: No acute fracture of the abdomen and pelvis. No abscess. Chest: Small bilateral dependent pleural effusions mild adjacent atelectasis. No evidence of acute process otherwise. Abdomen/pelvis: No evidence of acute process. CT ABDOMEN PELVIS W IV CONTRAST Additional Contrast? None    Result Date: 7/9/2021  EXAMINATION: CT OF THE CHEST WITH CONTRAST; CT OF THE ABDOMEN AND PELVIS WITH CONTRAST 7/9/2021 12:52 pm TECHNIQUE: CT of the chest was performed with the administration of intravenous contrast. Multiplanar reformatted images are provided for review. Dose modulation, iterative reconstruction, and/or weight based adjustment of the mA/kV was utilized to reduce the radiation dose to as low as reasonably achievable.; CT of the abdomen and pelvis was performed with the administration of intravenous contrast. Multiplanar reformatted images are provided for review. Dose modulation, iterative reconstruction, and/or weight based adjustment of the mA/kV was utilized to reduce the radiation dose to as low as reasonably achievable.  COMPARISON: 11/14/2017 HISTORY: ORDERING SYSTEM PROVIDED HISTORY: ?ptx on cxr, generalized weakness, fever TECHNOLOGIST PROVIDED HISTORY: Reason for exam:->?ptx on cxr, generalized weakness, fever Decision Support Exception - unselect if not a suspected or confirmed emergency medical condition->Emergency Medical Condition (MA) Reason for Exam: ?ptx on cxr, generalized weakness, fever Acuity: Acute Type of Exam: Initial; ORDERING SYSTEM PROVIDED HISTORY: fever, AMS, generalized weakness TECHNOLOGIST PROVIDED HISTORY: Reason for exam:->fever, AMS, generalized weakness Additional Contrast?->None Decision Support Exception - unselect if not a suspected or confirmed emergency medical condition->Emergency Medical Condition (MA) Reason for Exam: fever, AMS, generalized weakness Acuity: Acute Type of Exam: Initial Chest: Mediastinum: No acute abnormality of the aorta or other mediastinal structures. No pericardial effusion. Normal size lymph nodes, no evidence of adenopathy. Lungs/pleura: Small bilateral dependent pleural effusions with adjacent compressive atelectasis. No pneumothorax. Detailed lung evaluation limited due to motion. No consolidation Soft Tissues/Bones: No acute fracture. No acute findings of the chest wall. Extensive degenerative changes of the thoracic spine. No abscess of the soft tissues of the chest wall. Abdomen/Pelvis: Organs: No acute findings of the organs throughout the abdomen. No evidence of pancreatitis or pyelonephritis. No ureteral stone or hydronephrosis. Normal appearance of the gallbladder. No acute findings of the liver. No evidence of biliary dilation/obstruction. 1.9 cm cyst at the dome incidentally noted. Detailed evaluation of the organs limited due to motion. Cyst of the central right kidney measuring 4 cm. Several low-density lesions of the spleen measuring 1.4 cm and less most likely cysts or hemangiomata. GI/Bowel: No obstruction or other acute findings. No evidence diverticulitis or colitis. There is a large amount of colonic diverticulosis but no evidence of diverticulitis. Pelvis: No evidence of cystitis. Bladder appears normal. Peritoneum/Retroperitoneum: No ascites or free air. No abscess. Bones/Soft Tissues: No acute fracture of the abdomen and pelvis. No abscess. Chest: Small bilateral dependent pleural effusions mild adjacent atelectasis. No evidence of acute process otherwise. Abdomen/pelvis: No evidence of acute process.      XR CHEST PORTABLE    Result Date: 7/9/2021  EXAMINATION: ONE XRAY VIEW OF THE CHEST abdomen soft, nontender nondistended  Musculoskeletal: bilateral lower extremities pitting edema 1-2+. No cyanosis or clubbing.  No obvious deformity, range of motion grossly within normal limit  Neurology: AAOx3, CN 2-12 grossly intact, no focal deficits  Psychiatric: Normal mood, affect is congruent with age and the environment  Skin: Warm and dry, no rash in the exposed post area         DVT prophylaxis: [x] Lovenox                                 [] SCDs                                 [] SQ Heparin                                 [] Encourage ambulation           [] Already on Anticoagulation                 ASSESSMENT / PLAN :    Active Problems:    Essential hypertension    Cardiac pacemaker    Cerebrovascular accident (CVA) due to vascular stenosis ([address]    S/P AVR (aortic valve replacement)    Weakness    Troponin I above reference range    Bacteremia due to Streptococcus  Resolved Problems:    * No resolved hospital problems. *    Rakesh Wooduser is a 80 y. o.  female  who presents with generalized weakness     1) Rhabdomyolysis; mild  resolved  -CK elevated  -Hold statin , lasix   -CK trending down and wnl now, will discontinue NS    -Continue Chem7 - Creatinine remains stable      2) Generalized weakness  -Improved on exam  -PT/OT ordered     3) Elevated Troponin  -Not likely ACS, likely 2/2 #1  -tropI/EKG serial negative for ACS  -Echo as below , EF preserved 50-55%. Diastolic dysfunction grade III;   -EKG reviewed, V Paced rhythm       Echocardiogram 7/12/2021           Left ventricular systolic function is normal.           Ejection fraction is visually estimated at 50-55%.           Grade III diastolic dysfunction.          Severe biatrial enlargement.          PPM wiring visualized within the right heart.          History of TAVR (#29 Watson Nancy 3) with moderate perivalvular leak.          Slight prolapse of the anterior mitral valve leaflet with moderate mitral  Fabiana Booze regurgitation.          Severe tricuspid regurgitation; RVSP: 69 mmHg.           Mild to moderate pulmonic regurgitation present.           No evidence of any pericardial effusion.           Right pleural effusion.    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 CT negative for evidence of infection; VIKTORIA:  Pacemaker wire is free from vegetation.             - ID on board- Abx changed to Ceftriaxone. Vanco/cefepime were discontinued now.   - repeat blood cultures on 7/13,15,16 no growth so far; follow CRP, ESR, procalcitonin  -TTE  completed,no evidence of vegitation 7/13/2021    No  significant evidence of vegetations, per EP discussion documented w/ family, patient will likely stay on suppressive abx without aggressive intervention of pacemaker removal   -Repeat blood cultures sent per ID recs, last blood cxs from 7/9/2021 positive;    -will follow ID for antibiotic regimen on discharge.               Echocardiogram VIKTORIA 7/13/2021:    Summary  Pacemaker wire is free from vegetation. S/p TAVR: #29 Watson Nancy 3  Moderate to severe mitral regurgitation is present. There was no thrombus noted in the left atrial appendage. Negative bubble study. No PFO or ASD noted.        5) LE edema  -likely from CHF (diastolic dysfunction grade III)  -was on lasix, and has been holding due to rhabdomyolysis  - venous US  has ruled out DVT of the lower extremities  - NS ivf was discontinued      6) sepsis  resolving  Leukocytosis and tachycardia  -blood cultures negative so far, CRP elevated 84.5< 49.5 ( > 3 high risk), pro calcitonin wnl, lactic acid 1.2 wnl; no hypotension  -on iv antibiotics now  -Chest xray 7/17/2021 showing mild bibasilar airspace disease, greater on the right.  This is most likely reflects presence of atelectasis, but edema and pneumonia remain in the differential. and urinalysis suggests UTI, will follow culture rests.   -close monitor signs of infection     Other chronic medical condition; medication resumed unless contraindicated      - Essential HTN  - HLD  - S/P PPM   - S/P AVT     Disposition: patient and family likely would prefer home. Will follow .   Electronically signed by Rome Guevara MD on 7/19/2021 at 7:20 PM    Rounding Hospitalist

## 2021-07-19 NOTE — PROGRESS NOTES
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 History:? Interval history noted  Denies n/v/d/f or untoward effects of antimicrobials  Physical Exam:  Vital Signs: /75   Pulse 94   Temp 97.8 °F (36.6 °C) (Oral)   Resp 15   Ht 5' 1\" (1.549 m)   Wt 175 lb 0.7 oz (79.4 kg)   SpO2 94%   BMI 33.07 kg/m²     Gen: alert and oriented X3, no distress  Skin: no stigmata of endocarditis  Wounds: C/D/I  HEMT: AT/NC Oropharynx pink, moist, and without lesions or exudates; dentition in good state of repair  Eyes: PERRLA, EOMI, conjunctiva pink, sclera anicteric. Neck: Supple. Trachea midline. No LAD. Chest: no distress and CTA. Good air movement. Heart: systolic murmur in all cardiac areas  Abd: soft, non-distended, no tenderness, no hepatomegaly. Normoactive bowel sounds. Ext: no clubbing, cyanosis, or edema  Catheter Site: without erythema or tenderness  LDA: CVC:  Neuro: Mental status intact. CN 2-12 intact and no focal sensory or motor deficits     Radiologic / Imaging / TESTING  No results found.      Labs:    Recent Results (from the past 24 hour(s))   Basic metabolic panel    Collection Time: 21  5:42 AM   Result Value Ref Range    Sodium 141 135 - 145 MMOL/L    Potassium 4.0 3.5 - 5.1 MMOL/L    Chloride 106 99 - 110 mMol/L    CO2 24 21 - 32 MMOL/L    Anion Gap 11 4 - 16    BUN 19 6 - 23 MG/DL    CREATININE 0.6 0.6 - 1.1 MG/DL    Glucose 95 70 - 99 MG/DL    Calcium 8.5 8.3 - 10.6 MG/DL    GFR Non-African American >60 >60 mL/min/1.73m2    GFR African American >60 >60 mL/min/1.73m2   CK    Collection Time: 21  5:42 AM   Result Value Ref Range    Total CK 31 26 - 140 IU/L   CBC auto differential    Collection Time: 21  5:42 AM   Result Value Ref Range    WBC 11.7 (H) 4.0 - 10.5 K/CU MM Probable GI source. Need to rule out endocarditis-prosthetic valve versus CI ED. No vegetation seen on VIKTORIA but this does not rule out involvement.     Clinical status: Improving   Therapeutic:  D/c ampicillin, start Cefazolin IV 2g q 8 hour for cumulative 6-week course (end-date: 8/16/2021)   Diagnostic: trend CRP and pct   F/u:    Other:        Electronically signed by: Electronically signed by Juan Katz MD on 7/19/2021 at 11:11 AM

## 2021-07-20 ENCOUNTER — APPOINTMENT (OUTPATIENT)
Dept: GENERAL RADIOLOGY | Age: 86
DRG: 314 | End: 2021-07-20
Payer: MEDICARE

## 2021-07-20 LAB
ALBUMIN SERPL-MCNC: 3 GM/DL (ref 3.4–5)
ALP BLD-CCNC: 108 IU/L (ref 40–128)
ALT SERPL-CCNC: 26 U/L (ref 10–40)
ANION GAP SERPL CALCULATED.3IONS-SCNC: 11 MMOL/L (ref 4–16)
AST SERPL-CCNC: 26 IU/L (ref 15–37)
BASOPHILS ABSOLUTE: 0.1 K/CU MM
BASOPHILS RELATIVE PERCENT: 0.5 % (ref 0–1)
BILIRUB SERPL-MCNC: 0.2 MG/DL (ref 0–1)
BUN BLDV-MCNC: 20 MG/DL (ref 6–23)
CALCIUM SERPL-MCNC: 8.5 MG/DL (ref 8.3–10.6)
CHLORIDE BLD-SCNC: 106 MMOL/L (ref 99–110)
CO2: 24 MMOL/L (ref 21–32)
CREAT SERPL-MCNC: 0.8 MG/DL (ref 0.6–1.1)
CULTURE: NORMAL
CULTURE: NORMAL
DIFFERENTIAL TYPE: ABNORMAL
EOSINOPHILS ABSOLUTE: 0.3 K/CU MM
EOSINOPHILS RELATIVE PERCENT: 2 % (ref 0–3)
GFR AFRICAN AMERICAN: >60 ML/MIN/1.73M2
GFR NON-AFRICAN AMERICAN: >60 ML/MIN/1.73M2
GLUCOSE BLD-MCNC: 98 MG/DL (ref 70–99)
HCT VFR BLD CALC: 29 % (ref 37–47)
HEMOGLOBIN: 8.9 GM/DL (ref 12.5–16)
IMMATURE NEUTROPHIL %: 1.6 % (ref 0–0.43)
LYMPHOCYTES ABSOLUTE: 1.2 K/CU MM
LYMPHOCYTES RELATIVE PERCENT: 9.6 % (ref 24–44)
Lab: NORMAL
Lab: NORMAL
MAGNESIUM: 2 MG/DL (ref 1.8–2.4)
MCH RBC QN AUTO: 29.6 PG (ref 27–31)
MCHC RBC AUTO-ENTMCNC: 30.7 % (ref 32–36)
MCV RBC AUTO: 96.3 FL (ref 78–100)
MONOCYTES ABSOLUTE: 1.2 K/CU MM
MONOCYTES RELATIVE PERCENT: 9.1 % (ref 0–4)
NUCLEATED RBC %: 0 %
PDW BLD-RTO: 15.7 % (ref 11.7–14.9)
PLATELET # BLD: 271 K/CU MM (ref 140–440)
PMV BLD AUTO: 8.9 FL (ref 7.5–11.1)
POTASSIUM SERPL-SCNC: 3.9 MMOL/L (ref 3.5–5.1)
RBC # BLD: 3.01 M/CU MM (ref 4.2–5.4)
SEGMENTED NEUTROPHILS ABSOLUTE COUNT: 9.8 K/CU MM
SEGMENTED NEUTROPHILS RELATIVE PERCENT: 77.2 % (ref 36–66)
SODIUM BLD-SCNC: 141 MMOL/L (ref 135–145)
SPECIMEN: NORMAL
SPECIMEN: NORMAL
TOTAL CK: 28 IU/L (ref 26–140)
TOTAL IMMATURE NEUTOROPHIL: 0.21 K/CU MM
TOTAL NUCLEATED RBC: 0 K/CU MM
TOTAL PROTEIN: 5.1 GM/DL (ref 6.4–8.2)
WBC # BLD: 12.7 K/CU MM (ref 4–10.5)

## 2021-07-20 PROCEDURE — 6360000002 HC RX W HCPCS: Performed by: INTERNAL MEDICINE

## 2021-07-20 PROCEDURE — 80048 BASIC METABOLIC PNL TOTAL CA: CPT

## 2021-07-20 PROCEDURE — 94761 N-INVAS EAR/PLS OXIMETRY MLT: CPT

## 2021-07-20 PROCEDURE — 1200000000 HC SEMI PRIVATE

## 2021-07-20 PROCEDURE — C1751 CATH, INF, PER/CENT/MIDLINE: HCPCS

## 2021-07-20 PROCEDURE — 36415 COLL VENOUS BLD VENIPUNCTURE: CPT

## 2021-07-20 PROCEDURE — 6370000000 HC RX 637 (ALT 250 FOR IP): Performed by: INTERNAL MEDICINE

## 2021-07-20 PROCEDURE — 83735 ASSAY OF MAGNESIUM: CPT

## 2021-07-20 PROCEDURE — 36569 INSJ PICC 5 YR+ W/O IMAGING: CPT

## 2021-07-20 PROCEDURE — 2580000003 HC RX 258: Performed by: INTERNAL MEDICINE

## 2021-07-20 PROCEDURE — 80053 COMPREHEN METABOLIC PANEL: CPT

## 2021-07-20 PROCEDURE — 02HV33Z INSERTION OF INFUSION DEVICE INTO SUPERIOR VENA CAVA, PERCUTANEOUS APPROACH: ICD-10-PCS | Performed by: FAMILY MEDICINE

## 2021-07-20 PROCEDURE — 99232 SBSQ HOSP IP/OBS MODERATE 35: CPT | Performed by: INTERNAL MEDICINE

## 2021-07-20 PROCEDURE — 2500000003 HC RX 250 WO HCPCS: Performed by: INTERNAL MEDICINE

## 2021-07-20 PROCEDURE — 71045 X-RAY EXAM CHEST 1 VIEW: CPT

## 2021-07-20 PROCEDURE — 85025 COMPLETE CBC W/AUTO DIFF WBC: CPT

## 2021-07-20 PROCEDURE — 76937 US GUIDE VASCULAR ACCESS: CPT

## 2021-07-20 PROCEDURE — 6370000000 HC RX 637 (ALT 250 FOR IP): Performed by: NURSE PRACTITIONER

## 2021-07-20 PROCEDURE — 82550 ASSAY OF CK (CPK): CPT

## 2021-07-20 RX ORDER — SODIUM CHLORIDE 0.9 % (FLUSH) 0.9 %
5-40 SYRINGE (ML) INJECTION EVERY 12 HOURS SCHEDULED
Status: DISCONTINUED | OUTPATIENT
Start: 2021-07-20 | End: 2021-07-23 | Stop reason: HOSPADM

## 2021-07-20 RX ORDER — SODIUM CHLORIDE 0.9 % (FLUSH) 0.9 %
5-40 SYRINGE (ML) INJECTION PRN
Status: DISCONTINUED | OUTPATIENT
Start: 2021-07-20 | End: 2021-07-23 | Stop reason: HOSPADM

## 2021-07-20 RX ORDER — LIDOCAINE HYDROCHLORIDE 10 MG/ML
5 INJECTION, SOLUTION EPIDURAL; INFILTRATION; INTRACAUDAL; PERINEURAL ONCE
Status: COMPLETED | OUTPATIENT
Start: 2021-07-20 | End: 2021-07-20

## 2021-07-20 RX ORDER — SODIUM CHLORIDE 9 MG/ML
25 INJECTION, SOLUTION INTRAVENOUS PRN
Status: DISCONTINUED | OUTPATIENT
Start: 2021-07-20 | End: 2021-07-23 | Stop reason: HOSPADM

## 2021-07-20 RX ADMIN — CEFAZOLIN SODIUM 2000 MG: 2 INJECTION, SOLUTION INTRAVENOUS at 16:43

## 2021-07-20 RX ADMIN — METOPROLOL SUCCINATE 25 MG: 25 TABLET, EXTENDED RELEASE ORAL at 10:21

## 2021-07-20 RX ADMIN — FLUTICASONE PROPIONATE 1 SPRAY: 50 SPRAY, METERED NASAL at 10:33

## 2021-07-20 RX ADMIN — Medication 1 TABLET: at 10:21

## 2021-07-20 RX ADMIN — CEFAZOLIN SODIUM 2000 MG: 2 INJECTION, SOLUTION INTRAVENOUS at 10:35

## 2021-07-20 RX ADMIN — SODIUM CHLORIDE, PRESERVATIVE FREE 10 ML: 5 INJECTION INTRAVENOUS at 10:22

## 2021-07-20 RX ADMIN — ENOXAPARIN SODIUM 40 MG: 40 INJECTION SUBCUTANEOUS at 10:21

## 2021-07-20 RX ADMIN — SODIUM CHLORIDE, PRESERVATIVE FREE 10 ML: 5 INJECTION INTRAVENOUS at 22:15

## 2021-07-20 RX ADMIN — ASPIRIN 81 MG: 81 TABLET, CHEWABLE ORAL at 10:21

## 2021-07-20 RX ADMIN — CEFAZOLIN SODIUM 2000 MG: 2 INJECTION, SOLUTION INTRAVENOUS at 02:50

## 2021-07-20 RX ADMIN — LIDOCAINE HYDROCHLORIDE 5 ML: 10 INJECTION, SOLUTION EPIDURAL; INFILTRATION; INTRACAUDAL; PERINEURAL at 15:33

## 2021-07-20 RX ADMIN — PANTOPRAZOLE SODIUM 40 MG: 40 TABLET, DELAYED RELEASE ORAL at 10:21

## 2021-07-20 RX ADMIN — Medication 1 CAPSULE: at 10:21

## 2021-07-20 ASSESSMENT — PAIN SCALES - GENERAL
PAINLEVEL_OUTOF10: 0

## 2021-07-20 NOTE — PROGRESS NOTES
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 History:? Interval history noted  Denies n/v/d/f or untoward effects of antimicrobials  Physical Exam:  Vital Signs: BP (!) 128/53   Pulse 100   Temp 97.8 °F (36.6 °C) (Oral)   Resp 17   Ht 5' 1\" (1.549 m)   Wt 176 lb 2.4 oz (79.9 kg)   SpO2 100%   BMI 33.28 kg/m²     Gen: alert and oriented X3, no distress  Skin: no stigmata of endocarditis  Wounds: C/D/I  HEMT: AT/NC Oropharynx pink, moist, and without lesions or exudates; dentition in good state of repair  Eyes: PERRLA, EOMI, conjunctiva pink, sclera anicteric. Neck: Supple. Trachea midline. No LAD. Chest: no distress and CTA. Good air movement. Heart: systolic murmur in all cardiac areas  Abd: soft, non-distended, no tenderness, no hepatomegaly. Normoactive bowel sounds. Ext: no clubbing, cyanosis, or edema  Catheter Site: without erythema or tenderness  LDA: CVC:  Neuro: Mental status intact. CN 2-12 intact and no focal sensory or motor deficits     Radiologic / Imaging / TESTING  No results found.      Labs:    Recent Results (from the past 24 hour(s))   CK    Collection Time: 21  3:56 AM   Result Value Ref Range    Total CK 28 26 - 140 IU/L   CBC auto differential    Collection Time: 21  3:56 AM   Result Value Ref Range    WBC 12.7 (H) 4.0 - 10.5 K/CU MM    RBC 3.01 (L) 4.2 - 5.4 M/CU MM    Hemoglobin 8.9 (L) 12.5 - 16.0 GM/DL    Hematocrit 29.0 (L) 37 - 47 %    MCV 96.3 78 - 100 FL    MCH 29.6 27 - 31 PG    MCHC 30.7 (L) 32.0 - 36.0 %    RDW 15.7 (H) 11.7 - 14.9 %    Platelets 286 683 - 083 K/CU MM    MPV 8.9 7.5 - 11.1 FL    Differential Type AUTOMATED DIFFERENTIAL     Segs Relative 77.2 (H) 36 - 66 %    Lymphocytes % 9.6 (L) 24 - 44 %    Monocytes % 9.1 (H) 0 - 4 %    Eosinophils % 2.0 0 - 3 %    Basophils % 0.5 0 - 1 %    Segs Absolute 9.8 K/CU MM    Lymphocytes Absolute 1.2 K/CU MM    Monocytes Absolute 1.2 K/CU MM    Eosinophils Absolute 0.3 K/CU MM    Basophils Absolute 0.1 K/CU MM    Nucleated RBC % 0.0 %    Total Nucleated RBC 0.0 K/CU MM    Total Immature Neutrophil 0.21 K/CU MM    Immature Neutrophil % 1.6 (H) 0 - 0.43 %   Comprehensive Metabolic Panel    Collection Time: 07/20/21  3:56 AM   Result Value Ref Range    Sodium 141 135 - 145 MMOL/L    Potassium 3.9 3.5 - 5.1 MMOL/L    Chloride 106 99 - 110 mMol/L    CO2 24 21 - 32 MMOL/L    BUN 20 6 - 23 MG/DL    CREATININE 0.8 0.6 - 1.1 MG/DL    Glucose 98 70 - 99 MG/DL    Calcium 8.5 8.3 - 10.6 MG/DL    Albumin 3.0 (L) 3.4 - 5.0 GM/DL    Total Protein 5.1 (L) 6.4 - 8.2 GM/DL    Total Bilirubin 0.2 0.0 - 1.0 MG/DL    ALT 26 10 - 40 U/L    AST 26 15 - 37 IU/L    Alkaline Phosphatase 108 40 - 128 IU/L    GFR Non-African American >60 >60 mL/min/1.73m2    GFR African American >60 >60 mL/min/1.73m2    Anion Gap 11 4 - 16   Magnesium    Collection Time: 07/20/21  3:56 AM   Result Value Ref Range    Magnesium 2.0 1.8 - 2.4 mg/dl     CULTURE results: Invalid input(s): BLOOD CULTURE,  URINE CULTURE, SURGICAL CULTURE    Diagnosis:  Patient Active Problem List   Diagnosis    Essential hypertension    Other hyperlipidemia    Cardiac pacemaker    Congestive heart failure (HCC)    Cerebrovascular accident (CVA) due to vascular stenosis (HCC)    [facility]complete heart block) [facility])    S/P AVR (aortic valve replacement)    Weakness    Troponin I above reference range    Bacteremia due to Streptococcus       Active Problems  Active Problems:    Cardiac pacemaker    S/P AVR (aortic valve replacement)    Bacteremia due to Streptococcus    Essential hypertension    Cerebrovascular accident (CVA) due to vascular stenosis (HCC)    Weakness    Troponin I above reference range  Resolved Problems:    * No resolved hospital problems.  *      Impression and plan   Summary and rationale: Patient is a 80 y.o.  female with a medical history of hypertension, hyperlipidemia, CHF, status post TAVR for aortic stenosis, status post permanent pacemaker admitted for fever and generalized weakness. 2 out of 2 sets of blood cultures on 7/9/2021 + for Streptococcus sanguis. Probable GI source. Need to rule out endocarditis-prosthetic valve versus CI ED. No vegetation seen on VIKTORIA but this does not rule out involvement.     Clinical status: Improving   Therapeutic: continue Cefazolin IV 2g q 8 hour for cumulative 6-week course (end-date: 8/16/2021)   After discharge the following should be done:  Weekly labs drawn on Monday during the course of treatment  CBC with differential, CMP, ESR, CRP  Fax results to Attn: [place] [facility]  # [phone]  See in clinic within one week after discharge   Disposition:    Diagnostic: trend CRP and pct   F/u:    Other:        Electronically signed by: Electronically signed by Juan Katz MD on 7/20/2021 at 10:58 AM

## 2021-07-20 NOTE — CONSULTS
PICC Consult complete. Education regarding PICC insertion discussed and risks and benefits assessed with daughter [facility]. Daughter verbalized understanding. Telephone Consent from Daughter. Time out done @ 7263. PICC inserted in RUE Basilic Vessel. without difficulty per protocol. Placement verified via VPSG4 monitoring system. Good blood return and flushes easily on single port. Patient tolerated well. Education pamphlets left in chart  Ultrasound photos of vein diameter and doppler signature placed in chart. Patient is V Paced, no P waves. STAT portable chest xray ordered to confirm placement. @3483  PICC tip at [facility] [place] [facility], [place] to use PICC Line.

## 2021-07-21 LAB
ALBUMIN SERPL-MCNC: 2.9 GM/DL (ref 3.4–5)
ALP BLD-CCNC: 84 IU/L (ref 40–128)
ALT SERPL-CCNC: 18 U/L (ref 10–40)
ANION GAP SERPL CALCULATED.3IONS-SCNC: 10 MMOL/L (ref 4–16)
AST SERPL-CCNC: 25 IU/L (ref 15–37)
BASOPHILS ABSOLUTE: 0.1 K/CU MM
BASOPHILS RELATIVE PERCENT: 0.4 % (ref 0–1)
BILIRUB SERPL-MCNC: 0.3 MG/DL (ref 0–1)
BUN BLDV-MCNC: 18 MG/DL (ref 6–23)
CALCIUM SERPL-MCNC: 8.5 MG/DL (ref 8.3–10.6)
CHLORIDE BLD-SCNC: 106 MMOL/L (ref 99–110)
CO2: 24 MMOL/L (ref 21–32)
CREAT SERPL-MCNC: 0.7 MG/DL (ref 0.6–1.1)
CULTURE: NORMAL
CULTURE: NORMAL
DIFFERENTIAL TYPE: ABNORMAL
EOSINOPHILS ABSOLUTE: 0.2 K/CU MM
EOSINOPHILS RELATIVE PERCENT: 1.6 % (ref 0–3)
GFR AFRICAN AMERICAN: >60 ML/MIN/1.73M2
GFR NON-AFRICAN AMERICAN: >60 ML/MIN/1.73M2
GLUCOSE BLD-MCNC: 95 MG/DL (ref 70–99)
HCT VFR BLD CALC: 29 % (ref 37–47)
HEMOGLOBIN: 8.9 GM/DL (ref 12.5–16)
IMMATURE NEUTROPHIL %: 1.4 % (ref 0–0.43)
LYMPHOCYTES ABSOLUTE: 1.1 K/CU MM
LYMPHOCYTES RELATIVE PERCENT: 9.1 % (ref 24–44)
Lab: NORMAL
Lab: NORMAL
MCH RBC QN AUTO: 29.5 PG (ref 27–31)
MCHC RBC AUTO-ENTMCNC: 30.7 % (ref 32–36)
MCV RBC AUTO: 96 FL (ref 78–100)
MONOCYTES ABSOLUTE: 1.1 K/CU MM
MONOCYTES RELATIVE PERCENT: 9.5 % (ref 0–4)
NUCLEATED RBC %: 0 %
PDW BLD-RTO: 15.6 % (ref 11.7–14.9)
PLATELET # BLD: 283 K/CU MM (ref 140–440)
PMV BLD AUTO: 9.3 FL (ref 7.5–11.1)
POTASSIUM SERPL-SCNC: 3.7 MMOL/L (ref 3.5–5.1)
RBC # BLD: 3.02 M/CU MM (ref 4.2–5.4)
SEGMENTED NEUTROPHILS ABSOLUTE COUNT: 9 K/CU MM
SEGMENTED NEUTROPHILS RELATIVE PERCENT: 78 % (ref 36–66)
SODIUM BLD-SCNC: 140 MMOL/L (ref 135–145)
SPECIMEN: NORMAL
SPECIMEN: NORMAL
TOTAL CK: 25 IU/L (ref 26–140)
TOTAL IMMATURE NEUTOROPHIL: 0.16 K/CU MM
TOTAL NUCLEATED RBC: 0 K/CU MM
TOTAL PROTEIN: 4.9 GM/DL (ref 6.4–8.2)
WBC # BLD: 11.5 K/CU MM (ref 4–10.5)

## 2021-07-21 PROCEDURE — 6360000002 HC RX W HCPCS: Performed by: INTERNAL MEDICINE

## 2021-07-21 PROCEDURE — 1200000000 HC SEMI PRIVATE

## 2021-07-21 PROCEDURE — 36415 COLL VENOUS BLD VENIPUNCTURE: CPT

## 2021-07-21 PROCEDURE — 6370000000 HC RX 637 (ALT 250 FOR IP): Performed by: INTERNAL MEDICINE

## 2021-07-21 PROCEDURE — 82550 ASSAY OF CK (CPK): CPT

## 2021-07-21 PROCEDURE — 97110 THERAPEUTIC EXERCISES: CPT

## 2021-07-21 PROCEDURE — 2580000003 HC RX 258: Performed by: INTERNAL MEDICINE

## 2021-07-21 PROCEDURE — 6370000000 HC RX 637 (ALT 250 FOR IP): Performed by: NURSE PRACTITIONER

## 2021-07-21 PROCEDURE — 80048 BASIC METABOLIC PNL TOTAL CA: CPT

## 2021-07-21 PROCEDURE — 80053 COMPREHEN METABOLIC PANEL: CPT

## 2021-07-21 PROCEDURE — 97116 GAIT TRAINING THERAPY: CPT

## 2021-07-21 PROCEDURE — 99232 SBSQ HOSP IP/OBS MODERATE 35: CPT | Performed by: INTERNAL MEDICINE

## 2021-07-21 PROCEDURE — 85025 COMPLETE CBC W/AUTO DIFF WBC: CPT

## 2021-07-21 PROCEDURE — 94761 N-INVAS EAR/PLS OXIMETRY MLT: CPT

## 2021-07-21 RX ORDER — POTASSIUM CHLORIDE 20 MEQ/1
20 TABLET, EXTENDED RELEASE ORAL DAILY
Status: DISCONTINUED | OUTPATIENT
Start: 2021-07-22 | End: 2021-07-23 | Stop reason: HOSPADM

## 2021-07-21 RX ORDER — FUROSEMIDE 20 MG/1
20 TABLET ORAL DAILY
Status: DISCONTINUED | OUTPATIENT
Start: 2021-07-22 | End: 2021-07-23 | Stop reason: HOSPADM

## 2021-07-21 RX ADMIN — ASPIRIN 81 MG: 81 TABLET, CHEWABLE ORAL at 09:30

## 2021-07-21 RX ADMIN — PANTOPRAZOLE SODIUM 40 MG: 40 TABLET, DELAYED RELEASE ORAL at 06:25

## 2021-07-21 RX ADMIN — ENOXAPARIN SODIUM 40 MG: 40 INJECTION SUBCUTANEOUS at 09:31

## 2021-07-21 RX ADMIN — ACETAMINOPHEN 650 MG: 325 TABLET ORAL at 19:54

## 2021-07-21 RX ADMIN — ACETAMINOPHEN 650 MG: 325 TABLET ORAL at 09:50

## 2021-07-21 RX ADMIN — SODIUM CHLORIDE, PRESERVATIVE FREE 10 ML: 5 INJECTION INTRAVENOUS at 19:55

## 2021-07-21 RX ADMIN — Medication 1 CAPSULE: at 09:30

## 2021-07-21 RX ADMIN — CEFAZOLIN SODIUM 2000 MG: 2 INJECTION, SOLUTION INTRAVENOUS at 09:41

## 2021-07-21 RX ADMIN — METOPROLOL SUCCINATE 25 MG: 25 TABLET, EXTENDED RELEASE ORAL at 09:30

## 2021-07-21 RX ADMIN — Medication 1 TABLET: at 09:30

## 2021-07-21 RX ADMIN — FLUTICASONE PROPIONATE 1 SPRAY: 50 SPRAY, METERED NASAL at 09:47

## 2021-07-21 RX ADMIN — SODIUM CHLORIDE, PRESERVATIVE FREE 10 ML: 5 INJECTION INTRAVENOUS at 09:31

## 2021-07-21 RX ADMIN — CEFAZOLIN SODIUM 2000 MG: 2 INJECTION, SOLUTION INTRAVENOUS at 00:56

## 2021-07-21 RX ADMIN — CEFAZOLIN SODIUM 2000 MG: 2 INJECTION, SOLUTION INTRAVENOUS at 16:19

## 2021-07-21 ASSESSMENT — PAIN SCALES - GENERAL
PAINLEVEL_OUTOF10: 0
PAINLEVEL_OUTOF10: 0
PAINLEVEL_OUTOF10: 3
PAINLEVEL_OUTOF10: 3

## 2021-07-21 NOTE — PROGRESS NOTES
resolving  -PT/OT eval and treat    Elevated troponin not secondary to ACS  Cardiology evaluated    Diastolic heart failure with LE edema  Daily weights  Strict I/O  Evaluate for diuresis   venous US  has ruled out DVT of the lower extremities  Diuretics held in setting of rhabdo. Pt renal function WNL. Rhabdo resolved will initiate home dose. 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 Discharge Plan: back to home / [facility] / when able / discharge to hospice after seen by case management team. Patient plans to return home upon discharge   MDM [] Low, [] Moderate,[x]  High  Patient's risk as above due to:      [] One or more chronic illnesses with mild exacerbation progression      [x] Two or more stable chronic illnesses      [x] Undiagnosed new problem with uncertain prognosis      [] Elective major surgery      [x]Prescription drug management       History of Present Illness:     Chief Complaint: <principal problem not specified>  Cintia Edmonds is a 80 y.o.  female  who presents with generalized weakness. Patient feeling well and voices no complaints at this time. Did have some back pain earlier that improved with tylenol. Pending placement. Ten point ROS reviewed negative, unless as noted above    Objective:        Intake/Output Summary (Last 24 hours) at 7/21/2021 1054  Last data filed at 7/21/2021 0139  Gross per 24 hour   Intake  Output 700 ml   Net -700 ml      Vitals:   Vitals:    07/21/21 0927   BP: (!) 117/43   Pulse: 96   Resp: 18   Temp: 98.5 °F (36.9 °C)   SpO2: 96%     Physical Exam:   GEN Awake female, sitting upright in bed in no apparent distress. Appears given age. EYES Pupils are equally round. No scleral erythema, discharge, or conjunctivitis. HENT Mucous membranes are moist. Oral pharynx without exudates, no evidence of thrush. NECK Supple, no apparent thyromegaly or masses. RESP Faint rales bl.  no wheezes, rales or rhonchi. Symmetric chest movement while on room air. CARDIO/VASC S1/S2 auscultated. Regular rate without appreciable murmurs, rubs, or gallops. No JVD or carotid bruits. Peripheral pulses equal bilaterally and palpable. 2+ b/l leg edema trace thigh b/l.  GI Abdomen is soft without significant tenderness, masses, or guarding. Bowel sounds are normoactive. Rectal exam deferred.  No costovertebral angle tenderness. Normal appearing external genitalia. Goel catheter is not present. HEME/LYMPH No palpable cervical lymphadenopathy and no hepatosplenomegaly. No petechiae or ecchymoses. MSK No gross joint deformities. SKIN Normal coloration, warm, dry. NEURO Cranial nerves appear grossly intact, normal speech, no lateralizing weakness. PSYCH Awake, alert, oriented x 4. Affect appropriate. Past Medical History:    History reviewed. No pertinent past medical history. PSHX:  has a past surgical history that includes Aortic valve surgery (06/29/2017); pacemaker placement (06/29/2017); and brain surgery (11/27/2017). Allergies: Allergies   Allergen Reactions    Alendronate Other (See Comments)    Alendronate Sodium Other (See Comments)    Ibandronic Acid Other (See Comments)    Milk-Related Compounds        FAM HX: family history includes Cancer in an other family member; Heart Attack in her father; Heart Disease in her father.   Soc HX:   Social History     Socioeconomic History    Marital status:      Spouse name: None    Number of children: None    Years of education: None    Highest education level: None   Occupational History    None   Tobacco Use    Smoking status: Never Smoker    Smokeless tobacco: Never Used   Substance and Sexual Activity    Alcohol use: None    Drug use: None    Sexual activity: None   Other Topics Concern    None   Social History Narrative    None     Social Determinants of Health     Financial Resource Strain:     Difficulty of Paying Living Expenses:    Food Insecurity:     Worried About Running Out of Food in the Last Year:     Ran Out of Food in the Last Year:    Transportation Needs:     Lack of Transportation (Medical):      Lack of Transportation (Non-Medical):    Physical Activity:     Days of Exercise per Week:     Minutes of Exercise per Session:    Stress:     Feeling of Stress :    Social Connections:     Frequency of Communication with Friends and Family:     Frequency of Social Gatherings with Friends and Family:     Attends Caoda[employer] Services:     Active Member of Clubs or Organizations:     Attends Club or Organization Meetings:     Marital Status:    Intimate Partner Violence:     Fear of Current or Ex-Partner:     Emotionally Abused:     Physically Abused:     Sexually Abused:        Medications:   Medications:    sodium chloride flush  5-40 mL Intravenous 2 times per day    lactobacillus  1 capsule Oral Daily with breakfast    ceFAZolin  2,000 mg Intravenous Q8H    pantoprazole  40 mg Oral QAM AC    aspirin  81 mg Oral Daily    fluticasone  1 spray Each Nostril Daily    metoprolol succinate  25 mg Oral Daily    therapeutic multivitamin-minerals  1 tablet Oral Daily    sodium chloride flush  5-40 mL Intravenous 2 times per day    enoxaparin  40 mg Subcutaneous Daily      Infusions:    sodium chloride      sodium chloride       PRN Meds: sodium chloride flush, 5-40 mL, PRN  sodium chloride, 25 mL, PRN  sodium chloride flush, 10 mL, PRN  sodium chloride flush, 5-40 mL, PRN  sodium chloride, 25 mL, PRN  ondansetron, 4 mg, Q8H PRN   Or  ondansetron, 4 mg, Q6H PRN  polyethylene glycol, 17 g, Daily PRN  acetaminophen, 650 mg, Q6H PRN   Or  acetaminophen, 650 mg, Q6H PRN          Electronically signed by Jennifer Cadet DO on 7/21/2021 at 10:54 AM

## 2021-07-21 NOTE — FLOWSHEET NOTE
Follow up visit - pt may be discharged soon to rehab. Pt feeling better physically and emotionally. Engaged in conversation. Is coping well and accepts life.

## 2021-07-21 NOTE — PROGRESS NOTES
Physical Therapy Treatment Note  Name: Sabine Huynh MRN: [id number] :   5/15/1929   Date:  2021   Admission Date: 2021 Room:  -A     Restrictions/Precautions:  Restrictions/Precautions  Restrictions/Precautions: Fall Risk, General Precautions  Required Braces or Orthoses?: No        Communication with other providers:  RN    Subjective:  Patient states:  \"I think I'm getting out of here soon! \"  Pain:   Location, Type, Intensity (0/10 to 10/10):  Pt denies pain this session. Objective:    Observation:  Pt up in chair upon entry and agreeable to therapy. Treatment, including education/measures:  Transfers: Pt completed STS to/from chair CGA. Pt required cues for proper hand placement and slow descent to chair. Gait: Pt ambulated 25' + 100' CGA with SPC. Pt performed stair training between bouts. Pt demonstrated decreased sherwin, decreased step length bilaterally and narrow EFFIE. Pt demonstrated decreased pathway negotiation requiring cues to correct. Stair training: Pt ascended/descended 1 stair x4 CGA with SPC and hand rail assist on L and 1 stair x3 CGA with hand rail assist on R and HHA on L. Pt deferred further stair training due to fatigue. Therapeutic exercise: Pt completed STS to/from chair x2 + x3 + x3 + x2 with CGA. Pt required seated rest break between bouts. Pt cued for proper hand placement and slow descent to chair throughout. Assessment / Impression:    Pt left in chair with alarm on and call button within reach at end of session. Patient's tolerance of treatment:  good   Adverse Reaction: n/a  Significant change in status and impact:  n/a  Barriers to improvement:  Decreased strength, decreased endurance, impaired bed mobility, impaired transfers, impaired gait  Plan for Next Session:    Progress ambulation distance as tolerated to improve endurance.   Time in:  1514  Time out:  1538  Timed treatment minutes:  24  Total treatment time:  24    Previously filed items:  Social/Functional History  Lives With: Daughter  Type of Home: House  Home Layout: One level  Home Access: Stairs to enter without rails  Entrance Stairs - Number of Steps: 1  Bathroom Shower/Tub: Tub/Shower unit  Bathroom Toilet: Standard  Home Equipment: Cane (Uses cane in community but not in home)  ADL Assistance: Independent  Homemaking Assistance: Independent  Homemaking Responsibilities: Yes  Ambulation Assistance: Independent  Transfer Assistance: Independent  Active : No  Additional Comments: Pt reports 1 fall in 6 months. Short term goals  Time Frame for Short term goals: 1 week  Short term goal 1: Pt to complete all bed mobility mod I  Short term goal 2: Pt to complete all STS transfers to/from bed, commode, and chair mod I  Short term goal 3: Pt to ambulate 48' with LRAD mod I       Electronically signed by:    Aracelis Garcia  7/21/2021, 3:42 PM   \"I, the qualified professional, was present and in the room for the entire session. The student participates in the delivery of services when the qualified practitioner is directing the service, making the skilled judgment, and is responsible for the assessment and treatment. \"

## 2021-07-21 NOTE — PROGRESS NOTES
ATTENDING PHYSICIAN'S PROGRESS NOTES    Patient:  Missy Ulloa      Unit/Bed:2018/2018-A    YOB: 1929    MRN: [id number]     Acct: [de-identified]     Admit date: 7/9/2021    Patient Seen, Chart, Consults notes, Labs, Radiology studies reviewed. SUBJECTIVE:   Day 9 of stay     Juan Bennett a 80 y. o. lady  who presents with generalized weakness. Patient has past medical history of hypertension, hyperlipidemia, CHF, post s/p LOPEZ for aortic stenosis, status post permanent pacemaker implantation.       Patient was admitted for fever, generalized weakness and rhabdomyolysis.  2 out of 2 sets sets of blood cultures 7/9/2021 grew Streptococcus Sanguis. VIKTORIA done  to rule out endocarditis-prosthetic valve versus cardiovascular implantable electronic device infection ( [facility]) per ID. VIKTORIA was unremarkable. Pacemaker wire is free from vegetation. No sign of endocarditis    Interval history     Patient was placed a PICC line today for long term iv antibiotic infusion. She still has loose stool. Denies abdominal pain, nausea or vomiting. No fever or chills. ID changed the antibiotics . Diarrhea is likely due to antibiotic use. All other ROS negative except noted in HPI    Past, Family, Social History unchanged from admission. Diet:  ADULT DIET;  Regular    Medications:  Scheduled Meds:   sodium chloride flush  5-40 mL Intravenous 2 times per day    lactobacillus  1 capsule Oral Daily with breakfast    ceFAZolin  2,000 mg Intravenous Q8H    pantoprazole  40 mg Oral QAM AC    aspirin  81 mg Oral Daily    fluticasone  1 spray Each Nostril Daily    metoprolol succinate  25 mg Oral Daily    therapeutic multivitamin-minerals  1 tablet Oral Daily    sodium chloride flush  5-40 mL Intravenous 2 times per day    enoxaparin  40 mg Subcutaneous Daily     Continuous Infusions:   sodium chloride      sodium chloride       PRN Meds:sodium chloride flush, sodium chloride, sodium chloride flush, sodium chloride flush, sodium chloride, ondansetron **OR** ondansetron, polyethylene glycol, acetaminophen **OR** acetaminophen    OBJECTIVE:    CBC:   Recent Labs     07/18/21  0702 07/19/21  0542 07/20/21 0356   WBC 11.5* 11.7* 12.7*   HGB 9.5* 9.3* 8.9*    284 271     BMP:    Recent Labs     07/18/21  0702 07/19/21  0542 07/20/21 0356    141 141   K 4.2 4.0 3.9    106 106   CO2 23 24 24   BUN 20 19 20   CREATININE 0.6 0.6 0.8   GLUCOSE 103* 95 98     Calcium:  Recent Labs     07/20/21 0356   CALCIUM 8.5     Ionized Calcium:No results for input(s): IONCA in the last 72 hours. Magnesium:  Recent Labs     07/20/21 0356   MG 2.0     Phosphorus:No results for input(s): PHOS in the last 72 hours. BNP:No results for input(s): BNP in the last 72 hours. Glucose:No results for input(s): POCGLU in the last 72 hours. HgbA1C: No results for input(s): LABA1C in the last 72 hours. INR: No results for input(s): INR in the last 72 hours. Hepatic:   Recent Labs     07/20/21 0356   ALKPHOS 108   ALT 26   AST 26   PROT 5.1*   BILITOT 0.2   LABALBU 3.0*     Amylase and Lipase:No results for input(s): LACTA, AMYLASE in the last 72 hours. Lactic Acid: No results for input(s): LACTA in the last 72 hours. Troponin:   Recent Labs     07/18/21  0702 07/19/21  0542 07/20/21  0356   CKTOTAL 39 31 28     BNP: No results for input(s): BNP in the last 72 hours. Lipids: No results for input(s): CHOL, TRIG, HDL, LDLCALC in the last 72 hours.     Invalid input(s): LDL  ABGs: No results found for: PH, PCO2, PO2, HCO3, O2SAT    Radiology reports as per the Radiologist  Radiology: CT HEAD WO CONTRAST    Result Date: 7/9/2021  EXAMINATION: CT OF THE HEAD WITHOUT CONTRAST  7/9/2021 12:51 pm TECHNIQUE: CT of the head was performed without the administration of intravenous contrast. Dose modulation, iterative reconstruction, and/or weight based adjustment of the mA/kV was utilized to reduce the radiation dose to as low as reasonably achievable. COMPARISON: None. HISTORY: ORDERING SYSTEM PROVIDED HISTORY: generalized weakness, ams TECHNOLOGIST PROVIDED HISTORY: Reason for exam:->generalized weakness, ams Has a \"code stroke\" or \"stroke alert\" been called? ->No Decision Support Exception - unselect if not a suspected or confirmed emergency medical condition->Emergency Medical Condition (MA) Reason for Exam: generalized weakness, ams Acuity: Acute Type of Exam: Initial FINDINGS: BRAIN/VENTRICLES: There is no acute intracranial hemorrhage, mass effect or midline shift. No abnormal extra-axial fluid collection. The gray-white differentiation is maintained without evidence of an acute infarct. There is no evidence of hydrocephalus. Periventricular white matter low densities most likely due to chronic microvascular ischemia. Bilateral basal ganglia calcifications are present. ORBITS: The visualized portion of the orbits demonstrate no acute abnormality. SINUSES: The visualized paranasal sinuses and mastoid air cells demonstrate no acute abnormality. SOFT TISSUES/SKULL:  2 separate carter hole defects of the left-sided calvarium noted. No acute osseous findings. No acute intracranial abnormality. CT CHEST W CONTRAST    Result Date: 7/9/2021  EXAMINATION: CT OF THE CHEST WITH CONTRAST; CT OF THE ABDOMEN AND PELVIS WITH CONTRAST 7/9/2021 12:52 pm TECHNIQUE: CT of the chest was performed with the administration of intravenous contrast. Multiplanar reformatted images are provided for review. Dose modulation, iterative reconstruction, and/or weight based adjustment of the mA/kV was utilized to reduce the radiation dose to as low as reasonably achievable.; CT of the abdomen and pelvis was performed with the administration of intravenous contrast. Multiplanar reformatted images are provided for review.  Dose modulation, iterative reconstruction, and/or weight based adjustment of the mA/kV was utilized to reduce the radiation dose to as low as reasonably achievable. COMPARISON: 11/14/2017 HISTORY: ORDERING SYSTEM PROVIDED HISTORY: ?ptx on cxr, generalized weakness, fever TECHNOLOGIST PROVIDED HISTORY: Reason for exam:->?ptx on cxr, generalized weakness, fever Decision Support Exception - unselect if not a suspected or confirmed emergency medical condition->Emergency Medical Condition (MA) Reason for Exam: ?ptx on cxr, generalized weakness, fever Acuity: Acute Type of Exam: Initial; ORDERING SYSTEM PROVIDED HISTORY: fever, AMS, generalized weakness TECHNOLOGIST PROVIDED HISTORY: Reason for exam:->fever, AMS, generalized weakness Additional Contrast?->None Decision Support Exception - unselect if not a suspected or confirmed emergency medical condition->Emergency Medical Condition (MA) Reason for Exam: fever, AMS, generalized weakness Acuity: Acute Type of Exam: Initial Chest: Mediastinum: No acute abnormality of the aorta or other mediastinal structures. No pericardial effusion. Normal size lymph nodes, no evidence of adenopathy. Lungs/pleura: Small bilateral dependent pleural effusions with adjacent compressive atelectasis. No pneumothorax. Detailed lung evaluation limited due to motion. No consolidation Soft Tissues/Bones: No acute fracture. No acute findings of the chest wall. Extensive degenerative changes of the thoracic spine. No abscess of the soft tissues of the chest wall. Abdomen/Pelvis: Organs: No acute findings of the organs throughout the abdomen. No evidence of pancreatitis or pyelonephritis. No ureteral stone or hydronephrosis. Normal appearance of the gallbladder. No acute findings of the liver. No evidence of biliary dilation/obstruction. 1.9 cm cyst at the dome incidentally noted. Detailed evaluation of the organs limited due to motion. Cyst of the central right kidney measuring 4 cm. Several low-density lesions of the spleen measuring 1.4 cm and less most likely cysts or hemangiomata.  GI/Bowel: No obstruction or other acute findings. No evidence diverticulitis or colitis. There is a large amount of colonic diverticulosis but no evidence of diverticulitis. Pelvis: No evidence of cystitis. Bladder appears normal. Peritoneum/Retroperitoneum: No ascites or free air. No abscess. Bones/Soft Tissues: No acute fracture of the abdomen and pelvis. No abscess. Chest: Small bilateral dependent pleural effusions mild adjacent atelectasis. No evidence of acute process otherwise. Abdomen/pelvis: No evidence of acute process. CT ABDOMEN PELVIS W IV CONTRAST Additional Contrast? None    Result Date: 7/9/2021  EXAMINATION: CT OF THE CHEST WITH CONTRAST; CT OF THE ABDOMEN AND PELVIS WITH CONTRAST 7/9/2021 12:52 pm TECHNIQUE: CT of the chest was performed with the administration of intravenous contrast. Multiplanar reformatted images are provided for review. Dose modulation, iterative reconstruction, and/or weight based adjustment of the mA/kV was utilized to reduce the radiation dose to as low as reasonably achievable.; CT of the abdomen and pelvis was performed with the administration of intravenous contrast. Multiplanar reformatted images are provided for review. Dose modulation, iterative reconstruction, and/or weight based adjustment of the mA/kV was utilized to reduce the radiation dose to as low as reasonably achievable.  COMPARISON: 11/14/2017 HISTORY: ORDERING SYSTEM PROVIDED HISTORY: ?ptx on cxr, generalized weakness, fever TECHNOLOGIST PROVIDED HISTORY: Reason for exam:->?ptx on cxr, generalized weakness, fever Decision Support Exception - unselect if not a suspected or confirmed emergency medical condition->Emergency Medical Condition (MA) Reason for Exam: ?ptx on cxr, generalized weakness, fever Acuity: Acute Type of Exam: Initial; ORDERING SYSTEM PROVIDED HISTORY: fever, AMS, generalized weakness TECHNOLOGIST PROVIDED HISTORY: Reason for exam:->fever, AMS, generalized weakness Additional Contrast?->None Decision Support Exception - unselect if not a suspected or confirmed emergency medical condition->Emergency Medical Condition (MA) Reason for Exam: fever, AMS, generalized weakness Acuity: Acute Type of Exam: Initial Chest: Mediastinum: No acute abnormality of the aorta or other mediastinal structures. No pericardial effusion. Normal size lymph nodes, no evidence of adenopathy. Lungs/pleura: Small bilateral dependent pleural effusions with adjacent compressive atelectasis. No pneumothorax. Detailed lung evaluation limited due to motion. No consolidation Soft Tissues/Bones: No acute fracture. No acute findings of the chest wall. Extensive degenerative changes of the thoracic spine. No abscess of the soft tissues of the chest wall. Abdomen/Pelvis: Organs: No acute findings of the organs throughout the abdomen. No evidence of pancreatitis or pyelonephritis. No ureteral stone or hydronephrosis. Normal appearance of the gallbladder. No acute findings of the liver. No evidence of biliary dilation/obstruction. 1.9 cm cyst at the dome incidentally noted. Detailed evaluation of the organs limited due to motion. Cyst of the central right kidney measuring 4 cm. Several low-density lesions of the spleen measuring 1.4 cm and less most likely cysts or hemangiomata. GI/Bowel: No obstruction or other acute findings. No evidence diverticulitis or colitis. There is a large amount of colonic diverticulosis but no evidence of diverticulitis. Pelvis: No evidence of cystitis. Bladder appears normal. Peritoneum/Retroperitoneum: No ascites or free air. No abscess. Bones/Soft Tissues: No acute fracture of the abdomen and pelvis. No abscess. Chest: Small bilateral dependent pleural effusions mild adjacent atelectasis. No evidence of acute process otherwise. Abdomen/pelvis: No evidence of acute process.      XR CHEST PORTABLE    Result Date: 7/9/2021  EXAMINATION: ONE XRAY VIEW OF THE CHEST 7/9/2021 11:48 am COMPARISON: None. HISTORY: ORDERING SYSTEM PROVIDED HISTORY: generalized weakness TECHNOLOGIST PROVIDED HISTORY: Reason for exam:->generalized weakness Reason for Exam: generalized weakness FINDINGS: The cardiac silhouette is at the upper limits of normal in size. There is a left-sided cardiac device as well as sequela of TAVR. There is sequela of old granulomatous disease. The lung volumes are low with mild pulmonary vascular congestion. There is a suspected skin fold along the right lateral chest.  Pneumothorax cannot entirely be excluded. Follow-up radiograph is recommended. Degenerative changes are noted in the [facility] and glenohumeral joints. 1. Low lung volumes with pulmonary vascular congestion. 2. Suspected skin fold along the right lateral and upper chest. Underlying pneumothorax cannot entirely be excluded. Follow-up radiographs are recommended. Physical Exam:  Vitals: BP (!) 135/57   Pulse 101   Temp 97.9 °F (36.6 °C) (Oral)   Resp 16   Ht 5' 1\" (1.549 m)   Wt 176 lb 2.4 oz (79.9 kg)   SpO2 96%   BMI 33.28 kg/m²   24 hour intake/output:    Intake/Output Summary (Last 24 hours) at 7/20/2021 2055  Last data filed at 7/20/2021 1021  Gross per 24 hour   Intake 5 ml   Output    Net 5 ml     Last 3 weights: Wt Readings from Last 3 Encounters:   07/20/21 176 lb 2.4 oz (79.9 kg)   05/28/21 154 lb (69.9 kg)   10/13/20 158 lb (71.7 kg)     General: Elderly frail woman, rushing to the bathroom with assistance, not in acute distress  ENT: No nasal discharge, oral mucosa moist, hard hearing   Neck: Supple, trachea in midline, no thyromegaly, no JVD, no carotid bruits  Chest: Normal respiratory effort, symmetric expansion of the chest, no rales, rhonchi or wheezes; PM  in the precordial region; no signs of infection  CV: S1-S2 audible, tachycardiac, regular rate and rhythm, 2/6 systolic murmur rub noted; no gallop, pulses 2+ upper extremities  Abdomen:  Bowel sounds present, abdomen soft, nontender nondistended  Musculoskeletal: bilateral lower extremities pitting edema 1-2+. No cyanosis or clubbing.  No obvious deformity, range of motion grossly within normal limit  Neurology: AAOx3, CN 2-12 grossly intact, no focal deficits  Psychiatric: Normal mood, affect is congruent with age and the environment; cooperative  Skin: Warm and dry, no rash in the exposed post area       DVT prophylaxis: [x] Lovenox                                 [] SCDs                                 [] SQ Heparin                                 [] Encourage ambulation           [] Already on Anticoagulation                 ASSESSMENT / PLAN :    Active Problems:    Essential hypertension    Cardiac pacemaker    Cerebrovascular accident (CVA) due to vascular stenosis ([address]    S/P AVR (aortic valve replacement)    Weakness    Troponin I above reference range    Bacteremia due to Streptococcus  Resolved Problems:    * No resolved hospital problems. *       Joi Wooduseadilson is a 80 y. o.  female  who presents with generalized weakness     1) Rhabdomyolysis; mild  resolved  -CK elevated  -Hold statin , lasix   -CK trending down and wnl now, will discontinue NS    -Continue Chem7 - Creatinine remains stable      2) Generalized weakness  -Improved on exam  -PT/OT recommended SNF     3) Elevated Troponin  -Not likely ACS, likely 2/2 #1  -tropI/EKG serial negative for ACS  -Echo as below , EF preserved 50-55%. Diastolic dysfunction grade III;   -EKG reviewed, V Paced rhythm       Echocardiogram 7/12/2021           Left ventricular systolic function is normal.           Ejection fraction is visually estimated at 50-55%.           Grade III diastolic dysfunction.          Severe biatrial enlargement.          PPM wiring visualized within the right heart.          History of TAVR (#29 [place] 3) with moderate perivalvular leak.          Slight prolapse of the anterior mitral valve leaflet with moderate mitral valve regurgitation.          Severe tricuspid regurgitation; RVSP: 69 mmHg.           Mild to moderate pulmonic regurgitation present.           No evidence of any pericardial effusion.           Right pleural effusion.    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 CT negative for evidence of infection; VIKTORIA:  Pacemaker wire is free from vegetation.             - ID on board- Abx changed to Ceftriaxone. Vanco/cefepime were discontinued now.   - repeat blood cultures on 7/13,15,16 no growth so far; follow CRP, ESR, procalcitonin  -TTE  completed,no evidence of vegitation 7/13/2021    No  significant evidence of vegetations, per EP discussion documented w/ family, patient will likely stay on suppressive abx without aggressive intervention of pacemaker removal   -Repeat blood cultures sent per ID recs, last blood cxs from 7/9/2021 positive;    -ID recommended cefazolin 2g q8hr x6 weeks for antibiotic regimen on discharge.               Echocardiogram VIKTORIA 7/13/2021:    Summary  Pacemaker wire is free from vegetation. S/p TAVR: #29 Watson Nancy 3  Moderate to severe mitral regurgitation is present. There was no thrombus noted in the left atrial appendage. Negative bubble study. No PFO or ASD noted.        5) LE edema  -likely from CHF (diastolic dysfunction grade III)  -was on lasix, and has been holding due to rhabdomyolysis  - venous US  has ruled out DVT of the lower extremities  - NS ivf was discontinued      6) sepsis  improving  Leukocytosis and tachycardia  -blood cultures negative so far, CRP elevated 84.5< 49.5 ( > 3 high risk), pro calcitonin wnl, lactic acid 1.2 wnl; no hypotension  -on iv antibiotics now  -Chest xray 7/17/2021 showing mild bibasilar airspace disease, greater on the right.  This is most likely reflects presence of atelectasis, but edema and pneumonia remain in the differential. and urinalysis suggests UTI, follow urine culture no growth at 18-36 hrs  -close monitor signs of infection     Other chronic medical condition; medication resumed unless contraindicated      - Essential HTN  - HLD  - S/P PPM   - S/P AVT     Disposition: Patient will go to nursing home when medically ready for discharge with IV antibiotic infusion.      Electronically signed by Tanna Sheridan MD on 7/20/2021 at 8:55 PM    [facility] Hospitalist

## 2021-07-21 NOTE — PROGRESS NOTES
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 History:? Interval history noted  Denies n/v/d/f or untoward effects of antimicrobials  Physical Exam:  Vital Signs: BP (!) 117/43   Pulse 96   Temp 98.5 °F (36.9 °C) (Oral)   Resp 18   Ht 5' 1\" (1.549 m)   Wt 176 lb 2.4 oz (79.9 kg)   SpO2 96%   BMI 33.28 kg/m²     Gen: alert and oriented X3, no distress  Skin: no stigmata of endocarditis  Wounds: C/D/I  HEMT: AT/NC Oropharynx pink, moist, and without lesions or exudates; dentition in good state of repair  Eyes: PERRLA, EOMI, conjunctiva pink, sclera anicteric. Neck: Supple. Trachea midline. No LAD. Chest: no distress and CTA. Good air movement. Heart: systolic murmur in all cardiac areas  Abd: soft, non-distended, no tenderness, no hepatomegaly. Normoactive bowel sounds. Ext: no clubbing, cyanosis, or edema  Catheter Site: without erythema or tenderness  LDA: CVC:  Neuro: Mental status intact. CN 2-12 intact and no focal sensory or motor deficits     Radiologic / Imaging / TESTING  No results found.      Labs:    Recent Results (from the past 24 hour(s))   CK    Collection Time: 21  3:49 AM   Result Value Ref Range    Total CK 25 (L) 26 - 140 IU/L   CBC auto differential    Collection Time: 21  3:49 AM   Result Value Ref Range    WBC 11.5 (H) 4.0 - 10.5 K/CU MM    RBC 3.02 (L) 4.2 - 5.4 M/CU MM    Hemoglobin 8.9 (L) 12.5 - 16.0 GM/DL    Hematocrit 29.0 (L) 37 - 47 %    MCV 96.0 78 - 100 FL    MCH 29.5 27 - 31 PG    MCHC 30.7 (L) 32.0 - 36.0 %    RDW 15.6 (H) 11.7 - 14.9 %    Platelets 768 992 - 794 K/CU MM    MPV 9.3 7.5 - 11.1 FL    Differential Type AUTOMATED DIFFERENTIAL     Segs Relative 78.0 (H) 36 - 66 %    Lymphocytes % 9.1 (L) 24 - 44 %    Monocytes % 9.5 (H) 0 - 4 %    Eosinophils % 1.6 0 - 3 %    Basophils % 0.4 0 - 1 %    Segs Absolute 9.0 K/CU MM    Lymphocytes Absolute 1.1 K/CU MM    Monocytes Absolute 1.1 K/CU MM    Eosinophils Absolute 0.2 K/CU MM    Basophils Absolute 0.1 K/CU MM    Nucleated RBC % 0.0 %    Total Nucleated RBC 0.0 K/CU MM    Total Immature Neutrophil 0.16 K/CU MM    Immature Neutrophil % 1.4 (H) 0 - 0.43 %   Comprehensive Metabolic Panel    Collection Time: 07/21/21  3:49 AM   Result Value Ref Range    Sodium 140 135 - 145 MMOL/L    Potassium 3.7 3.5 - 5.1 MMOL/L    Chloride 106 99 - 110 mMol/L    CO2 24 21 - 32 MMOL/L    BUN 18 6 - 23 MG/DL    CREATININE 0.7 0.6 - 1.1 MG/DL    Glucose 95 70 - 99 MG/DL    Calcium 8.5 8.3 - 10.6 MG/DL    Albumin 2.9 (L) 3.4 - 5.0 GM/DL    Total Protein 4.9 (L) 6.4 - 8.2 GM/DL    Total Bilirubin 0.3 0.0 - 1.0 MG/DL    ALT 18 10 - 40 U/L    AST 25 15 - 37 IU/L    Alkaline Phosphatase 84 40 - 128 IU/L    GFR Non-African American >60 >60 mL/min/1.73m2    GFR African American >60 >60 mL/min/1.73m2    Anion Gap 10 4 - 16     CULTURE results: Invalid input(s): BLOOD CULTURE,  URINE CULTURE, SURGICAL CULTURE    Diagnosis:  Patient Active Problem List   Diagnosis    Essential hypertension    Other hyperlipidemia    Cardiac pacemaker    [address]estive heart failure ([facility] [address]    Cerebrovascular accident (CVA) due to vascular stenosis (HCC)    [facility]    S/P AVR (aortic valve replacement)    Weakness    Troponin I above reference range    Bacteremia due to Streptococcus       Active Problems  Active Problems:    Cardiac pacemaker    S/P AVR (aortic valve replacement)    Bacteremia due to Streptococcus    Essential hypertension    Cerebrovascular accident (CVA) due to vascular stenosis (HCC)    Weakness    Troponin I above reference range  Resolved Problems:    * No resolved hospital problems. *      Impression and plan   Summary and rationale: Patient is a 80 y.o.   female with a medical history of hypertension,

## 2021-07-22 VITALS
RESPIRATION RATE: 18 BRPM | HEART RATE: 103 BPM | WEIGHT: 176.15 LBS | TEMPERATURE: 98 F | SYSTOLIC BLOOD PRESSURE: 115 MMHG | OXYGEN SATURATION: 95 % | DIASTOLIC BLOOD PRESSURE: 51 MMHG | BODY MASS INDEX: 33.26 KG/M2 | HEIGHT: 61 IN

## 2021-07-22 LAB
ANION GAP SERPL CALCULATED.3IONS-SCNC: 8 MMOL/L (ref 4–16)
BASOPHILS ABSOLUTE: 0.1 K/CU MM
BASOPHILS RELATIVE PERCENT: 0.5 % (ref 0–1)
BUN BLDV-MCNC: 19 MG/DL (ref 6–23)
CALCIUM SERPL-MCNC: 8.6 MG/DL (ref 8.3–10.6)
CHLORIDE BLD-SCNC: 108 MMOL/L (ref 99–110)
CO2: 25 MMOL/L (ref 21–32)
CREAT SERPL-MCNC: 0.7 MG/DL (ref 0.6–1.1)
DIFFERENTIAL TYPE: ABNORMAL
EOSINOPHILS ABSOLUTE: 0.2 K/CU MM
EOSINOPHILS RELATIVE PERCENT: 2.3 % (ref 0–3)
GFR AFRICAN AMERICAN: >60 ML/MIN/1.73M2
GFR NON-AFRICAN AMERICAN: >60 ML/MIN/1.73M2
GLUCOSE BLD-MCNC: 99 MG/DL (ref 70–99)
HCT VFR BLD CALC: 29.3 % (ref 37–47)
HEMOGLOBIN: 9.4 GM/DL (ref 12.5–16)
IMMATURE NEUTROPHIL %: 1.1 % (ref 0–0.43)
LYMPHOCYTES ABSOLUTE: 1.3 K/CU MM
LYMPHOCYTES RELATIVE PERCENT: 13 % (ref 24–44)
MCH RBC QN AUTO: 30.3 PG (ref 27–31)
MCHC RBC AUTO-ENTMCNC: 32.1 % (ref 32–36)
MCV RBC AUTO: 94.5 FL (ref 78–100)
MONOCYTES ABSOLUTE: 1.3 K/CU MM
MONOCYTES RELATIVE PERCENT: 12.6 % (ref 0–4)
NUCLEATED RBC %: 0 %
PDW BLD-RTO: 15.8 % (ref 11.7–14.9)
PLATELET # BLD: 291 K/CU MM (ref 140–440)
PMV BLD AUTO: 8.9 FL (ref 7.5–11.1)
POTASSIUM SERPL-SCNC: 3.7 MMOL/L (ref 3.5–5.1)
RBC # BLD: 3.1 M/CU MM (ref 4.2–5.4)
SARS-COV-2, NAAT: NOT DETECTED
SEGMENTED NEUTROPHILS ABSOLUTE COUNT: 7.3 K/CU MM
SEGMENTED NEUTROPHILS RELATIVE PERCENT: 70.5 % (ref 36–66)
SODIUM BLD-SCNC: 141 MMOL/L (ref 135–145)
SOURCE: NORMAL
TOTAL CK: 26 IU/L (ref 26–140)
TOTAL IMMATURE NEUTOROPHIL: 0.11 K/CU MM
TOTAL NUCLEATED RBC: 0 K/CU MM
WBC # BLD: 10.3 K/CU MM (ref 4–10.5)

## 2021-07-22 PROCEDURE — 87635 SARS-COV-2 COVID-19 AMP PRB: CPT

## 2021-07-22 PROCEDURE — 6370000000 HC RX 637 (ALT 250 FOR IP): Performed by: NURSE PRACTITIONER

## 2021-07-22 PROCEDURE — 82550 ASSAY OF CK (CPK): CPT

## 2021-07-22 PROCEDURE — 94761 N-INVAS EAR/PLS OXIMETRY MLT: CPT

## 2021-07-22 PROCEDURE — 85025 COMPLETE CBC W/AUTO DIFF WBC: CPT

## 2021-07-22 PROCEDURE — 6370000000 HC RX 637 (ALT 250 FOR IP): Performed by: INTERNAL MEDICINE

## 2021-07-22 PROCEDURE — 99232 SBSQ HOSP IP/OBS MODERATE 35: CPT | Performed by: INTERNAL MEDICINE

## 2021-07-22 PROCEDURE — 6360000002 HC RX W HCPCS: Performed by: INTERNAL MEDICINE

## 2021-07-22 PROCEDURE — 6370000000 HC RX 637 (ALT 250 FOR IP): Performed by: FAMILY MEDICINE

## 2021-07-22 PROCEDURE — 80048 BASIC METABOLIC PNL TOTAL CA: CPT

## 2021-07-22 PROCEDURE — 2580000003 HC RX 258: Performed by: INTERNAL MEDICINE

## 2021-07-22 RX ORDER — CEFAZOLIN SODIUM 2 G/100ML
2000 INJECTION, SOLUTION INTRAVENOUS EVERY 8 HOURS
Qty: 3000 ML | Refills: 0 | Status: SHIPPED | OUTPATIENT
Start: 2021-07-22 | End: 2021-08-16

## 2021-07-22 RX ORDER — CEFAZOLIN SODIUM 2 G/50ML
2000 SOLUTION INTRAVENOUS EVERY 8 HOURS
Status: DISCONTINUED | OUTPATIENT
Start: 2021-07-23 | End: 2021-07-23 | Stop reason: HOSPADM

## 2021-07-22 RX ADMIN — ASPIRIN 81 MG: 81 TABLET, CHEWABLE ORAL at 09:33

## 2021-07-22 RX ADMIN — PANTOPRAZOLE SODIUM 40 MG: 40 TABLET, DELAYED RELEASE ORAL at 05:11

## 2021-07-22 RX ADMIN — ENOXAPARIN SODIUM 40 MG: 40 INJECTION SUBCUTANEOUS at 09:33

## 2021-07-22 RX ADMIN — Medication 1 CAPSULE: at 09:36

## 2021-07-22 RX ADMIN — CEFAZOLIN SODIUM 2000 MG: 2 INJECTION, SOLUTION INTRAVENOUS at 01:06

## 2021-07-22 RX ADMIN — Medication 1 TABLET: at 09:33

## 2021-07-22 RX ADMIN — FUROSEMIDE 20 MG: 20 TABLET ORAL at 09:33

## 2021-07-22 RX ADMIN — SODIUM CHLORIDE, PRESERVATIVE FREE 10 ML: 5 INJECTION INTRAVENOUS at 09:34

## 2021-07-22 RX ADMIN — CEFAZOLIN SODIUM 2000 MG: 2 INJECTION, SOLUTION INTRAVENOUS at 18:39

## 2021-07-22 RX ADMIN — FLUTICASONE PROPIONATE 1 SPRAY: 50 SPRAY, METERED NASAL at 09:38

## 2021-07-22 RX ADMIN — POTASSIUM CHLORIDE 20 MEQ: 1500 TABLET, EXTENDED RELEASE ORAL at 09:33

## 2021-07-22 RX ADMIN — METOPROLOL SUCCINATE 25 MG: 25 TABLET, EXTENDED RELEASE ORAL at 09:33

## 2021-07-22 RX ADMIN — CEFAZOLIN SODIUM 2000 MG: 2 INJECTION, SOLUTION INTRAVENOUS at 09:34

## 2021-07-22 NOTE — PROGRESS NOTES
Infectious Disease Progress Note  2021   Patient Name: Perico Cantu : 5/15/1929       Reason for visit: F/u Streptococcus sanguis bacteremia, suspected prosthetic valve endocarditis versus cardiac implantable electronic device endocarditis ? History:? Interval history noted  Denies n/v/d/f or untoward effects of antimicrobials  Physical Exam:  Vital Signs: BP (!) 119/44   Pulse 95   Temp 98.3 °F (36.8 °C) (Oral)   Resp 18   Ht 5' 1\" (1.549 m)   Wt 176 lb 2.4 oz (79.9 kg)   SpO2 96%   BMI 33.28 kg/m²     Gen: alert and oriented X3, no distress  Skin: no stigmata of endocarditis  Wounds: C/D/I  HEMT: AT/NC Oropharynx pink, moist, and without lesions or exudates; dentition in good state of repair  Eyes: PERRLA, EOMI, conjunctiva pink, sclera anicteric. Neck: Supple. Trachea midline. No LAD. Chest: no distress and CTA. Good air movement. Heart: systolic murmur in all cardiac areas  Abd: soft, non-distended, no tenderness, no hepatomegaly. Normoactive bowel sounds. Ext: no clubbing, cyanosis, or edema  Catheter Site: without erythema or tenderness  LDA: CVC:  Neuro: Mental status intact. CN 2-12 intact and no focal sensory or motor deficits     Radiologic / Imaging / TESTING  No results found.      Labs:    Recent Results (from the past 24 hour(s))   Basic metabolic panel    Collection Time: 21  5:19 AM   Result Value Ref Range    Sodium 141 135 - 145 MMOL/L    Potassium 3.7 3.5 - 5.1 MMOL/L    Chloride 108 99 - 110 mMol/L    CO2 25 21 - 32 MMOL/L    Anion Gap 8 4 - 16    BUN 19 6 - 23 MG/DL    CREATININE 0.7 0.6 - 1.1 MG/DL    Glucose 99 70 - 99 MG/DL    Calcium 8.6 8.3 - 10.6 MG/DL    GFR Non-African American >60 >60 mL/min/1.73m2    GFR African American >60 >60 mL/min/1.73m2   CK    Collection Time: 21  5:19 AM   Result Value Ref Range    Total CK 26 26 - 140 IU/L   CBC auto differential    Collection Time: 21  5:19 AM   Result Value Ref Range    WBC 10.3 4.0 - 10.5 K/CU MM RBC 3.10 (L) 4.2 - 5.4 M/CU MM    Hemoglobin 9.4 (L) 12.5 - 16.0 GM/DL    Hematocrit 29.3 (L) 37 - 47 %    MCV 94.5 78 - 100 FL    MCH 30.3 27 - 31 PG    MCHC 32.1 32.0 - 36.0 %    RDW 15.8 (H) 11.7 - 14.9 %    Platelets 596 024 - 007 K/CU MM    MPV 8.9 7.5 - 11.1 FL    Differential Type AUTOMATED DIFFERENTIAL     Segs Relative 70.5 (H) 36 - 66 %    Lymphocytes % 13.0 (L) 24 - 44 %    Monocytes % 12.6 (H) 0 - 4 %    Eosinophils % 2.3 0 - 3 %    Basophils % 0.5 0 - 1 %    Segs Absolute 7.3 K/CU MM    Lymphocytes Absolute 1.3 K/CU MM    Monocytes Absolute 1.3 K/CU MM    Eosinophils Absolute 0.2 K/CU MM    Basophils Absolute 0.1 K/CU MM    Nucleated RBC % 0.0 %    Total Nucleated RBC 0.0 K/CU MM    Total Immature Neutrophil 0.11 K/CU MM    Immature Neutrophil % 1.1 (H) 0 - 0.43 %     CULTURE results: Invalid input(s): BLOOD CULTURE,  URINE CULTURE, SURGICAL CULTURE    Diagnosis:  Patient Active Problem List   Diagnosis    Essential hypertension    Other hyperlipidemia    Cardiac pacemaker    Congestive heart failure (HCC)    Cerebrovascular accident (CVA) due to vascular stenosis (HCC)    CHB (complete heart block) (HCC)    S/P AVR (aortic valve replacement)    Weakness    Troponin I above reference range    Bacteremia due to Streptococcus       Active Problems  Active Problems:    Cardiac pacemaker    S/P AVR (aortic valve replacement)    Bacteremia due to Streptococcus    Essential hypertension    Cerebrovascular accident (CVA) due to vascular stenosis (HCC)    Weakness    Troponin I above reference range  Resolved Problems:    * No resolved hospital problems. *      Impression and plan   Summary and rationale: Patient is a 80 y.o.  female with a medical history of hypertension, hyperlipidemia, CHF, status post TAVR for aortic stenosis, status post permanent pacemaker admitted for fever and generalized weakness. 2 out of 2 sets of blood cultures on 7/9/2021 + for Streptococcus sanguis. Probable GI source. Need to rule out endocarditis-prosthetic valve versus CI ED. No vegetation seen on VIKTORIA but this does not rule out involvement.     Clinical status: Improving   Therapeutic: continue Cefazolin IV 2g q 8 hour for cumulative 6-week course (end-date: 8/16/2021)   After discharge the following should be done:  Weekly labs drawn on Monday during the course of treatment  CBC with differential, CMP, ESR, CRP  [facility]  # [phone]  See in clinic within one week after discharge   Disposition: [facility]          Electronically signed by: Electronically signed by Audra Nguyễn MD on 7/22/2021 at 2:06 PM

## 2021-07-22 NOTE — DISCHARGE SUMMARY
Discharge Summary           Name:  Karolyn Nava /Age/Sex: 5/15/1929  (80 y.o. female)   MRN & CSN:  [id number] & [id number] Admission Date/Time: 2021 11:08 AM   Attending:  Nilam Arnett DO Discharging Physician: Nilam Arnett DO     Hospital Course:   Karolyn Nava is a 80 y.o.  female  who presents with generalized weakness. She underwent treatment for probable endocarditis secondary to prosthetic valve versus implanted cardiac pacemaker. Infectious disease consulted and did not recommend aggressive intervention for removal of hardware. Patient was started on Cefazolin 2g every 8 hrs for a 6 week course. Patient has responded well to therapy and is stable for discharge to [facility]. The patient expressed appropriate understanding of and agreement with the discharge recommendations, medications, and plan. Sepsis secondary to Streptococcus anguis bacteremia Suspect endocarditis in setting of (TAVR) & cardiac pacemaker   Presumptive endocarditis  ID recommendations appreciated. Discharge to [facility] for Cefazolin IV 2g q8hrs for 6 weeks end on 2021. 2of2 Blood Cx positive for Strep Sanguis. Trend CBC w/ Diff, CMP, ESR, CRP while on long term ABX  Follow up with ID in 1 week      Rhabdomyolysis  resolved  - Ck WNL     Generalized weakness, resolving  -PT/OT eval and treat     Elevated troponin not secondary to ACS  Cardiology evaluated     Diastolic heart failure with LE edema  Daily weights  Strict I/O  Evaluate for diuresis   venous US  has ruled out DVT of the lower extremities  Diuretics held in setting of rhabdo. Pt renal function WNL.   Rhabdo resolved will initiate home dose.     Chronic medical conditions, medication resumed unless contraindicated   Pacemaker, esssential hypertension, Pulmonary hypertension, AVR in 2018 with TAVR Watson valve, Dyslipidemia      Consults this admission:  IP CONSULT TO HOSPITALIST  IP CONSULT TO CARDIOLOGY  PHARMACY TO DOSE VANCOMYCIN  IP CONSULT TO CARDIOLOGY  IP CONSULT TO INFECTIOUS DISEASES  IP CONSULT TO CARDIOLOGY  IP CONSULT TO IV TEAM  IP CONSULT TO IV TEAM    Discharge Instruction:   Follow up appointments: ID   Primary care physician:  within 2 weeks    Diet:  cardiac diet   Activity: activity as tolerated  Disposition: Discharged to:   []Home, [][place], [x]SNF, []Acute Rehab, []Hospice   Condition on discharge: Stable    Discharge Medications:      Chavo Anand   Home Medication Instructions [facility]:[id number]    Printed on:07/22/21 1352   Medication Information                      aspirin 81 MG tablet  Take 81 mg by mouth daily             Calcium Carb-Cholecalciferol (OYSTER SHELL CALCIUM) 500-400 MG-UNIT TABS  Calcium 500 + D 500 mg (1,250 mg)-400 unit tablet   Take by oral route.              ceFAZolin (ANCEF) 2-4 GM/100ML-% SOLN IVPB (premix)  Infuse 100 mLs intravenously every 8 hours for 25 days             Cholecalciferol (VITAMIN D3) 5000 units TABS  Take 1 tablet by mouth daily             ferrous sulfate 325 (65 Fe) MG tablet  Take 325 mg by mouth daily (with breakfast)             fluticasone (FLONASE) 50 MCG/ACT nasal spray  USE 2 SPRAYS IN EACH  NOSTRIL DAILY             furosemide (LASIX) 20 MG tablet  Take 1 tablet by mouth daily             lovastatin (MEVACOR) 40 MG tablet  One daily             metoprolol succinate (TOPROL XL) 25 MG extended release tablet  Take 1 tablet by mouth daily             Multiple Vitamins-Minerals (MULTIVITAMIN ADULT PO)  Take 1 tablet by mouth daily             potassium chloride (KLOR-CON M) 20 MEQ extended release tablet  Take 1 tablet by mouth daily             senna (SENOKOT) 8.6 MG tablet  Take 1 tablet by mouth daily                 Objective Findings at Discharge:   BP (!) 119/44   Pulse 95   Temp 98.3 °F (36.8 °C) (Oral)   Resp 18   Ht 5' 1\" (1.549 m)   Wt 176 lb 2.4 oz (79.9 kg)   SpO2 96%   BMI 33.28 kg/m²            GEN    Awake female, sitting upright in bed in no apparent distress. Appears given age. EYES   Pupils are equally round. No scleral erythema, discharge, or conjunctivitis. HENT  Mucous membranes are moist. Oral pharynx without exudates, no evidence of thrush. NECK  Supple, no apparent thyromegaly or masses. RESP  Faint rales bl.  no wheezes, rales or rhonchi. Symmetric chest movement while on room air. CARDIO/VASC           S1/S2 auscultated. Regular rate without appreciable murmurs, rubs, or gallops. No JVD or carotid bruits. Peripheral pulses equal bilaterally and palpable. 2+ b/l leg edema trace thigh b/l. Unchanged. GI        Abdomen is soft without significant tenderness, masses, or guarding. Bowel sounds are normoactive. Rectal exam deferred.        No costovertebral angle tenderness. Normal appearing external genitalia. Goel catheter is not present. HEME/LYMPH            No palpable cervical lymphadenopathy and no hepatosplenomegaly. No petechiae or ecchymoses. MSK    No gross joint deformities. SKIN    Normal coloration, warm, dry. NEURO           Cranial nerves appear grossly intact, normal speech, no lateralizing weakness. PSYCH            Awake, alert, oriented x 4. Affect appropriate. BMP/CBC  Recent Labs     07/20/21  0356 07/21/21  0349 07/22/21  0519    140 141   K 3.9 3.7 3.7    106 108   CO2 24 24 25   BUN 20 18 19   CREATININE 0.8 0.7 0.7   WBC 12.7* 11.5* 10.3   HCT 29.0* 29.0* 29.3*    283 291       IMAGING:  Echocardiogram 7/12/2021           Left ventricular systolic function is normal.           Ejection fraction is visually estimated at 50-55%.           Grade III diastolic dysfunction.          Severe biatrial enlargement.          PPM wiring visualized within the right heart.          History of TAVR (#29 Watson Nancy 3) with moderate perivalvular leak.          Slight prolapse of the anterior mitral valve leaflet with moderate mitral           valve regurgitation.  Brigida Patterson tricuspid regurgitation; RVSP: 69 mmHg.           Mild to moderate pulmonic regurgitation present.           No evidence of any pericardial effusion.           Right pleural effusion. Echocardiogram VIKTORIA 7/13/2021:  Summary          Pacemaker wire is free from vegetation. S/p TAVR: #29 Watson Nancy 3          Moderate to severe mitral regurgitation is present. There was no thrombus noted in the left atrial appendage. Negative bubble study. No PFO or ASD noted.     Discharge Time of 35 minutes    Electronically signed by Marlee Paredes DO on 7/22/2021 at 12:49 PM

## 2021-07-22 NOTE — DISCHARGE INSTR - COC
Continuity of Care Form    Patient Name: Cintia Edmonds   :  5/15/1929  MRN:  [id number]    Admit date:  2021  Discharge date:  21    Code Status Order: Full Code   Advance Directives:      Admitting Physician:  No admitting provider for patient encounter. PCP: Анна Velasco MD    Discharging Nurse: Electronically signed by Velia Webber RN on 2021 at 5:07 PM    [address] Unit/Room#: -A  Discharging Unit Phone Number: [phone]    Emergency Contact:   Extended Emergency Contact Information  Primary Emergency Contact: [address] [facility] Phone: [phone]  Work Phone: [phone]  Mobile Phone: [phone]  Relation: Child   needed? No  Secondary Emergency Contact: [address] Phone: [phone]  Work Phone: [phone]  Mobile Phone: [phone]  Relation: Child   needed?  No    Past Surgical History:  Past Surgical History:   Procedure Laterality Date    AORTIC VALVE SURGERY  2017    BRAIN SURGERY  2017    bleed from stroke    PACEMAKER PLACEMENT  2017       Immunization History:   Immunization History   Administered Date(s) Administered    Influenza A ([id number]) Vaccine PF IM 2010    Influenza Vaccine, unspecified formulation 2009, 10/01/2012, 10/10/2014    Influenza Virus Vaccine 2009, 10/01/2012, 10/10/2014, 2015    Influenza, High Dose (Fluzone 65 yrs and older) 10/18/2016, 2017, 10/10/2018    Influenza, Quadv, adjuvanted, 65 yrs +, IM, PF (Fluad) 10/13/2020    Influenza, Triv, inactivated, subunit, adjuvanted, IM (Fluad 65 yrs and older) 2019    Pneumococcal Conjugate 13-valent ([id number]) 2014    Pneumococcal Polysaccharide ([id number]) 2003, 2017    Tdap (Boostrix, Adacel) 2012, 2017, 2017    Zoster Live (Zostavax) 2007    Zoster Recombinant (Shingrix) 2019       Active Problems:  Patient Active Problem List   Diagnosis Code    Essential hypertension I10    Other hyperlipidemia E78.49    Cardiac pacemaker Z95.0    Congestive heart failure (HCC) I50.9    Cerebrovascular accident (CVA) due to vascular stenosis (HCC) I63.9    CHB (complete heart block) (HCC) I44.2    S/P AVR (aortic valve replacement) Z95.2    Weakness R53.1    Troponin I above reference range R77.8    Bacteremia due to Streptococcus R78.81, B95.5       Isolation/Infection:   Isolation            No Isolation          Patient Infection Status       Infection Onset Added Last Indicated Last Indicated By Review Planned Expiration Resolved Resolved By    None active    Resolved    COVID-19 Rule Out 07/09/21 07/09/21 07/09/21 COVID-19, Rapid (Ordered)   07/09/21 Rule-Out Test Resulted            Nurse Assessment:  Last Vital Signs: BP (!) 140/52   Pulse 112   Temp 98 °F (36.7 °C) (Oral)   Resp 18   Ht 5' 1\" (1.549 m)   Wt 176 lb 2.4 oz (79.9 kg)   SpO2 99%   BMI 33.28 kg/m²     Last documented pain score (0-10 scale): Pain Level: 3  Last Weight:   Wt Readings from Last 1 Encounters:   07/20/21 176 lb 2.4 oz (79.9 kg)     Mental Status:  oriented, alert, coherent, logical, thought processes intact and able to concentrate and follow conversation    IV Access:  PICC R Bacilic placed 5/62/44    Nursing Mobility/ADLs:  Walking   Assisted  Transfer  Assisted  Bathing  Assisted  Dressing  Assisted  Toileting  Assisted  Feeding  Independent  Med Admin  Assisted  Med Delivery   whole    Wound Care Documentation and Therapy:        Elimination:  Continence: Bowel: Yes  Bladder: Yes  Urinary Catheter: None   Colostomy/Ileostomy/Ileal Conduit: No       Date of Last BM: 7/22/21    No intake or output data in the 24 hours ending 07/22/21 1704  No intake/output data recorded.     Safety Concerns:     History of Falls (last 30 days) and At Risk for Falls    Impairments/Disabilities:      Vision and Hearing    Nutrition Therapy:  Current Nutrition Therapy:   - Oral Diet:  General    Routes of Feeding: Oral  Liquids: Thin Liquids  Daily Fluid Restriction: no  Last Modified Barium Swallow with Video (Video Swallowing Test): not done    Treatments at the Time of Hospital Discharge:   Respiratory Treatments: ***  Oxygen Therapy:  is not on home oxygen therapy. Ventilator:    - No ventilator support    Rehab Therapies: Physical Therapy and Occupational Therapy  Weight Bearing Status/Restrictions: No weight bearing restirctions  Other Medical Equipment (for information only, NOT a DME order):  cane  Other Treatments: ***    Patient's personal belongings (please select all that are sent with patient):  Delia    RN SIGNATURE:  Electronically signed by Chriss Quezada RN on 7/22/21 at 5:12 PM EDT    CASE MANAGEMENT/SOCIAL WORK SECTION    Inpatient Status Date: 7/9/21    Readmission Risk Assessment Score:  Readmission Risk              Risk of Unplanned Readmission:  14           Discharging to Facility/ Agency   Name:   Address:  Phone:  Fax:    Dialysis Facility (if applicable)   Name:  Address:  Dialysis Schedule:  Phone:  Fax:    / signature: {Esignature:[id number]}    PHYSICIAN SECTION    Prognosis: Good    Condition at Discharge: Stable    Rehab Potential (if transferring to Rehab): Good    Recommended Labs or Other Treatments After Discharge: PT/OT    Physician Certification: I certify the above information and transfer of Wilbert Holliday  is necessary for the continuing treatment of the diagnosis listed and that she requires [facility] for less 30 days.      Update Admission H&P: No change in H&P    PHYSICIAN SIGNATURE:  Electronically signed by Iggy Benton DO on 7/22/21 at 5:15 PM EDT

## 2021-07-22 NOTE — PROGRESS NOTES
Progress Note        Name:  Erna Mendenhall /Age/Sex: 5/15/1929  (80 y.o. female)   MRN & CSN:  [id number] & [id number] Admission Date/Time: 2021 11:08 AM   Location:  -A PCP: Dmitriy Madison MD       Hospital Day: 14    Assessment and Plan:   Erna Mendenhall is a 80 y.o.  female  who presents with <principal problem not specified>    Sepsis secondary to Streptococcus anguis bacteremia Suspect endocarditis in setting of (TAVR) & cardiac pacemaker    Presumptive endocarditis  ID following. Per ID Start Cefazolin IV 2g q8hrs for 6 weeks end on 2021. 2of2 Blood Cx positive for Strep Sanguis. Discharge planning    Rhabdomyolysis  resolved  - Ck WNL    Generalized weakness, resolving  -PT/OT eval and treat    Elevated troponin not secondary to ACS  Cardiology evaluated    Diastolic heart failure with LE edema  Daily weights  Strict I/O  Evaluate for diuresis   venous US  has ruled out DVT of the lower extremities  Diuretics held in setting of rhabdo. Pt renal function WNL. Rhabdo resolved will initiate home dose. 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    Discharge Plan: back to home / [facility] / when able / discharge to hospice after seen by case management team. Patient plans to return home upon discharge   MDM [] Low, [] Moderate,[x]  High  Patient's risk as above due to:      [] One or more chronic illnesses with mild exacerbation progression      [x] Two or more stable chronic illnesses      [x] Undiagnosed new problem with uncertain prognosis      [] Elective major surgery      [x]Prescription drug management       History of Present Illness:     Chief Complaint: <principal problem not specified>  Perico Cantu is a 80 y.o.  female  who presents with generalized weakness. No acute interval events. Plan to discharge to SNF pending placement. Ten point ROS reviewed negative, unless as noted above    Objective:     No intake or output data in the 24 hours ending 07/22/21 1247   Vitals:   Vitals:    07/22/21 1124   BP:    Pulse:    Resp:    Temp:    SpO2: 96%     Physical Exam:   GEN Awake female, sitting upright in bed in no apparent distress. Appears given age. EYES Pupils are equally round. No scleral erythema, discharge, or conjunctivitis. HENT Mucous membranes are moist. Oral pharynx without exudates, no evidence of thrush. NECK Supple, no apparent thyromegaly or masses. RESP Faint rales bl.  no wheezes, rales or rhonchi. Symmetric chest movement while on room air. CARDIO/VASC S1/S2 auscultated. Regular rate without appreciable murmurs, rubs, or gallops. No JVD or carotid bruits. Peripheral pulses equal bilaterally and palpable. 2+ b/l leg edema trace thigh b/l.  GI Abdomen is soft without significant tenderness, masses, or guarding. Bowel sounds are normoactive. Rectal exam deferred.  No costovertebral angle tenderness. Normal appearing external genitalia. Goel catheter is not present. HEME/LYMPH No palpable cervical lymphadenopathy and no hepatosplenomegaly. No petechiae or ecchymoses. MSK No gross joint deformities. SKIN Normal coloration, warm, dry. NEURO Cranial nerves appear grossly intact, normal speech, no lateralizing weakness. PSYCH Awake, alert, oriented x 4. Affect appropriate. Past Medical History:    History reviewed. No pertinent past medical history.   PSHX:  has a past surgical history that includes Aortic valve surgery (06/29/2017); pacemaker placement (06/29/2017); and brain surgery (11/27/2017). Allergies: Allergies   Allergen Reactions    Alendronate Other (See Comments)    Alendronate Sodium Other (See Comments)    Ibandronic Acid Other (See Comments)    Milk-Related Compounds        FAM HX: family history includes Cancer in an other family member; Heart Attack in her father; Heart Disease in her father. Soc HX:   Social History     Socioeconomic History    Marital status:      Spouse name: None    Number of children: None    Years of education: None    Highest education level: None   Occupational History    None   Tobacco Use    Smoking status: Never Smoker    Smokeless tobacco: Never Used   Substance and Sexual Activity    Alcohol use: None    Drug use: None    Sexual activity: None   Other Topics Concern    None   Social History Narrative    None     Social Determinants of Health     Financial Resource Strain:     Difficulty of Paying Living Expenses:    Food Insecurity:     Worried About Running Out of Food in the Last Year:     Ran Out of Food in the Last Year:    Transportation Needs:     Lack of Transportation (Medical):      Lack of Transportation (Non-Medical):    Physical Activity:     Days of Exercise per Week:     Minutes of Exercise per Session:    Stress:     Feeling of Stress :    Social Connections:     Frequency of Communication with Friends and Family:     Frequency of Social Gatherings with Friends and Family:     Attends Restorationist Services:     Active Member of Clubs or Organizations:     Attends Club or Organization Meetings:     Marital Status:    Intimate Partner Violence:     Fear of Current or Ex-Partner:     Emotionally Abused:     Physically Abused:     Sexually Abused:        Medications:   Medications:    furosemide  20 mg Oral Daily    potassium chloride  20 mEq Oral Daily    sodium chloride flush 5-40 mL Intravenous 2 times per day    lactobacillus  1 capsule Oral Daily with breakfast    ceFAZolin  2,000 mg Intravenous Q8H    pantoprazole  40 mg Oral QAM AC    aspirin  81 mg Oral Daily    fluticasone  1 spray Each Nostril Daily    metoprolol succinate  25 mg Oral Daily    therapeutic multivitamin-minerals  1 tablet Oral Daily    sodium chloride flush  5-40 mL Intravenous 2 times per day    enoxaparin  40 mg Subcutaneous Daily      Infusions:    sodium chloride      sodium chloride       PRN Meds: sodium chloride flush, 5-40 mL, PRN  sodium chloride, 25 mL, PRN  sodium chloride flush, 10 mL, PRN  sodium chloride flush, 5-40 mL, PRN  sodium chloride, 25 mL, PRN  ondansetron, 4 mg, Q8H PRN   Or  ondansetron, 4 mg, Q6H PRN  polyethylene glycol, 17 g, Daily PRN  acetaminophen, 650 mg, Q6H PRN   Or  acetaminophen, 650 mg, Q6H PRN          Electronically signed by Dylan Ellison DO on 7/22/2021 at 12:47 PM

## 2021-07-22 NOTE — CARE COORDINATION
Chart reviewed. CM called [facility] to check on if they can accept the patient today. They will call this CM back. 1550  The patient can discharge to [facility] anytime. Doctor updated. Patient will need a negative covid. If patient d/c after case management hours, please call report to [facility] at [phone] and give report. You can also call the admissions coordinator on her cell at [phone] her name is Sinan Morrow. He will need a negative covid and .set up transport with either [facility] [phone] or [phone] OR [facility] [phone] and include AVS and MANASA in packet to send to [facility].

## 2021-08-19 ENCOUNTER — TELEPHONE (OUTPATIENT)
Dept: FAMILY MEDICINE CLINIC | Age: 86
End: 2021-08-19

## 2021-08-19 NOTE — TELEPHONE ENCOUNTER
Bre  from [facility] called & needs verbal Will you follow patient being D/C from [employer] to home 8-19-21  Need orders for Skilled nursing. OT PT  Call with verbal

## 2021-08-20 ENCOUNTER — OFFICE VISIT (OUTPATIENT)
Dept: FAMILY MEDICINE CLINIC | Age: 86
End: 2021-08-20
Payer: MEDICARE

## 2021-08-20 VITALS
DIASTOLIC BLOOD PRESSURE: 58 MMHG | BODY MASS INDEX: 29.83 KG/M2 | OXYGEN SATURATION: 99 % | HEIGHT: 61 IN | SYSTOLIC BLOOD PRESSURE: 130 MMHG | WEIGHT: 158 LBS | HEART RATE: 86 BPM

## 2021-08-20 DIAGNOSIS — E78.49 OTHER HYPERLIPIDEMIA: ICD-10-CM

## 2021-08-20 DIAGNOSIS — Z95.2 S/P AVR (AORTIC VALVE REPLACEMENT): ICD-10-CM

## 2021-08-20 DIAGNOSIS — Z92.89 HISTORY OF RECENT HOSPITALIZATION: Primary | ICD-10-CM

## 2021-08-20 DIAGNOSIS — I10 ESSENTIAL HYPERTENSION: ICD-10-CM

## 2021-08-20 DIAGNOSIS — I38 ENDOCARDITIS, UNSPECIFIED CHRONICITY, UNSPECIFIED ENDOCARDITIS TYPE: ICD-10-CM

## 2021-08-20 DIAGNOSIS — R60.0 LOCALIZED EDEMA: ICD-10-CM

## 2021-08-20 PROCEDURE — 1036F TOBACCO NON-USER: CPT | Performed by: FAMILY MEDICINE

## 2021-08-20 PROCEDURE — G8417 CALC BMI ABV UP PARAM F/U: HCPCS | Performed by: FAMILY MEDICINE

## 2021-08-20 PROCEDURE — G8427 DOCREV CUR MEDS BY ELIG CLIN: HCPCS | Performed by: FAMILY MEDICINE

## 2021-08-20 PROCEDURE — 4040F PNEUMOC VAC/ADMIN/RCVD: CPT | Performed by: FAMILY MEDICINE

## 2021-08-20 PROCEDURE — 1123F ACP DISCUSS/DSCN MKR DOCD: CPT | Performed by: FAMILY MEDICINE

## 2021-08-20 PROCEDURE — 99214 OFFICE O/P EST MOD 30 MIN: CPT | Performed by: FAMILY MEDICINE

## 2021-08-20 PROCEDURE — 1111F DSCHRG MED/CURRENT MED MERGE: CPT | Performed by: FAMILY MEDICINE

## 2021-08-20 PROCEDURE — 1090F PRES/ABSN URINE INCON ASSESS: CPT | Performed by: FAMILY MEDICINE

## 2021-08-20 RX ORDER — POLYETHYLENE GLYCOL 3350 17 G/17G
17 POWDER, FOR SOLUTION ORAL DAILY
Qty: 850 G | Refills: 1 | Status: SHIPPED | OUTPATIENT
Start: 2021-08-20 | End: 2021-09-19

## 2021-08-20 ASSESSMENT — ENCOUNTER SYMPTOMS
VOMITING: 0
DIARRHEA: 0
BLOOD IN STOOL: 0
EYE PAIN: 0
TROUBLE SWALLOWING: 0
ABDOMINAL PAIN: 0
NAUSEA: 0
CHEST TIGHTNESS: 0
WHEEZING: 0
SHORTNESS OF BREATH: 0

## 2021-08-20 NOTE — PROGRESS NOTES
diarrhea, nausea and vomiting. Endocrine: Negative for polydipsia and polyuria. Genitourinary: Negative for dysuria, frequency and urgency. Musculoskeletal: Negative for arthralgias, gait problem and neck stiffness. Skin: Negative for rash. Allergic/Immunologic: Negative for environmental allergies. Neurological: Negative for dizziness, seizures, speech difficulty and weakness. Hematological: Does not bruise/bleed easily. Psychiatric/Behavioral: Negative for agitation, confusion, hallucinations and suicidal ideas. The patient is not nervous/anxious. PAST MEDICAL HISTORY  No past medical history on file. FAMILY HISTORY  Family History   Problem Relation Age of Onset    Heart Disease Father     Heart Attack Father     Cancer Other        SOCIAL HISTORY  Social History     Socioeconomic History    Marital status:      Spouse name: None    Number of children: None    Years of education: None    Highest education level: None   Occupational History    None   Tobacco Use    Smoking status: Never Smoker    Smokeless tobacco: Never Used   Substance and Sexual Activity    Alcohol use: None    Drug use: None    Sexual activity: None   Other Topics Concern    None   Social History Narrative    None     Social Determinants of Health     Financial Resource Strain:     Difficulty of Paying Living Expenses:    Food Insecurity:     Worried About Running Out of Food in the Last Year:     Ran Out of Food in the Last Year:    Transportation Needs:     Lack of Transportation (Medical):      Lack of Transportation (Non-Medical):    Physical Activity:     Days of Exercise per Week:     Minutes of Exercise per Session:    Stress:     Feeling of Stress :    Social Connections:     Frequency of Communication with Friends and Family:     Frequency of Social Gatherings with Friends and Family:     Attends Episcopal Services:     Active Member of Clubs or Organizations:     Attends Club or Organization Meetings:     Marital Status:    Intimate Partner Violence:     Fear of Current or Ex-Partner:     Emotionally Abused:     Physically Abused:     Sexually Abused:         SURGICAL HISTORY  Past Surgical History:   Procedure Laterality Date    AORTIC VALVE SURGERY  06/29/2017    BRAIN SURGERY  11/27/2017    bleed from stroke    PACEMAKER PLACEMENT  06/29/2017                 CURRENT MEDICATIONS  Current Outpatient Medications   Medication Sig Dispense Refill    polyethylene glycol (GLYCOLAX) 17 GM/SCOOP powder Take 17 g by mouth daily 850 g 1    furosemide (LASIX) 20 MG tablet Take 1 tablet by mouth daily 90 tablet 1    potassium chloride (KLOR-CON M) 20 MEQ extended release tablet Take 1 tablet by mouth daily 90 tablet 1    lovastatin (MEVACOR) 40 MG tablet One daily 90 tablet 1    Calcium Carb-Cholecalciferol (OYSTER SHELL CALCIUM) 500-400 MG-UNIT TABS Calcium 500 + D 500 mg (1,250 mg)-400 unit tablet   Take by oral route.  metoprolol succinate (TOPROL XL) 25 MG extended release tablet Take 1 tablet by mouth daily 90 tablet 1    fluticasone (FLONASE) 50 MCG/ACT nasal spray USE 2 SPRAYS IN EACH  NOSTRIL DAILY 3 Bottle 1    aspirin 81 MG tablet Take 81 mg by mouth daily      ferrous sulfate 325 (65 Fe) MG tablet Take 325 mg by mouth daily (with breakfast)      Multiple Vitamins-Minerals (MULTIVITAMIN ADULT PO) Take 1 tablet by mouth daily      Cholecalciferol (VITAMIN D3) 5000 units TABS Take 1 tablet by mouth daily       No current facility-administered medications for this visit.        ALLERGIES  Allergies   Allergen Reactions    Alendronate Other (See Comments)    Alendronate Sodium Other (See Comments)    Ibandronic Acid Other (See Comments)    Milk-Related Compounds        PHYSICAL EXAM    BP (!) 130/58 (Site: Left Upper Arm, Position: Sitting, Cuff Size: Medium Adult)   Pulse 86   Ht 5' 1\" (1.549 m)   Wt 158 lb (71.7 kg)   SpO2 99%   BMI 29.85 kg/m² Physical Exam  Vitals and nursing note reviewed. Constitutional:       General: She is not in acute distress. Appearance: Normal appearance. She is well-developed. She is not ill-appearing or toxic-appearing. HENT:      Head: Normocephalic and atraumatic. Nose: Nose normal.      Mouth/Throat:      Mouth: Mucous membranes are moist.   Eyes:      Pupils: Pupils are equal, round, and reactive to light. Cardiovascular:      Rate and Rhythm: Normal rate and regular rhythm. Heart sounds: Normal heart sounds. No murmur heard. Pulmonary:      Effort: Pulmonary effort is normal. No respiratory distress. Breath sounds: Normal breath sounds. No wheezing, rhonchi or rales. Abdominal:      Palpations: Abdomen is soft. Musculoskeletal:         General: Swelling present. Normal range of motion. Cervical back: Normal range of motion and neck supple. No rigidity. Right lower leg: Edema present. Left lower leg: Edema present. Lymphadenopathy:      Cervical: No cervical adenopathy. Skin:     General: Skin is warm and dry. Neurological:      General: No focal deficit present. Mental Status: She is alert and oriented to person, place, and time. Mental status is at baseline. Cranial Nerves: No cranial nerve deficit. Motor: No weakness. Psychiatric:         Mood and Affect: Mood normal.         Behavior: Behavior normal.         Thought Content: Thought content normal.         ASSESSMENT & PLAN     Diagnosis Orders   1. History of recent hospitalization     2. Endocarditis, unspecified chronicity, unspecified endocarditis type  CBC    COMPREHENSIVE METABOLIC PANEL   3. Essential hypertension     4. S/P AVR (aortic valve replacement)     5. Other hyperlipidemia     6. Localized edema     Please see HPI. Will stop Senokot start MiraLAX 17 g daily continue on usual regimen refills will be provided as needed.   We will sign off on PT, OT and nursing care from [address] [place] [address] care.  We will check CBC and a CMP today advised patient's daughter that patient should get Covid vaccine when able. And she should contact cardiology in [facility] and local ID here in [facility] as to whether they want follow-up or further testing. They are to call with questions or problems. Return in about 4 months (around 12/20/2021).          Electronically signed by Judith Canales MD on 8/20/2021

## 2021-08-23 ENCOUNTER — CARE COORDINATION (OUTPATIENT)
Dept: CARE COORDINATION | Age: 86
End: 2021-08-23

## 2021-08-23 DIAGNOSIS — R74.8 ELEVATED ALKALINE PHOSPHATASE LEVEL: ICD-10-CM

## 2021-08-23 DIAGNOSIS — N18.30 STAGE 3 CHRONIC KIDNEY DISEASE, UNSPECIFIED WHETHER STAGE 3A OR 3B CKD (HCC): Primary | ICD-10-CM

## 2021-08-23 NOTE — CARE COORDINATION
spoke to Roxann at [facility] who transferred call to , Em Hall for patient's discharge disposition. Left voicemail for Em Hall requesting a call back.

## 2021-08-24 ENCOUNTER — CARE COORDINATION (OUTPATIENT)
Dept: CASE MANAGEMENT | Age: 86
End: 2021-08-24

## 2021-08-24 NOTE — CARE COORDINATION
Dian 45 Transitions Initial Follow Up Call    Call within 2 business days of discharge: Yes    Patient: Laura Duke Patient : 5/15/1929   MRN: <[id number]>  Reason for Admission: AMS poss endocarditis  Discharge Date: 21 RARS: Readmission Risk Score: 14      Last Discharge [facility]       Complaint Diagnosis Description Type Department Provider    21 Altered Mental Status; Fatigue; Fever Troponin I above reference range . .. ED to Hosp-Admission (Discharged) (ADMITTED) [address] [place] [address]; [facility] Payor, ... Acute Care Course:  Pt to [facility] from  to  with weakness and poss. Endocarditis /2 to pacemaker or artificial valve. Responded to treatment. Pt then to [facility]. Had visit with PCP Den Sanders on . Needs CoVid Vaccine and appt with cardiology in [address] [place] [address],[address] Hx:   HTN, HLD, cardiac pacemaker, CHF, AVR, varicose veins. DME: lift chair , cane shower with chair and grab bars. Conversation:  Spoke to Merly May after 2 IDs. Pt is doing fine. She was d/c on Friday with [employer] and the nurse has been out. She is still a little weak and shaky and but improving. Her appetite is good and she has put on 40#. Since Feb and they have been making sure she is getting protein while reducing her portions. Weight gain has mad it difficult to do daily weights. She has increased swelling and is giving 40 mg of lasix for 4 days per MD. She had appt with PCP last Friday. She walks in house with a cane and PT took her outside yesterday. She has a shower chair and grab bars and her showers need to be monitored. At the MD appt the 1111F was done but reviewed meds to make sure that caregiver understands. No issues. Firm  on purpose of meds. Pt with lift chair and ANALISA hose to reduce swelling    Follow up plan:  Pt needs met and is making appts with PCP. Will close    Transitions of Care Initial Call    Was this an external facility discharge?  No Discharge Facility: [facility] to be reviewed by the provider   Additional needs identified to be addressed with provider: No  none             Method of communication with provider : none      Advance Care Planning:   Does patient have an Advance Directive: reviewed and current, reviewed and needs to be updated, not on file; education provided, not on file, patient declined education, decision maker updated and referral to internal ACP facilitator. Was this a readmission? No  Patient stated reason for admission: endocarditis. Patients top risk factors for readmission: medical condition-CHF    Care Transition Nurse (CTN) contacted the family by telephone to perform post hospital discharge assessment. Verified name and  with family as identifiers. Provided introduction to self, and explanation of the CTN role. CTN reviewed discharge instructions, medical action plan and red flags with family who verbalized understanding. Family given an opportunity to ask questions and does not have any further questions or concerns at this time. Were discharge instructions available to patient? Yes. Reviewed appropriate site of care based on symptoms and resources available to patient including: PCP. The family agrees to contact the PCP office for questions related to their healthcare. Medication reconciliation was performed with family, who verbalizes understanding of administration of home medications. Advised obtaining a 90-day supply of all daily and as-needed medications. Covid Risk Education     Pt needs vaccine and discussed at PCP and caregiver aware    Reviewed and educated family on any new and changed medications related to discharge diagnosis. Was patient discharged with a pulse oximeter? No Discussed and confirmed pulse oximeter discharge instructions and when to notify provider or seek emergency care. CTN provided contact information.  No further follow-up call indicated based on

## 2021-08-25 ENCOUNTER — CARE COORDINATION (OUTPATIENT)
Dept: CARE COORDINATION | Age: 86
End: 2021-08-25

## 2021-08-25 NOTE — CARE COORDINATION
Per [employer] Email:                The following [facility] client has completed skilled care at [facility] and d/c home with home health effective 08/20/2021. Thomas Quarles    Member ID  [id number]  Date Of Birth  05/15/1929    No active needs noted.    HH: [facility] PCP f/u: 08/24/2021    [facility] ph: [phone]    Jacqueline Antonio RN, BSN       Skilled Inpatient Care Coodinator     M: [phone]

## 2021-08-27 ENCOUNTER — TELEPHONE (OUTPATIENT)
Dept: FAMILY MEDICINE CLINIC | Age: 86
End: 2021-08-27

## 2021-08-27 DIAGNOSIS — R41.0 CONFUSION: Primary | ICD-10-CM

## 2021-08-27 NOTE — TELEPHONE ENCOUNTER
Patient seems very confused last night----she has been off of antibiotics 2 weeks----would give a verbal order for a home care nurse to come out to see patient and do a UA---and possibly draw blood. Angelica Sullivan (daughter) would like some guidance.     Please let Angelica Sullivan what will be done:    Phone:  [phone] (daughter's cell)    Would like to use:    [phone]

## 2021-08-27 NOTE — TELEPHONE ENCOUNTER
Okay to set up;UA,urine C & S ,CBC and CMP with her home care service . If she gets more confused or gets fever- will need to go to ER(pt with recent rx and hosp for endocarditis).

## 2021-08-27 NOTE — TELEPHONE ENCOUNTER
Noble Galeazzi ([address] [place] [address])--    Please fax over any office notes from 8/20/21 to:    Fax#  [phone]

## 2021-08-28 ENCOUNTER — HOSPITAL ENCOUNTER (OUTPATIENT)
Age: 86
Setting detail: SPECIMEN
Discharge: HOME | End: 2021-08-28
Payer: MEDICARE

## 2021-09-03 ENCOUNTER — HOSPITAL ENCOUNTER (OUTPATIENT)
Age: 86
Setting detail: SPECIMEN
Discharge: HOME | End: 2021-09-03
Payer: MEDICARE

## 2021-09-03 LAB
ALBUMIN SERPL-MCNC: 4.3 GM/DL (ref 3.4–5)
ALP BLD-CCNC: 121 IU/L (ref 40–129)
ALT SERPL-CCNC: 9 U/L (ref 10–40)
ANION GAP SERPL CALCULATED.3IONS-SCNC: 10 MMOL/L (ref 4–16)
AST SERPL-CCNC: 24 IU/L (ref 15–37)
BILIRUB SERPL-MCNC: 0.5 MG/DL (ref 0–1)
BUN BLDV-MCNC: 39 MG/DL (ref 6–23)
CALCIUM SERPL-MCNC: 9.8 MG/DL (ref 8.3–10.6)
CHLORIDE BLD-SCNC: 101 MMOL/L (ref 99–110)
CO2: 28 MMOL/L (ref 21–32)
CREAT SERPL-MCNC: 1.1 MG/DL (ref 0.6–1.1)
GFR AFRICAN AMERICAN: 56 ML/MIN/1.73M2
GFR NON-AFRICAN AMERICAN: 46 ML/MIN/1.73M2
GLUCOSE BLD-MCNC: 77 MG/DL (ref 70–99)
HCT VFR BLD CALC: 34.5 % (ref 37–47)
HEMOGLOBIN: 10.6 GM/DL (ref 12.5–16)
MCH RBC QN AUTO: 30.7 PG (ref 27–31)
MCHC RBC AUTO-ENTMCNC: 30.7 % (ref 32–36)
MCV RBC AUTO: 100 FL (ref 78–100)
PDW BLD-RTO: 14.6 % (ref 11.7–14.9)
PLATELET # BLD: 295 K/CU MM (ref 140–440)
PMV BLD AUTO: 9.5 FL (ref 7.5–11.1)
POTASSIUM SERPL-SCNC: 5 MMOL/L (ref 3.5–5.1)
RBC # BLD: 3.45 M/CU MM (ref 4.2–5.4)
SODIUM BLD-SCNC: 139 MMOL/L (ref 135–145)
TOTAL PROTEIN: 7.1 GM/DL (ref 6.4–8.2)
WBC # BLD: 6.9 K/CU MM (ref 4–10.5)

## 2021-09-03 PROCEDURE — 80053 COMPREHEN METABOLIC PANEL: CPT

## 2021-09-03 PROCEDURE — 85027 COMPLETE CBC AUTOMATED: CPT

## 2021-09-03 PROCEDURE — 87077 CULTURE AEROBIC IDENTIFY: CPT

## 2021-09-03 PROCEDURE — 87086 URINE CULTURE/COLONY COUNT: CPT

## 2021-09-03 PROCEDURE — 87186 SC STD MICRODIL/AGAR DIL: CPT

## 2021-09-06 LAB
CULTURE: ABNORMAL
CULTURE: ABNORMAL
Lab: ABNORMAL
SPECIMEN: ABNORMAL

## 2021-10-15 ENCOUNTER — TELEPHONE (OUTPATIENT)
Dept: FAMILY MEDICINE CLINIC | Age: 86
End: 2021-10-15

## 2021-10-15 NOTE — TELEPHONE ENCOUNTER
Ace Tipton from [facility] called and   Has not received the signed orders for PTE Vicki dated 8-22-21  A Signed verbal order  Physicians order 8-27-21  Please have provider address these items and fax to [place]

## 2021-10-25 DIAGNOSIS — I50.9 CONGESTIVE HEART FAILURE, UNSPECIFIED HF CHRONICITY, UNSPECIFIED HEART FAILURE TYPE (HCC): ICD-10-CM

## 2021-10-25 DIAGNOSIS — E78.2 MIXED HYPERLIPIDEMIA: ICD-10-CM

## 2021-10-26 RX ORDER — POTASSIUM CHLORIDE 20 MEQ/1
20 TABLET, EXTENDED RELEASE ORAL DAILY
Qty: 90 TABLET | Refills: 0 | Status: SHIPPED | OUTPATIENT
Start: 2021-10-26 | End: 2021-12-17

## 2021-10-26 RX ORDER — FUROSEMIDE 20 MG/1
20 TABLET ORAL DAILY
Qty: 90 TABLET | Refills: 0 | Status: SHIPPED | OUTPATIENT
Start: 2021-10-26 | End: 2021-12-23 | Stop reason: SDUPTHER

## 2021-10-26 RX ORDER — LOVASTATIN 40 MG/1
TABLET ORAL
Qty: 90 TABLET | Refills: 0 | Status: SHIPPED | OUTPATIENT
Start: 2021-10-26 | End: 2021-12-17

## 2021-12-16 DIAGNOSIS — E78.2 MIXED HYPERLIPIDEMIA: ICD-10-CM

## 2021-12-17 RX ORDER — LOVASTATIN 40 MG/1
TABLET ORAL
Qty: 90 TABLET | Refills: 3 | Status: SHIPPED | OUTPATIENT
Start: 2021-12-17

## 2021-12-17 RX ORDER — POTASSIUM CHLORIDE 20 MEQ/1
20 TABLET, EXTENDED RELEASE ORAL DAILY
Qty: 90 TABLET | Refills: 3 | Status: SHIPPED | OUTPATIENT
Start: 2021-12-17

## 2021-12-23 DIAGNOSIS — I50.9 CONGESTIVE HEART FAILURE, UNSPECIFIED HF CHRONICITY, UNSPECIFIED HEART FAILURE TYPE (HCC): ICD-10-CM

## 2021-12-23 DIAGNOSIS — I10 ESSENTIAL HYPERTENSION: ICD-10-CM

## 2021-12-23 RX ORDER — METOPROLOL SUCCINATE 25 MG/1
25 TABLET, EXTENDED RELEASE ORAL DAILY
Qty: 90 TABLET | Refills: 1 | Status: SHIPPED | OUTPATIENT
Start: 2021-12-23 | End: 2022-05-02

## 2021-12-23 RX ORDER — FUROSEMIDE 20 MG/1
20 TABLET ORAL DAILY
Qty: 90 TABLET | Refills: 0 | Status: SHIPPED | OUTPATIENT
Start: 2021-12-23 | End: 2022-03-21

## 2022-01-18 ENCOUNTER — VIRTUAL VISIT (OUTPATIENT)
Dept: FAMILY MEDICINE CLINIC | Age: 87
End: 2022-01-18
Payer: MEDICARE

## 2022-01-18 DIAGNOSIS — I10 ESSENTIAL HYPERTENSION: Primary | ICD-10-CM

## 2022-01-18 DIAGNOSIS — E78.49 OTHER HYPERLIPIDEMIA: ICD-10-CM

## 2022-01-18 DIAGNOSIS — Z95.2 S/P AVR (AORTIC VALVE REPLACEMENT): ICD-10-CM

## 2022-01-18 DIAGNOSIS — B95.5 BACTEREMIA DUE TO STREPTOCOCCUS: ICD-10-CM

## 2022-01-18 DIAGNOSIS — R78.81 BACTEREMIA DUE TO STREPTOCOCCUS: ICD-10-CM

## 2022-01-18 PROCEDURE — 1123F ACP DISCUSS/DSCN MKR DOCD: CPT | Performed by: FAMILY MEDICINE

## 2022-01-18 PROCEDURE — G8484 FLU IMMUNIZE NO ADMIN: HCPCS | Performed by: FAMILY MEDICINE

## 2022-01-18 PROCEDURE — 4040F PNEUMOC VAC/ADMIN/RCVD: CPT | Performed by: FAMILY MEDICINE

## 2022-01-18 PROCEDURE — 1090F PRES/ABSN URINE INCON ASSESS: CPT | Performed by: FAMILY MEDICINE

## 2022-01-18 PROCEDURE — 1036F TOBACCO NON-USER: CPT | Performed by: FAMILY MEDICINE

## 2022-01-18 PROCEDURE — G8427 DOCREV CUR MEDS BY ELIG CLIN: HCPCS | Performed by: FAMILY MEDICINE

## 2022-01-18 PROCEDURE — 99213 OFFICE O/P EST LOW 20 MIN: CPT | Performed by: FAMILY MEDICINE

## 2022-01-18 PROCEDURE — G8417 CALC BMI ABV UP PARAM F/U: HCPCS | Performed by: FAMILY MEDICINE

## 2022-01-18 SDOH — ECONOMIC STABILITY: FOOD INSECURITY: WITHIN THE PAST 12 MONTHS, THE FOOD YOU BOUGHT JUST DIDN'T LAST AND YOU DIDN'T HAVE MONEY TO GET MORE.: NEVER TRUE

## 2022-01-18 SDOH — ECONOMIC STABILITY: FOOD INSECURITY: WITHIN THE PAST 12 MONTHS, YOU WORRIED THAT YOUR FOOD WOULD RUN OUT BEFORE YOU GOT MONEY TO BUY MORE.: NEVER TRUE

## 2022-01-18 ASSESSMENT — ENCOUNTER SYMPTOMS
ABDOMINAL PAIN: 0
CHEST TIGHTNESS: 0
TROUBLE SWALLOWING: 0
SHORTNESS OF BREATH: 0
VOMITING: 0
NAUSEA: 0
DIARRHEA: 0
WHEEZING: 0
BLOOD IN STOOL: 0
EYE PAIN: 0

## 2022-01-18 ASSESSMENT — SOCIAL DETERMINANTS OF HEALTH (SDOH): HOW HARD IS IT FOR YOU TO PAY FOR THE VERY BASICS LIKE FOOD, HOUSING, MEDICAL CARE, AND HEATING?: NOT HARD AT ALL

## 2022-01-18 NOTE — PROGRESS NOTES
2022    TELEHEALTH EVALUATION -- Audio/Visual (During WTBUJ-41 public health emergency)    HPI:    Afshin Grubbs (:  5/15/1929) has requested an audio/video evaluation for the following concern(s):    Advanced age, hypertension, CHF, past history of aortic valve replacement, previous CVA and known pacemaker for complete heart block. Patient did follow-up with infectious disease and cardiology and no change or further treatment for endocarditis was required. She is actually doing quite well pleasant meds are tolerated denies chest pain or shortness of breath had no further fever labs in 2021 look good. She has had no COVID shots but has very very limited social exposure. Daughter states patient has had no acute change overall appetite is good she has had no new issues or falls. Meds reviewed no refills required at this point. Her mood remains very positive    Review of Systems   Constitutional: Negative for activity change and fatigue. HENT: Negative for congestion, hearing loss, mouth sores and trouble swallowing. Eyes: Negative for pain and visual disturbance. Respiratory: Negative for chest tightness, shortness of breath and wheezing. Cardiovascular: Negative for chest pain and palpitations. Patient with history of pacemaker insertion as well as aortic valve replacement no current chest pain or shortness of breath   Gastrointestinal: Negative for abdominal pain, blood in stool, diarrhea, nausea and vomiting. Endocrine: Negative for polydipsia and polyuria. Genitourinary: Negative for dysuria, frequency and urgency. Musculoskeletal: Negative for arthralgias, gait problem and neck stiffness. Skin: Negative for rash. Allergic/Immunologic: Negative for environmental allergies. Neurological: Negative for dizziness, seizures, speech difficulty and weakness.         Patient with remote CVA- no current focal neurologic abnormality   Hematological: Does not bruise/bleed easily. Psychiatric/Behavioral: Negative for agitation, confusion, hallucinations and suicidal ideas. The patient is not nervous/anxious. Prior to Visit Medications    Medication Sig Taking? Authorizing Provider   furosemide (LASIX) 20 MG tablet Take 1 tablet by mouth daily Yes Annette Fletcher MD   metoprolol succinate (TOPROL XL) 25 MG extended release tablet Take 1 tablet by mouth daily Yes Annette Fletcher MD   lovastatin (MEVACOR) 40 MG tablet TAKE 1 TABLET BY MOUTH  DAILY Yes Annette Fletcher MD   potassium chloride (KLOR-CON M) 20 MEQ extended release tablet TAKE 1 TABLET BY MOUTH  DAILY Yes Annette Fletcher MD   Calcium Carb-Cholecalciferol (OYSTER SHELL CALCIUM) 500-400 MG-UNIT TABS Calcium 500 + D 500 mg (1,250 mg)-400 unit tablet   Take by oral route.  Yes Historical Provider, MD   fluticasone (FLONASE) 50 MCG/ACT nasal spray USE 2 SPRAYS IN EACH  NOSTRIL DAILY Yes Annette Fletcher MD   aspirin 81 MG tablet Take 81 mg by mouth daily Yes Historical Provider, MD   ferrous sulfate 325 (65 Fe) MG tablet Take 325 mg by mouth daily (with breakfast) Yes Historical Provider, MD   Multiple Vitamins-Minerals (MULTIVITAMIN ADULT PO) Take 1 tablet by mouth daily Yes Historical Provider, MD   Cholecalciferol (VITAMIN D3) 5000 units TABS Take 1 tablet by mouth daily Yes Historical Provider, MD       Social History     Tobacco Use    Smoking status: Never Smoker    Smokeless tobacco: Never Used   Substance Use Topics    Alcohol use: Not on file    Drug use: Not on file            PHYSICAL EXAMINATION:  [ INSTRUCTIONS:  \"[x]\" Indicates a positive item  \"[]\" Indicates a negative item  -- DELETE ALL ITEMS NOT EXAMINED]  Vital Signs: (As obtained by patient/caregiver or practitioner observation)    Blood pressure-  Heart rate-    Respiratory rate-    Temperature-  Pulse oximetry-     Constitutional: [x] Appears well-developed and well-nourished [x] No apparent distress [] Abnormal-   Mental status  [x] Alert and awake  [x] Oriented to person/place/time [x]Able to follow commands      Eyes:  EOM    [x]  Normal  [] Abnormal-  Sclera  [x]  Normal  [] Abnormal -         Discharge []  None visible  [] Abnormal -    HENT:   [x] Normocephalic, atraumatic. [] Abnormal   [x] Mouth/Throat: Mucous membranes are moist.     External Ears [] Normal  [] Abnormal-     Neck: [x] No visualized mass     Pulmonary/Chest: [x] Respiratory effort normal.  [] No visualized signs of difficulty breathing or respiratory distress        [] Abnormal-      Musculoskeletal:   [] Normal gait with no signs of ataxia         [x] Normal range of motion of neck        [] Abnormal-       Neurological:        [x] No Facial Asymmetry (Cranial nerve 7 motor function) (limited exam to video visit)          [x] No gaze palsy        [] Abnormal-         Skin:        [x] No significant exanthematous lesions or discoloration noted on facial skin         [] Abnormal-            Psychiatric:       [x] Normal Affect [] No Hallucinations        [] Abnormal-     Other pertinent observable physical exam findings-     ASSESSMENT/PLAN:  1. Essential hypertension      2. Other hyperlipidemia      3. S/P AVR (aortic valve replacement)      4. Bacteremia due to Streptococcus by HX  At this point would keep her on the same regimen. Encouraged social distancing and continuation of same regimen they are to call with questions or problems. Keep appointments with subspecialists      Return in about 4 months (around 5/18/2022). Elaine Johnson, was evaluated through a synchronous (real-time) audio-video encounter. The patient (or guardian if applicable) is aware that this is a billable service, which includes applicable co-pays. This Virtual Visit was conducted with patient's (and/or legal guardian's) consent.  The visit was conducted pursuant to the emergency declaration under the [facility], [facility] waiver authority and the [employer] General Act. Patient identification was verified, and a caregiver was present when appropriate. The patient was located in a state where the provider was licensed to provide care. Total time spent on this encounter: Not billed by time    --Roz Irby MD on 1/18/2022 at 5:46 PM    An electronic signature was used to authenticate this note.

## 2022-03-20 DIAGNOSIS — I50.9 CONGESTIVE HEART FAILURE, UNSPECIFIED HF CHRONICITY, UNSPECIFIED HEART FAILURE TYPE (HCC): ICD-10-CM

## 2022-03-21 RX ORDER — FUROSEMIDE 20 MG/1
20 TABLET ORAL DAILY
Qty: 90 TABLET | Refills: 3 | Status: SHIPPED | OUTPATIENT
Start: 2022-03-21

## 2022-04-15 ENCOUNTER — TELEPHONE (OUTPATIENT)
Dept: FAMILY MEDICINE CLINIC | Age: 87
End: 2022-04-15

## 2022-04-30 DIAGNOSIS — I10 ESSENTIAL HYPERTENSION: ICD-10-CM

## 2022-05-02 RX ORDER — METOPROLOL SUCCINATE 25 MG/1
25 TABLET, EXTENDED RELEASE ORAL DAILY
Qty: 90 TABLET | Refills: 1 | Status: SHIPPED | OUTPATIENT
Start: 2022-05-02 | End: 2022-10-21 | Stop reason: SDUPTHER

## 2022-05-03 ENCOUNTER — TELEPHONE (OUTPATIENT)
Dept: FAMILY MEDICINE CLINIC | Age: 87
End: 2022-05-03

## 2022-06-07 ENCOUNTER — OFFICE VISIT (OUTPATIENT)
Dept: FAMILY MEDICINE CLINIC | Age: 87
End: 2022-06-07
Payer: MEDICARE

## 2022-06-07 VITALS
HEART RATE: 60 BPM | HEIGHT: 59 IN | SYSTOLIC BLOOD PRESSURE: 124 MMHG | DIASTOLIC BLOOD PRESSURE: 60 MMHG | BODY MASS INDEX: 28.93 KG/M2 | WEIGHT: 143.5 LBS | OXYGEN SATURATION: 98 %

## 2022-06-07 DIAGNOSIS — Z00.00 INITIAL MEDICARE ANNUAL WELLNESS VISIT: Primary | ICD-10-CM

## 2022-06-07 PROCEDURE — G0438 PPPS, INITIAL VISIT: HCPCS | Performed by: FAMILY MEDICINE

## 2022-06-07 PROCEDURE — 1123F ACP DISCUSS/DSCN MKR DOCD: CPT | Performed by: FAMILY MEDICINE

## 2022-06-07 ASSESSMENT — PATIENT HEALTH QUESTIONNAIRE - PHQ9
SUM OF ALL RESPONSES TO PHQ QUESTIONS 1-9: 0
SUM OF ALL RESPONSES TO PHQ QUESTIONS 1-9: 0
2. FEELING DOWN, DEPRESSED OR HOPELESS: 0
SUM OF ALL RESPONSES TO PHQ9 QUESTIONS 1 & 2: 0
1. LITTLE INTEREST OR PLEASURE IN DOING THINGS: 0
SUM OF ALL RESPONSES TO PHQ QUESTIONS 1-9: 0
SUM OF ALL RESPONSES TO PHQ QUESTIONS 1-9: 0

## 2022-06-07 ASSESSMENT — LIFESTYLE VARIABLES: HOW OFTEN DO YOU HAVE A DRINK CONTAINING ALCOHOL: NEVER

## 2022-06-07 NOTE — PROGRESS NOTES
Medicare Annual Wellness Visit    Edwin Faria is here for Zoraida GOTTI (Annual Wellness Visit. States she only has mosquito bites. )    Assessment & Plan    Recommendations for Preventive Services Due: see orders and patient instructions/AVS.  Recommended screening schedule for the next 5-10 years is provided to the patient in written form: see Patient Instructions/AVS.     No follow-ups on file. Subjective   {OPTIONAL FOR THIS VISIT TYPE - TO BE USED IF ADDRESSING AN ACUTE OR CHRONIC PROBLEM (THIS WILL AUTO-DELETE IF NOT USED):[id number]    Patient's complete Health Risk Assessment and screening values have been reviewed and are found in Flowsheets. The following problems were reviewed today and where indicated follow up appointments were made and/or referrals ordered.     Positive Risk Factor Screenings with Interventions:             General Health and ACP:  General  In general, how would you say your health is?: Good  In the past 7 days, have you experienced any of the following: New or Increased Pain, New or Increased Fatigue, Loneliness, Social Isolation, Stress or Anger?: No  Do you get the social and emotional support that you need?: Yes  Do you have a Living Will?: Yes    Advance Directives     Power of  Living Will ACP-Advance Directive ACP-Power of Jaja Counts on 07/23/21 Not on File Not on File Filed      General Health Risk Interventions:  · Continue with good nutrition and activity    Health Habits/Nutrition:     Physical Activity: Sufficiently Active    Days of Exercise per Week: 7 days    Minutes of Exercise per Session: 30 min     Have you lost any weight without trying in the past 3 months?: No  Body mass index: (!) 28.74  Have you seen the dentist within the past year?: (!) No    Health Habits/Nutrition Interventions:  · pt with good appetite    Hearing/Vision:  Do you or your family notice any trouble with your hearing that hasn't been managed with hearing aids?: (!) Yes  Do you have difficulty driving, watching TV, or doing any of your daily activities because of your eyesight?: (!) Yes  Have you had an eye exam within the past year?: (!) No  No exam data present    Hearing/Vision Interventions:  · consider vision and hearing evals     ADLs:  In the past 7 days, did you need help from others to perform any of the following everyday activities: Eating, dressing, grooming, bathing, toileting, or walking/balance?: (!) Yes (needs help with shower.)  Select all that apply: (!) Bathing  In the past 7 days, did you need help from others to take care of any of the following: Laundry, housekeeping, banking/finances, shopping, telephone use, food preparation, transportation, or taking medications?: (!) Yes  Select all that apply: (!) Laundry,Housekeeping,Banking/Finances,Shopping,Telephone Use,Transportation,Taking Medications    ADL Interventions:  · Patient declines any further evaluation/treatment for this issue          Objective   Vitals:    06/07/22 1008   BP: 124/60   Site: Right Upper Arm   Position: Sitting   Cuff Size: Medium Adult   Pulse: 60   SpO2: 98%   Weight: 143 lb 8 oz (65.1 kg)   Height: 4' 11.25\" (1.505 m)      Body mass index is 28.74 kg/m². Allergies   Allergen Reactions    Alendronate Other (See Comments)    Alendronate Sodium Other (See Comments)    Ibandronic Acid Other (See Comments)    Milk-Related Compounds      Prior to Visit Medications    Medication Sig Taking? Authorizing Provider   SENNA PO Take by mouth Takes a quarter of a tablet.  Yes Historical Provider, MD   metoprolol succinate (TOPROL XL) 25 MG extended release tablet TAKE 1 TABLET BY MOUTH  DAILY Yes Stefania Lemus MD   furosemide (LASIX) 20 MG tablet TAKE 1 TABLET BY MOUTH  DAILY Yes Stefania Lemus MD   lovastatin (MEVACOR) 40 MG tablet TAKE 1 TABLET BY MOUTH  DAILY Yes Stefania Lemus MD   potassium chloride (KLOR-CON M) 20 MEQ extended release tablet TAKE 1 TABLET BY MOUTH  DAILY Yes Shelly Bonds MD   Calcium Carb-Cholecalciferol (OYSTER SHELL CALCIUM) 500-400 MG-UNIT TABS Calcium 500 + D 500 mg (1,250 mg)-400 unit tablet   Take by oral route.  Yes Historical Provider, MD   fluticasone (FLONASE) 50 MCG/ACT nasal spray USE 2 SPRAYS IN EACH  NOSTRIL DAILY Yes Shelly Bonds MD   aspirin 81 MG tablet Take 81 mg by mouth daily Yes Historical Provider, MD   ferrous sulfate 325 (65 Fe) MG tablet Take 325 mg by mouth daily (with breakfast) Yes Historical Provider, MD   Multiple Vitamins-Minerals (MULTIVITAMIN ADULT PO) Take 1 tablet by mouth daily Yes Historical Provider, MD   Cholecalciferol (VITAMIN D3) 5000 units TABS Take 1 tablet by mouth daily Yes Historical Provider, MD       [employer] (Including outside providers/suppliers regularly involved in providing care):   Patient Care Team:  Shelly Bonds MD as PCP - General (Family Medicine)  Shelly Bonds MD as PCP - [facility] Empaneled Provider     Reviewed and updated this visit:  Allergies  Meds

## 2022-06-07 NOTE — PATIENT INSTRUCTIONS
Personalized Preventive Plan for Dianne Maloney - 6/7/2022  Medicare offers a range of preventive health benefits. Some of the tests and screenings are paid in full while other may be subject to a deductible, co-insurance, and/or copay. Some of these benefits include a comprehensive review of your medical history including lifestyle, illnesses that may run in your family, and various assessments and screenings as appropriate. After reviewing your medical record and screening and assessments performed today your provider may have ordered immunizations, labs, imaging, and/or referrals for you. A list of these orders (if applicable) as well as your Preventive Care list are included within your After Visit Summary for your review. Other Preventive Recommendations:    · A preventive eye exam performed by an eye specialist is recommended every 1-2 years to screen for glaucoma; cataracts, macular degeneration, and other eye disorders. · A preventive dental visit is recommended every 6 months. · Try to get at least 150 minutes of exercise per week or 10,000 steps per day on a pedometer . · Order or download the FREE \"Exercise & Physical Activity: Your Everyday Guide\" from [employer]. Call 7-842.733.4787 or search [employer] online. · You need 0272-5431 mg of calcium and 8448-9133 IU of vitamin D per day. It is possible to meet your calcium requirement with diet alone, but a vitamin D supplement is usually necessary to meet this goal.  · When exposed to the sun, use a sunscreen that protects against both UVA and UVB radiation with an SPF of 30 or greater. Reapply every 2 to 3 hours or after sweating, drying off with a towel, or swimming. · Always wear a seat belt when traveling in a car. Always wear a helmet when riding a bicycle or motorcycle.

## 2022-08-22 ENCOUNTER — TELEPHONE (OUTPATIENT)
Dept: FAMILY MEDICINE CLINIC | Age: 87
End: 2022-08-22

## 2022-08-22 NOTE — LETTER
[address] [place] [address] Daisy Chinchilla  Phone: [phone]  Fax: [phone]    Ashley Meckel, MD         August 22, 2022     Patient: Lillie Hdez   YOB: 1929   Date of Visit: 8/22/2022       To Whom It May Concern: It is my medical opinion that True Nephew requires a disability parking placard for the following reasons:  She cannot walk 200 feet without stopping to rest.  Duration of need: permanent    If you have any questions or concerns, please don't hesitate to call.     Sincerely,        Ashley Meckel, MD

## 2022-10-09 DIAGNOSIS — I10 ESSENTIAL HYPERTENSION: ICD-10-CM

## 2022-10-10 RX ORDER — METOPROLOL SUCCINATE 25 MG/1
25 TABLET, EXTENDED RELEASE ORAL DAILY
Qty: 90 TABLET | Refills: 3 | OUTPATIENT
Start: 2022-10-10

## 2022-10-21 ENCOUNTER — TELEMEDICINE (OUTPATIENT)
Dept: FAMILY MEDICINE CLINIC | Age: 87
End: 2022-10-21
Payer: MEDICARE

## 2022-10-21 DIAGNOSIS — E78.49 OTHER HYPERLIPIDEMIA: ICD-10-CM

## 2022-10-21 DIAGNOSIS — R04.0 EPISTAXIS: ICD-10-CM

## 2022-10-21 DIAGNOSIS — J30.1 SEASONAL ALLERGIC RHINITIS DUE TO POLLEN: ICD-10-CM

## 2022-10-21 DIAGNOSIS — N18.30 STAGE 3 CHRONIC KIDNEY DISEASE, UNSPECIFIED WHETHER STAGE 3A OR 3B CKD (HCC): Primary | ICD-10-CM

## 2022-10-21 DIAGNOSIS — Z95.2 S/P AVR (AORTIC VALVE REPLACEMENT): ICD-10-CM

## 2022-10-21 DIAGNOSIS — I10 ESSENTIAL HYPERTENSION: ICD-10-CM

## 2022-10-21 DIAGNOSIS — H91.93 BILATERAL HEARING LOSS, UNSPECIFIED HEARING LOSS TYPE: ICD-10-CM

## 2022-10-21 DIAGNOSIS — Z95.0 CARDIAC PACEMAKER: ICD-10-CM

## 2022-10-21 PROCEDURE — 1090F PRES/ABSN URINE INCON ASSESS: CPT | Performed by: FAMILY MEDICINE

## 2022-10-21 PROCEDURE — 1123F ACP DISCUSS/DSCN MKR DOCD: CPT | Performed by: FAMILY MEDICINE

## 2022-10-21 PROCEDURE — G8484 FLU IMMUNIZE NO ADMIN: HCPCS | Performed by: FAMILY MEDICINE

## 2022-10-21 PROCEDURE — 1036F TOBACCO NON-USER: CPT | Performed by: FAMILY MEDICINE

## 2022-10-21 PROCEDURE — 99422 OL DIG E/M SVC 11-20 MIN: CPT | Performed by: FAMILY MEDICINE

## 2022-10-21 PROCEDURE — G8427 DOCREV CUR MEDS BY ELIG CLIN: HCPCS | Performed by: FAMILY MEDICINE

## 2022-10-21 PROCEDURE — G8417 CALC BMI ABV UP PARAM F/U: HCPCS | Performed by: FAMILY MEDICINE

## 2022-10-21 RX ORDER — METOPROLOL SUCCINATE 25 MG/1
25 TABLET, EXTENDED RELEASE ORAL DAILY
Qty: 90 TABLET | Refills: 1 | Status: SHIPPED | OUTPATIENT
Start: 2022-10-21

## 2022-10-21 RX ORDER — FLUTICASONE PROPIONATE 50 MCG
SPRAY, SUSPENSION (ML) NASAL
Qty: 3 EACH | Refills: 1 | Status: SHIPPED | OUTPATIENT
Start: 2022-10-21

## 2022-10-21 ASSESSMENT — ENCOUNTER SYMPTOMS
VOMITING: 0
EYE PAIN: 0
SHORTNESS OF BREATH: 0
BLOOD IN STOOL: 0
CHEST TIGHTNESS: 0
ABDOMINAL PAIN: 0
NAUSEA: 0
WHEEZING: 0
TROUBLE SWALLOWING: 0
DIARRHEA: 0

## 2022-10-21 NOTE — PROGRESS NOTES
10/21/2022    TELEHEALTH EVALUATION -- Audio/Visual (During NBKEA-17 public health emergency)    HPI:    Flora Odell (:  5/15/1929) has requested an audio/video evaluation for the following concern(s):    Virtual visit carried out with the help of daughterArabella Pereira denies chest pain or shortness of breath she has a history of aortic valve replacement as well as hypertension CKD 3 and cardiac pacemaker. Has some mild allergies and her epistaxis has improved. Denies other acute change. Does get out with health of help with family. Mood remains positive her last labs were about a year ago. She is pleasant alert with diminished hearing. Review of Systems   Constitutional:  Negative for activity change and fatigue. HENT:  Positive for hearing loss. Negative for congestion, mouth sores, nosebleeds and trouble swallowing. Eyes:  Negative for pain and visual disturbance. Respiratory:  Negative for chest tightness, shortness of breath and wheezing. Cardiovascular:  Negative for chest pain and palpitations. Gastrointestinal:  Negative for abdominal pain, blood in stool, diarrhea, nausea and vomiting. Endocrine: Negative for cold intolerance, polydipsia and polyuria. Genitourinary:  Negative for dysuria, frequency and urgency. Musculoskeletal:  Negative for arthralgias, gait problem and neck stiffness. Skin:  Negative for rash. Allergic/Immunologic: Negative for environmental allergies. Neurological:  Negative for dizziness, seizures, speech difficulty and weakness. Hematological:  Does not bruise/bleed easily. Psychiatric/Behavioral:  Negative for agitation, confusion, dysphoric mood, hallucinations and suicidal ideas. The patient is not nervous/anxious. Prior to Visit Medications    Medication Sig Taking?  Authorizing Provider   metoprolol succinate (TOPROL XL) 25 MG extended release tablet Take 1 tablet by mouth daily Yes Joyce Swift MD   fluticasone [facility]) 50 MCG/ACT nasal spray USE 2 SPRAYS IN EACH  NOSTRIL DAILY Yes Jos Lara MD   SENNA PO Take by mouth Takes a quarter of a tablet. Yes Historical Provider, MD   furosemide (LASIX) 20 MG tablet TAKE 1 TABLET BY MOUTH  DAILY Yes Jos Lara MD   lovastatin (MEVACOR) 40 MG tablet TAKE 1 TABLET BY MOUTH  DAILY Yes Jos Lara MD   potassium chloride (KLOR-CON M) 20 MEQ extended release tablet TAKE 1 TABLET BY MOUTH  DAILY Yes Jos Lara MD   Calcium Carb-Cholecalciferol (OYSTER SHELL CALCIUM) 500-400 MG-UNIT TABS Calcium 500 + D 500 mg (1,250 mg)-400 unit tablet   Take by oral route. Yes Historical Provider, MD   aspirin 81 MG tablet Take 81 mg by mouth daily Yes Historical Provider, MD   ferrous sulfate 325 (65 Fe) MG tablet Take 325 mg by mouth daily (with breakfast) Yes Historical Provider, MD   Multiple Vitamins-Minerals (MULTIVITAMIN ADULT PO) Take 1 tablet by mouth daily Yes Historical Provider, MD   Cholecalciferol (VITAMIN D3) 5000 units TABS Take 1 tablet by mouth daily Yes Historical Provider, MD       Social History     Tobacco Use    Smoking status: Never    Smokeless tobacco: Never            PHYSICAL EXAMINATION:  [ INSTRUCTIONS:  \"[x]\" Indicates a positive item  \"[]\" Indicates a negative item  -- DELETE ALL ITEMS NOT EXAMINED]  Vital Signs: (As obtained by patient/caregiver or practitioner observation)    Blood pressure-  Heart rate-    Respiratory rate-    Temperature-  Pulse oximetry-     Constitutional: [x] Appears well-developed and well-nourished [x] No apparent distress      [] Abnormal-   Mental status  [x] Alert and awake  [x] Oriented to person/place/time [x]Able to follow commands      Eyes:  EOM    [x]  Normal  [] Abnormal-  Sclera  [x]  Normal  [] Abnormal -         Discharge []  None visible  [] Abnormal -    HENT:   [x] Normocephalic, atraumatic.   [] Abnormal   [] Mouth/Throat: Mucous membranes are moist.     External Ears [] Normal  [] Abnormal- Neck: [x] No visualized mass     Pulmonary/Chest: [x] Respiratory effort normal.  [x] No visualized signs of difficulty breathing or respiratory distress        [] Abnormal-      Musculoskeletal:   [] Normal gait with no signs of ataxia         [x] Normal range of motion of neck        [] Abnormal-       Neurological:        [x] No Facial Asymmetry (Cranial nerve 7 motor function) (limited exam to video visit)          [] No gaze palsy        [] Abnormal-         Skin:        [x] No significant exanthematous lesions or discoloration noted on facial skin         [] Abnormal-            Psychiatric:       [x] Normal Affect [] No Hallucinations        [] Abnormal-     Other pertinent observable physical exam findings-     ASSESSMENT/PLAN:  1. Essential hypertension    - metoprolol succinate (TOPROL XL) 25 MG extended release tablet; Take 1 tablet by mouth daily  Dispense: 90 tablet; Refill: 1  - CBC; Future  - Comprehensive Metabolic Panel; Future    2. S/P AVR (aortic valve replacement)      3. Stage 3 chronic kidney disease, unspecified whether stage 3a or 3b CKD ([facility] [address]      4. Cardiac pacemaker      5. Seasonal allergic rhinitis due to pollen      6. Epistaxis-improved      7. Other hyperlipidemia    - LIPID PANEL; Future  At this point encouragement provided no changes made-med list reviewed and refills given. Certainly agree to hold Flonase if its drying out her nasal passages and aggravating epistasis. Needs to get high-dose flu shot soon. The patient defers on COVID booster at this time. Med list reviewed and refills will be given. We will check CBC, CMP and lipid panel in the next couple months at their convenience. Call with other issues or changes. Call with changes or issues. Return in about 6 months (around 4/21/2023). Willy Reddy, was evaluated through a synchronous (real-time) audio-video encounter.  The patient (or guardian if applicable) is aware that this is a billable service, which includes applicable co-pays. This Virtual Visit was conducted with patient's (and/or legal guardian's) consent. The visit was conducted pursuant to the emergency declaration under the [facility] [address] and the [employer] Act. Patient identification was verified, and a caregiver was present when appropriate. The patient was located at Home: [address] [place] [address]. Provider was [facility] at [facility] [address] [place] [address]. [place] [id number]. Total time spent on this encounter: Not billed by time    --Johnathan Metz MD on 10/21/2022 at 5:44 PM    An electronic signature was used to authenticate this note.

## 2022-11-06 DIAGNOSIS — E78.2 MIXED HYPERLIPIDEMIA: ICD-10-CM

## 2022-11-07 RX ORDER — POTASSIUM CHLORIDE 20 MEQ/1
20 TABLET, EXTENDED RELEASE ORAL DAILY
Qty: 90 TABLET | Refills: 3 | Status: SHIPPED | OUTPATIENT
Start: 2022-11-07

## 2022-11-07 RX ORDER — LOVASTATIN 40 MG/1
TABLET ORAL
Qty: 90 TABLET | Refills: 3 | Status: SHIPPED | OUTPATIENT
Start: 2022-11-07

## 2022-12-20 ENCOUNTER — PATIENT MESSAGE (OUTPATIENT)
Dept: FAMILY MEDICINE CLINIC | Age: 87
End: 2022-12-20

## 2022-12-20 NOTE — TELEPHONE ENCOUNTER
From: Jese Keith  To: Dr. Arthur Hodgkin: 12/20/2022 2:51 PM EST  Subject: medicine for Mom for head congestion    just left [facility] ER. Mom's urine, chest xray were clear. Tests positive for Covid. She was a bit confused, is why I took her in. What can i give her, with her heart meds, for head congestion?

## 2022-12-20 NOTE — TELEPHONE ENCOUNTER
Spoke to patient's daughter. Mom has been confused for two days. Congestion and cough X 1 week. Tested + for covid at the [facility] in [facility]. Urine and Chest Xray were clear. What can she give her for the cough and congestion? Worried about what to give her with her heart medication. Please advise.

## 2022-12-21 NOTE — TELEPHONE ENCOUNTER
Mediation pended for signing. Spoke to daughter, Rohit Molina per message. She voiced understanding.

## 2023-02-14 DIAGNOSIS — I50.9 CONGESTIVE HEART FAILURE, UNSPECIFIED HF CHRONICITY, UNSPECIFIED HEART FAILURE TYPE (HCC): ICD-10-CM

## 2023-02-15 RX ORDER — FUROSEMIDE 20 MG/1
20 TABLET ORAL DAILY
Qty: 90 TABLET | Refills: 1 | Status: SHIPPED | OUTPATIENT
Start: 2023-02-15

## 2023-03-07 DIAGNOSIS — I10 ESSENTIAL HYPERTENSION: ICD-10-CM

## 2023-03-08 RX ORDER — METOPROLOL SUCCINATE 25 MG/1
TABLET, EXTENDED RELEASE ORAL
Qty: 90 TABLET | Refills: 0 | Status: SHIPPED | OUTPATIENT
Start: 2023-03-08

## 2023-06-02 DIAGNOSIS — I10 ESSENTIAL HYPERTENSION: ICD-10-CM

## 2023-06-02 RX ORDER — METOPROLOL SUCCINATE 25 MG/1
TABLET, EXTENDED RELEASE ORAL
Qty: 90 TABLET | Refills: 0 | Status: SHIPPED | OUTPATIENT
Start: 2023-06-02

## 2023-06-03 DIAGNOSIS — I10 ESSENTIAL HYPERTENSION: ICD-10-CM

## 2023-06-05 RX ORDER — METOPROLOL SUCCINATE 25 MG/1
TABLET, EXTENDED RELEASE ORAL
Qty: 90 TABLET | Refills: 3 | OUTPATIENT
Start: 2023-06-05

## 2023-08-01 DIAGNOSIS — I50.9 CONGESTIVE HEART FAILURE, UNSPECIFIED HF CHRONICITY, UNSPECIFIED HEART FAILURE TYPE (HCC): ICD-10-CM

## 2023-08-01 RX ORDER — FUROSEMIDE 20 MG/1
20 TABLET ORAL DAILY
Qty: 90 TABLET | Refills: 3 | OUTPATIENT
Start: 2023-08-01

## 2023-08-08 ENCOUNTER — APPOINTMENT (OUTPATIENT)
Dept: CT IMAGING | Age: 88
End: 2023-08-08
Payer: MEDICARE

## 2023-08-08 ENCOUNTER — APPOINTMENT (OUTPATIENT)
Dept: GENERAL RADIOLOGY | Age: 88
End: 2023-08-08
Attending: STUDENT IN AN ORGANIZED HEALTH CARE EDUCATION/TRAINING PROGRAM
Payer: MEDICARE

## 2023-08-08 ENCOUNTER — HOSPITAL ENCOUNTER (INPATIENT)
Age: 88
LOS: 9 days | Discharge: NURSING, REHAB OR LONG-TERM CARE | End: 2023-08-17
Attending: STUDENT IN AN ORGANIZED HEALTH CARE EDUCATION/TRAINING PROGRAM | Admitting: STUDENT IN AN ORGANIZED HEALTH CARE EDUCATION/TRAINING PROGRAM
Payer: MEDICARE

## 2023-08-08 DIAGNOSIS — R41.0 CONFUSION: Primary | ICD-10-CM

## 2023-08-08 DIAGNOSIS — I50.9 ACUTE ON CHRONIC CONGESTIVE HEART FAILURE, UNSPECIFIED HEART FAILURE TYPE (HCC): ICD-10-CM

## 2023-08-08 DIAGNOSIS — R05.1 ACUTE COUGH: ICD-10-CM

## 2023-08-08 DIAGNOSIS — J18.9 PNEUMONIA DUE TO INFECTIOUS ORGANISM, UNSPECIFIED LATERALITY, UNSPECIFIED PART OF LUNG: ICD-10-CM

## 2023-08-08 PROBLEM — G93.41 ACUTE METABOLIC ENCEPHALOPATHY: Status: ACTIVE | Noted: 2023-08-08

## 2023-08-08 LAB
ALBUMIN SERPL-MCNC: 3.8 GM/DL (ref 3.4–5)
ALCOHOL SCREEN SERUM: <0.01 %WT/VOL
ALP BLD-CCNC: 178 IU/L (ref 40–129)
ALT SERPL-CCNC: 26 U/L (ref 10–40)
ANION GAP SERPL CALCULATED.3IONS-SCNC: 10 MMOL/L (ref 4–16)
AST SERPL-CCNC: 34 IU/L (ref 15–37)
BILIRUB SERPL-MCNC: 1 MG/DL (ref 0–1)
BUN SERPL-MCNC: 39 MG/DL (ref 6–23)
CALCIUM SERPL-MCNC: 9.3 MG/DL (ref 8.3–10.6)
CHLORIDE BLD-SCNC: 103 MMOL/L (ref 99–110)
CO2: 25 MMOL/L (ref 21–32)
CREAT SERPL-MCNC: 1.3 MG/DL (ref 0.6–1.1)
GFR SERPL CREATININE-BSD FRML MDRD: 38 ML/MIN/1.73M2
GLUCOSE BLD-MCNC: 103 MG/DL
GLUCOSE SERPL-MCNC: 103 MG/DL (ref 70–99)
HCT VFR BLD CALC: 34.5 % (ref 37–47)
HEMOGLOBIN: 10.6 GM/DL (ref 12.5–16)
MCH RBC QN AUTO: 29.1 PG (ref 27–31)
MCHC RBC AUTO-ENTMCNC: 30.7 % (ref 32–36)
MCV RBC AUTO: 94.8 FL (ref 78–100)
PDW BLD-RTO: 15.3 % (ref 11.7–14.9)
PLATELET # BLD: 185 K/CU MM (ref 140–440)
PMV BLD AUTO: 10.1 FL (ref 7.5–11.1)
POTASSIUM SERPL-SCNC: 5 MMOL/L (ref 3.5–5.1)
PRO-BNP: ABNORMAL PG/ML
RBC # BLD: 3.64 M/CU MM (ref 4.2–5.4)
SODIUM BLD-SCNC: 138 MMOL/L (ref 135–145)
TOTAL PROTEIN: 6.2 GM/DL (ref 6.4–8.2)
TROPONIN T: 0.02 NG/ML
TROPONIN T: 0.02 NG/ML
TSH SERPL DL<=0.005 MIU/L-ACNC: 1.04 UIU/ML (ref 0.27–4.2)
WBC # BLD: 11.8 K/CU MM (ref 4–10.5)

## 2023-08-08 PROCEDURE — 2580000003 HC RX 258: Performed by: STUDENT IN AN ORGANIZED HEALTH CARE EDUCATION/TRAINING PROGRAM

## 2023-08-08 PROCEDURE — 83880 ASSAY OF NATRIURETIC PEPTIDE: CPT

## 2023-08-08 PROCEDURE — 84443 ASSAY THYROID STIM HORMONE: CPT

## 2023-08-08 PROCEDURE — 85027 COMPLETE CBC AUTOMATED: CPT

## 2023-08-08 PROCEDURE — 93005 ELECTROCARDIOGRAM TRACING: CPT | Performed by: STUDENT IN AN ORGANIZED HEALTH CARE EDUCATION/TRAINING PROGRAM

## 2023-08-08 PROCEDURE — 71045 X-RAY EXAM CHEST 1 VIEW: CPT

## 2023-08-08 PROCEDURE — 99285 EMERGENCY DEPT VISIT HI MDM: CPT

## 2023-08-08 PROCEDURE — 80053 COMPREHEN METABOLIC PANEL: CPT

## 2023-08-08 PROCEDURE — 96374 THER/PROPH/DIAG INJ IV PUSH: CPT

## 2023-08-08 PROCEDURE — 70450 CT HEAD/BRAIN W/O DYE: CPT

## 2023-08-08 PROCEDURE — 1200000000 HC SEMI PRIVATE

## 2023-08-08 PROCEDURE — G0480 DRUG TEST DEF 1-7 CLASSES: HCPCS

## 2023-08-08 PROCEDURE — 6360000002 HC RX W HCPCS: Performed by: STUDENT IN AN ORGANIZED HEALTH CARE EDUCATION/TRAINING PROGRAM

## 2023-08-08 PROCEDURE — 96375 TX/PRO/DX INJ NEW DRUG ADDON: CPT

## 2023-08-08 PROCEDURE — 84484 ASSAY OF TROPONIN QUANT: CPT

## 2023-08-08 RX ORDER — ACETAMINOPHEN 325 MG/1
650 TABLET ORAL EVERY 6 HOURS PRN
Status: DISCONTINUED | OUTPATIENT
Start: 2023-08-08 | End: 2023-08-17 | Stop reason: HOSPADM

## 2023-08-08 RX ORDER — SODIUM CHLORIDE 9 MG/ML
INJECTION, SOLUTION INTRAVENOUS PRN
Status: DISCONTINUED | OUTPATIENT
Start: 2023-08-08 | End: 2023-08-17 | Stop reason: HOSPADM

## 2023-08-08 RX ORDER — ONDANSETRON 2 MG/ML
4 INJECTION INTRAMUSCULAR; INTRAVENOUS EVERY 6 HOURS PRN
Status: DISCONTINUED | OUTPATIENT
Start: 2023-08-08 | End: 2023-08-17 | Stop reason: HOSPADM

## 2023-08-08 RX ORDER — MAGNESIUM SULFATE IN WATER 40 MG/ML
2000 INJECTION, SOLUTION INTRAVENOUS PRN
Status: DISCONTINUED | OUTPATIENT
Start: 2023-08-08 | End: 2023-08-15

## 2023-08-08 RX ORDER — POTASSIUM CHLORIDE 7.45 MG/ML
10 INJECTION INTRAVENOUS PRN
Status: DISCONTINUED | OUTPATIENT
Start: 2023-08-08 | End: 2023-08-17 | Stop reason: HOSPADM

## 2023-08-08 RX ORDER — SODIUM CHLORIDE 0.9 % (FLUSH) 0.9 %
5-40 SYRINGE (ML) INJECTION EVERY 12 HOURS SCHEDULED
Status: DISCONTINUED | OUTPATIENT
Start: 2023-08-08 | End: 2023-08-17 | Stop reason: HOSPADM

## 2023-08-08 RX ORDER — ACETAMINOPHEN 650 MG/1
650 SUPPOSITORY RECTAL EVERY 6 HOURS PRN
Status: DISCONTINUED | OUTPATIENT
Start: 2023-08-08 | End: 2023-08-17 | Stop reason: HOSPADM

## 2023-08-08 RX ORDER — POTASSIUM CHLORIDE 20 MEQ/1
40 TABLET, EXTENDED RELEASE ORAL PRN
Status: DISCONTINUED | OUTPATIENT
Start: 2023-08-08 | End: 2023-08-15

## 2023-08-08 RX ORDER — ATORVASTATIN CALCIUM 10 MG/1
10 TABLET, FILM COATED ORAL DAILY
Status: DISCONTINUED | OUTPATIENT
Start: 2023-08-09 | End: 2023-08-17 | Stop reason: HOSPADM

## 2023-08-08 RX ORDER — ENOXAPARIN SODIUM 100 MG/ML
40 INJECTION SUBCUTANEOUS DAILY
Status: DISCONTINUED | OUTPATIENT
Start: 2023-08-09 | End: 2023-08-09

## 2023-08-08 RX ORDER — FUROSEMIDE 10 MG/ML
20 INJECTION INTRAMUSCULAR; INTRAVENOUS ONCE
Status: COMPLETED | OUTPATIENT
Start: 2023-08-08 | End: 2023-08-08

## 2023-08-08 RX ORDER — FERROUS SULFATE 325(65) MG
325 TABLET ORAL
Status: DISCONTINUED | OUTPATIENT
Start: 2023-08-09 | End: 2023-08-17 | Stop reason: HOSPADM

## 2023-08-08 RX ORDER — METOPROLOL SUCCINATE 25 MG/1
25 TABLET, EXTENDED RELEASE ORAL DAILY
Status: DISCONTINUED | OUTPATIENT
Start: 2023-08-09 | End: 2023-08-12

## 2023-08-08 RX ORDER — ASPIRIN 81 MG/1
81 TABLET, CHEWABLE ORAL DAILY
Status: DISCONTINUED | OUTPATIENT
Start: 2023-08-09 | End: 2023-08-17 | Stop reason: HOSPADM

## 2023-08-08 RX ORDER — POTASSIUM CHLORIDE 7.45 MG/ML
10 INJECTION INTRAVENOUS PRN
Status: DISCONTINUED | OUTPATIENT
Start: 2023-08-08 | End: 2023-08-15

## 2023-08-08 RX ORDER — ONDANSETRON 4 MG/1
4 TABLET, ORALLY DISINTEGRATING ORAL EVERY 8 HOURS PRN
Status: DISCONTINUED | OUTPATIENT
Start: 2023-08-08 | End: 2023-08-17 | Stop reason: HOSPADM

## 2023-08-08 RX ORDER — SODIUM CHLORIDE 0.9 % (FLUSH) 0.9 %
5-40 SYRINGE (ML) INJECTION PRN
Status: DISCONTINUED | OUTPATIENT
Start: 2023-08-08 | End: 2023-08-17 | Stop reason: HOSPADM

## 2023-08-08 RX ORDER — POLYETHYLENE GLYCOL 3350 17 G/17G
17 POWDER, FOR SOLUTION ORAL DAILY PRN
Status: DISCONTINUED | OUTPATIENT
Start: 2023-08-08 | End: 2023-08-17 | Stop reason: HOSPADM

## 2023-08-08 RX ADMIN — SODIUM CHLORIDE, PRESERVATIVE FREE 10 ML: 5 INJECTION INTRAVENOUS at 23:53

## 2023-08-08 RX ADMIN — FUROSEMIDE 20 MG: 10 INJECTION, SOLUTION INTRAMUSCULAR; INTRAVENOUS at 20:57

## 2023-08-08 RX ADMIN — CEFTRIAXONE SODIUM 1000 MG: 1 INJECTION, POWDER, FOR SOLUTION INTRAMUSCULAR; INTRAVENOUS at 20:56

## 2023-08-08 ASSESSMENT — LIFESTYLE VARIABLES
HOW OFTEN DO YOU HAVE A DRINK CONTAINING ALCOHOL: NEVER
HOW MANY STANDARD DRINKS CONTAINING ALCOHOL DO YOU HAVE ON A TYPICAL DAY: PATIENT DOES NOT DRINK

## 2023-08-08 NOTE — ED PROVIDER NOTES
Triage Chief Complaint:    Altered Mental Status    HPI   Conchita Vizcaino is a 80 y.o. female that presents with her daughter. History of stroke and aphasia as well as congestive heart failure. Patient has had confusion. Last known well was before bed last evening at around 9:30 PM.  Presented today to the emergency department at around 5 PM.  Daughter states that aphasia is baseline however the confusion and inability to perform her normal activities of daily living such as cooking or self breakfast, and taking care of herself and getting dressed, all of these activities were unable to be completed by the patient today. She was seen by her primary care physician and sent into the ED for evaluation. Given patient's duration of symptoms, prior brain bleed, she is not truly a tPA candidate. She does appear to be doing better, has no acute facial droop, or strokelike symptoms, is at baseline aphasia per daughter. No weakness noted. She does have some swelling of bilateral lower extremities. Daughter states that she has not been able to prop her legs up today. Patient has had no nausea no vomiting, normal bowel habits, daughter denies any foul-smelling urine. Daughter does state that is difficult to take care of her patient at home in her current state. No fevers here. No fevers at home documented. No trauma no falls. History from : Patient and Family daughter    Limitations to history : Confusion and  Aphasia    ROS:  10 systems reviewed and negative except as above. Past Medical History:   Diagnosis Date    Aphasia      Past Surgical History:   Procedure Laterality Date    AORTIC VALVE SURGERY  06/29/2017    BRAIN SURGERY  11/27/2017    bleed from stroke    PACEMAKER PLACEMENT  06/29/2017     Family History   Problem Relation Age of Onset    Heart Disease Father     Heart Attack Father     Cancer Other      Social History     Socioeconomic History    Marital status:       Spouse name: consideration in areas of consolidation with pleural   effusion. 2. Calcific atherosclerosis aorta. 3. Cardiomegaly. CT HEAD WO CONTRAST   Preliminary Result   No acute intracranial abnormality. Cerebral atrophy. Severe chronic small vessel ischemic changes. No acute   brain parenchymal abnormality. Procedures:  12 lead EKG per my interpretation:  Atrial sensed ventricular paced rhythm. Rate: 76  QTc is   slightly prolonged at 499  There are no T wave changes  There are no ST concerning segment changes    Prior EKG to compare with was available and is the same 7/14/21    (All EKG's are interpreted by myself in the absence of a cardiologist)      Chart review shows recent radiographs:  CT HEAD WO CONTRAST    Result Date: 8/8/2023  EXAMINATION: CT OF THE HEAD WITHOUT CONTRAST  8/8/2023 5:52 pm TECHNIQUE: CT of the head was performed without the administration of intravenous contrast. Automated exposure control, iterative reconstruction, and/or weight based adjustment of the mA/kV was utilized to reduce the radiation dose to as low as reasonably achievable. COMPARISON: 07/09/2021 HISTORY: ORDERING SYSTEM PROVIDED HISTORY: AMS TECHNOLOGIST PROVIDED HISTORY: Has a \"code stroke\" or \"stroke alert\" been called? ->No Reason for exam:->AMS Decision Support Exception - unselect if not a suspected or confirmed emergency medical condition->Emergency Medical Condition (MA) Reason for Exam: AMS Initial evaluation. FINDINGS: BRAIN/VENTRICLES:  The ventricles and cisternal spaces are normal in size, shape, and configuration for the age of the patient. There is atherosclerotic calcification of the cavernous carotids. There are calcifications in the basal ganglia bilaterally that are likely physiologic. There are confluent areas of hypoattenuation in the periventricular white matter and centrum semiovale that are likely related to chronic small vessel ischemic disease.   There is no midline shift or

## 2023-08-09 LAB
ALBUMIN SERPL-MCNC: 3.3 GM/DL (ref 3.4–5)
ALP BLD-CCNC: 140 IU/L (ref 40–128)
ALT SERPL-CCNC: 23 U/L (ref 10–40)
AMPHETAMINES: NEGATIVE
ANION GAP SERPL CALCULATED.3IONS-SCNC: 12 MMOL/L (ref 4–16)
AST SERPL-CCNC: 35 IU/L (ref 15–37)
BACTERIA: NEGATIVE /HPF
BARBITURATE SCREEN URINE: NEGATIVE
BASOPHILS ABSOLUTE: 0.1 K/CU MM
BASOPHILS RELATIVE PERCENT: 0.4 % (ref 0–1)
BENZODIAZEPINE SCREEN, URINE: NEGATIVE
BILIRUB SERPL-MCNC: 0.7 MG/DL (ref 0–1)
BILIRUBIN URINE: NEGATIVE MG/DL
BLOOD, URINE: ABNORMAL
BUN SERPL-MCNC: 37 MG/DL (ref 6–23)
CALCIUM SERPL-MCNC: 8.9 MG/DL (ref 8.3–10.6)
CANNABINOID SCREEN URINE: NEGATIVE
CHLORIDE BLD-SCNC: 104 MMOL/L (ref 99–110)
CLARITY: CLEAR
CO2: 24 MMOL/L (ref 21–32)
COCAINE METABOLITE: NEGATIVE
COLOR: YELLOW
CREAT SERPL-MCNC: 1.2 MG/DL (ref 0.6–1.1)
DIFFERENTIAL TYPE: ABNORMAL
EKG DIAGNOSIS: NORMAL
EKG P AXIS: 62 DEGREES
EKG P-R INTERVAL: 124 MS
EKG Q-T INTERVAL: 444 MS
EKG QRS DURATION: 132 MS
EKG QTC CALCULATION (BAZETT): 499 MS
EKG R AXIS: 270 DEGREES
EKG T AXIS: 69 DEGREES
EKG VENTRICULAR RATE: 76 BPM
EOSINOPHILS ABSOLUTE: 0 K/CU MM
EOSINOPHILS RELATIVE PERCENT: 0.2 % (ref 0–3)
FENTANYL URINE: NEGATIVE
GFR SERPL CREATININE-BSD FRML MDRD: 42 ML/MIN/1.73M2
GLUCOSE SERPL-MCNC: 104 MG/DL (ref 70–99)
GLUCOSE, URINE: NEGATIVE MG/DL
HCT VFR BLD CALC: 31.6 % (ref 37–47)
HEMOGLOBIN: 9.8 GM/DL (ref 12.5–16)
IMMATURE NEUTROPHIL %: 0.5 % (ref 0–0.43)
INR BLD: 1.3 INDEX
KETONES, URINE: NEGATIVE MG/DL
LEUKOCYTE ESTERASE, URINE: ABNORMAL
LYMPHOCYTES ABSOLUTE: 0.6 K/CU MM
LYMPHOCYTES RELATIVE PERCENT: 4.6 % (ref 24–44)
MAGNESIUM: 2.1 MG/DL (ref 1.8–2.4)
MCH RBC QN AUTO: 29.8 PG (ref 27–31)
MCHC RBC AUTO-ENTMCNC: 31 % (ref 32–36)
MCV RBC AUTO: 96 FL (ref 78–100)
MONOCYTES ABSOLUTE: 1.2 K/CU MM
MONOCYTES RELATIVE PERCENT: 10.2 % (ref 0–4)
NITRITE URINE, QUANTITATIVE: NEGATIVE
NUCLEATED RBC %: 0 %
OPIATES, URINE: NEGATIVE
OXYCODONE: NEGATIVE
PDW BLD-RTO: 15.3 % (ref 11.7–14.9)
PH, URINE: 6 (ref 5–8)
PHOSPHORUS: 4 MG/DL (ref 2.5–4.9)
PLATELET # BLD: 151 K/CU MM (ref 140–440)
PMV BLD AUTO: 10.2 FL (ref 7.5–11.1)
POTASSIUM SERPL-SCNC: 4.2 MMOL/L (ref 3.5–5.1)
PROTEIN UA: NEGATIVE MG/DL
PROTHROMBIN TIME: 16.8 SECONDS (ref 11.7–14.5)
RBC # BLD: 3.29 M/CU MM (ref 4.2–5.4)
RBC URINE: <1 /HPF (ref 0–6)
SEGMENTED NEUTROPHILS ABSOLUTE COUNT: 10 K/CU MM
SEGMENTED NEUTROPHILS RELATIVE PERCENT: 84.1 % (ref 36–66)
SODIUM BLD-SCNC: 140 MMOL/L (ref 135–145)
SPECIFIC GRAVITY UA: 1.01 (ref 1–1.03)
TOTAL IMMATURE NEUTOROPHIL: 0.06 K/CU MM
TOTAL NUCLEATED RBC: 0 K/CU MM
TOTAL PROTEIN: 5.3 GM/DL (ref 6.4–8.2)
TRICHOMONAS: NORMAL /HPF
UROBILINOGEN, URINE: 0.2 MG/DL (ref 0.2–1)
WBC # BLD: 11.9 K/CU MM (ref 4–10.5)
WBC UA: 3 /HPF (ref 0–5)

## 2023-08-09 PROCEDURE — 97162 PT EVAL MOD COMPLEX 30 MIN: CPT

## 2023-08-09 PROCEDURE — 83735 ASSAY OF MAGNESIUM: CPT

## 2023-08-09 PROCEDURE — 36415 COLL VENOUS BLD VENIPUNCTURE: CPT

## 2023-08-09 PROCEDURE — 84100 ASSAY OF PHOSPHORUS: CPT

## 2023-08-09 PROCEDURE — 6360000002 HC RX W HCPCS: Performed by: STUDENT IN AN ORGANIZED HEALTH CARE EDUCATION/TRAINING PROGRAM

## 2023-08-09 PROCEDURE — 85025 COMPLETE CBC W/AUTO DIFF WBC: CPT

## 2023-08-09 PROCEDURE — 93010 ELECTROCARDIOGRAM REPORT: CPT | Performed by: INTERNAL MEDICINE

## 2023-08-09 PROCEDURE — 2580000003 HC RX 258: Performed by: STUDENT IN AN ORGANIZED HEALTH CARE EDUCATION/TRAINING PROGRAM

## 2023-08-09 PROCEDURE — 97116 GAIT TRAINING THERAPY: CPT

## 2023-08-09 PROCEDURE — 80307 DRUG TEST PRSMV CHEM ANLYZR: CPT

## 2023-08-09 PROCEDURE — 6370000000 HC RX 637 (ALT 250 FOR IP): Performed by: STUDENT IN AN ORGANIZED HEALTH CARE EDUCATION/TRAINING PROGRAM

## 2023-08-09 PROCEDURE — 80053 COMPREHEN METABOLIC PANEL: CPT

## 2023-08-09 PROCEDURE — 81001 URINALYSIS AUTO W/SCOPE: CPT

## 2023-08-09 PROCEDURE — 85610 PROTHROMBIN TIME: CPT

## 2023-08-09 PROCEDURE — 1200000000 HC SEMI PRIVATE

## 2023-08-09 PROCEDURE — 94761 N-INVAS EAR/PLS OXIMETRY MLT: CPT

## 2023-08-09 PROCEDURE — 6360000002 HC RX W HCPCS: Performed by: INTERNAL MEDICINE

## 2023-08-09 RX ORDER — HEPARIN SODIUM 5000 [USP'U]/ML
5000 INJECTION, SOLUTION INTRAVENOUS; SUBCUTANEOUS EVERY 8 HOURS SCHEDULED
Status: DISCONTINUED | OUTPATIENT
Start: 2023-08-09 | End: 2023-08-17 | Stop reason: HOSPADM

## 2023-08-09 RX ORDER — FUROSEMIDE 10 MG/ML
20 INJECTION INTRAMUSCULAR; INTRAVENOUS DAILY
Status: DISCONTINUED | OUTPATIENT
Start: 2023-08-09 | End: 2023-08-10

## 2023-08-09 RX ORDER — HEPARIN SODIUM 5000 [USP'U]/ML
5000 INJECTION, SOLUTION INTRAVENOUS; SUBCUTANEOUS EVERY 8 HOURS SCHEDULED
Status: DISCONTINUED | OUTPATIENT
Start: 2023-08-09 | End: 2023-08-09

## 2023-08-09 RX ORDER — FUROSEMIDE 10 MG/ML
20 INJECTION INTRAMUSCULAR; INTRAVENOUS ONCE
Status: COMPLETED | OUTPATIENT
Start: 2023-08-09 | End: 2023-08-09

## 2023-08-09 RX ADMIN — HEPARIN SODIUM 5000 UNITS: 5000 INJECTION INTRAVENOUS; SUBCUTANEOUS at 18:01

## 2023-08-09 RX ADMIN — SODIUM CHLORIDE, PRESERVATIVE FREE 10 ML: 5 INJECTION INTRAVENOUS at 20:47

## 2023-08-09 RX ADMIN — SODIUM CHLORIDE, PRESERVATIVE FREE 10 ML: 5 INJECTION INTRAVENOUS at 10:09

## 2023-08-09 RX ADMIN — FUROSEMIDE 20 MG: 10 INJECTION, SOLUTION INTRAMUSCULAR; INTRAVENOUS at 10:10

## 2023-08-09 RX ADMIN — FERROUS SULFATE TAB 325 MG (65 MG ELEMENTAL FE) 325 MG: 325 (65 FE) TAB at 10:09

## 2023-08-09 RX ADMIN — FUROSEMIDE 20 MG: 10 INJECTION, SOLUTION INTRAMUSCULAR; INTRAVENOUS at 13:16

## 2023-08-09 RX ADMIN — ASPIRIN 81 MG: 81 TABLET, CHEWABLE ORAL at 10:09

## 2023-08-09 RX ADMIN — ATORVASTATIN CALCIUM 10 MG: 10 TABLET, FILM COATED ORAL at 10:10

## 2023-08-09 NOTE — PROGRESS NOTES
4 Eyes Skin Assessment     NAME:  Robert Marinelli  YOB: 1929  MEDICAL RECORD NUMBER:  [id number]    The patient is being assessed for  Admission    I agree that at least one RN has performed a thorough Head to Toe Skin Assessment on the patient. ALL assessment sites listed below have been assessed. Areas assessed by both nurses:    Head, Face, Ears, Shoulders, Back, Chest, Arms, Elbows, Hands, Sacrum. Buttock, Coccyx, Ischium, Legs. Feet and Heels, and Under Medical Devices         Does the Patient have a Wound?  No noted wound(s)       [place]unter Prevention initiated by RN: No  Wound Care Orders initiated by RN: No    Pressure Injury (Stage 3,4, Unstageable, DTI, NWPT, and Complex wounds) if present, place Wound referral order by RN under : No    New Ostomies, if present place, Ostomy referral order under : No     Nurse 1 eSignature: Electronically signed by Giancarlo Pearson RN on 8/9/23 at 12:44 AM EDT    **SHARE this note so that the co-signing nurse can place an eSignature**    Nurse 2 eSignature: Electronically signed by Lia Fraser RN on 8/9/23 at 12:46 AM EDT

## 2023-08-09 NOTE — CARE COORDINATION
08/09/23 1632   Service Assessment   Patient Orientation Alert and Oriented   Cognition Alert   History Provided By Child/Family   Primary [address] Members   Patient's Healthcare Decision Maker is: Legal Next of [address] [facility]   PCP Verified by CM Yes   Prior Functional Level Assistance with the following:;Mobility   Current Functional Level Assistance with the following:;Mobility   Can patient return to prior living arrangement Yes   Ability to make needs known: Good   Family able to assist with home care needs: Yes   Would you like for me to discuss the discharge plan with any other family members/significant others, and if so, who? No   Financial Resources [email or web address] Resources None     CM in to see Pt to initiate discharge planning. Pt son Daljit Owen present. Pt from home with daughter Lilliana Guy and plans to return. Pt and family contemplating home care at this time. Pt and Daljit Owen deny any needs at this time.   CM following

## 2023-08-09 NOTE — PROGRESS NOTES
In-Patient Progress Note    Patient:  Corinne Rizo 80 y.o. female MRN: [id number]     Date of Service: 8/9/2023    Hospital Day: 2      Chief complaint: had concerns including Altered Mental Status. Subjective   Patient seen and examined in the morning. Patient has underlying dementia. Denies any complaints. Spoke at length with daughter - patient has been living with daughter. Daughter feels like she cannot take care of patient at home if pt. Is unable to walk. See ROS    I have reviewed all pertinent PMHx, PSHx, FamHx, SocialHx, medications, and allergies and updated history as appropriate. I have reviewed images as appropriate. Assessment and Plan   Corinne Rizo, a 80 y.o. female, with a history of HTN, HLD, stroke, HFpEF, constipation, s/p ICD, pacer was admitted on 8/8/2023 with complaints of had concerns including Altered Mental Status. Assessment and Plan:  # Altered Mental Status 2/2 likely Underlying Dementia  -Patient presents with concerns of altered mental status reported by the daughter. Daughter states that the patient did not have breakfast and seemed uncooperative which was atypical for the patient  -No evidence of metabolic encephalopathy, electrolytes abnormalities, TSH within normal limits, UDS negative, no evidence of infection                 -Will monitor at this time due to no known etiology, suspect component of chronic dementia     # Acute on Chronic HFpEF  - patient presents with SOB and peripheral edema  - on admit, patient hemodynamically stable and not requiring oxygen supplementation.   Physical exam and imaging consistent with volume overload on admission   -TTE on 7/21 with an EF of 50 to 55%, grade 3 DD, severe biatrial enlargement  -TSH within normal limits, EKG without ischemic changes, UDS negative, dietary compliance  -Received 20mg IV lasix in ED  - Pro-BNP elevated at 17,398                 -Continue Lasix IV 20mg daily

## 2023-08-09 NOTE — H&P
History and Physical      Name:  Yashira Ruano /Age/Sex: 5/15/1929  (80 y.o. female)   MRN & CSN:  [id number] & [id number] Encounter Date/Time: 2023 9:54 PM EDT   Location:   PCP: Kandis Mulligan MD       Hospital Day: 1    Assessment and Plan:     Patient is a 80 y.o. female  who presented with AMS     # Acute encephalopathy   -Patient presents with concerns of altered mental status reported by the daughter. Daughter states that the patient did not have breakfast and seemed uncooperative which was atypical for the patient  -No evidence of metabolic encephalopathy, electrolytes abnormalities, TSH within normal limits, UDS negative, no evidence of infection     -Will monitor at this time due to no known etiology, suspect component of chronic dementia    # ADHF  - patient presents with SOB and peripheral edema  - on admit, patient hemodynamically stable and not requiring oxygen supplementation. Physical exam and imaging consistent with volume overload  -TTE on  with an EF of 50 to 55%, grade 3 DD, severe biatrial enlargement  -TSH within normal limits, EKG without ischemic changes, UDS negative, dietary compliance  -Received 20mg IV lasix in ED     -Continue IV 20mg daily    -Monitor I/O   -Low sodium diet            # Non-MI troponin elevation  - Denied any typical CP.  - Initial Tn mildly elevated 0.017, repeat 0.017. ECG without acute ischemic changes. - Likely secondary to ADHF.     -Monitor Tele     #SABA  -Cr 1.3 on admit, baseline (baseline ~0.9 in )  -Likely pre-renal 2/2 decreased PO intake and cardiorenal syndrome      -Expect to improve with diuresis    -Avoid nephrotoxin   -Monitor I/O   -Bladder scan       # HTN  - Continue BB    # HLD  - Continue statin    Checklist:  Advanced directive: full  Diet: cardiac  DVT ppx: Lovenox      Disposition: admit to inpatient. Estimated discharge: 2 day(s). Current living situation: home.   Expected disposition:

## 2023-08-09 NOTE — CONSULTS
[address], 5/15/1929, 3025/3025-A, 8/9/2023    History  [place]:  The primary encounter diagnosis was Confusion. Diagnoses of Acute cough, Acute on chronic congestive heart failure, unspecified heart failure type ([address]), and Pneumonia due to infectious organism, unspecified laterality, unspecified part of lung were also pertinent to this visit. Patient  has a past medical history of Aphasia. Patient  has a past surgical history that includes Aortic valve surgery (06/29/2017); pacemaker placement (06/29/2017); and brain surgery (11/27/2017). Subjective:  Patient states: \"I haven't been getting up much\". Pain:  denies. Communication with other providers:  RN  Restrictions: general precautions, fall risk    Home Setup/Prior level of function  Social/Functional History  Lives With: Daughter  Home Equipment: [employer] [address] [place] [address] Help From: Family  ADL Assistance: Needs assistance  Homemaking Assistance: Needs assistance  Ambulation Assistance: Independent (states [employer] w/ walker)    Pt is poor historian and has difficulty answering social/functional questions. Information should be confirmed and gathered with family when present. Pt is from home with daughter and requires assistance for ADLs per chart review. Examination of body systems (includes body structures/functions, activity/participation limitations):  Observation:  Supine in bed upon arrival, sitter in room. Cooperative with therapy  Vision:  WFL, glasses  Hearing:  WFL  Cardiopulmonary:  stable vitals on room air  Cognition: Hx dementia, poor historian, oriented to self and date, not place, see OT/SLP note for further evaluation. Musculoskeletal  ROM R/L:  WFL bilat LEs. Strength R/L:  grossly 4 to 4+/5 bilat LEs, moderate impairment in function and endurance.     Neuro:  [facility]      Mobility/treatment:  Rolling L/R:  NT  Supine to sit:  completed CGA w/ HOB and use of

## 2023-08-09 NOTE — PROGRESS NOTES
Physician Progress Note      PATIENT:               Nichole Simmons  [facility] #:                  [id number]  :                       5/15/1929  ADMIT DATE:       2023 4:52 PM  [facility] DATE:  RESPONDING  PROVIDER #:        Jeffrey Lopez MD        QUERY TEXT:    Type of Encephalopathy: Please provide further specificity, if known. Clinical indicators include:  Options provided:  -- Anoxic/hypoxic encephalopathy  -- Metabolic encephalopathy  -- Toxic encephalopathy  -- Hepatic encephalopathy  -- Hypertensive encephalopathy  -- Other - I will add my own diagnosis  -- Disagree - Not applicable / Not valid  -- Disagree - Clinically Unable to determine / Unknown        PROVIDER RESPONSE TEXT:    Altered Mental Status 2/2 likely Underlying Dementia.  Doubt metabolic, toxic   or hepatic encephalopathy      Electronically signed by:  Jeffrey Lopez MD 2023 5:45 PM

## 2023-08-09 NOTE — PROGRESS NOTES
Patient medication list and admission documentation not completed. Patient quite confused, family member can help with more information. Has a sitter at bedside.

## 2023-08-10 ENCOUNTER — APPOINTMENT (OUTPATIENT)
Dept: GENERAL RADIOLOGY | Age: 88
End: 2023-08-10
Payer: MEDICARE

## 2023-08-10 LAB
ALBUMIN SERPL-MCNC: 3.2 GM/DL (ref 3.4–5)
ALP BLD-CCNC: 110 IU/L (ref 40–129)
ALT SERPL-CCNC: 19 U/L (ref 10–40)
ANION GAP SERPL CALCULATED.3IONS-SCNC: 11 MMOL/L (ref 4–16)
ANION GAP SERPL CALCULATED.3IONS-SCNC: 11 MMOL/L (ref 4–16)
AST SERPL-CCNC: 27 IU/L (ref 15–37)
BASOPHILS ABSOLUTE: 0 K/CU MM
BASOPHILS ABSOLUTE: 0 K/CU MM
BASOPHILS RELATIVE PERCENT: 0.2 % (ref 0–1)
BASOPHILS RELATIVE PERCENT: 0.4 % (ref 0–1)
BILIRUB SERPL-MCNC: 0.8 MG/DL (ref 0–1)
BILIRUBIN URINE: NEGATIVE MG/DL
BLOOD, URINE: NEGATIVE
BUN SERPL-MCNC: 34 MG/DL (ref 6–23)
BUN SERPL-MCNC: 43 MG/DL (ref 6–23)
CALCIUM SERPL-MCNC: 8 MG/DL (ref 8.3–10.6)
CALCIUM SERPL-MCNC: 8.4 MG/DL (ref 8.3–10.6)
CHLORIDE BLD-SCNC: 96 MMOL/L (ref 99–110)
CHLORIDE BLD-SCNC: 99 MMOL/L (ref 99–110)
CLARITY: CLEAR
CO2: 25 MMOL/L (ref 21–32)
CO2: 27 MMOL/L (ref 21–32)
COLOR: YELLOW
COMMENT UA: NORMAL
CREAT SERPL-MCNC: 1.2 MG/DL (ref 0.6–1.1)
CREAT SERPL-MCNC: 1.5 MG/DL (ref 0.6–1.1)
DIFFERENTIAL TYPE: ABNORMAL
DIFFERENTIAL TYPE: ABNORMAL
EOSINOPHILS ABSOLUTE: 0 K/CU MM
EOSINOPHILS ABSOLUTE: 0 K/CU MM
EOSINOPHILS RELATIVE PERCENT: 0 % (ref 0–3)
EOSINOPHILS RELATIVE PERCENT: 0.3 % (ref 0–3)
GFR SERPL CREATININE-BSD FRML MDRD: 32 ML/MIN/1.73M2
GFR SERPL CREATININE-BSD FRML MDRD: 42 ML/MIN/1.73M2
GLUCOSE SERPL-MCNC: 109 MG/DL (ref 70–99)
GLUCOSE SERPL-MCNC: 146 MG/DL (ref 70–99)
GLUCOSE, URINE: NEGATIVE MG/DL
HCT VFR BLD CALC: 31.3 % (ref 37–47)
HCT VFR BLD CALC: 31.9 % (ref 37–47)
HEMOGLOBIN: 9.7 GM/DL (ref 12.5–16)
HEMOGLOBIN: 9.8 GM/DL (ref 12.5–16)
IMMATURE NEUTROPHIL %: 0.5 % (ref 0–0.43)
IMMATURE NEUTROPHIL %: 0.5 % (ref 0–0.43)
KETONES, URINE: NEGATIVE MG/DL
LEUKOCYTE ESTERASE, URINE: NEGATIVE
LV EF: 58 %
LVEF MODALITY: NORMAL
LYMPHOCYTES ABSOLUTE: 0.4 K/CU MM
LYMPHOCYTES ABSOLUTE: 0.7 K/CU MM
LYMPHOCYTES RELATIVE PERCENT: 4.2 % (ref 24–44)
LYMPHOCYTES RELATIVE PERCENT: 6.6 % (ref 24–44)
MAGNESIUM: 2.1 MG/DL (ref 1.8–2.4)
MCH RBC QN AUTO: 29.3 PG (ref 27–31)
MCH RBC QN AUTO: 29.4 PG (ref 27–31)
MCHC RBC AUTO-ENTMCNC: 30.7 % (ref 32–36)
MCHC RBC AUTO-ENTMCNC: 31 % (ref 32–36)
MCV RBC AUTO: 94.8 FL (ref 78–100)
MCV RBC AUTO: 95.2 FL (ref 78–100)
MONOCYTES ABSOLUTE: 1.2 K/CU MM
MONOCYTES ABSOLUTE: 1.3 K/CU MM
MONOCYTES RELATIVE PERCENT: 12 % (ref 0–4)
MONOCYTES RELATIVE PERCENT: 13.8 % (ref 0–4)
NITRITE URINE, QUANTITATIVE: NEGATIVE
NUCLEATED RBC %: 0 %
NUCLEATED RBC %: 0 %
PDW BLD-RTO: 15.3 % (ref 11.7–14.9)
PDW BLD-RTO: 15.3 % (ref 11.7–14.9)
PH, URINE: 5 (ref 5–8)
PHOSPHORUS: 3.5 MG/DL (ref 2.5–4.9)
PLATELET # BLD: 142 K/CU MM (ref 140–440)
PLATELET # BLD: 145 K/CU MM (ref 140–440)
PMV BLD AUTO: 10.1 FL (ref 7.5–11.1)
PMV BLD AUTO: 10.3 FL (ref 7.5–11.1)
POTASSIUM SERPL-SCNC: 3.5 MMOL/L (ref 3.5–5.1)
POTASSIUM SERPL-SCNC: 4.1 MMOL/L (ref 3.5–5.1)
PRO-BNP: ABNORMAL PG/ML
PROTEIN UA: NEGATIVE MG/DL
RBC # BLD: 3.3 M/CU MM (ref 4.2–5.4)
RBC # BLD: 3.35 M/CU MM (ref 4.2–5.4)
SEGMENTED NEUTROPHILS ABSOLUTE COUNT: 7.6 K/CU MM
SEGMENTED NEUTROPHILS ABSOLUTE COUNT: 7.8 K/CU MM
SEGMENTED NEUTROPHILS RELATIVE PERCENT: 80.2 % (ref 36–66)
SEGMENTED NEUTROPHILS RELATIVE PERCENT: 81.3 % (ref 36–66)
SODIUM BLD-SCNC: 132 MMOL/L (ref 135–145)
SODIUM BLD-SCNC: 137 MMOL/L (ref 135–145)
SPECIFIC GRAVITY UA: 1.01 (ref 1–1.03)
TOTAL IMMATURE NEUTOROPHIL: 0.05 K/CU MM
TOTAL IMMATURE NEUTOROPHIL: 0.05 K/CU MM
TOTAL NUCLEATED RBC: 0 K/CU MM
TOTAL NUCLEATED RBC: 0 K/CU MM
TOTAL PROTEIN: 5.2 GM/DL (ref 6.4–8.2)
UROBILINOGEN, URINE: 0.2 MG/DL (ref 0.2–1)
WBC # BLD: 9.4 K/CU MM (ref 4–10.5)
WBC # BLD: 9.8 K/CU MM (ref 4–10.5)

## 2023-08-10 PROCEDURE — 94761 N-INVAS EAR/PLS OXIMETRY MLT: CPT

## 2023-08-10 PROCEDURE — 6370000000 HC RX 637 (ALT 250 FOR IP): Performed by: STUDENT IN AN ORGANIZED HEALTH CARE EDUCATION/TRAINING PROGRAM

## 2023-08-10 PROCEDURE — 71045 X-RAY EXAM CHEST 1 VIEW: CPT

## 2023-08-10 PROCEDURE — 51702 INSERT TEMP BLADDER CATH: CPT

## 2023-08-10 PROCEDURE — 87449 NOS EACH ORGANISM AG IA: CPT

## 2023-08-10 PROCEDURE — 81003 URINALYSIS AUTO W/O SCOPE: CPT

## 2023-08-10 PROCEDURE — 87081 CULTURE SCREEN ONLY: CPT

## 2023-08-10 PROCEDURE — 85025 COMPLETE CBC W/AUTO DIFF WBC: CPT

## 2023-08-10 PROCEDURE — 83735 ASSAY OF MAGNESIUM: CPT

## 2023-08-10 PROCEDURE — 0202U NFCT DS 22 TRGT SARS-COV-2: CPT

## 2023-08-10 PROCEDURE — 83880 ASSAY OF NATRIURETIC PEPTIDE: CPT

## 2023-08-10 PROCEDURE — 84100 ASSAY OF PHOSPHORUS: CPT

## 2023-08-10 PROCEDURE — 36415 COLL VENOUS BLD VENIPUNCTURE: CPT

## 2023-08-10 PROCEDURE — 87040 BLOOD CULTURE FOR BACTERIA: CPT

## 2023-08-10 PROCEDURE — 80053 COMPREHEN METABOLIC PANEL: CPT

## 2023-08-10 PROCEDURE — 84145 PROCALCITONIN (PCT): CPT

## 2023-08-10 PROCEDURE — 99222 1ST HOSP IP/OBS MODERATE 55: CPT | Performed by: INTERNAL MEDICINE

## 2023-08-10 PROCEDURE — 2580000003 HC RX 258: Performed by: NURSE PRACTITIONER

## 2023-08-10 PROCEDURE — 87899 AGENT NOS ASSAY W/OPTIC: CPT

## 2023-08-10 PROCEDURE — 93306 TTE W/DOPPLER COMPLETE: CPT

## 2023-08-10 PROCEDURE — 1200000000 HC SEMI PRIVATE

## 2023-08-10 PROCEDURE — 87186 SC STD MICRODIL/AGAR DIL: CPT

## 2023-08-10 PROCEDURE — 87086 URINE CULTURE/COLONY COUNT: CPT

## 2023-08-10 PROCEDURE — 51798 US URINE CAPACITY MEASURE: CPT

## 2023-08-10 PROCEDURE — 87641 MR-STAPH DNA AMP PROBE: CPT

## 2023-08-10 PROCEDURE — 80048 BASIC METABOLIC PNL TOTAL CA: CPT

## 2023-08-10 PROCEDURE — 2580000003 HC RX 258: Performed by: STUDENT IN AN ORGANIZED HEALTH CARE EDUCATION/TRAINING PROGRAM

## 2023-08-10 PROCEDURE — 6360000002 HC RX W HCPCS: Performed by: INTERNAL MEDICINE

## 2023-08-10 PROCEDURE — 87150 DNA/RNA AMPLIFIED PROBE: CPT

## 2023-08-10 RX ORDER — FUROSEMIDE 10 MG/ML
40 INJECTION INTRAMUSCULAR; INTRAVENOUS 2 TIMES DAILY
Status: DISCONTINUED | OUTPATIENT
Start: 2023-08-10 | End: 2023-08-11

## 2023-08-10 RX ORDER — SODIUM CHLORIDE 9 MG/ML
INJECTION, SOLUTION INTRAVENOUS CONTINUOUS
Status: DISCONTINUED | OUTPATIENT
Start: 2023-08-10 | End: 2023-08-11

## 2023-08-10 RX ADMIN — SODIUM CHLORIDE: 9 INJECTION, SOLUTION INTRAVENOUS at 21:44

## 2023-08-10 RX ADMIN — FERROUS SULFATE TAB 325 MG (65 MG ELEMENTAL FE) 325 MG: 325 (65 FE) TAB at 09:42

## 2023-08-10 RX ADMIN — FUROSEMIDE 40 MG: 10 INJECTION, SOLUTION INTRAMUSCULAR; INTRAVENOUS at 09:41

## 2023-08-10 RX ADMIN — FUROSEMIDE 40 MG: 10 INJECTION, SOLUTION INTRAMUSCULAR; INTRAVENOUS at 18:12

## 2023-08-10 RX ADMIN — SODIUM CHLORIDE, PRESERVATIVE FREE 10 ML: 5 INJECTION INTRAVENOUS at 20:43

## 2023-08-10 RX ADMIN — SODIUM CHLORIDE, PRESERVATIVE FREE 10 ML: 5 INJECTION INTRAVENOUS at 09:42

## 2023-08-10 RX ADMIN — ATORVASTATIN CALCIUM 10 MG: 10 TABLET, FILM COATED ORAL at 09:42

## 2023-08-10 RX ADMIN — ASPIRIN 81 MG: 81 TABLET, CHEWABLE ORAL at 09:42

## 2023-08-10 RX ADMIN — HEPARIN SODIUM 5000 UNITS: 5000 INJECTION INTRAVENOUS; SUBCUTANEOUS at 15:02

## 2023-08-10 RX ADMIN — HEPARIN SODIUM 5000 UNITS: 5000 INJECTION INTRAVENOUS; SUBCUTANEOUS at 20:43

## 2023-08-10 RX ADMIN — ACETAMINOPHEN 650 MG: 325 TABLET ORAL at 20:43

## 2023-08-10 ASSESSMENT — PAIN SCALES - GENERAL: PAINLEVEL_OUTOF10: 0

## 2023-08-10 NOTE — PROGRESS NOTES
Monocytes Absolute 1.2 K/CU MM    Eosinophils Absolute 0.0 K/CU MM    Basophils Absolute 0.0 K/CU MM    Nucleated RBC % 0.0 %    Total Nucleated RBC 0.0 K/CU MM    Total Immature Neutrophil 0.05 K/CU MM    Immature Neutrophil % 0.5 (H) 0 - 0.43 %   Magnesium    Collection Time: 08/10/23  3:15 AM   Result Value Ref Range    Magnesium 2.1 1.8 - 2.4 mg/dl   Phosphorus    Collection Time: 08/10/23  3:15 AM   Result Value Ref Range    Phosphorus 3.5 2.5 - 4.9 MG/DL   Brain Natriuretic Peptide    Collection Time: 08/10/23  3:15 AM   Result Value Ref Range    Pro-BNP 34,630 (H) <300 PG/ML         Electronically signed by Fito Aldridge MD on 8/10/2023 at 1:54 PM

## 2023-08-10 NOTE — CONSULTS
Name:  Funmi Miranda /Age/Sex: 5/15/1929  (80 y.o. female)   MRN & CSN:  [id number] & [id number] Admission Date/Time: 2023  4:52 PM   [facility]  [facility] PCP: July Butler, [address] [place] [address] Day: 3          Referring physician:  Gilberto Sr MD         Reason for consultation: HFpEF        Thanks for referral.    Information source: Chart/staff    CC; altered mental status      HPI:   Thank you for involving me in taking  care of Funmi Miranda who  is a 80 y. o.year  Old female  Presents with history of TAVR with Ju David, permanent pacemaker plantation    , hypertension, hyperlipidemia admitted with altered mental status patient at present is confused cannot give me any history all history is obtained from records patient also has history of intracranial bleed in 2018     Patient's BNP is markedly elevated to 34,000 hence cardiology has been consulted patient does not appear to be any respiratory distress            Past medical history:    has a past medical history of Aphasia. Past surgical history:   has a past surgical history that includes Aortic valve surgery (2017); pacemaker placement (2017); and brain surgery (2017). Social History:   reports that she has never smoked. She has never used smokeless tobacco.  Family history:  family history includes Cancer in an other family member; Heart Attack in her father; Heart Disease in her father.     Allergies   Allergen Reactions    Alendronate Other (See Comments)    Alendronate Sodium Other (See Comments)    Ibandronic Acid Other (See Comments)    Milk-Related Compounds        furosemide (LASIX) injection 40 mg, BID  heparin (porcine) injection 5,000 Units, 3 times per day  aspirin chewable tablet 81 mg, Daily  ferrous sulfate (IRON 325) tablet 325 mg, Daily with breakfast  atorvastatin (LIPITOR) tablet 10 mg, Daily  [Held by provider] metoprolol succinate (TOPROL XL) extended release tablet Assessment/Recommendations:     -Patient's BNP is elevated however the patient does not appear to be in any respiratory distress echo done in 2021 was normal EF I will repeat the echocardiogram for further assessment  -Status post TAVR that we will also be checked with the echo  -Mildly elevated troponin  -Has permanent pacemaker will need to get interrogated if needed         Carlos Gonsalves MD, 8/10/2023 7:58 AM       Please note this report has been partially produced using speech recognition software and may contain errors related to that system including errors in grammar, punctuation, and spelling, as well as words and phrases that may be inappropriate. If there are any questions or concerns please feel free to contact the dictating provider for clarification.

## 2023-08-10 NOTE — PROGRESS NOTES
Rapid [facility] RN to bedside for eval d/t DI score of 52. Patient is resting with eyes closed and arouses easily. Resp are unlabored. VSS on R/A. Sitter at bedside. No complaints noted. Bedside Rn denies needs.

## 2023-08-11 LAB
ALBUMIN SERPL-MCNC: 2.8 GM/DL (ref 3.4–5)
ALP BLD-CCNC: 98 IU/L (ref 40–128)
ALT SERPL-CCNC: 17 U/L (ref 10–40)
ANION GAP SERPL CALCULATED.3IONS-SCNC: 9 MMOL/L (ref 4–16)
AST SERPL-CCNC: 24 IU/L (ref 15–37)
B PARAP IS1001 DNA NPH QL NAA+NON-PROBE: NOT DETECTED
B PERT.PT PRMT NPH QL NAA+NON-PROBE: NOT DETECTED
BASOPHILS ABSOLUTE: 0 K/CU MM
BASOPHILS RELATIVE PERCENT: 0.2 % (ref 0–1)
BILIRUB SERPL-MCNC: 0.7 MG/DL (ref 0–1)
BUN SERPL-MCNC: 43 MG/DL (ref 6–23)
C PNEUM DNA NPH QL NAA+NON-PROBE: NOT DETECTED
CALCIUM SERPL-MCNC: 8.1 MG/DL (ref 8.3–10.6)
CHLORIDE BLD-SCNC: 101 MMOL/L (ref 99–110)
CO2: 27 MMOL/L (ref 21–32)
CREAT SERPL-MCNC: 1.6 MG/DL (ref 0.6–1.1)
CRP SERPL HS-MCNC: 127 MG/L
CULTURE: NORMAL
DIFFERENTIAL TYPE: ABNORMAL
EOSINOPHILS ABSOLUTE: 0 K/CU MM
EOSINOPHILS RELATIVE PERCENT: 0.4 % (ref 0–3)
FLUAV H1 2009 PAN RNA NPH NAA+NON-PROBE: NOT DETECTED
FLUAV H1 RNA NPH QL NAA+NON-PROBE: NOT DETECTED
FLUAV H3 RNA NPH QL NAA+NON-PROBE: NOT DETECTED
FLUAV RNA NPH QL NAA+NON-PROBE: NOT DETECTED
FLUBV RNA NPH QL NAA+NON-PROBE: NOT DETECTED
GFR SERPL CREATININE-BSD FRML MDRD: 30 ML/MIN/1.73M2
GLUCOSE BLD-MCNC: 118 MG/DL (ref 70–99)
GLUCOSE BLD-MCNC: 133 MG/DL (ref 70–99)
GLUCOSE BLD-MCNC: 146 MG/DL (ref 70–99)
GLUCOSE BLD-MCNC: 94 MG/DL (ref 70–99)
GLUCOSE SERPL-MCNC: 132 MG/DL (ref 70–99)
HADV DNA NPH QL NAA+NON-PROBE: NOT DETECTED
HCOV 229E RNA NPH QL NAA+NON-PROBE: NOT DETECTED
HCOV HKU1 RNA NPH QL NAA+NON-PROBE: NOT DETECTED
HCOV NL63 RNA NPH QL NAA+NON-PROBE: NOT DETECTED
HCOV OC43 RNA NPH QL NAA+NON-PROBE: NOT DETECTED
HCT VFR BLD CALC: 30.3 % (ref 37–47)
HEMOGLOBIN: 9.3 GM/DL (ref 12.5–16)
HMPV RNA NPH QL NAA+NON-PROBE: NOT DETECTED
HPIV1 RNA NPH QL NAA+NON-PROBE: NOT DETECTED
HPIV2 RNA NPH QL NAA+NON-PROBE: NOT DETECTED
HPIV3 RNA NPH QL NAA+NON-PROBE: NOT DETECTED
HPIV4 RNA NPH QL NAA+NON-PROBE: NOT DETECTED
IMMATURE NEUTROPHIL %: 0.5 % (ref 0–0.43)
L PNEUMO AG UR QL IA: NEGATIVE
LACTATE: 1 MMOL/L (ref 0.5–1.9)
LACTATE: 1.5 MMOL/L (ref 0.5–1.9)
LACTATE: 1.6 MMOL/L (ref 0.5–1.9)
LACTATE: 2.4 MMOL/L (ref 0.5–1.9)
LYMPHOCYTES ABSOLUTE: 0.6 K/CU MM
LYMPHOCYTES RELATIVE PERCENT: 7.3 % (ref 24–44)
Lab: NORMAL
M PNEUMO DNA NPH QL NAA+NON-PROBE: NOT DETECTED
MAGNESIUM: 2 MG/DL (ref 1.8–2.4)
MCH RBC QN AUTO: 29.3 PG (ref 27–31)
MCHC RBC AUTO-ENTMCNC: 30.7 % (ref 32–36)
MCV RBC AUTO: 95.6 FL (ref 78–100)
MONOCYTES ABSOLUTE: 1.4 K/CU MM
MONOCYTES RELATIVE PERCENT: 15.9 % (ref 0–4)
MRSA, DNA, NASAL: NEGATIVE
NUCLEATED RBC %: 0 %
PDW BLD-RTO: 15.3 % (ref 11.7–14.9)
PHOSPHORUS: 3.6 MG/DL (ref 2.5–4.9)
PLATELET # BLD: 130 K/CU MM (ref 140–440)
PMV BLD AUTO: 10.3 FL (ref 7.5–11.1)
POTASSIUM SERPL-SCNC: 3.5 MMOL/L (ref 3.5–5.1)
PROCALCITONIN SERPL-MCNC: 1.16 NG/ML
RBC # BLD: 3.17 M/CU MM (ref 4.2–5.4)
RSV RNA NPH QL NAA+NON-PROBE: NOT DETECTED
RV+EV RNA NPH QL NAA+NON-PROBE: NOT DETECTED
S PNEUM AG CSF QL: NORMAL
SARS-COV-2 RNA NPH QL NAA+NON-PROBE: NOT DETECTED
SEGMENTED NEUTROPHILS ABSOLUTE COUNT: 6.5 K/CU MM
SEGMENTED NEUTROPHILS RELATIVE PERCENT: 75.7 % (ref 36–66)
SODIUM BLD-SCNC: 137 MMOL/L (ref 135–145)
SPECIMEN DESCRIPTION: NORMAL
SPECIMEN: NORMAL
TOTAL IMMATURE NEUTOROPHIL: 0.04 K/CU MM
TOTAL NUCLEATED RBC: 0 K/CU MM
TOTAL PROTEIN: 5 GM/DL (ref 6.4–8.2)
WBC # BLD: 8.5 K/CU MM (ref 4–10.5)

## 2023-08-11 PROCEDURE — 83735 ASSAY OF MAGNESIUM: CPT

## 2023-08-11 PROCEDURE — 36415 COLL VENOUS BLD VENIPUNCTURE: CPT

## 2023-08-11 PROCEDURE — 6360000002 HC RX W HCPCS: Performed by: INTERNAL MEDICINE

## 2023-08-11 PROCEDURE — 85025 COMPLETE CBC W/AUTO DIFF WBC: CPT

## 2023-08-11 PROCEDURE — 80053 COMPREHEN METABOLIC PANEL: CPT

## 2023-08-11 PROCEDURE — 83605 ASSAY OF LACTIC ACID: CPT

## 2023-08-11 PROCEDURE — 2060000000 HC ICU INTERMEDIATE R&B

## 2023-08-11 PROCEDURE — 2580000003 HC RX 258: Performed by: STUDENT IN AN ORGANIZED HEALTH CARE EDUCATION/TRAINING PROGRAM

## 2023-08-11 PROCEDURE — 82962 GLUCOSE BLOOD TEST: CPT

## 2023-08-11 PROCEDURE — 87070 CULTURE OTHR SPECIMN AEROBIC: CPT

## 2023-08-11 PROCEDURE — 87205 SMEAR GRAM STAIN: CPT

## 2023-08-11 PROCEDURE — 99232 SBSQ HOSP IP/OBS MODERATE 35: CPT | Performed by: INTERNAL MEDICINE

## 2023-08-11 PROCEDURE — 6360000002 HC RX W HCPCS

## 2023-08-11 PROCEDURE — 86140 C-REACTIVE PROTEIN: CPT

## 2023-08-11 PROCEDURE — 94761 N-INVAS EAR/PLS OXIMETRY MLT: CPT

## 2023-08-11 PROCEDURE — APPSS60 APP SPLIT SHARED TIME 46-60 MINUTES: Performed by: NURSE PRACTITIONER

## 2023-08-11 PROCEDURE — 99223 1ST HOSP IP/OBS HIGH 75: CPT | Performed by: INTERNAL MEDICINE

## 2023-08-11 PROCEDURE — 6370000000 HC RX 637 (ALT 250 FOR IP): Performed by: STUDENT IN AN ORGANIZED HEALTH CARE EDUCATION/TRAINING PROGRAM

## 2023-08-11 PROCEDURE — 2580000003 HC RX 258: Performed by: NURSE PRACTITIONER

## 2023-08-11 PROCEDURE — 84100 ASSAY OF PHOSPHORUS: CPT

## 2023-08-11 PROCEDURE — 6360000002 HC RX W HCPCS: Performed by: NURSE PRACTITIONER

## 2023-08-11 RX ORDER — FUROSEMIDE 10 MG/ML
20 INJECTION INTRAMUSCULAR; INTRAVENOUS 2 TIMES DAILY
Status: DISCONTINUED | OUTPATIENT
Start: 2023-08-11 | End: 2023-08-17 | Stop reason: HOSPADM

## 2023-08-11 RX ADMIN — HEPARIN SODIUM 5000 UNITS: 5000 INJECTION INTRAVENOUS; SUBCUTANEOUS at 21:33

## 2023-08-11 RX ADMIN — ASPIRIN 81 MG: 81 TABLET, CHEWABLE ORAL at 09:35

## 2023-08-11 RX ADMIN — ATORVASTATIN CALCIUM 10 MG: 10 TABLET, FILM COATED ORAL at 09:35

## 2023-08-11 RX ADMIN — FERROUS SULFATE TAB 325 MG (65 MG ELEMENTAL FE) 325 MG: 325 (65 FE) TAB at 09:35

## 2023-08-11 RX ADMIN — HEPARIN SODIUM 5000 UNITS: 5000 INJECTION INTRAVENOUS; SUBCUTANEOUS at 06:15

## 2023-08-11 RX ADMIN — ACETAMINOPHEN 650 MG: 325 TABLET ORAL at 09:35

## 2023-08-11 RX ADMIN — FUROSEMIDE 20 MG: 10 INJECTION, SOLUTION INTRAMUSCULAR; INTRAVENOUS at 17:45

## 2023-08-11 RX ADMIN — ACETAMINOPHEN 650 MG: 325 TABLET ORAL at 19:05

## 2023-08-11 RX ADMIN — HEPARIN SODIUM 5000 UNITS: 5000 INJECTION INTRAVENOUS; SUBCUTANEOUS at 13:42

## 2023-08-11 RX ADMIN — VANCOMYCIN HYDROCHLORIDE 1250 MG: 1.25 INJECTION, POWDER, LYOPHILIZED, FOR SOLUTION INTRAVENOUS at 17:45

## 2023-08-11 RX ADMIN — SODIUM CHLORIDE, PRESERVATIVE FREE 10 ML: 5 INJECTION INTRAVENOUS at 09:25

## 2023-08-11 ASSESSMENT — PAIN SCALES - GENERAL
PAINLEVEL_OUTOF10: 3
PAINLEVEL_OUTOF10: 0

## 2023-08-11 ASSESSMENT — PAIN DESCRIPTION - LOCATION: LOCATION: GENERALIZED

## 2023-08-11 ASSESSMENT — PAIN DESCRIPTION - DESCRIPTORS: DESCRIPTORS: ACHING

## 2023-08-11 NOTE — PROGRESS NOTES
In-Patient Progress Note    Patient:  Elder Meckel 80 y.o. female MRN: [id number]     Date of Service: 8/11/2023    Hospital Day: 4      Chief complaint: had concerns including Altered Mental Status. Subjective   Patient seen and examined in the morning. Patient has underlying dementia. Denies any complaints. States she is breathing ok. Noted tachypnea today as well. Noted overnight events including drop in BP and temp of 101F and tachypnea. Concern for sepsis but no abx started. Pan cultures sent. See ROS    I have reviewed all pertinent PMHx, PSHx, FamHx, SocialHx, medications, and allergies and updated history as appropriate. I have reviewed images as appropriate. Assessment and Plan   Elder Meckel, a 80 y.o. female, with a history of HTN, HLD, stroke, HFpEF, constipation, s/p ICD, pacer was admitted on 8/8/2023 with complaints of had concerns including Altered Mental Status. Assessment and Plan:  # Altered Mental Status 2/2 likely Underlying Dementia, and Septic Encephalopathy   -Patient presents with concerns of altered mental status reported by the daughter. Daughter states that the patient did not have breakfast and seemed uncooperative which was atypical for the patient  -No evidence of metabolic encephalopathy initially, electrolytes abnormalities, TSH within normal limits, UDS negative, no evidence of infection initially                  # Acute on Chronic HFpEF  - patient presents with SOB and peripheral edema  - on admit, patient hemodynamically stable and not requiring oxygen supplementation.   Physical exam and imaging consistent with volume overload on admission   -TTE on 7/21 with an EF of 50 to 55%, grade 3 DD, severe biatrial enlargement  -TSH within normal limits, EKG without ischemic changes, UDS negative, dietary compliance  -Received 20mg IV lasix in ED  - Pro-BNP elevated at 17,398 initially and now 34,630                 -Hold lasix for now and defer to

## 2023-08-11 NOTE — PROGRESS NOTES
Patient meets SIRS criteria. Awaiting stat labs EKG. To determine source of infection/ will start on IV abx. Giving fluids a low rate 100ml hours with caution due to HF. Bladder scan 7[address] ordered  with ua and cs    Chest xray results: Stable cardiopulmonary status including pulmonary edema, right base  atelectasis and bilateral effusions, right greater than left. Might consider a chest CT during day shift if no improvement. Will await urine atigens and procal, resp pan neg. mRSA pend. Blood cult pend. Lactic no released?   CBC no lukocytosis,  anemia stable     UA is neg

## 2023-08-11 NOTE — PROGRESS NOTES
[address], [address] [place] [address]  Phone: [phone]    Fax [phone]                  Nelson Hollingsworth MD, Yahir Mauro MD, Deisy Chang MD, MD Concha De Guzman MD Percy Parents, MD Oren Dawn, MD Dr. Alejandro Bougie MD Kassandra Fabian, FELICIA Dowling Speaker, FELICIA Cheatham, FELICIA Downing, FELICIA Lofton, [place]    CARDIOLOGY  NOTE      Name:  Yashira Ruano /Age/Sex: 5/15/1929  (80 y.o. female)   MRN & CSN:  [id number] & [id number] Admission Date/Time: 2023  4:52 PM   Location:  [address] [place] [address]-A PCP: Kandis Mulligan [address] [place] [address] Day: 4    - Cardiology consult is for: CHF      ASSESSMENT/ PLAN:  Acute on chronic HFpEF  -Strict I's and O's and daily weights  -proBNP elevated, CXR concerning for worsening pulmonary edemaAnd pleural effusions  -Lasix held this morning, resume. 2+ edema and crackles. Patient received fluids last night.  -If becomes hypotensive again initiate on midodrine  -Resume Toprol xl 25 mg daily  -Resume IV Lasix 20 mg twice daily  Aortic stenosis s/p TAVR with Watson Nancy valve  Sepsis with Enterococcus bacteremia  -Infectious disease on board  -Moderate paravalvular leak noted  -Concern for endocarditis, can plan for VIKTORIA on Monday  Permanent pacemaker  -Pacer interrogated  History of intracranial hemorrhage, anemia  SABA versus SABA on CKD  -Monitor carefully, resume Lasix          Subjective:  David Cannon is a 80 y. o.year old     Patient is having some mild shortness of breath and 2+ lower extremity edema at this time. She did receive some fluids last night because she was hypotensive. However now would like her volume overload has worsened. Patient was noted to have blood culture positive for Enterococcus bacteremia, infectious disease requesting VIKTORIA. Patient, she is okay with undergoing VIKTORIA if necessary.

## 2023-08-11 NOTE — PROGRESS NOTES
4 Eyes Skin Assessment     NAME:  Joy Laboy  YOB: 1929  MEDICAL RECORD NUMBER:  [id number]    The patient is being assessed for  Transfer to New Unit    I agree that at least one RN has performed a thorough Head to Toe Skin Assessment on the patient. ALL assessment sites listed below have been assessed. Areas assessed by both nurses:    Head, Face, Ears, Shoulders, Back, Chest, Arms, Elbows, Hands, Sacrum. Buttock, Coccyx, Ischium, Legs. Feet and Heels, and Under Medical Devices         Does the Patient have a Wound?  No noted wound(s)       Hunter Prevention initiated by RN: No  Wound Care Orders initiated by RN: No    Pressure Injury (Stage 3,4, Unstageable, DTI, NWPT, and Complex wounds) if present, place Wound referral order by RN under : No    New Ostomies, if present place, Ostomy referral order under : No     Nurse 1 eSignature: Electronically signed by Serg Nobles RN on 8/11/23 at 7:20 PM EDT    **SHARE this note so that the co-signing nurse can place an eSignature**    Nurse 2 eSignature: Electronically signed by Gilda Amaral RN on 8/11/23 at 7:21 PM EDT

## 2023-08-11 NOTE — PLAN OF CARE
LA 2.4. Hesitant to give fluid due to fluid overload. Will repeat lactate in an hour. Continue to monitor. Transfer to . Will call daughter to update. Electronically signed by Joy Rome MD on 8/11/2023 at 4:40 PM    Called daughter and updated her regarding current care. Daughter agrees with VIKTORIA but requests updates to patients outpatient cardiologist if ICD/pacer needs to come out.      Electronically signed by Joy Rome MD on 8/11/2023 at 6:00 PM

## 2023-08-11 NOTE — CARE COORDINATION
CM reviewed chart and spoke with pt in room. Pt still plans home and denies all needs. Plan remains home with dgt.

## 2023-08-11 NOTE — PROGRESS NOTES
Patient's son and daughter on chart updated of patient being transferred to a higher level of care. Received call from patient's son Candy Hess on chart, verified name on chart - update given. Questions and concerns addressed.

## 2023-08-11 NOTE — CONSULTS
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 Ejection fraction is visually estimated at 55-60%. light apical ballooning noted. Mildly dilated left atrium. PPM wiring visualized in right heart. S/p TAVR (#29 Watson Nancy 3) with moderate perivalvular leak. Moderate to severe mitral regurgitation. Moderate tricuspid regurgitation; RVSP: 52 mmHg. No evidence of any pericardial effusion. Atherosclerotic plaque noted in the abdominal aorta. -CT Head WO Contrast: No acute intracranial abnormality. Cerebral atrophy. Severe chronic small vessel ischemic changes. No acute   brain parenchymal abnormality. -CXR: 1. Congestive heart failure is most likely given the radiographic findings; pneumonia is also a consideration in areas of consolidation with pleural   effusion. 2. Calcific atherosclerosis aorta. 3. Cardiomegaly. 8/10-CXR: Stable cardiopulmonary status including pulmonary edema, right base   atelectasis and bilateral effusions, right greater than left. SABA on CKD3:  Hearing Impaired:   Altered Mental Status/ Dementia:   Chronic HFpEF/AS s/p TAVR 2017:  PPM Placement 2017:  Dr. Radu Rhodes onboard  Multi-morbidity: per PMHx:  HLD, past cerebral hemorrhage 2018 with residual aphagia    Plan:  Start IV vancomycin per pharmacy dosing  Await E.faecalis sensi, if returns suscptible to

## 2023-08-12 PROBLEM — R41.0 CONFUSION: Status: ACTIVE | Noted: 2023-08-12

## 2023-08-12 LAB
ANION GAP SERPL CALCULATED.3IONS-SCNC: 9 MMOL/L (ref 4–16)
BASOPHILS ABSOLUTE: 0 K/CU MM
BASOPHILS ABSOLUTE: 0 K/CU MM
BASOPHILS RELATIVE PERCENT: 0.4 % (ref 0–1)
BASOPHILS RELATIVE PERCENT: 0.5 % (ref 0–1)
BUN SERPL-MCNC: 44 MG/DL (ref 6–23)
CALCIUM SERPL-MCNC: 8 MG/DL (ref 8.3–10.6)
CHLORIDE BLD-SCNC: 95 MMOL/L (ref 99–110)
CO2: 28 MMOL/L (ref 21–32)
CREAT SERPL-MCNC: 1.4 MG/DL (ref 0.6–1.1)
CRP SERPL HS-MCNC: 109.8 MG/L
DIFFERENTIAL TYPE: ABNORMAL
DIFFERENTIAL TYPE: ABNORMAL
DOSE AMOUNT: NORMAL
DOSE TIME: NORMAL
EOSINOPHILS ABSOLUTE: 0.1 K/CU MM
EOSINOPHILS ABSOLUTE: 0.3 K/CU MM
EOSINOPHILS RELATIVE PERCENT: 1.9 % (ref 0–3)
EOSINOPHILS RELATIVE PERCENT: 3.5 % (ref 0–3)
GFR SERPL CREATININE-BSD FRML MDRD: 35 ML/MIN/1.73M2
GLUCOSE BLD-MCNC: 101 MG/DL (ref 70–99)
GLUCOSE BLD-MCNC: 89 MG/DL (ref 70–99)
GLUCOSE BLD-MCNC: 98 MG/DL (ref 70–99)
GLUCOSE SERPL-MCNC: 99 MG/DL (ref 70–99)
HCT VFR BLD CALC: 31.2 % (ref 37–47)
HCT VFR BLD CALC: 33.1 % (ref 37–47)
HEMOGLOBIN: 10.1 GM/DL (ref 12.5–16)
HEMOGLOBIN: 9.7 GM/DL (ref 12.5–16)
IMMATURE NEUTROPHIL %: 0.4 % (ref 0–0.43)
IMMATURE NEUTROPHIL %: 0.5 % (ref 0–0.43)
LYMPHOCYTES ABSOLUTE: 0.7 K/CU MM
LYMPHOCYTES ABSOLUTE: 0.8 K/CU MM
LYMPHOCYTES RELATIVE PERCENT: 11.1 % (ref 24–44)
LYMPHOCYTES RELATIVE PERCENT: 11.4 % (ref 24–44)
MAGNESIUM: 2 MG/DL (ref 1.8–2.4)
MCH RBC QN AUTO: 28.9 PG (ref 27–31)
MCH RBC QN AUTO: 29.4 PG (ref 27–31)
MCHC RBC AUTO-ENTMCNC: 30.5 % (ref 32–36)
MCHC RBC AUTO-ENTMCNC: 31.1 % (ref 32–36)
MCV RBC AUTO: 94.5 FL (ref 78–100)
MCV RBC AUTO: 94.6 FL (ref 78–100)
MONOCYTES ABSOLUTE: 1.4 K/CU MM
MONOCYTES ABSOLUTE: 1.8 K/CU MM
MONOCYTES RELATIVE PERCENT: 21.8 % (ref 0–4)
MONOCYTES RELATIVE PERCENT: 25 % (ref 0–4)
NUCLEATED RBC %: 0 %
NUCLEATED RBC %: 0 %
PDW BLD-RTO: 15.3 % (ref 11.7–14.9)
PDW BLD-RTO: 15.4 % (ref 11.7–14.9)
PHOSPHORUS: 2.7 MG/DL (ref 2.5–4.9)
PLATELET # BLD: 136 K/CU MM (ref 140–440)
PLATELET # BLD: 141 K/CU MM (ref 140–440)
PMV BLD AUTO: 10 FL (ref 7.5–11.1)
PMV BLD AUTO: 9.8 FL (ref 7.5–11.1)
POTASSIUM SERPL-SCNC: 3.5 MMOL/L (ref 3.5–5.1)
PRO-BNP: ABNORMAL PG/ML
RBC # BLD: 3.3 M/CU MM (ref 4.2–5.4)
RBC # BLD: 3.5 M/CU MM (ref 4.2–5.4)
SEGMENTED NEUTROPHILS ABSOLUTE COUNT: 4.1 K/CU MM
SEGMENTED NEUTROPHILS ABSOLUTE COUNT: 4.2 K/CU MM
SEGMENTED NEUTROPHILS RELATIVE PERCENT: 59.3 % (ref 36–66)
SEGMENTED NEUTROPHILS RELATIVE PERCENT: 64.2 % (ref 36–66)
SODIUM BLD-SCNC: 132 MMOL/L (ref 135–145)
TOTAL IMMATURE NEUTOROPHIL: 0.03 K/CU MM
TOTAL IMMATURE NEUTOROPHIL: 0.03 K/CU MM
TOTAL NUCLEATED RBC: 0 K/CU MM
TOTAL NUCLEATED RBC: 0 K/CU MM
VANCOMYCIN RANDOM: 10.8 UG/ML
WBC # BLD: 6.4 K/CU MM (ref 4–10.5)
WBC # BLD: 7.1 K/CU MM (ref 4–10.5)

## 2023-08-12 PROCEDURE — 2580000003 HC RX 258: Performed by: NURSE PRACTITIONER

## 2023-08-12 PROCEDURE — 2060000000 HC ICU INTERMEDIATE R&B

## 2023-08-12 PROCEDURE — 94761 N-INVAS EAR/PLS OXIMETRY MLT: CPT

## 2023-08-12 PROCEDURE — 6360000002 HC RX W HCPCS

## 2023-08-12 PROCEDURE — 6370000000 HC RX 637 (ALT 250 FOR IP): Performed by: NURSE PRACTITIONER

## 2023-08-12 PROCEDURE — 99233 SBSQ HOSP IP/OBS HIGH 50: CPT | Performed by: INTERNAL MEDICINE

## 2023-08-12 PROCEDURE — 85025 COMPLETE CBC W/AUTO DIFF WBC: CPT

## 2023-08-12 PROCEDURE — 6370000000 HC RX 637 (ALT 250 FOR IP): Performed by: STUDENT IN AN ORGANIZED HEALTH CARE EDUCATION/TRAINING PROGRAM

## 2023-08-12 PROCEDURE — 80048 BASIC METABOLIC PNL TOTAL CA: CPT

## 2023-08-12 PROCEDURE — 87040 BLOOD CULTURE FOR BACTERIA: CPT

## 2023-08-12 PROCEDURE — 83880 ASSAY OF NATRIURETIC PEPTIDE: CPT

## 2023-08-12 PROCEDURE — 6360000002 HC RX W HCPCS: Performed by: INTERNAL MEDICINE

## 2023-08-12 PROCEDURE — 6360000002 HC RX W HCPCS: Performed by: NURSE PRACTITIONER

## 2023-08-12 PROCEDURE — 83735 ASSAY OF MAGNESIUM: CPT

## 2023-08-12 PROCEDURE — 84100 ASSAY OF PHOSPHORUS: CPT

## 2023-08-12 PROCEDURE — 97165 OT EVAL LOW COMPLEX 30 MIN: CPT

## 2023-08-12 PROCEDURE — 83605 ASSAY OF LACTIC ACID: CPT

## 2023-08-12 PROCEDURE — 97535 SELF CARE MNGMENT TRAINING: CPT

## 2023-08-12 PROCEDURE — 36415 COLL VENOUS BLD VENIPUNCTURE: CPT

## 2023-08-12 PROCEDURE — 86140 C-REACTIVE PROTEIN: CPT

## 2023-08-12 PROCEDURE — 2580000003 HC RX 258: Performed by: STUDENT IN AN ORGANIZED HEALTH CARE EDUCATION/TRAINING PROGRAM

## 2023-08-12 PROCEDURE — 80202 ASSAY OF VANCOMYCIN: CPT

## 2023-08-12 PROCEDURE — 82962 GLUCOSE BLOOD TEST: CPT

## 2023-08-12 PROCEDURE — APPSS60 APP SPLIT SHARED TIME 46-60 MINUTES: Performed by: NURSE PRACTITIONER

## 2023-08-12 RX ORDER — ALBUMIN, HUMAN INJ 5% 5 %
12.5 SOLUTION INTRAVENOUS ONCE
Status: DISCONTINUED | OUTPATIENT
Start: 2023-08-12 | End: 2023-08-17 | Stop reason: HOSPADM

## 2023-08-12 RX ORDER — METOPROLOL SUCCINATE 25 MG/1
12.5 TABLET, EXTENDED RELEASE ORAL DAILY
Status: DISCONTINUED | OUTPATIENT
Start: 2023-08-13 | End: 2023-08-17 | Stop reason: HOSPADM

## 2023-08-12 RX ORDER — MIDODRINE HYDROCHLORIDE 5 MG/1
2.5 TABLET ORAL
Status: DISCONTINUED | OUTPATIENT
Start: 2023-08-12 | End: 2023-08-17 | Stop reason: HOSPADM

## 2023-08-12 RX ORDER — SODIUM CHLORIDE 9 MG/ML
INJECTION, SOLUTION INTRAVENOUS CONTINUOUS
Status: DISCONTINUED | OUTPATIENT
Start: 2023-08-12 | End: 2023-08-12

## 2023-08-12 RX ORDER — SODIUM CHLORIDE 9 MG/ML
INJECTION, SOLUTION INTRAVENOUS CONTINUOUS
Status: DISCONTINUED | OUTPATIENT
Start: 2023-08-13 | End: 2023-08-12

## 2023-08-12 RX ADMIN — ATORVASTATIN CALCIUM 10 MG: 10 TABLET, FILM COATED ORAL at 08:36

## 2023-08-12 RX ADMIN — ASPIRIN 81 MG: 81 TABLET, CHEWABLE ORAL at 08:36

## 2023-08-12 RX ADMIN — HEPARIN SODIUM 5000 UNITS: 5000 INJECTION INTRAVENOUS; SUBCUTANEOUS at 14:28

## 2023-08-12 RX ADMIN — VANCOMYCIN HYDROCHLORIDE 1250 MG: 1.25 INJECTION, POWDER, LYOPHILIZED, FOR SOLUTION INTRAVENOUS at 14:42

## 2023-08-12 RX ADMIN — HEPARIN SODIUM 5000 UNITS: 5000 INJECTION INTRAVENOUS; SUBCUTANEOUS at 22:53

## 2023-08-12 RX ADMIN — FUROSEMIDE 20 MG: 10 INJECTION, SOLUTION INTRAMUSCULAR; INTRAVENOUS at 18:21

## 2023-08-12 RX ADMIN — SODIUM CHLORIDE, PRESERVATIVE FREE 10 ML: 5 INJECTION INTRAVENOUS at 19:57

## 2023-08-12 RX ADMIN — HEPARIN SODIUM 5000 UNITS: 5000 INJECTION INTRAVENOUS; SUBCUTANEOUS at 06:32

## 2023-08-12 RX ADMIN — SODIUM CHLORIDE 25 ML: 9 INJECTION, SOLUTION INTRAVENOUS at 14:41

## 2023-08-12 RX ADMIN — MIDODRINE HYDROCHLORIDE 2.5 MG: 5 TABLET ORAL at 14:28

## 2023-08-12 RX ADMIN — METOPROLOL SUCCINATE 25 MG: 25 TABLET, EXTENDED RELEASE ORAL at 08:36

## 2023-08-12 RX ADMIN — FUROSEMIDE 20 MG: 10 INJECTION, SOLUTION INTRAMUSCULAR; INTRAVENOUS at 08:37

## 2023-08-12 RX ADMIN — MIDODRINE HYDROCHLORIDE 2.5 MG: 5 TABLET ORAL at 18:21

## 2023-08-12 RX ADMIN — FERROUS SULFATE TAB 325 MG (65 MG ELEMENTAL FE) 325 MG: 325 (65 FE) TAB at 08:36

## 2023-08-12 RX ADMIN — SODIUM CHLORIDE, PRESERVATIVE FREE 10 ML: 5 INJECTION INTRAVENOUS at 08:37

## 2023-08-12 ASSESSMENT — PAIN SCALES - GENERAL: PAINLEVEL_OUTOF10: 0

## 2023-08-12 ASSESSMENT — ENCOUNTER SYMPTOMS
SHORTNESS OF BREATH: 1
SHORTNESS OF BREATH: 0

## 2023-08-12 NOTE — PROGRESS NOTES
Hospitalist made aware of MEWS score and overall condition. Continue care at this time. No new orders at this time. Notify if PT condition deteriorates.

## 2023-08-12 NOTE — PROGRESS NOTES
In-Patient Progress Note    Patient:  Sada Cortes 80 y.o. female MRN: [id number]     Date of Service: 8/12/2023    Hospital Day: 5      Chief complaint: had concerns including Altered Mental Status. Subjective   Patient seen and examined in the morning. Patient has underlying dementia. States she feels like she is breathing a little heavy. Noted tachypnea today as well. Tearful affect today. Discussed with daughter and she is ok with VIKTORIA. See ROS    I have reviewed all pertinent PMHx, PSHx, FamHx, SocialHx, medications, and allergies and updated history as appropriate. I have reviewed images as appropriate. Assessment and Plan   Sada Cortes, a 80 y.o. female, with a history of HTN, HLD, stroke, HFpEF, constipation, s/p ICD, pacer was admitted on 8/8/2023 with complaints of had concerns including Altered Mental Status. Assessment and Plan:  # Altered Mental Status 2/2 likely Underlying Dementia, and Septic Encephalopathy   -Patient presents with concerns of altered mental status reported by the daughter. Daughter states that the patient did not have breakfast and seemed uncooperative which was atypical for the patient  -No evidence of metabolic encephalopathy initially, electrolytes abnormalities, TSH within normal limits, UDS negative, no evidence of infection initially                  # Acute on Chronic HFpEF  - patient presents with SOB and peripheral edema  - on admit, patient hemodynamically stable and not requiring oxygen supplementation.   Physical exam and imaging consistent with volume overload on admission   -TTE on 7/21 with an EF of 50 to 55%, grade 3 DD, severe biatrial enlargement  -TSH within normal limits, EKG without ischemic changes, UDS negative, dietary compliance  -Received 20mg IV lasix in ED  - Pro-BNP elevated at 17,398 initially and further uptrending to 39,310                 - On lasix 20mg IV BID  - F/U cardio recs                -Monitor I/O follow      ENTEROCOCCUS FAECALIS POSITIVE for Sensitivity to follow Isolated two of two sets    Narrative:      SETUP DATE/TIME:  08/10/2023 2253    Culture, Blood 1 [[id number]]  (Abnormal) Collected: 08/10/23 2116    Order Status: Completed Specimen: Blood Updated: 08/12/23 0828     Specimen BLOOD     Special Requests NONE     Culture Prelim Report Information to follow      ENTEROCOCCUS FAECALIS POSITIVE for No further workup Isolated two of two sets Refer to culture [id number] ([id number] 8/10/23) for sensitivity results    Narrative:      SETUP DATE/TIME:  08/10/2023 2253              Electronically signed by Estela Bowers MD on 8/12/2023 at 1:31 PM

## 2023-08-12 NOTE — PROGRESS NOTES
PT Bp this AM was 126/75, HR noted to be 60s-70s, pt was brought to stepdown unit last evening for soft bps, RN concern metoprolol and lasix would drop BP and HR, Dr. Fatoumata Guy to see if it would be appropriate to hold metoprolol. 3482: Provider okay to administer both. 1030: Family in room updated on care. 1156: At 1136 Tele called, pt had run of Vtach 17 beats self resolved, Dr. Fam Bronson notified. 1158: Dr. Joel Ayala cardiology notified of [employer] as well  [phone]: NA obtained VS for noon, Pt Bp was relayed to RN at 116/33 Map (55) after multiple checks, Dr. Fam Bronson notified as well as Dr. Joel Ayala to see if there is any concern, Dr. Jono Casey with BP.  1640: Labs resulted, noted Na+ dropped back to 132, Dr. Fam Bronson notified.   1703: Provider okay with Na+ 132

## 2023-08-12 NOTE — PROGRESS NOTES
Occupational Therapy    [facility] ACUTE CARE OCCUPATIONAL THERAPY EVALUATION  Rose Delcid, 5/15/1929, 2026/2026-A, 8/12/2023    History  [place]:  The primary encounter diagnosis was Confusion. Diagnoses of Acute cough, Acute on chronic congestive heart failure, unspecified heart failure type ([address]), and Pneumonia due to infectious organism, unspecified laterality, unspecified part of lung were also pertinent to this visit. Patient  has a past medical history of Aphasia. Patient  has a past surgical history that includes Aortic valve surgery (06/29/2017); pacemaker placement (06/29/2017); and brain surgery (11/27/2017). Subjective:  Patient states:  Darlene Webb would she do that to me? \"Pt is confused    Pain:  No.    Communication with other providers:  Handoff to RN  Restrictions: General Precautions, Fall Risk    Home Setup/Prior level of function  Social/Functional History  Lives With: Daughter  Home Equipment: [address] Help From: Family  ADL Assistance: Needs assistance  Homemaking Assistance: Needs assistance  Ambulation Assistance: Independent (states [place] w/ walker)    Examination of body systems (includes body structures/functions, activity/participation limitations):  Observation:  Sitting upright in chair upon arrival, agreeable to therapy  Vision:  Glasses  Hearing:  [place]  Cardiopulmonary:  No 02 needs      Body Systems and functions:  ROM R/L:  WFL. Strength R/L:  4+/5,   Sensation: WFL  Tone: Normal  Coordination: WFL  Perception: WNL    Activities of Daily Living (ADLs):  Feeding: [place]ko  Grooming: CGA (washing hands/face in stand at sink)  UB bathing: Supervision  LB bathing: CGA (washing shy area/buttocks sitting upright on standard commode)  UB dressing: Supervision  LB dressing: CGA   Toileting: CGA (pt toileted on standard commode, see LB bathing/dressing for details)    Cognitive and Psychosocial Functioning:  Overall cognitive status:  AxO x 3, not to situation, to home w/ home health OTS1 w/ assist PRN  Complexity: Low  Prognosis: Good, no significant barriers to participation at this time. Occupational Therapy Plan  Times Per Week: 2x+  Times Per Day:  Once a day  Current Treatment Recommendations: Strengthening, ROM, Balance training, Functional mobility training, Endurance training, Patient/Caregiver education & training, Self-Care / ADL, Safety education & training, Pain management, Equipment evaluation, education, & procurement, Positioning, Home management training, Cognitive reorientation, Cognitive/Perceptual training     Equipment: defer    Goals:  Pt goal: go home  Time Frame for STGs: discharge  Goal 1: Pt will perform UE ADLs Supervision  Goal 2: Pt will perform LE ADLs SBA w/ AD  Goal 3: Pt will perform toileting SBA w/ AD  Goal 4: Pt will perform functional transfer w/ AD SBA  Goal 5: Pt will perform functional mobility w/ AD SBA  Goal 6: Pt will perform therex/theract in order to increase functional activity tolerance and dynamic standing balance    Treatment plan:  Pt will perform functional task in stand reaching in all 3 planes in order to increase dynamic standing balance and functional activity tolerance    Recommendations for NURSING activity: Up to chair for all 3 meals and up to standard commode for all toileting needs    Time:   Time in: 1303  Time out: 1321  Timed treatment minutes: 8 minutes  Total time: 18 minutes    Electronically signed by:    Jerson SUNSHINE/L [id number]  1:46 PM,8/12/2023

## 2023-08-13 PROBLEM — R78.81 BACTEREMIA DUE TO ENTEROCOCCUS: Status: ACTIVE | Noted: 2023-08-13

## 2023-08-13 PROBLEM — B95.2 BACTEREMIA DUE TO ENTEROCOCCUS: Status: ACTIVE | Noted: 2023-08-13

## 2023-08-13 LAB
ANION GAP SERPL CALCULATED.3IONS-SCNC: 15 MMOL/L (ref 4–16)
BASOPHILS ABSOLUTE: 0.1 K/CU MM
BASOPHILS RELATIVE PERCENT: 0.6 % (ref 0–1)
BUN SERPL-MCNC: 46 MG/DL (ref 6–23)
CALCIUM SERPL-MCNC: 8.4 MG/DL (ref 8.3–10.6)
CHLORIDE BLD-SCNC: 95 MMOL/L (ref 99–110)
CO2: 24 MMOL/L (ref 21–32)
CREAT SERPL-MCNC: 1.5 MG/DL (ref 0.6–1.1)
CRP SERPL HS-MCNC: 94.3 MG/L
CULTURE: ABNORMAL
DIFFERENTIAL TYPE: ABNORMAL
DOSE AMOUNT: NORMAL
DOSE AMOUNT: NORMAL
DOSE TIME: NORMAL
DOSE TIME: NORMAL
EOSINOPHILS ABSOLUTE: 0.1 K/CU MM
EOSINOPHILS RELATIVE PERCENT: 0.6 % (ref 0–3)
GFR SERPL CREATININE-BSD FRML MDRD: 32 ML/MIN/1.73M2
GLUCOSE BLD-MCNC: 112 MG/DL (ref 70–99)
GLUCOSE BLD-MCNC: 113 MG/DL (ref 70–99)
GLUCOSE BLD-MCNC: 115 MG/DL (ref 70–99)
GLUCOSE SERPL-MCNC: 124 MG/DL (ref 70–99)
HCT VFR BLD CALC: 35.9 % (ref 37–47)
HEMOGLOBIN: 10.7 GM/DL (ref 12.5–16)
IMMATURE NEUTROPHIL %: 0.9 % (ref 0–0.43)
LACTATE: 1.2 MMOL/L (ref 0.5–1.9)
LYMPHOCYTES ABSOLUTE: 1 K/CU MM
LYMPHOCYTES RELATIVE PERCENT: 10.5 % (ref 24–44)
Lab: ABNORMAL
Lab: ABNORMAL
MAGNESIUM: 2.1 MG/DL (ref 1.8–2.4)
MCH RBC QN AUTO: 29.2 PG (ref 27–31)
MCHC RBC AUTO-ENTMCNC: 29.8 % (ref 32–36)
MCV RBC AUTO: 97.8 FL (ref 78–100)
MONOCYTES ABSOLUTE: 2.1 K/CU MM
MONOCYTES RELATIVE PERCENT: 21.7 % (ref 0–4)
NUCLEATED RBC %: 0 %
PDW BLD-RTO: 15.3 % (ref 11.7–14.9)
PHOSPHORUS: 2.8 MG/DL (ref 2.5–4.9)
PLATELET # BLD: 165 K/CU MM (ref 140–440)
PMV BLD AUTO: 10.5 FL (ref 7.5–11.1)
POTASSIUM SERPL-SCNC: 3.6 MMOL/L (ref 3.5–5.1)
RBC # BLD: 3.67 M/CU MM (ref 4.2–5.4)
SEGMENTED NEUTROPHILS ABSOLUTE COUNT: 6.2 K/CU MM
SEGMENTED NEUTROPHILS RELATIVE PERCENT: 65.7 % (ref 36–66)
SODIUM BLD-SCNC: 134 MMOL/L (ref 135–145)
SPECIMEN: ABNORMAL
SPECIMEN: ABNORMAL
TOTAL IMMATURE NEUTOROPHIL: 0.09 K/CU MM
TOTAL NUCLEATED RBC: 0 K/CU MM
VANCOMYCIN RANDOM: 13.6 UG/ML
VANCOMYCIN RANDOM: 24.1 UG/ML
WBC # BLD: 9.5 K/CU MM (ref 4–10.5)

## 2023-08-13 PROCEDURE — 76937 US GUIDE VASCULAR ACCESS: CPT

## 2023-08-13 PROCEDURE — 94761 N-INVAS EAR/PLS OXIMETRY MLT: CPT

## 2023-08-13 PROCEDURE — 6360000002 HC RX W HCPCS: Performed by: NURSE PRACTITIONER

## 2023-08-13 PROCEDURE — 6370000000 HC RX 637 (ALT 250 FOR IP): Performed by: NURSE PRACTITIONER

## 2023-08-13 PROCEDURE — 86140 C-REACTIVE PROTEIN: CPT

## 2023-08-13 PROCEDURE — 83735 ASSAY OF MAGNESIUM: CPT

## 2023-08-13 PROCEDURE — 80048 BASIC METABOLIC PNL TOTAL CA: CPT

## 2023-08-13 PROCEDURE — 6370000000 HC RX 637 (ALT 250 FOR IP): Performed by: STUDENT IN AN ORGANIZED HEALTH CARE EDUCATION/TRAINING PROGRAM

## 2023-08-13 PROCEDURE — APPSS60 APP SPLIT SHARED TIME 46-60 MINUTES: Performed by: NURSE PRACTITIONER

## 2023-08-13 PROCEDURE — 99233 SBSQ HOSP IP/OBS HIGH 50: CPT | Performed by: INTERNAL MEDICINE

## 2023-08-13 PROCEDURE — 6360000002 HC RX W HCPCS: Performed by: INTERNAL MEDICINE

## 2023-08-13 PROCEDURE — 36415 COLL VENOUS BLD VENIPUNCTURE: CPT

## 2023-08-13 PROCEDURE — 82962 GLUCOSE BLOOD TEST: CPT

## 2023-08-13 PROCEDURE — 84100 ASSAY OF PHOSPHORUS: CPT

## 2023-08-13 PROCEDURE — 2580000003 HC RX 258: Performed by: NURSE PRACTITIONER

## 2023-08-13 PROCEDURE — 2060000000 HC ICU INTERMEDIATE R&B

## 2023-08-13 PROCEDURE — 80202 ASSAY OF VANCOMYCIN: CPT

## 2023-08-13 PROCEDURE — 2580000003 HC RX 258: Performed by: STUDENT IN AN ORGANIZED HEALTH CARE EDUCATION/TRAINING PROGRAM

## 2023-08-13 PROCEDURE — 85025 COMPLETE CBC W/AUTO DIFF WBC: CPT

## 2023-08-13 RX ADMIN — MIDODRINE HYDROCHLORIDE 2.5 MG: 5 TABLET ORAL at 13:30

## 2023-08-13 RX ADMIN — VANCOMYCIN HYDROCHLORIDE 1000 MG: 1 INJECTION, POWDER, LYOPHILIZED, FOR SOLUTION INTRAVENOUS at 17:14

## 2023-08-13 RX ADMIN — HEPARIN SODIUM 5000 UNITS: 5000 INJECTION INTRAVENOUS; SUBCUTANEOUS at 13:31

## 2023-08-13 RX ADMIN — MIDODRINE HYDROCHLORIDE 2.5 MG: 5 TABLET ORAL at 17:06

## 2023-08-13 RX ADMIN — MIDODRINE HYDROCHLORIDE 2.5 MG: 5 TABLET ORAL at 08:49

## 2023-08-13 RX ADMIN — SODIUM CHLORIDE, PRESERVATIVE FREE 10 ML: 5 INJECTION INTRAVENOUS at 08:50

## 2023-08-13 RX ADMIN — FUROSEMIDE 20 MG: 10 INJECTION, SOLUTION INTRAMUSCULAR; INTRAVENOUS at 08:50

## 2023-08-13 RX ADMIN — ACETAMINOPHEN 650 MG: 325 TABLET ORAL at 17:05

## 2023-08-13 RX ADMIN — HEPARIN SODIUM 5000 UNITS: 5000 INJECTION INTRAVENOUS; SUBCUTANEOUS at 21:31

## 2023-08-13 RX ADMIN — SODIUM CHLORIDE, PRESERVATIVE FREE 10 ML: 5 INJECTION INTRAVENOUS at 19:59

## 2023-08-13 RX ADMIN — ASPIRIN 81 MG: 81 TABLET, CHEWABLE ORAL at 08:49

## 2023-08-13 RX ADMIN — FERROUS SULFATE TAB 325 MG (65 MG ELEMENTAL FE) 325 MG: 325 (65 FE) TAB at 08:49

## 2023-08-13 RX ADMIN — ATORVASTATIN CALCIUM 10 MG: 10 TABLET, FILM COATED ORAL at 08:49

## 2023-08-13 ASSESSMENT — PAIN - FUNCTIONAL ASSESSMENT: PAIN_FUNCTIONAL_ASSESSMENT: ACTIVITIES ARE NOT PREVENTED

## 2023-08-13 ASSESSMENT — PAIN SCALES - GENERAL: PAINLEVEL_OUTOF10: 8

## 2023-08-13 ASSESSMENT — ENCOUNTER SYMPTOMS: SHORTNESS OF BREATH: 0

## 2023-08-13 ASSESSMENT — PAIN DESCRIPTION - DESCRIPTORS: DESCRIPTORS: ACHING

## 2023-08-13 ASSESSMENT — PAIN DESCRIPTION - ORIENTATION: ORIENTATION: MID

## 2023-08-13 ASSESSMENT — PAIN DESCRIPTION - LOCATION: LOCATION: HEAD

## 2023-08-13 NOTE — PROGRESS NOTES
4.9 MG/DL   POCT Glucose    Collection Time: 08/12/23  4:42 PM   Result Value Ref Range    POC Glucose 101 (H) 70 - 99 MG/DL   POCT Glucose    Collection Time: 08/12/23  8:33 PM   Result Value Ref Range    POC Glucose 89 70 - 99 MG/DL   CBC with Auto Differential    Collection Time: 08/12/23 11:33 PM   Result Value Ref Range    WBC 7.1 4.0 - 10.5 K/CU MM    RBC 3.30 (L) 4.2 - 5.4 M/CU MM    Hemoglobin 9.7 (L) 12.5 - 16.0 GM/DL    Hematocrit 31.2 (L) 37 - 47 %    MCV 94.5 78 - 100 FL    MCH 29.4 27 - 31 PG    MCHC 31.1 (L) 32.0 - 36.0 %    RDW 15.3 (H) 11.7 - 14.9 %    Platelets 726 (L) 970 - 440 K/CU MM    MPV 9.8 7.5 - 11.1 FL    Differential Type AUTOMATED DIFFERENTIAL     Segs Relative 59.3 36 - 66 %    Lymphocytes % 11.4 (L) 24 - 44 %    Monocytes % 25.0 (H) 0 - 4 %    Eosinophils % 3.5 (H) 0 - 3 %    Basophils % 0.4 0 - 1 %    Segs Absolute 4.2 K/CU MM    Lymphocytes Absolute 0.8 K/CU MM    Monocytes Absolute 1.8 K/CU MM    Eosinophils Absolute 0.3 K/CU MM    Basophils Absolute 0.0 K/CU MM    Nucleated RBC % 0.0 %    Total Nucleated RBC 0.0 K/CU MM    Total Immature Neutrophil 0.03 K/CU MM    Immature Neutrophil % 0.4 0 - 0.43 %   Lactic Acid    Collection Time: 08/12/23 11:33 PM   Result Value Ref Range    Lactate 1.2 0.5 - 1.9 mMOL/L   POCT Glucose    Collection Time: 08/13/23  7:20 AM   Result Value Ref Range    POC Glucose 113 (H) 70 - 99 MG/DL   POCT Glucose    Collection Time: 08/13/23 11:23 AM   Result Value Ref Range    POC Glucose 115 (H) 70 - 99 MG/DL     Results       Procedure Component Value Units Date/Time    Culture, Blood 1 [[id number]] Collected: 08/12/23 0526    Order Status: Completed Specimen: Blood Updated: 08/13/23 1301     Specimen BLOOD     Special Requests 4 OUT OF 4 BOTTLES DRAWN ARE POSITIVE FOR GPC CALLED TO KM STRANGE [phone] AT [address] RB     Culture Prelim Report Information to follow      If child <=2 yrs old please draw pediatric bottle. RBlood Culture 1 Updated: 08/13/23 0722     Specimen BLOOD     Special Requests --     4 OUT OF 4 BOTTLES POSITIVE FOR ENTERCOCCUS FAECALIS. CALLED TO Charles Mcfadden RN @[phone] [facility].   RESULTS READ BACK  SENT TO PHARMACY       Culture Final Report      ENTEROCOCCUS FAECALIS POSITIVE for Isolated two of two sets    Narrative:      SETUP DATE/TIME:  08/10/2023 2253    Susceptibility        Enterococcus faecalis      BACTERIAL SUSCEPTIBILITY PANEL JEZ      ampicillin <=2  Sensitive      ciprofloxacin <=0.5  Sensitive      levofloxacin 1  Sensitive      vancomycin 1  Sensitive                           Culture, Blood 1 [[id number]]  (Abnormal) Collected: 08/10/23 2116    Order Status: Completed Specimen: Blood Updated: 08/13/23 0722     Specimen BLOOD     Special Requests NONE     Culture Final Report      ENTEROCOCCUS FAECALIS POSITIVE for No further workup Isolated two of two sets Refer to culture [id number] ([id number] 8/10/23) for sensitivity results    Narrative:      SETUP DATE/TIME:  08/10/2023 2253              Electronically signed by Janett Gurrola MD on 8/13/2023 at 1:04 PM

## 2023-08-13 NOTE — PROGRESS NOTES
Blood cult pos for gram+ cocci, already stated on IV vanc x 1 dose will con pharmacy for additional doses, ID cons already placed by days. Patient is hypotensive asymptomatic will give albumin  re- assess being mindful of  previous CHF ex. Requiring lasix this admission.  Repeat lactic and cbc

## 2023-08-13 NOTE — PROGRESS NOTES
On call provider notified about pt hypotension, and positive blood cultures, new order to start albumin. When albumin was initiated iv infiltrated so new access was necessary. Pt extremely agitated and increasingly confused. Pt believes she is at home and staff broke into her house. Pt repeatedly throwing legs over bed rails attempting to climb out of bed. Two separate skin tears noted on each calf. Skin tears were cleansed and covered. Attempted to re orient pt but was ineffective. Will continue to monitor blood pressure for now as pt is no longer hypotensive. Order to give albumin if needed.

## 2023-08-13 NOTE — CONSULTS
Consult completed. Nexiva 20g 1.75 inch catheter inserted via ultrasound in patient's JOI. Brisk blood return noted and catheter flushes with ease. Patient tolerated well. Consult IV/PICC team for any questions or if patient's needs change.

## 2023-08-14 LAB
ANION GAP SERPL CALCULATED.3IONS-SCNC: 10 MMOL/L (ref 4–16)
ANISOCYTOSIS: ABNORMAL
BANDED NEUTROPHILS ABSOLUTE COUNT: 0.26 K/CU MM
BANDED NEUTROPHILS RELATIVE PERCENT: 3 % (ref 5–11)
BUN SERPL-MCNC: 46 MG/DL (ref 6–23)
CALCIUM SERPL-MCNC: 8.5 MG/DL (ref 8.3–10.6)
CHLORIDE BLD-SCNC: 100 MMOL/L (ref 99–110)
CO2: 27 MMOL/L (ref 21–32)
CREAT SERPL-MCNC: 1.3 MG/DL (ref 0.6–1.1)
CULTURE: ABNORMAL
DIFFERENTIAL TYPE: ABNORMAL
DOSE AMOUNT: NORMAL
DOSE TIME: NORMAL
EOSINOPHILS ABSOLUTE: 0.4 K/CU MM
EOSINOPHILS RELATIVE PERCENT: 5 % (ref 0–3)
GFR SERPL CREATININE-BSD FRML MDRD: 38 ML/MIN/1.73M2
GLUCOSE BLD-MCNC: 103 MG/DL (ref 70–99)
GLUCOSE BLD-MCNC: 90 MG/DL (ref 70–99)
GLUCOSE BLD-MCNC: 90 MG/DL (ref 70–99)
GLUCOSE SERPL-MCNC: 93 MG/DL (ref 70–99)
HCT VFR BLD CALC: 32.9 % (ref 37–47)
HEMOGLOBIN: 10.1 GM/DL (ref 12.5–16)
LV EF: 58 %
LVEF MODALITY: NORMAL
LYMPHOCYTES ABSOLUTE: 2.3 K/CU MM
LYMPHOCYTES RELATIVE PERCENT: 27 % (ref 24–44)
Lab: ABNORMAL
Lab: ABNORMAL
MACROCYTES: ABNORMAL
MAGNESIUM: 2.2 MG/DL (ref 1.8–2.4)
MCH RBC QN AUTO: 28.6 PG (ref 27–31)
MCHC RBC AUTO-ENTMCNC: 30.7 % (ref 32–36)
MCV RBC AUTO: 93.2 FL (ref 78–100)
MONOCYTES ABSOLUTE: 0.9 K/CU MM
MONOCYTES RELATIVE PERCENT: 10 % (ref 0–4)
PDW BLD-RTO: 15.5 % (ref 11.7–14.9)
PHOSPHORUS: 3.3 MG/DL (ref 2.5–4.9)
PLATELET # BLD: 175 K/CU MM (ref 140–440)
PMV BLD AUTO: 10.8 FL (ref 7.5–11.1)
POTASSIUM SERPL-SCNC: 3.7 MMOL/L (ref 3.5–5.1)
PRO-BNP: ABNORMAL PG/ML
PROCALCITONIN SERPL-MCNC: 0.86 NG/ML
RBC # BLD: 3.53 M/CU MM (ref 4.2–5.4)
RBC # BLD: ABNORMAL 10*6/UL
SEGMENTED NEUTROPHILS ABSOLUTE COUNT: 4.6 K/CU MM
SEGMENTED NEUTROPHILS RELATIVE PERCENT: 55 % (ref 36–66)
SODIUM BLD-SCNC: 137 MMOL/L (ref 135–145)
SPECIMEN: ABNORMAL
SPECIMEN: ABNORMAL
VANCOMYCIN RANDOM: 18.7 UG/ML
WBC # BLD: 8.5 K/CU MM (ref 4–10.5)

## 2023-08-14 PROCEDURE — 6370000000 HC RX 637 (ALT 250 FOR IP): Performed by: NURSE PRACTITIONER

## 2023-08-14 PROCEDURE — 85007 BL SMEAR W/DIFF WBC COUNT: CPT

## 2023-08-14 PROCEDURE — 7100000001 HC PACU RECOVERY - ADDTL 15 MIN

## 2023-08-14 PROCEDURE — 83735 ASSAY OF MAGNESIUM: CPT

## 2023-08-14 PROCEDURE — 36415 COLL VENOUS BLD VENIPUNCTURE: CPT

## 2023-08-14 PROCEDURE — 2700000000 HC OXYGEN THERAPY PER DAY

## 2023-08-14 PROCEDURE — 93312 ECHO TRANSESOPHAGEAL: CPT

## 2023-08-14 PROCEDURE — 94761 N-INVAS EAR/PLS OXIMETRY MLT: CPT

## 2023-08-14 PROCEDURE — 7100000000 HC PACU RECOVERY - FIRST 15 MIN

## 2023-08-14 PROCEDURE — 87040 BLOOD CULTURE FOR BACTERIA: CPT

## 2023-08-14 PROCEDURE — 87186 SC STD MICRODIL/AGAR DIL: CPT

## 2023-08-14 PROCEDURE — 80202 ASSAY OF VANCOMYCIN: CPT

## 2023-08-14 PROCEDURE — 82962 GLUCOSE BLOOD TEST: CPT

## 2023-08-14 PROCEDURE — 6360000002 HC RX W HCPCS: Performed by: INTERNAL MEDICINE

## 2023-08-14 PROCEDURE — B24BZZ4 ULTRASONOGRAPHY OF HEART WITH AORTA, TRANSESOPHAGEAL: ICD-10-PCS | Performed by: INTERNAL MEDICINE

## 2023-08-14 PROCEDURE — 6360000002 HC RX W HCPCS: Performed by: NURSE PRACTITIONER

## 2023-08-14 PROCEDURE — 85027 COMPLETE CBC AUTOMATED: CPT

## 2023-08-14 PROCEDURE — 99222 1ST HOSP IP/OBS MODERATE 55: CPT | Performed by: THORACIC SURGERY (CARDIOTHORACIC VASCULAR SURGERY)

## 2023-08-14 PROCEDURE — 87150 DNA/RNA AMPLIFIED PROBE: CPT

## 2023-08-14 PROCEDURE — 6370000000 HC RX 637 (ALT 250 FOR IP): Performed by: STUDENT IN AN ORGANIZED HEALTH CARE EDUCATION/TRAINING PROGRAM

## 2023-08-14 PROCEDURE — 99233 SBSQ HOSP IP/OBS HIGH 50: CPT | Performed by: INTERNAL MEDICINE

## 2023-08-14 PROCEDURE — 83880 ASSAY OF NATRIURETIC PEPTIDE: CPT

## 2023-08-14 PROCEDURE — 80048 BASIC METABOLIC PNL TOTAL CA: CPT

## 2023-08-14 PROCEDURE — 2580000003 HC RX 258: Performed by: STUDENT IN AN ORGANIZED HEALTH CARE EDUCATION/TRAINING PROGRAM

## 2023-08-14 PROCEDURE — 99233 SBSQ HOSP IP/OBS HIGH 50: CPT | Performed by: NURSE PRACTITIONER

## 2023-08-14 PROCEDURE — 51702 INSERT TEMP BLADDER CATH: CPT

## 2023-08-14 PROCEDURE — 84100 ASSAY OF PHOSPHORUS: CPT

## 2023-08-14 PROCEDURE — 93312 ECHO TRANSESOPHAGEAL: CPT | Performed by: INTERNAL MEDICINE

## 2023-08-14 PROCEDURE — 84145 PROCALCITONIN (PCT): CPT

## 2023-08-14 PROCEDURE — 2060000000 HC ICU INTERMEDIATE R&B

## 2023-08-14 PROCEDURE — 2580000003 HC RX 258: Performed by: NURSE PRACTITIONER

## 2023-08-14 RX ADMIN — HEPARIN SODIUM 5000 UNITS: 5000 INJECTION INTRAVENOUS; SUBCUTANEOUS at 14:13

## 2023-08-14 RX ADMIN — METOPROLOL SUCCINATE 12.5 MG: 25 TABLET, EXTENDED RELEASE ORAL at 10:20

## 2023-08-14 RX ADMIN — MIDODRINE HYDROCHLORIDE 2.5 MG: 5 TABLET ORAL at 14:13

## 2023-08-14 RX ADMIN — ASPIRIN 81 MG: 81 TABLET, CHEWABLE ORAL at 10:20

## 2023-08-14 RX ADMIN — SODIUM CHLORIDE, PRESERVATIVE FREE 10 ML: 5 INJECTION INTRAVENOUS at 10:28

## 2023-08-14 RX ADMIN — HEPARIN SODIUM 5000 UNITS: 5000 INJECTION INTRAVENOUS; SUBCUTANEOUS at 22:12

## 2023-08-14 RX ADMIN — FERROUS SULFATE TAB 325 MG (65 MG ELEMENTAL FE) 325 MG: 325 (65 FE) TAB at 10:20

## 2023-08-14 RX ADMIN — CEFTRIAXONE SODIUM 2000 MG: 2 INJECTION, POWDER, FOR SOLUTION INTRAMUSCULAR; INTRAVENOUS at 20:41

## 2023-08-14 RX ADMIN — SODIUM CHLORIDE 25 ML: 9 INJECTION, SOLUTION INTRAVENOUS at 17:16

## 2023-08-14 RX ADMIN — AMPICILLIN SODIUM 2000 MG: 2 INJECTION, POWDER, FOR SOLUTION INTRAVENOUS at 22:12

## 2023-08-14 RX ADMIN — AMPICILLIN SODIUM 2000 MG: 2 INJECTION, POWDER, FOR SOLUTION INTRAVENOUS at 17:18

## 2023-08-14 RX ADMIN — MIDODRINE HYDROCHLORIDE 2.5 MG: 5 TABLET ORAL at 10:20

## 2023-08-14 RX ADMIN — ATORVASTATIN CALCIUM 10 MG: 10 TABLET, FILM COATED ORAL at 10:20

## 2023-08-14 ASSESSMENT — PAIN SCALES - GENERAL
PAINLEVEL_OUTOF10: 0
PAINLEVEL_OUTOF10: 0

## 2023-08-14 ASSESSMENT — PAIN SCALES - PAIN ASSESSMENT IN ADVANCED DEMENTIA (PAINAD)
FACIALEXPRESSION: 0
CONSOLABILITY: 0
BREATHING: 0
BODYLANGUAGE: 0
NEGVOCALIZATION: 0
TOTALSCORE: 0

## 2023-08-14 ASSESSMENT — ENCOUNTER SYMPTOMS: SHORTNESS OF BREATH: 0

## 2023-08-14 NOTE — PROCEDURES
Trans esophageal echocardiogram    ASA Mallampati 3/3    Full report to follow      Signs of infective endocarditis, paravalvular leak around aortic valve root, signs of abscess/valve dehiscence. Primary care team, infectious disease and CVT surgery notified  Continue with IV antibiotics                Conscious sedation:    Consent: Written consent obtained (see nursing note)  Risks and benefits: risks, benefits and alternatives were discussed  Consent given by: patient/family  Patient understanding: states understanding of the procedure being performed  Patient consent: understanding of the procedure matches consent given  Patient identity confirmed: verbally with patient and arm band  Time out: Immediately prior to procedure a \"time out\" was called to verify the correct patient, procedure, equipment, support staff and site/side marked as required. Medication IV:  Received IV Versed and IV fentanyl, please refer to nursing log for dosing  Complication: Tolerated well without complication. Patient was observed for over 30 minutes postprocedure  Time: Total intra-service time with patient was 32 minutes.

## 2023-08-14 NOTE — PROGRESS NOTES
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 Ejection fraction is visually estimated at 55-60%. light apical ballooning noted. Mildly dilated left atrium. PPM wiring visualized in right heart. S/p TAVR (#29 Watson Nancy 3) with moderate perivalvular leak. Moderate to severe mitral regurgitation. Moderate tricuspid regurgitation; RVSP: 52 mmHg. No evidence of any pericardial effusion. Atherosclerotic plaque noted in the abdominal aorta. -CT Head WO Contrast: No acute intracranial abnormality. Cerebral atrophy. Severe chronic small vessel ischemic changes. No acute   brain parenchymal abnormality. -CXR: 1. Congestive heart failure is most likely given the radiographic findings; pneumonia is also a consideration in areas of consolidation with pleural   effusion. 2. Calcific atherosclerosis aorta. 3. Cardiomegaly. 8/10-CXR: Stable cardiopulmonary status including pulmonary edema, right base   atelectasis and bilateral effusions, right greater than left. -VIKTORIA: Summary:  Left ventricular systolic function is normal.  Ejection fraction is visually estimated at 55-60% PPM wiring visualized within the right heart. S/p TAVR (#29 Watson Nancy 3) with moderate perivalvular leak. New finding suspicious for endocarditis found near aortic valve annulus.  Valvular Streptococcus    Chronic renal disease, stage III ([facility]) [876355]    Seasonal allergic rhinitis due to pollen    Bilateral hearing loss    Acute metabolic encephalopathy    Confusion    Bacteremia due to Enterococcus       Active Problems  Principal Problem:    Acute metabolic encephalopathy  Active Problems:    Confusion    Bacteremia due to Enterococcus  Resolved Problems:    * No resolved hospital problems. *    Electronically signed by: Electronically signed by Shabnam Lozada.  FELICIA Hagen CNP on 8/14/2023 at 4:22 PM

## 2023-08-14 NOTE — CONSULTS
Cardiothoracic [address] / HISTORY AND PHYSICAL EXAMINATION            Date:   8/14/2023  Patient name:  Dante Carroll  Date of admission:  8/8/2023  4:52 PM  MRN:   [id number]  Account:  [de-identified]  YOB: 1929  PCP:    Peter Orosco MD  Room:   2026/2026-A  Code Status:    Full Code    Physician Requesting Consult: Janett Gurrola MD    Reason for Consult:  aortic root abscess    Chief Complaint:     Altered mental status    History of Present Illness:     Patient is a 79yo female with PMH of HFpEF, AS s/p TAVR, PPM, ICH 2018,HTN and HLD. She was admitted 8/8/23 with altered mental status,concerning for sepsis. Blood cultures on admission + for Enterococcus faecalis and repeat cultures 8/12 remain positive. Currently on vancomycin. BNP 07949 on admission. diuresed with Lasix. TTE concerning for aortic valve endocarditis and perivalvular leak. VIKTORIA completed today which showed a small perivalvular leak, moderate to severe mitral regurgitation, moderate tricuspid regurgitation. Past Medical History:     Past Medical History:   Diagnosis Date    Aphasia         Past Surgical History:     Past Surgical History:   Procedure Laterality Date    AORTIC VALVE SURGERY  06/29/2017    BRAIN SURGERY  11/27/2017    bleed from stroke    PACEMAKER PLACEMENT  06/29/2017        Medications Prior to Admission:     Prior to Admission medications    Medication Sig Start Date End Date Taking?  Authorizing Provider   metoprolol succinate (TOPROL XL) 25 MG extended release tablet TAKE 1 TABLET BY MOUTH ONCE  DAILY 6/2/23   Peter Orosco MD   furosemide (LASIX) 20 MG tablet TAKE 1 TABLET BY MOUTH  DAILY 2/15/23   Jermain Kulkarni PA-C   potassium chloride (KLOR-CON M) 20 MEQ extended release tablet TAKE 1 TABLET BY MOUTH  DAILY 11/7/22   Peter Orosco MD   lovastatin (MEVACOR) 40 MG tablet TAKE 1 TABLET Collection Time: 08/13/23  8:24 PM   Result Value Ref Range    Vancomycin Rm 24.1 UG/ML    DOSE AMOUNT DOSE AMT. GIVEN - . DOSE TIME DOSE TIME GIVEN - . Brain Natriuretic Peptide    Collection Time: 08/14/23  4:31 AM   Result Value Ref Range    Pro-BNP 31,838 (H) <300 PG/ML   CBC with Auto Differential    Collection Time: 08/14/23  4:31 AM   Result Value Ref Range    WBC 8.5 4.0 - 10.5 K/CU MM    RBC 3.53 (L) 4.2 - 5.4 M/CU MM    Hemoglobin 10.1 (L) 12.5 - 16.0 GM/DL    Hematocrit 32.9 (L) 37 - 47 %    MCV 93.2 78 - 100 FL    MCH 28.6 27 - 31 PG    MCHC 30.7 (L) 32.0 - 36.0 %    RDW 15.5 (H) 11.7 - 14.9 %    Platelets 241 618 - 769 K/CU MM    MPV 10.8 7.5 - 11.1 FL    Bands Relative 3 (L) 5 - 11 %    Segs Relative 55.0 36 - 66 %    Eosinophils % 5.0 (H) 0 - 3 %    Lymphocytes % 27.0 24 - 44 %    Monocytes % 10.0 (H) 0 - 4 %    Bands Absolute 0.26 K/CU MM    Segs Absolute 4.6 K/CU MM    Eosinophils Absolute 0.4 K/CU MM    Lymphocytes Absolute 2.3 K/CU MM    Monocytes Absolute 0.9 K/CU MM    Differential Type MANUAL DIFFERENTIAL     RBC Morphology POIKILOCYTOSIS     Anisocytosis 1+     Macrocytes 1+    Basic Metabolic Panel    Collection Time: 08/14/23  4:31 AM   Result Value Ref Range    Sodium 137 135 - 145 MMOL/L    Potassium 3.7 3.5 - 5.1 MMOL/L    Chloride 100 99 - 110 mMol/L    CO2 27 21 - 32 MMOL/L    Anion Gap 10 4 - 16    BUN 46 (H) 6 - 23 MG/DL    Creatinine 1.3 (H) 0.6 - 1.1 MG/DL    Est, Glom Filt Rate 38 (L) >60 mL/min/1.73m2    Glucose 93 70 - 99 MG/DL    Calcium 8.5 8.3 - 10.6 MG/DL   Magnesium    Collection Time: 08/14/23  4:31 AM   Result Value Ref Range    Magnesium 2.2 1.8 - 2.4 mg/dl   Phosphorus    Collection Time: 08/14/23  4:31 AM   Result Value Ref Range    Phosphorus 3.3 2.5 - 4.9 MG/DL   Vancomycin Level, Random    Collection Time: 08/14/23  4:31 AM   Result Value Ref Range    Vancomycin Rm 18.7 UG/ML    DOSE AMOUNT DOSE AMT.  GIVEN - UNKNOWN     DOSE TIME DOSE TIME GIVEN - UNKNOWN

## 2023-08-14 NOTE — PROGRESS NOTES
In-Patient Progress Note    Patient:  Jerardo Finch 80 y.o. female MRN: [id number]     Date of Service: 8/14/2023    Hospital Day: 7      Chief complaint: had concerns including Altered Mental Status. Subjective   Patient seen and examined in the morning. Patient has underlying dementia. States she feels ok. Confused. Tachypnea is slightly better. No new complaints. See ROS    I have reviewed all pertinent PMHx, PSHx, FamHx, SocialHx, medications, and allergies and updated history as appropriate. I have reviewed images as appropriate. Assessment and Plan   Jerardo Finch, a 80 y.o. female, with a history of HTN, HLD, stroke, HFpEF, constipation, s/p ICD, pacer was admitted on 8/8/2023 with complaints of had concerns including Altered Mental Status. Assessment and Plan:  # Altered Mental Status 2/2 likely Underlying Dementia, and Septic Encephalopathy   -Patient presents with concerns of altered mental status reported by the daughter. Daughter states that the patient did not have breakfast and seemed uncooperative which was atypical for the patient  -No evidence of metabolic encephalopathy initially, electrolytes abnormalities, TSH within normal limits, UDS negative, no evidence of infection initially                  # Acute on Chronic HFpEF  - patient presents with SOB and peripheral edema  - on admit, patient hemodynamically stable and not requiring oxygen supplementation.   Physical exam and imaging consistent with volume overload on admission   -TTE on 7/21 with an EF of 50 to 55%, grade 3 DD, severe biatrial enlargement  -TSH within normal limits, EKG without ischemic changes, UDS negative, dietary compliance  -Received 20mg IV lasix in ED  - Pro-BNP elevated at 17,398 initially and further uptrending to 39,310                 - On lasix 20mg IV BID - On hold per cardio   - F/U cardio recs                -Monitor I/O              -Low sodium diet    - On metoprolol XL 12.5mg should be used as an adjunct to nosocomial control efforts to identify patients   needing enhanced precautions. The test is not intended to identify patients with staphylococcal infections. Results   should not be used to guide or monitor treatment for MRSA infections. Culture, Blood 1 [[id number]]  (Abnormal)  (Susceptibility) Collected: 08/10/23 2116    Order Status: Completed Specimen: Blood Updated: 08/13/23 0722     Specimen BLOOD     Special Requests --     4 OUT OF 4 BOTTLES POSITIVE FOR ENTERCOCCUS FAECALIS. CALLED TO Alli Medley RN @[phone] [facility]   RESULTS READ BACK  SENT TO PHARMACY       Culture Final Report      ENTEROCOCCUS FAECALIS POSITIVE for Isolated two of two sets    Narrative:      SETUP DATE/TIME:  08/10/2023 2253    Susceptibility        Enterococcus faecalis      BACTERIAL SUSCEPTIBILITY PANEL JEZ      ampicillin <=2  Sensitive      ciprofloxacin <=0.5  Sensitive      levofloxacin 1  Sensitive      vancomycin 1  Sensitive                           Culture, Blood 1 [[id number]]  (Abnormal) Collected: 08/10/23 2116    Order Status: Completed Specimen: Blood Updated: 08/13/23 0722     Specimen BLOOD     Special Requests NONE     Culture Final Report      ENTEROCOCCUS FAECALIS POSITIVE for No further workup Isolated two of two sets Refer to culture [id number] ([id number] 8/10/23) for sensitivity results    Narrative:      SETUP DATE/TIME:  08/10/2023 2253              Electronically signed by Sara Hamilton MD on 8/14/2023 at 1:08 PM

## 2023-08-15 PROBLEM — A41.9 SEPTICEMIA (HCC): Status: ACTIVE | Noted: 2023-08-15

## 2023-08-15 LAB
ANION GAP SERPL CALCULATED.3IONS-SCNC: 9 MMOL/L (ref 4–16)
BASOPHILS ABSOLUTE: 0.1 K/CU MM
BASOPHILS RELATIVE PERCENT: 0.6 % (ref 0–1)
BUN SERPL-MCNC: 38 MG/DL (ref 6–23)
CALCIUM SERPL-MCNC: 8.4 MG/DL (ref 8.3–10.6)
CHLORIDE BLD-SCNC: 104 MMOL/L (ref 99–110)
CO2: 27 MMOL/L (ref 21–32)
CREAT SERPL-MCNC: 1.1 MG/DL (ref 0.6–1.1)
DIFFERENTIAL TYPE: ABNORMAL
EOSINOPHILS ABSOLUTE: 0.3 K/CU MM
EOSINOPHILS RELATIVE PERCENT: 3.1 % (ref 0–3)
GFR SERPL CREATININE-BSD FRML MDRD: 47 ML/MIN/1.73M2
GLUCOSE BLD-MCNC: 125 MG/DL (ref 70–99)
GLUCOSE BLD-MCNC: 83 MG/DL (ref 70–99)
GLUCOSE BLD-MCNC: 85 MG/DL (ref 70–99)
GLUCOSE BLD-MCNC: 89 MG/DL (ref 70–99)
GLUCOSE BLD-MCNC: 94 MG/DL (ref 70–99)
GLUCOSE SERPL-MCNC: 78 MG/DL (ref 70–99)
HCT VFR BLD CALC: 29.9 % (ref 37–47)
HEMOGLOBIN: 9.3 GM/DL (ref 12.5–16)
IMMATURE NEUTROPHIL %: 2.6 % (ref 0–0.43)
LYMPHOCYTES ABSOLUTE: 1.2 K/CU MM
LYMPHOCYTES RELATIVE PERCENT: 14.1 % (ref 24–44)
MAGNESIUM: 2.3 MG/DL (ref 1.8–2.4)
MCH RBC QN AUTO: 29.8 PG (ref 27–31)
MCHC RBC AUTO-ENTMCNC: 31.1 % (ref 32–36)
MCV RBC AUTO: 95.8 FL (ref 78–100)
MONOCYTES ABSOLUTE: 1.4 K/CU MM
MONOCYTES RELATIVE PERCENT: 17.1 % (ref 0–4)
NUCLEATED RBC %: 0 %
PDW BLD-RTO: 15.6 % (ref 11.7–14.9)
PHOSPHORUS: 3.8 MG/DL (ref 2.5–4.9)
PLATELET # BLD: 183 K/CU MM (ref 140–440)
PMV BLD AUTO: 10.2 FL (ref 7.5–11.1)
POTASSIUM SERPL-SCNC: 3.6 MMOL/L (ref 3.5–5.1)
RBC # BLD: 3.12 M/CU MM (ref 4.2–5.4)
SEGMENTED NEUTROPHILS ABSOLUTE COUNT: 5.1 K/CU MM
SEGMENTED NEUTROPHILS RELATIVE PERCENT: 62.5 % (ref 36–66)
SODIUM BLD-SCNC: 140 MMOL/L (ref 135–145)
TOTAL IMMATURE NEUTOROPHIL: 0.21 K/CU MM
TOTAL NUCLEATED RBC: 0 K/CU MM
WBC # BLD: 8.2 K/CU MM (ref 4–10.5)

## 2023-08-15 PROCEDURE — 84100 ASSAY OF PHOSPHORUS: CPT

## 2023-08-15 PROCEDURE — 99233 SBSQ HOSP IP/OBS HIGH 50: CPT | Performed by: INTERNAL MEDICINE

## 2023-08-15 PROCEDURE — 6360000002 HC RX W HCPCS: Performed by: INTERNAL MEDICINE

## 2023-08-15 PROCEDURE — 6370000000 HC RX 637 (ALT 250 FOR IP): Performed by: NURSE PRACTITIONER

## 2023-08-15 PROCEDURE — 82962 GLUCOSE BLOOD TEST: CPT

## 2023-08-15 PROCEDURE — 2060000000 HC ICU INTERMEDIATE R&B

## 2023-08-15 PROCEDURE — 94761 N-INVAS EAR/PLS OXIMETRY MLT: CPT

## 2023-08-15 PROCEDURE — 2700000000 HC OXYGEN THERAPY PER DAY

## 2023-08-15 PROCEDURE — 85025 COMPLETE CBC W/AUTO DIFF WBC: CPT

## 2023-08-15 PROCEDURE — 99233 SBSQ HOSP IP/OBS HIGH 50: CPT | Performed by: NURSE PRACTITIONER

## 2023-08-15 PROCEDURE — 51798 US URINE CAPACITY MEASURE: CPT

## 2023-08-15 PROCEDURE — 2580000003 HC RX 258: Performed by: STUDENT IN AN ORGANIZED HEALTH CARE EDUCATION/TRAINING PROGRAM

## 2023-08-15 PROCEDURE — 2580000003 HC RX 258: Performed by: NURSE PRACTITIONER

## 2023-08-15 PROCEDURE — 36415 COLL VENOUS BLD VENIPUNCTURE: CPT

## 2023-08-15 PROCEDURE — APPNB60 APP NON BILLABLE TIME 46-60 MINS: Performed by: NURSE PRACTITIONER

## 2023-08-15 PROCEDURE — 87077 CULTURE AEROBIC IDENTIFY: CPT

## 2023-08-15 PROCEDURE — 99223 1ST HOSP IP/OBS HIGH 75: CPT | Performed by: INTERNAL MEDICINE

## 2023-08-15 PROCEDURE — 6360000002 HC RX W HCPCS: Performed by: NURSE PRACTITIONER

## 2023-08-15 PROCEDURE — 99231 SBSQ HOSP IP/OBS SF/LOW 25: CPT | Performed by: THORACIC SURGERY (CARDIOTHORACIC VASCULAR SURGERY)

## 2023-08-15 PROCEDURE — 6370000000 HC RX 637 (ALT 250 FOR IP)

## 2023-08-15 PROCEDURE — 87040 BLOOD CULTURE FOR BACTERIA: CPT

## 2023-08-15 PROCEDURE — 80048 BASIC METABOLIC PNL TOTAL CA: CPT

## 2023-08-15 PROCEDURE — 6370000000 HC RX 637 (ALT 250 FOR IP): Performed by: STUDENT IN AN ORGANIZED HEALTH CARE EDUCATION/TRAINING PROGRAM

## 2023-08-15 PROCEDURE — 83735 ASSAY OF MAGNESIUM: CPT

## 2023-08-15 PROCEDURE — APPSS30 APP SPLIT SHARED TIME 16-30 MINUTES

## 2023-08-15 RX ORDER — SODIUM CHLORIDE, SODIUM LACTATE, POTASSIUM CHLORIDE, CALCIUM CHLORIDE 600; 310; 30; 20 MG/100ML; MG/100ML; MG/100ML; MG/100ML
INJECTION, SOLUTION INTRAVENOUS CONTINUOUS
Status: DISCONTINUED | OUTPATIENT
Start: 2023-08-15 | End: 2023-08-16

## 2023-08-15 RX ORDER — LIDOCAINE HYDROCHLORIDE 10 MG/ML
5 INJECTION, SOLUTION EPIDURAL; INFILTRATION; INTRACAUDAL; PERINEURAL ONCE
Status: DISCONTINUED | OUTPATIENT
Start: 2023-08-15 | End: 2023-08-17 | Stop reason: HOSPADM

## 2023-08-15 RX ORDER — SODIUM CHLORIDE 9 MG/ML
INJECTION, SOLUTION INTRAVENOUS PRN
Status: DISCONTINUED | OUTPATIENT
Start: 2023-08-15 | End: 2023-08-17 | Stop reason: HOSPADM

## 2023-08-15 RX ORDER — SODIUM CHLORIDE 0.9 % (FLUSH) 0.9 %
5-40 SYRINGE (ML) INJECTION EVERY 12 HOURS SCHEDULED
Status: DISCONTINUED | OUTPATIENT
Start: 2023-08-15 | End: 2023-08-17 | Stop reason: HOSPADM

## 2023-08-15 RX ORDER — SODIUM CHLORIDE 0.9 % (FLUSH) 0.9 %
5-40 SYRINGE (ML) INJECTION PRN
Status: DISCONTINUED | OUTPATIENT
Start: 2023-08-15 | End: 2023-08-17 | Stop reason: HOSPADM

## 2023-08-15 RX ADMIN — ATORVASTATIN CALCIUM 10 MG: 10 TABLET, FILM COATED ORAL at 07:49

## 2023-08-15 RX ADMIN — FERROUS SULFATE TAB 325 MG (65 MG ELEMENTAL FE) 325 MG: 325 (65 FE) TAB at 07:49

## 2023-08-15 RX ADMIN — MIDODRINE HYDROCHLORIDE 2.5 MG: 5 TABLET ORAL at 11:29

## 2023-08-15 RX ADMIN — HEPARIN SODIUM 5000 UNITS: 5000 INJECTION INTRAVENOUS; SUBCUTANEOUS at 14:46

## 2023-08-15 RX ADMIN — ASPIRIN 81 MG: 81 TABLET, CHEWABLE ORAL at 07:49

## 2023-08-15 RX ADMIN — SODIUM CHLORIDE, PRESERVATIVE FREE 10 ML: 5 INJECTION INTRAVENOUS at 07:49

## 2023-08-15 RX ADMIN — SODIUM CHLORIDE, PRESERVATIVE FREE 10 ML: 5 INJECTION INTRAVENOUS at 21:00

## 2023-08-15 RX ADMIN — ACETAMINOPHEN 650 MG: 325 TABLET ORAL at 14:48

## 2023-08-15 RX ADMIN — CEFTRIAXONE SODIUM 2000 MG: 2 INJECTION, POWDER, FOR SOLUTION INTRAMUSCULAR; INTRAVENOUS at 21:06

## 2023-08-15 RX ADMIN — HEPARIN SODIUM 5000 UNITS: 5000 INJECTION INTRAVENOUS; SUBCUTANEOUS at 06:34

## 2023-08-15 RX ADMIN — MIDODRINE HYDROCHLORIDE 2.5 MG: 5 TABLET ORAL at 07:49

## 2023-08-15 RX ADMIN — SODIUM CHLORIDE, POTASSIUM CHLORIDE, SODIUM LACTATE AND CALCIUM CHLORIDE: 600; 310; 30; 20 INJECTION, SOLUTION INTRAVENOUS at 16:19

## 2023-08-15 RX ADMIN — HEPARIN SODIUM 5000 UNITS: 5000 INJECTION INTRAVENOUS; SUBCUTANEOUS at 21:00

## 2023-08-15 RX ADMIN — SODIUM CHLORIDE, PRESERVATIVE FREE 10 ML: 5 INJECTION INTRAVENOUS at 20:59

## 2023-08-15 RX ADMIN — AMPICILLIN SODIUM 2000 MG: 2 INJECTION, POWDER, FOR SOLUTION INTRAVENOUS at 06:33

## 2023-08-15 RX ADMIN — SODIUM CHLORIDE, PRESERVATIVE FREE 10 ML: 5 INJECTION INTRAVENOUS at 14:46

## 2023-08-15 RX ADMIN — METOPROLOL SUCCINATE 12.5 MG: 25 TABLET, EXTENDED RELEASE ORAL at 07:50

## 2023-08-15 RX ADMIN — CEFTRIAXONE SODIUM 2000 MG: 2 INJECTION, POWDER, FOR SOLUTION INTRAMUSCULAR; INTRAVENOUS at 08:01

## 2023-08-15 RX ADMIN — AMPICILLIN SODIUM 2000 MG: 2 INJECTION, POWDER, FOR SOLUTION INTRAVENOUS at 21:43

## 2023-08-15 RX ADMIN — AMPICILLIN SODIUM 2000 MG: 2 INJECTION, POWDER, FOR SOLUTION INTRAVENOUS at 14:52

## 2023-08-15 ASSESSMENT — ENCOUNTER SYMPTOMS
DIARRHEA: 0
NAUSEA: 0
SINUS PAIN: 0
BACK PAIN: 0
COLOR CHANGE: 0
ABDOMINAL PAIN: 0
BLOOD IN STOOL: 0
PHOTOPHOBIA: 0
SORE THROAT: 0
COUGH: 0
CONSTIPATION: 0
SHORTNESS OF BREATH: 0
VOMITING: 0
EYE PAIN: 0
SINUS PRESSURE: 0
WHEEZING: 0
CHEST TIGHTNESS: 0

## 2023-08-15 ASSESSMENT — PAIN SCALES - PAIN ASSESSMENT IN ADVANCED DEMENTIA (PAINAD)
BODYLANGUAGE: 0
FACIALEXPRESSION: 0
TOTALSCORE: 0
TOTALSCORE: 0
CONSOLABILITY: 0
NEGVOCALIZATION: 0
BREATHING: 0
BODYLANGUAGE: 0
CONSOLABILITY: 0
NEGVOCALIZATION: 0
FACIALEXPRESSION: 0
FACIALEXPRESSION: 0
TOTALSCORE: 0
NEGVOCALIZATION: 0
BREATHING: 0
CONSOLABILITY: 0
BODYLANGUAGE: 0
BREATHING: 0

## 2023-08-15 ASSESSMENT — PAIN SCALES - GENERAL: PAINLEVEL_OUTOF10: 0

## 2023-08-15 NOTE — PROGRESS NOTES
Cardiothoracic Surgery  [address] [facility]    PROGRESS NOTE            Date:   8/15/2023  Patient name:  Funmi Miranda  Date of admission:  8/8/2023  4:52 PM  MRN:   [id number]  Account:  [de-identified]  YOB: 1929  PCP:    July Butler MD  Room:   2026/2026-A  Code Status:    Full Code    Physician Requesting Consult: Paz Sharma, *    Reason for Consult:  aortic root abscess    Chief Complaint:     Altered mental status    History of Present Illness:     Patient is a 81yo female with PMH of HFpEF, AS s/p TAVR, PPM, ICH 2018,HTN and HLD. She was admitted 8/8/23 with altered mental status,concerning for sepsis. Blood cultures on admission + for Enterococcus faecalis and repeat cultures 8/12 remain positive. Currently on vancomycin. BNP 57866 on admission. diuresed with Lasix. TTE concerning for aortic valve endocarditis and perivalvular leak. VIKTORIA completed today which showed a small perivalvular leak, moderate to severe mitral regurgitation, moderate tricuspid regurgitation. Past Medical History:     Past Medical History:   Diagnosis Date    Aphasia         Past Surgical History:     Past Surgical History:   Procedure Laterality Date    AORTIC VALVE SURGERY  06/29/2017    BRAIN SURGERY  11/27/2017    bleed from stroke    PACEMAKER PLACEMENT  06/29/2017        Medications Prior to Admission:     Prior to Admission medications    Medication Sig Start Date End Date Taking?  Authorizing Provider   metoprolol succinate (TOPROL XL) 25 MG extended release tablet TAKE 1 TABLET BY MOUTH ONCE  DAILY 6/2/23   July Butler MD   furosemide (LASIX) 20 MG tablet TAKE 1 TABLET BY MOUTH  DAILY 2/15/23   Christopher Estrada PA-C   potassium chloride (KLOR-CON M) 20 MEQ extended release tablet TAKE 1 TABLET BY MOUTH  DAILY 11/7/22   July Butler MD   lovastatin (MEVACOR) 40 MG tablet TAKE 1 TABLET BY MOUTH  DAILY 11/7/22

## 2023-08-15 NOTE — CARE COORDINATION
CM reviewed chart and spoke with pts daughter/mpoa, Trevor Godoy, regarding [address] vs SNF. Trevor Godoy stated that pt needs to be able to toilet self before returning home. Also, pt will be discharged on IV abx which is not something that Trevor Godoy feels comfortable with due to pt not always being cooperative with Trevor Godoy. Trevor Godoy would like pt to go to [facility]. When pt is discharged Trevor Godoy will provide the transportation to [facility]. Referral made to April at [facility].  14:24 ESTUARDO received a call from [address] [place] [address] with [facility] who stated that they are able to accept pt when medically stable. Pt will qualify for SNF due to antibiotics. At discharge physician needs to make a note in the DC paperwork that pt would benefit from OT/PT services so that [facility] will be able to provide that to the pt.

## 2023-08-15 NOTE — PROGRESS NOTES
Comprehensive Nutrition Assessment    Type and Reason for Visit:  Initial, RD Nutrition Re-Screen/LOS    Nutrition Recommendations/Plan:   Trial Plant-Based Ensure oral nutrition supplement   Encourage small more frequent snacks   Assist with meal set up and feed assist prn   Endorse adequate PO intakes/ONS Intake and record in I/O   Encourage proper hydration/fluids intake     Malnutrition Assessment:  Malnutrition Status: At risk for malnutrition (Comment) (08/15/23 6627)    Context:  Social/Environmental Circumstances      Nutrition Assessment:    Admitted with acute metabolic encephalopathy. H/O CVA, CHF, CKD3, Essential HTN, Hyperlipidemia, Dementia, Hearing Loss. Pt on regular diet, oral intakes vary throughout stay 0-100%. Reduced oral intake related to advanced age. Stable weight x 1 yr. May benefit from additional nutrition supplement, but pt has milk allergy. May trial plant-based protein supplement. Follow as moderate nutrition risk due to predicted inadequate protein intake. Nutrition Related Findings:    POCT 125 Wound Type: Multiple, Skin Tears  Noted patient is pleasantly confused within stay. Current Nutrition Intake & Therapies:    Average Meal Intake: 51-75%, 26-50%, 0%  Average Supplements Intake: None Ordered  ADULT DIET; Regular    Anthropometric Measures:  Height: 5' (152.4 cm)  Ideal Body Weight (IBW): 100 lbs (45 kg)    Admission Body Weight: 135 lb 14.4 oz (61.6 kg) (8/10)  Current Body Weight: 147 lb 7.8 oz (66.9 kg), 147.5 % IBW. Weight Source: Bed Scale  Current BMI (kg/m2): 28.8  Usual Body Weight: 143 lb 8 oz (65.1 kg) (June 2022)  % Weight Change (Calculated): 2.8  Weight Adjustment For: No Adjustment                 BMI Categories: Overweight (BMI 25.0-29. 9)    Estimated Daily Nutrient Needs:  Energy Requirements Based On: Formula     Energy (kcal/day): 3279-2003 (MSJ)  Weight Used for Protein Requirements: Ideal  Protein (g/day): 45-54 (1-1.2 g/kg)  Method Used for Fluid

## 2023-08-15 NOTE — CONSULTS
Electrophysiology Consult Note      Reason for consultation: evaluation for device extraction    Chief complaint: altered mental status    Referring physician:  Dr Loretta Ng      Primary care physician: Peter Orosco MD      History of Present Illness:     Roxann Santamaria is a 80year old female with a history of CKD, HTN, HLD, CVA, CHB s/p BIV pacemaker. She presents with complaints of altered mental status. Patient lives with her daughter. Patient's daughter noted her to be not of her usual self. This information is obtained from chart as patient is unable to recall why she came to the hospital.   Patient is currently alert and oriented to self, place, and situation. Patient was found to be positive for sepsis  Blood cultures 4 of 4 positive for enterococcus faecalis. VIKTORIA suspicious for endocarditis found near the aortic valve annulus. EP was consulted for evaluation of whole system extraction. Past medical history:   Past Medical History:   Diagnosis Date    Aphasia        Surgical history :  Past Surgical History:   Procedure Laterality Date    AORTIC VALVE SURGERY  06/29/2017    BRAIN SURGERY  11/27/2017    bleed from stroke    PACEMAKER PLACEMENT  06/29/2017       Family history:   Family History   Problem Relation Age of Onset    Heart Disease Father     Heart Attack Father     Cancer Other        Social history :  reports that she has never smoked. She has never used smokeless tobacco.    Allergies   Allergen Reactions    Alendronate Other (See Comments)    Alendronate Sodium Other (See Comments)    Ibandronic Acid Other (See Comments)    Milk-Related Compounds        No current facility-administered medications on file prior to encounter.      Current Outpatient Medications on File Prior to Encounter   Medication Sig Dispense Refill    metoprolol succinate (TOPROL XL) 25 MG extended release tablet TAKE 1 TABLET BY MOUTH ONCE  DAILY 90 tablet 0    furosemide (LASIX) 20 MG tablet TAKE 1

## 2023-08-15 NOTE — PROGRESS NOTES
Contacted patient's POA/daughter, Santosh Krause, to set up a time tomorrow to meet with Dr. Ivanna Tariq. Patient's daughter stated she is available any time tomorrow to meet at the hospital to speak with Dr. Ivanna Tariq. Perfect served Ingrid Galindo to setup a time for the POA to meet with Dr. Ivanna Tariq. Dr. Ivanna Tariq said \"between 8:30 and 9am\"    Called POA/daughter, Santosh Krause, to verify the time and she stated \"that time is very hard for me because I take care of a heart patient, but I can be there around 9am\"    Notified Ingrid Galindo via Perfect serve of the time that works for [employer], [address] [facility] [place].

## 2023-08-15 NOTE — CONSULTS
Electrophysiology Consult Note      Reason for consultation: EValuation for device extraction    Chief complaint : Altered mental status    Referring physician:       Primary care physician: Katina Martinez MD      History of Present Illness:     Jarod Centeno is a 80year old female with a history of CKD, HTN, HLD, CVA, Complete heart block s/p BIV pacemaker presents with complaints of altered mental status. Patient lives with her daughter. Patient's daughter noted her to be not of her usual self. This information is obtained from chart as patient is unable to recall why she came to the hospital.     Patient is currently alert and oriented to self, place, and situation. Patient reports she is not feeling good over all. She feels very weak and tired. Patient denies chest pain, shortness of breath. Patient denies palpitations    Patient was found to be positive for sepsis  Blood cultures 4 of 4 positive for enterococcus faecalis. VIKTORIA suspicious for endocarditis found near the aortic valve annulus. EP was consulted for evaluation of whole system extraction. Pastmedical history:   Past Medical History:   Diagnosis Date    Aphasia        Surgical history :   Past Surgical History:   Procedure Laterality Date    AORTIC VALVE SURGERY  06/29/2017    BRAIN SURGERY  11/27/2017    bleed from stroke    PACEMAKER PLACEMENT  06/29/2017       Family history:   Family History   Problem Relation Age of Onset    Heart Disease Father     Heart Attack Father     Cancer Other        Social history :  reports that she has never smoked. She has never used smokeless tobacco.    Allergies   Allergen Reactions    Alendronate Other (See Comments)    Alendronate Sodium Other (See Comments)    Ibandronic Acid Other (See Comments)    Milk-Related Compounds        No current facility-administered medications on file prior to encounter.      Current Outpatient

## 2023-08-15 NOTE — PROGRESS NOTES
In-Patient Progress Note    Patient:  Sada Cortes 80 y.o. female MRN: [id number]     Date of Service: 8/15/2023    Hospital Day: 8      Chief complaint: had concerns including Altered Mental Status. Subjective     Patient awaiting repeat blood cultures to be negative and she is on her IV antibiotics. Patient's only issue today is she gets frustrated with the bed alarm as she says it goes off every time she moves. See ROS    I have reviewed all pertinent PMHx, PSHx, FamHx, SocialHx, medications, and allergies and updated history as appropriate. I have reviewed images as appropriate. Assessment and Plan   Sada Cortes, a 80 y.o. female, with a history of HTN, HLD, stroke, HFpEF, constipation, s/p ICD, pacer was admitted on 8/8/2023 with complaints of had concerns including Altered Mental Status. Assessment and Plan:    # Altered Mental Status 2/2 likely Underlying Dementia, and Septic Encephalopathy   -Patient presents with concerns of altered mental status reported by the daughter.   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

## 2023-08-15 NOTE — PROGRESS NOTES
Notified Dr. Dionte Mcfadden that patient had no urinated since lehman was removed this morning at 8am. Bladder scan of patient shows 144 mL.     Dr. Dionte Mcfadden replied \"I'll put in some gentle fluids\"

## 2023-08-16 LAB
ANION GAP SERPL CALCULATED.3IONS-SCNC: 13 MMOL/L (ref 4–16)
ANISOCYTOSIS: ABNORMAL
BUN SERPL-MCNC: 32 MG/DL (ref 6–23)
CALCIUM SERPL-MCNC: 8.7 MG/DL (ref 8.3–10.6)
CHLORIDE BLD-SCNC: 102 MMOL/L (ref 99–110)
CO2: 26 MMOL/L (ref 21–32)
CREAT SERPL-MCNC: 1 MG/DL (ref 0.6–1.1)
CRP SERPL HS-MCNC: 50.8 MG/L
DIFFERENTIAL TYPE: ABNORMAL
EOSINOPHILS ABSOLUTE: 0.2 K/CU MM
EOSINOPHILS RELATIVE PERCENT: 2 % (ref 0–3)
GFR SERPL CREATININE-BSD FRML MDRD: 52 ML/MIN/1.73M2
GLUCOSE BLD-MCNC: 102 MG/DL (ref 70–99)
GLUCOSE BLD-MCNC: 117 MG/DL (ref 70–99)
GLUCOSE BLD-MCNC: 133 MG/DL (ref 70–99)
GLUCOSE SERPL-MCNC: 93 MG/DL (ref 70–99)
HCT VFR BLD CALC: 35.5 % (ref 37–47)
HEMOGLOBIN: 10.7 GM/DL (ref 12.5–16)
LYMPHOCYTES ABSOLUTE: 1.3 K/CU MM
LYMPHOCYTES RELATIVE PERCENT: 12 % (ref 24–44)
MAGNESIUM: 2.3 MG/DL (ref 1.8–2.4)
MCH RBC QN AUTO: 29.4 PG (ref 27–31)
MCHC RBC AUTO-ENTMCNC: 30.1 % (ref 32–36)
MCV RBC AUTO: 97.5 FL (ref 78–100)
METAMYELOCYTES ABSOLUTE COUNT: 0.11 K/CU MM
METAMYELOCYTES PERCENT: 1 %
MONOCYTES ABSOLUTE: 1.8 K/CU MM
MONOCYTES RELATIVE PERCENT: 17 % (ref 0–4)
PDW BLD-RTO: 15.8 % (ref 11.7–14.9)
PHOSPHORUS: 3.3 MG/DL (ref 2.5–4.9)
PLATELET # BLD: 220 K/CU MM (ref 140–440)
PMV BLD AUTO: 9.6 FL (ref 7.5–11.1)
POTASSIUM SERPL-SCNC: 3.7 MMOL/L (ref 3.5–5.1)
PRO-BNP: ABNORMAL PG/ML
PROCALCITONIN SERPL-MCNC: 0.32 NG/ML
RBC # BLD: 3.64 M/CU MM (ref 4.2–5.4)
SEGMENTED NEUTROPHILS ABSOLUTE COUNT: 7.2 K/CU MM
SEGMENTED NEUTROPHILS RELATIVE PERCENT: 68 % (ref 36–66)
SODIUM BLD-SCNC: 141 MMOL/L (ref 135–145)
WBC # BLD: 10.6 K/CU MM (ref 4–10.5)

## 2023-08-16 PROCEDURE — 85007 BL SMEAR W/DIFF WBC COUNT: CPT

## 2023-08-16 PROCEDURE — 84100 ASSAY OF PHOSPHORUS: CPT

## 2023-08-16 PROCEDURE — 6360000002 HC RX W HCPCS: Performed by: INTERNAL MEDICINE

## 2023-08-16 PROCEDURE — 80048 BASIC METABOLIC PNL TOTAL CA: CPT

## 2023-08-16 PROCEDURE — 6370000000 HC RX 637 (ALT 250 FOR IP): Performed by: STUDENT IN AN ORGANIZED HEALTH CARE EDUCATION/TRAINING PROGRAM

## 2023-08-16 PROCEDURE — 85027 COMPLETE CBC AUTOMATED: CPT

## 2023-08-16 PROCEDURE — 6370000000 HC RX 637 (ALT 250 FOR IP): Performed by: NURSE PRACTITIONER

## 2023-08-16 PROCEDURE — 83880 ASSAY OF NATRIURETIC PEPTIDE: CPT

## 2023-08-16 PROCEDURE — 99232 SBSQ HOSP IP/OBS MODERATE 35: CPT | Performed by: INTERNAL MEDICINE

## 2023-08-16 PROCEDURE — 6370000000 HC RX 637 (ALT 250 FOR IP): Performed by: FAMILY MEDICINE

## 2023-08-16 PROCEDURE — 94761 N-INVAS EAR/PLS OXIMETRY MLT: CPT

## 2023-08-16 PROCEDURE — 99231 SBSQ HOSP IP/OBS SF/LOW 25: CPT | Performed by: INTERNAL MEDICINE

## 2023-08-16 PROCEDURE — 2580000003 HC RX 258: Performed by: NURSE PRACTITIONER

## 2023-08-16 PROCEDURE — 2580000003 HC RX 258: Performed by: STUDENT IN AN ORGANIZED HEALTH CARE EDUCATION/TRAINING PROGRAM

## 2023-08-16 PROCEDURE — 2060000000 HC ICU INTERMEDIATE R&B

## 2023-08-16 PROCEDURE — 6370000000 HC RX 637 (ALT 250 FOR IP)

## 2023-08-16 PROCEDURE — 84145 PROCALCITONIN (PCT): CPT

## 2023-08-16 PROCEDURE — 86140 C-REACTIVE PROTEIN: CPT

## 2023-08-16 PROCEDURE — 99233 SBSQ HOSP IP/OBS HIGH 50: CPT | Performed by: NURSE PRACTITIONER

## 2023-08-16 PROCEDURE — 6360000002 HC RX W HCPCS: Performed by: NURSE PRACTITIONER

## 2023-08-16 PROCEDURE — 36415 COLL VENOUS BLD VENIPUNCTURE: CPT

## 2023-08-16 PROCEDURE — 51798 US URINE CAPACITY MEASURE: CPT

## 2023-08-16 PROCEDURE — 97116 GAIT TRAINING THERAPY: CPT

## 2023-08-16 PROCEDURE — 83735 ASSAY OF MAGNESIUM: CPT

## 2023-08-16 PROCEDURE — 82962 GLUCOSE BLOOD TEST: CPT

## 2023-08-16 RX ORDER — LANOLIN ALCOHOL/MO/W.PET/CERES
3 CREAM (GRAM) TOPICAL NIGHTLY PRN
Status: DISCONTINUED | OUTPATIENT
Start: 2023-08-16 | End: 2023-08-17 | Stop reason: HOSPADM

## 2023-08-16 RX ADMIN — HEPARIN SODIUM 5000 UNITS: 5000 INJECTION INTRAVENOUS; SUBCUTANEOUS at 14:37

## 2023-08-16 RX ADMIN — METOPROLOL SUCCINATE 12.5 MG: 25 TABLET, EXTENDED RELEASE ORAL at 08:17

## 2023-08-16 RX ADMIN — Medication 3 MG: at 20:53

## 2023-08-16 RX ADMIN — SODIUM CHLORIDE, PRESERVATIVE FREE 10 ML: 5 INJECTION INTRAVENOUS at 08:19

## 2023-08-16 RX ADMIN — ATORVASTATIN CALCIUM 10 MG: 10 TABLET, FILM COATED ORAL at 08:17

## 2023-08-16 RX ADMIN — HEPARIN SODIUM 5000 UNITS: 5000 INJECTION INTRAVENOUS; SUBCUTANEOUS at 20:53

## 2023-08-16 RX ADMIN — ASPIRIN 81 MG: 81 TABLET, CHEWABLE ORAL at 08:17

## 2023-08-16 RX ADMIN — AMPICILLIN SODIUM 2000 MG: 2 INJECTION, POWDER, FOR SOLUTION INTRAVENOUS at 05:48

## 2023-08-16 RX ADMIN — HEPARIN SODIUM 5000 UNITS: 5000 INJECTION INTRAVENOUS; SUBCUTANEOUS at 05:47

## 2023-08-16 RX ADMIN — CEFTRIAXONE SODIUM 2000 MG: 2 INJECTION, POWDER, FOR SOLUTION INTRAMUSCULAR; INTRAVENOUS at 08:31

## 2023-08-16 RX ADMIN — AMPICILLIN SODIUM 2000 MG: 2 INJECTION, POWDER, FOR SOLUTION INTRAVENOUS at 21:21

## 2023-08-16 RX ADMIN — MIDODRINE HYDROCHLORIDE 2.5 MG: 5 TABLET ORAL at 12:30

## 2023-08-16 RX ADMIN — CEFTRIAXONE SODIUM 2000 MG: 2 INJECTION, POWDER, FOR SOLUTION INTRAMUSCULAR; INTRAVENOUS at 20:49

## 2023-08-16 RX ADMIN — FERROUS SULFATE TAB 325 MG (65 MG ELEMENTAL FE) 325 MG: 325 (65 FE) TAB at 08:17

## 2023-08-16 RX ADMIN — AMPICILLIN SODIUM 2000 MG: 2 INJECTION, POWDER, FOR SOLUTION INTRAVENOUS at 14:44

## 2023-08-16 RX ADMIN — SODIUM CHLORIDE, PRESERVATIVE FREE 10 ML: 5 INJECTION INTRAVENOUS at 20:53

## 2023-08-16 RX ADMIN — MIDODRINE HYDROCHLORIDE 2.5 MG: 5 TABLET ORAL at 08:16

## 2023-08-16 RX ADMIN — SODIUM CHLORIDE, PRESERVATIVE FREE 10 ML: 5 INJECTION INTRAVENOUS at 08:18

## 2023-08-16 ASSESSMENT — PAIN SCALES - PAIN ASSESSMENT IN ADVANCED DEMENTIA (PAINAD)
BODYLANGUAGE: 0
FACIALEXPRESSION: 0
FACIALEXPRESSION: 0
CONSOLABILITY: 0
BREATHING: 0
CONSOLABILITY: 0
NEGVOCALIZATION: 0
FACIALEXPRESSION: 0
NEGVOCALIZATION: 0
TOTALSCORE: 0
BREATHING: 0
TOTALSCORE: 0
FACIALEXPRESSION: 0
TOTALSCORE: 0
BREATHING: 0
CONSOLABILITY: 0
BODYLANGUAGE: 0
BREATHING: 0
TOTALSCORE: 0
BODYLANGUAGE: 0
BODYLANGUAGE: 0
NEGVOCALIZATION: 0
NEGVOCALIZATION: 0
CONSOLABILITY: 0

## 2023-08-16 ASSESSMENT — ENCOUNTER SYMPTOMS
DIARRHEA: 0
COLOR CHANGE: 0
COUGH: 0
CONSTIPATION: 0
BACK PAIN: 0
SHORTNESS OF BREATH: 0
SINUS PRESSURE: 0
ABDOMINAL PAIN: 0
VOMITING: 0
NAUSEA: 0
SINUS PAIN: 0
WHEEZING: 0
SORE THROAT: 0

## 2023-08-16 NOTE — PROGRESS NOTES
Rapid resource RN rounding on pt of DI score of 50. Review of chart noted map was less than 60 and no urine output since lheman removed today . Assessed pt - she is A7O x4 with VSS now. Map is 70 currently. pt has been on L. R.50 ml/hr since 1617- no urine output - bladder scan done now showing >330 ml . pt has tried to urinate and was unsuccessful . GFR less than 47 per results Notified Farheen Gray NP - New order for straight cath. Buttons bedside RN updated.     300 ml urine obtained from bladder per straight cath order @2130

## 2023-08-16 NOTE — PLAN OF CARE
Problem: Safety - Adult  Goal: Free from fall injury  8/16/2023 0417 by Marcel Shannon RN  Outcome: Progressing      Problem: Skin/Tissue Integrity  Goal: Absence of new skin breakdown  Description: 1. Monitor for areas of redness and/or skin breakdown  2. Assess vascular access sites hourly  3. Every 4-6 hours minimum:  Change oxygen saturation probe site  4. Every 4-6 hours:  If on nasal continuous positive airway pressure, respiratory therapy assess nares and determine need for appliance change or resting period.   8/16/2023 0417 by Marcel Shannon RN  Outcome: Progressing

## 2023-08-16 NOTE — PROGRESS NOTES
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 A&O x 4  8/10-BC 4 Enterococcus faecalis (pan sensi)   BC 4 E.faecalis  -BC 3 E.faecalis  8/15-BC Pending   8/10-MRSA PCR, Strep pna, legionella ag and Resp panel negative  -UA WBC 3, RBC <1, urine culture:NGTD  8/10-Complete TTE: Left ventricular systolic function is normal. Ejection fraction is visually estimated at 55-60%. light apical ballooning noted. Mildly dilated left atrium. PPM wiring visualized in right heart. S/p TAVR (#29 Watson Nancy 3) with moderate perivalvular leak. Moderate to severe mitral regurgitation. Moderate tricuspid regurgitation; RVSP: 52 mmHg. No evidence of any pericardial effusion. Atherosclerotic plaque noted in the abdominal aorta. -CT Head WO Contrast: No acute intracranial abnormality. Cerebral atrophy. Severe chronic small vessel ischemic changes. No acute   brain parenchymal abnormality. -CXR: 1. Congestive heart failure is most likely given the radiographic findings; pneumonia is also a consideration in areas of consolidation with pleural   effusion. 2. Calcific atherosclerosis aorta. 3. Cardiomegaly. 8/10-CXR: Stable cardiopulmonary status including pulmonary edema, right base   atelectasis and bilateral effusions, right greater than left. -VIKTORIA: Summary:  Left ventricular systolic function is normal.  Ejection fraction is visually estimated at 55-60% PPM wiring visualized within the right heart. S/p TAVR (#29 Watson Nancy 3) with moderate perivalvular leak.  New finding suspicious for

## 2023-08-16 NOTE — PLAN OF CARE
Problem: Discharge Planning  Goal: Discharge to home or other facility with appropriate resources  Outcome: Progressing     Problem: Chronic Conditions and Co-morbidities  Goal: Patient's chronic conditions and co-morbidity symptoms are monitored and maintained or improved  Outcome: Progressing     Problem: Safety - Adult  Goal: Free from fall injury  8/16/2023 1044 by Segun Baugh RN  Outcome: Progressing  8/16/2023 0417 by Delilah To RN  Outcome: Progressing     Problem: Pain  Goal: Verbalizes/displays adequate comfort level or baseline comfort level  Outcome: Progressing     Problem: Skin/Tissue Integrity  Goal: Absence of new skin breakdown  Description: 1. Monitor for areas of redness and/or skin breakdown  2. Assess vascular access sites hourly  3. Every 4-6 hours minimum:  Change oxygen saturation probe site  4. Every 4-6 hours:  If on nasal continuous positive airway pressure, respiratory therapy assess nares and determine need for appliance change or resting period.   8/16/2023 1044 by Segun Baugh RN  Outcome: Progressing  8/16/2023 0417 by Delilah To RN  Outcome: Progressing     Problem: ABCDS Injury Assessment  Goal: Absence of physical injury  Outcome: Progressing     Problem: Neurosensory - Adult  Goal: Achieves stable or improved neurological status  Outcome: Progressing  Goal: Achieves maximal functionality and self care  Outcome: Progressing     Problem: Respiratory - Adult  Goal: Achieves optimal ventilation and oxygenation  Outcome: Progressing     Problem: Cardiovascular - Adult  Goal: Maintains optimal cardiac output and hemodynamic stability  Outcome: Progressing  Goal: Absence of cardiac dysrhythmias or at baseline  Outcome: Progressing     Problem: Skin/Tissue Integrity - Adult  Goal: Incisions, wounds, or drain sites healing without S/S of infection  Outcome: Progressing     Problem: Musculoskeletal - Adult  Goal: Return mobility to safest level of function  Outcome:

## 2023-08-16 NOTE — PROGRESS NOTES
Physical Therapy  Name: Sada Cortes MRN: [id number] :   5/15/1929   Date:  2023   Admission Date: 2023 Room:  -A   Restrictions/Precautions:        general precautions, fall risk    Communication with other providers:  Bartolo Epley RN states pt is ok to see for therapy    Subjective:  Patient states: \"If  you have the energy, sure we can go for a walk\"  Pain:   Location, Type, Intensity (0/10 to 10/10): Denies    Objective:    Observation:  Patient is on room air upon arrival. Patient SpO2 at rest variable at 85-98%. During ambulation patient SpO2 desats to 75%, pleth has poor reading, 1L NC O2 donned and SpO2 rebounds to 93-95%    Treatment, including education/measures:    Transfers with line management of Tele Monitor, Pulse Ox, BP Cuff, NC O2    Sit to stand : Mod A from recliner with patient using BLE to push posteriorly into recliner for added effort   Stand to sit :CGA to recliner  SPT:CGA with patient needing cues to remain in RW EFFIE    Gait:  Pt amb with RW for 30 ft + 30 ft + 60 ft with CGA assist. Patient demo's forward flexion, decrease step length and increase EFFIE. Cues for increase step length. She occasionally diverts from designated pathway when she looks around the environment     Safety  Patient left safely in the recliner, with call light/phone in reach with alarm applied. Gait belt and mask were used for transfers and gait.     Assessment / Impression:     Patient's tolerance of treatment:  Good   Adverse Reaction: SpO2 desat  Significant change in status and impact:  none  Barriers to improvement:  medical status    Plan for Next Session:    Will cont to work towards pt's goals per patient tolerance  Time in:  4085  Time out:  1215  Timed treatment minutes:  17  Total treatment time:  17  Previously filed items:  Social/Functional History  Lives With: Daughter  Home Equipment: Andrzej Ng, [id number] [facility] Help From: Family  ADL Assistance: Needs assistance  Homemaking

## 2023-08-16 NOTE — PROGRESS NOTES
Narrative:      SETUP DATE/TIME:  08/12/2023 0534    Culture, Respiratory [[id number]]     Order Status: Sent Specimen: Sputum Expectorated     Culture, Urine [[id number]] Collected: 08/10/23 2200    Order Status: Completed Specimen: Urine, clean catch Updated: 08/11/23 2048     Specimen URINE CLEAN CATCH     Special Requests NONE     Culture Final Report No growth at 18 to 36 hours    Narrative:      SETUP DATE/TIME:  08/10/2023 2252    Strep Pneumoniae Antigen [[id number]] Collected: 08/10/23 2200    Order Status: Completed Specimen: CSF Updated: 08/11/23 0929     Strep pneumo Ag URINE NEGATIVE     Comment: Presumptive negative for   pneumococcal pneumonia,  suggesting no current or recent  pneumococcal infection. Infection  due to S pneumonia cannot be ruled  out if the antigen present in the  sample is below the detection  limit of the test.  (NOTE)  REFERENCE RANGE: NEGATIVE         Legionella antigen, urine [[id number]] Collected: 08/10/23 2200    Order Status: Completed Specimen: Urine Updated: 08/11/23 0929     Legionella Urinary Ag NEGATIVE     Comment: (NOTE)  PRESUMPTIVE NEGATIVE FOR LEGIONELLA PNEUMOPHILIA SEROGROUP 1   ANTIGEN IN URINE SUGGESTING NO RECENT OR CURRENT INFECTION. INFECTION DUE TO LEGIONELLA CANNOT BE RULED OUT SINCE OTHER   SEROGROUPS AND SPECIES MAY CAUSE DISEASE. ANTIGEN MAY NOT BE PRESENT   IN URINE IN EARLY INFECTION, AND THE LEVEL OF ANTIGEN PRESENT IN   URINE MAY BE BELOW THE DETECTION LIMIT OF THE TEST.          Culture, MRSA, Screening [[id number]] Collected: 08/10/23 2130    Order Status: Canceled Specimen: Nares     Respiratory Panel, Molecular, with COVID-19 (Restricted: peds pts or suitable admitted adults) [[id number]] Collected: 08/10/23 2130    Order Status: Completed Specimen: Nasopharyngeal Updated: 08/11/23 0036     Adenovirus Detection by PCR NOT DETECTED     Coronavirus 229E PCR NOT DETECTED     Coronavirus HKU1 PCR NOT DETECTED     Coronavirus NL63 PCR NOT DETECTED 2253    Susceptibility        Enterococcus faecalis      BACTERIAL SUSCEPTIBILITY PANEL JEZ      ampicillin <=2  Sensitive      ciprofloxacin <=0.5  Sensitive      levofloxacin 1  Sensitive      vancomycin 1  Sensitive                           Culture, Blood 1 [[id number]]  (Abnormal) Collected: 08/10/23 2116    Order Status: Completed Specimen: Blood Updated: 08/13/23 0722     Specimen BLOOD     Special Requests NONE     Culture Final Report      ENTEROCOCCUS FAECALIS POSITIVE for No further workup Isolated two of two sets Refer to culture [id number] ([id number] 8/10/23) for sensitivity results    Narrative:      SETUP DATE/TIME:  08/10/2023 2253              Electronically signed by Selena Alonso MD on 8/16/2023 at 12:55 PM

## 2023-08-16 NOTE — CARE COORDINATION
When pt is discharged pt will be [facility] to: [facility]     Call report to [phone]     Complete & sign the MANASA then fax to [phone]    Place DC summary, AVS & any scripts in the envelope that is in the soft chart.   This is to go with the pt to the facility    [address] [facility]  [phone]  After 5 pm & weekends - [phone]    Notify family    Covid test is not needed    PASRR completed

## 2023-08-16 NOTE — PROGRESS NOTES
08/16/23 8274   Encounter Summary   Encounter Overview/Reason  Initial Encounter   Service Provided For: Patient   Referral/Consult From: Nurse   Support System Family members; Jewish/dax community   Last Encounter  08/16/23  (Patient sitting in chair; attended by RN. Patient was very cautious of my visit. She belongs to a [employer], but states that is her business. Two sisters living, in their 80's.  Time of prayer observed.)   Complexity of Encounter Moderate   Begin Time 0745   End Time  0814   Total Time Calculated 29 min   Spiritual/Emotional needs   Type Spiritual Support   Grief, Loss, and Adjustments   Type Adjustment to illness   Assessment/Intervention/Outcome   Assessment Calm;Coping;Fearful   Intervention Active listening;Discussed meaning/purpose;Explored/Affirmed feelings, thoughts, concerns;Prayer (assurance of)/Fr[place];Sustaining Presence/Ministry of presence   Outcome Comfort;Coping;Encouraged;Engaged in conversation;Expressed Gratitude   Plan and Referrals   Plan/Referrals Continue to visit, (comment)  (Patient informed how to contact )

## 2023-08-17 ENCOUNTER — APPOINTMENT (OUTPATIENT)
Dept: GENERAL RADIOLOGY | Age: 88
End: 2023-08-17
Payer: MEDICARE

## 2023-08-17 ENCOUNTER — APPOINTMENT (OUTPATIENT)
Dept: GENERAL RADIOLOGY | Age: 88
End: 2023-08-17
Attending: STUDENT IN AN ORGANIZED HEALTH CARE EDUCATION/TRAINING PROGRAM
Payer: MEDICARE

## 2023-08-17 VITALS
OXYGEN SATURATION: 98 % | BODY MASS INDEX: 30.25 KG/M2 | WEIGHT: 154.1 LBS | TEMPERATURE: 96.6 F | SYSTOLIC BLOOD PRESSURE: 137 MMHG | HEART RATE: 77 BPM | DIASTOLIC BLOOD PRESSURE: 33 MMHG | RESPIRATION RATE: 25 BRPM | HEIGHT: 60 IN

## 2023-08-17 LAB
ANION GAP SERPL CALCULATED.3IONS-SCNC: 11 MMOL/L (ref 4–16)
ANISOCYTOSIS: ABNORMAL
BACTERIA: NEGATIVE /HPF
BANDED NEUTROPHILS ABSOLUTE COUNT: 0.32 K/CU MM
BANDED NEUTROPHILS RELATIVE PERCENT: 3 % (ref 5–11)
BASOPHILS ABSOLUTE: 0.1 K/CU MM
BASOPHILS RELATIVE PERCENT: 1 % (ref 0–1)
BILIRUBIN URINE: NEGATIVE MG/DL
BLOOD, URINE: ABNORMAL
BUN SERPL-MCNC: 33 MG/DL (ref 6–23)
CALCIUM SERPL-MCNC: 8.6 MG/DL (ref 8.3–10.6)
CHLORIDE BLD-SCNC: 102 MMOL/L (ref 99–110)
CLARITY: CLEAR
CO2: 24 MMOL/L (ref 21–32)
COLOR: YELLOW
CREAT SERPL-MCNC: 1 MG/DL (ref 0.6–1.1)
CRP SERPL HS-MCNC: 44.2 MG/L
CULTURE: ABNORMAL
DIFFERENTIAL TYPE: ABNORMAL
EOSINOPHILS ABSOLUTE: 0.3 K/CU MM
EOSINOPHILS RELATIVE PERCENT: 3 % (ref 0–3)
GFR SERPL CREATININE-BSD FRML MDRD: 52 ML/MIN/1.73M2
GLUCOSE BLD-MCNC: 121 MG/DL (ref 70–99)
GLUCOSE SERPL-MCNC: 112 MG/DL (ref 70–99)
GLUCOSE, URINE: NEGATIVE MG/DL
HCT VFR BLD CALC: 31.5 % (ref 37–47)
HEMOGLOBIN: 9.6 GM/DL (ref 12.5–16)
HYPOCHROMIA: ABNORMAL
IMMATURE NEUTROPHIL %: 0 % (ref 0–0.43)
KETONES, URINE: ABNORMAL MG/DL
LEUKOCYTE ESTERASE, URINE: NEGATIVE
LYMPHOCYTES ABSOLUTE: 1.2 K/CU MM
LYMPHOCYTES RELATIVE PERCENT: 11 % (ref 24–44)
Lab: ABNORMAL
Lab: ABNORMAL
MACROCYTES: ABNORMAL
MAGNESIUM: 2.3 MG/DL (ref 1.8–2.4)
MCH RBC QN AUTO: 29.5 PG (ref 27–31)
MCHC RBC AUTO-ENTMCNC: 30.5 % (ref 32–36)
MCV RBC AUTO: 96.9 FL (ref 78–100)
METAMYELOCYTES ABSOLUTE COUNT: 0.11 K/CU MM
METAMYELOCYTES PERCENT: 1 %
MONOCYTES ABSOLUTE: 1.4 K/CU MM
MONOCYTES RELATIVE PERCENT: 13 % (ref 0–4)
NITRITE URINE, QUANTITATIVE: NEGATIVE
PDW BLD-RTO: 15.8 % (ref 11.7–14.9)
PH, URINE: 5.5 (ref 5–8)
PHOSPHORUS: 3.3 MG/DL (ref 2.5–4.9)
PLATELET # BLD: 219 K/CU MM (ref 140–440)
PMV BLD AUTO: 9.2 FL (ref 7.5–11.1)
POTASSIUM SERPL-SCNC: 3.6 MMOL/L (ref 3.5–5.1)
PROTEIN UA: 30 MG/DL
RBC # BLD: 3.25 M/CU MM (ref 4.2–5.4)
RBC URINE: 62 /HPF (ref 0–6)
SEGMENTED NEUTROPHILS ABSOLUTE COUNT: 7.3 K/CU MM
SEGMENTED NEUTROPHILS RELATIVE PERCENT: 68 % (ref 36–66)
SODIUM BLD-SCNC: 137 MMOL/L (ref 135–145)
SPECIFIC GRAVITY UA: 1.02 (ref 1–1.03)
SPECIMEN: ABNORMAL
SPECIMEN: ABNORMAL
SQUAMOUS EPITHELIAL: 3 /HPF
TOTAL IMMATURE NEUTOROPHIL: 0 K/CU MM
TRICHOMONAS: ABNORMAL /HPF
UROBILINOGEN, URINE: 1 MG/DL (ref 0.2–1)
WBC # BLD: 10.7 K/CU MM (ref 4–10.5)
WBC CLUMP: ABNORMAL /HPF
WBC UA: 31 /HPF (ref 0–5)

## 2023-08-17 PROCEDURE — 83735 ASSAY OF MAGNESIUM: CPT

## 2023-08-17 PROCEDURE — 99232 SBSQ HOSP IP/OBS MODERATE 35: CPT | Performed by: NURSE PRACTITIONER

## 2023-08-17 PROCEDURE — 86140 C-REACTIVE PROTEIN: CPT

## 2023-08-17 PROCEDURE — 6370000000 HC RX 637 (ALT 250 FOR IP): Performed by: NURSE PRACTITIONER

## 2023-08-17 PROCEDURE — C1751 CATH, INF, PER/CENT/MIDLINE: HCPCS

## 2023-08-17 PROCEDURE — 36415 COLL VENOUS BLD VENIPUNCTURE: CPT

## 2023-08-17 PROCEDURE — 6360000002 HC RX W HCPCS: Performed by: INTERNAL MEDICINE

## 2023-08-17 PROCEDURE — 76937 US GUIDE VASCULAR ACCESS: CPT

## 2023-08-17 PROCEDURE — 6360000002 HC RX W HCPCS: Performed by: NURSE PRACTITIONER

## 2023-08-17 PROCEDURE — 2580000003 HC RX 258: Performed by: STUDENT IN AN ORGANIZED HEALTH CARE EDUCATION/TRAINING PROGRAM

## 2023-08-17 PROCEDURE — 94761 N-INVAS EAR/PLS OXIMETRY MLT: CPT

## 2023-08-17 PROCEDURE — 71045 X-RAY EXAM CHEST 1 VIEW: CPT

## 2023-08-17 PROCEDURE — 2709999900 HC NON-CHARGEABLE SUPPLY

## 2023-08-17 PROCEDURE — 6370000000 HC RX 637 (ALT 250 FOR IP): Performed by: STUDENT IN AN ORGANIZED HEALTH CARE EDUCATION/TRAINING PROGRAM

## 2023-08-17 PROCEDURE — 80048 BASIC METABOLIC PNL TOTAL CA: CPT

## 2023-08-17 PROCEDURE — 84100 ASSAY OF PHOSPHORUS: CPT

## 2023-08-17 PROCEDURE — 2700000000 HC OXYGEN THERAPY PER DAY

## 2023-08-17 PROCEDURE — 85025 COMPLETE CBC W/AUTO DIFF WBC: CPT

## 2023-08-17 PROCEDURE — 02HV33Z INSERTION OF INFUSION DEVICE INTO SUPERIOR VENA CAVA, PERCUTANEOUS APPROACH: ICD-10-PCS | Performed by: STUDENT IN AN ORGANIZED HEALTH CARE EDUCATION/TRAINING PROGRAM

## 2023-08-17 PROCEDURE — 6370000000 HC RX 637 (ALT 250 FOR IP)

## 2023-08-17 PROCEDURE — 36569 INSJ PICC 5 YR+ W/O IMAGING: CPT

## 2023-08-17 PROCEDURE — 2580000003 HC RX 258: Performed by: NURSE PRACTITIONER

## 2023-08-17 PROCEDURE — 81001 URINALYSIS AUTO W/SCOPE: CPT

## 2023-08-17 PROCEDURE — 82962 GLUCOSE BLOOD TEST: CPT

## 2023-08-17 RX ORDER — MIDODRINE HYDROCHLORIDE 2.5 MG/1
2.5 TABLET ORAL
Qty: 90 TABLET | Refills: 3 | Status: SHIPPED | OUTPATIENT
Start: 2023-08-17 | End: 2023-08-17 | Stop reason: HOSPADM

## 2023-08-17 RX ORDER — SODIUM CHLORIDE 9 MG/ML
INJECTION, SOLUTION INTRAVENOUS PRN
Status: DISCONTINUED | OUTPATIENT
Start: 2023-08-17 | End: 2023-08-17 | Stop reason: HOSPADM

## 2023-08-17 RX ORDER — SODIUM CHLORIDE 0.9 % (FLUSH) 0.9 %
5-40 SYRINGE (ML) INJECTION PRN
Status: DISCONTINUED | OUTPATIENT
Start: 2023-08-17 | End: 2023-08-17 | Stop reason: HOSPADM

## 2023-08-17 RX ORDER — SODIUM CHLORIDE 0.9 % (FLUSH) 0.9 %
5-40 SYRINGE (ML) INJECTION EVERY 12 HOURS SCHEDULED
Status: DISCONTINUED | OUTPATIENT
Start: 2023-08-17 | End: 2023-08-17 | Stop reason: HOSPADM

## 2023-08-17 RX ORDER — LIDOCAINE HYDROCHLORIDE 10 MG/ML
5 INJECTION, SOLUTION EPIDURAL; INFILTRATION; INTRACAUDAL; PERINEURAL ONCE
Status: DISCONTINUED | OUTPATIENT
Start: 2023-08-17 | End: 2023-08-17 | Stop reason: HOSPADM

## 2023-08-17 RX ADMIN — AMPICILLIN SODIUM 2000 MG: 2 INJECTION, POWDER, FOR SOLUTION INTRAVENOUS at 05:15

## 2023-08-17 RX ADMIN — HEPARIN SODIUM 5000 UNITS: 5000 INJECTION INTRAVENOUS; SUBCUTANEOUS at 14:09

## 2023-08-17 RX ADMIN — SODIUM CHLORIDE, PRESERVATIVE FREE 10 ML: 5 INJECTION INTRAVENOUS at 08:59

## 2023-08-17 RX ADMIN — HEPARIN SODIUM 5000 UNITS: 5000 INJECTION INTRAVENOUS; SUBCUTANEOUS at 05:04

## 2023-08-17 RX ADMIN — FERROUS SULFATE TAB 325 MG (65 MG ELEMENTAL FE) 325 MG: 325 (65 FE) TAB at 08:58

## 2023-08-17 RX ADMIN — ATORVASTATIN CALCIUM 10 MG: 10 TABLET, FILM COATED ORAL at 08:58

## 2023-08-17 RX ADMIN — MIDODRINE HYDROCHLORIDE 2.5 MG: 5 TABLET ORAL at 08:59

## 2023-08-17 RX ADMIN — CEFTRIAXONE SODIUM 2000 MG: 2 INJECTION, POWDER, FOR SOLUTION INTRAMUSCULAR; INTRAVENOUS at 09:04

## 2023-08-17 RX ADMIN — METOPROLOL SUCCINATE 12.5 MG: 25 TABLET, EXTENDED RELEASE ORAL at 08:58

## 2023-08-17 RX ADMIN — ASPIRIN 81 MG: 81 TABLET, CHEWABLE ORAL at 08:58

## 2023-08-17 RX ADMIN — AMPICILLIN SODIUM 2000 MG: 2 INJECTION, POWDER, FOR SOLUTION INTRAVENOUS at 14:06

## 2023-08-17 RX ADMIN — ACETAMINOPHEN 650 MG: 325 TABLET ORAL at 04:35

## 2023-08-17 ASSESSMENT — PAIN DESCRIPTION - LOCATION: LOCATION: ABDOMEN

## 2023-08-17 ASSESSMENT — PAIN DESCRIPTION - ORIENTATION: ORIENTATION: LOWER

## 2023-08-17 ASSESSMENT — PAIN DESCRIPTION - DESCRIPTORS: DESCRIPTORS: DISCOMFORT

## 2023-08-17 ASSESSMENT — PAIN SCALES - GENERAL
PAINLEVEL_OUTOF10: 1
PAINLEVEL_OUTOF10: 3

## 2023-08-17 NOTE — DISCHARGE SUMMARY
V2.0  Discharge Summary    Name:  Sourva Hurtado /Age/Sex: 5/15/1929 (36 y.o. female)   Admit Date: 2023  Discharge Date: 23    MRN & CSN:  [id number] & [id number] Encounter Date and Time 23 1:08 PM EDT    Attending:  La Vargas, * Discharging Provider: La Vargas MD       Hospital Course:     Brief HPI: Sourav Hurtado is a 80 y.o. female  with a PMHx of HFpEF, AS s/p TAVR, HTL and HLD who presented to the ED with concerns for AMS. Per the daughter who the patient lives with, the patient was not in her normal state of mind. The daughter states that she assists her mother with most ADLs, however the only thing that her mother does on her own is make breakfast for herself. She states that something felt off as her mother sat in her room instead of breakfast like she normally does. She reports she usually does not know where she is at, however is oriented to person and time. 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 She felt overall that the patient was uncooperative and not her normal self as she claimed that she fixed herself breakfast when she never did. Daughter notes that her legs have been more swollen however denies any new chest pain or shortness of breath does note a cough which tends to occur when she has ADHF. The daughter states that she is on a strict low-salt diet. Denied any F/C, HA, dizziness, presyncope, syncope, sputum production, CP, SOB, N/V, abdominal pain, bleeding (hemoptysis / hematemesis, hematuria, BRBPR), C/D, or changes in urinary habits. Denied any tobacco, alcohol or illicit drug use. Brief Problem Based Course:     # Altered Mental Status 2/2 Metabolic Encephalopathy   Patient presents with concerns of altered mental status reported by the daughter. Patient's mental status improved with treating her infection.      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 Thought to have some involvement of her pacemaker leads as well. Seen by cardiology, electrophysiology, and infectious disease. Did undergo a VIKTORIA. Patient did continue to have positive blood cultures until this set drawn on 8/15/2023. Plan is to continue ampicillin and Rocephin for a 6-week course. Patient to follow-up with infectious disease as well as her cardiologist after discharge. # Acute on Chronic HFpEF  Patient presents with SOB and peripheral edema. On admit, patient hemodynamically stable and not requiring oxygen supplementation. Physical exam and imaging consistent with volume overload on admission. Echo with severe MR, moderate TR, moderate perivalvular leak of TAVR, normal EF. Slight apical ballooning. VIKTORIA done- shows paravalvular leak around aortic root (signs of abscess/dehiscence). Patient to be discharged with follow-up with cardiology and low-dose diuretic as well as supplemental oxygen as needed. #SABA vs SABA on CKD  Cr 1.3 on admit, baseline (baseline ~0.9 in 2021). At the time of discharge her kidney function returned back to normal.  I agree with minimizing nephrotoxins moving forward. # HTN  Continue betablocker      # HLD  Continue statin      The patient expressed appropriate understanding of, and agreement with the discharge recommendations, medications, and plan.      Consults this admission:  IP CONSULT TO CARDIOLOGY  IP CONSULT TO INFECTIOUS DISEASES  PHARMACY TO DOSE VANCOMYCIN  PHARMACY TO DOSE VANCOMYCIN  IP CONSULT TO CARDIOTHORACIC SURGERY  IP CONSULT TO CARDIOTHORACIC SURGERY  IP CONSULT TO SPIRITUAL SERVICES  IP CONSULT TO UROLOGY    Discharge Diagnosis:     Acute metabolic encephalopathy  Infective Endocarditis    Discharge Instruction:   Follow up appointments: Cardiology and infectious disease  Primary

## 2023-08-17 NOTE — CARE COORDINATION
When pt is discharged pt will be [facility] to: [facility]      Call report to [phone]      Complete & sign the MANASA then fax to [phone]     Place DC summary, AVS & any scripts in the envelope that is in the soft chart.   This is to go with the pt to the facility     Karis Hope, daughter/mpoa to transport pt     Notified Aniya by  on home & cell numbers     Covid test is not needed     PASRR completed    Lam Bernal RN aware

## 2023-08-17 NOTE — PROGRESS NOTES
Pt continues to talk about her heart going fast and now complains of SOB and is anxious. Noted increased work of breathing , congested cough, VSS, increased oxygen to 2L to assist with SOB. Called Garland Epperson Np to inform her of pt's symptoms and uneasy feeling. We reviewed labs and last chest xray. New orders for U/A d/t new lehman/retention/abd discomfort and repeat cxray.

## 2023-08-17 NOTE — PROGRESS NOTES
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 A&O x 4  8/10-BC 4/ Enterococcus faecalis (pan sensi)   BC 4/4 E.faecalis  -BC 3/4 E.faecalis  8/15-BC 0/2 NGTD   8/10-MRSA PCR, Strep pna, legionella ag and Resp panel negative  -UA WBC 3, RBC <1, urine culture:NGTD  8/10-Complete TTE: Left ventricular systolic function is normal. Ejection fraction is visually estimated at 55-60%. light apical ballooning noted. Mildly dilated left atrium. PPM wiring visualized in right heart. S/p TAVR (#29 Watson Nancy 3) with moderate perivalvular leak. Moderate to severe mitral regurgitation. Moderate tricuspid regurgitation; RVSP: 52 mmHg. No evidence of any pericardial effusion. Atherosclerotic plaque noted in the abdominal aorta. -CT Head WO Contrast: No acute intracranial abnormality. Cerebral atrophy. Severe chronic small vessel ischemic changes. No acute   brain parenchymal abnormality. -CXR: 1. Congestive heart failure is most likely given the radiographic findings; pneumonia is also a consideration in areas of consolidation with pleural   effusion. 2. Calcific atherosclerosis aorta. 3. Cardiomegaly. 8/10-CXR: Stable cardiopulmonary status including pulmonary edema, right base   atelectasis and bilateral effusions, right greater than left. -VIKTORIA: Summary:  Left ventricular systolic function is normal.  Ejection fraction is visually estimated at 55-60% PPM wiring visualized within the right heart. S/p TAVR (#29 Watson Nancy 3) with moderate perivalvular leak.  New finding suspicious for Urine, Quantitative NEGATIVE NEGATIVE    Leukocyte Esterase, Urine NEGATIVE NEGATIVE   Microscopic Urinalysis    Collection Time: 08/17/23  5:17 AM   Result Value Ref Range    RBC, UA 62 (H) 0 - 6 /HPF    WBC, UA 31 (H) 0 - 5 /HPF    Bacteria, UA NEGATIVE NEGATIVE /HPF    WBC Clumps, UA RARE /HPF    Squam Epithel, UA 3 /HPF    Trichomonas NONE SEEN NONE SEEN /HPF   POCT Glucose    Collection Time: 08/17/23  7:05 AM   Result Value Ref Range    POC Glucose 121 (H) 70 - 99 MG/DL     CULTURE results: Invalid input(s): BLOOD CULTURE,  URINE CULTURE, SURGICAL CULTURE    Diagnosis:  Patient Active Problem List   Diagnosis    Essential hypertension    Other hyperlipidemia    Cardiac pacemaker    Congestive heart failure ([address])    Cerebrovascular accident (CVA) due to vascular stenosis ([facility])    CHB (complete heart block) ([facility])    S/P AVR (aortic valve replacement)    Weakness    Troponin I above reference range    Bacteremia due to Streptococcus    Chronic renal disease, stage III ([facility]) [660136]    Seasonal allergic rhinitis due to pollen    Bilateral hearing loss    Acute metabolic encephalopathy    Confusion    Bacteremia due to Enterococcus    Septicemia ([facility])       Active Problems  Principal Problem:    Acute metabolic encephalopathy  Active Problems:    Confusion    Bacteremia due to Enterococcus    Septicemia ([facility])  Resolved Problems:    * No resolved hospital problems. *    Electronically signed by: Electronically signed by FELICIA Umanzor CNP on 8/17/2023 at 10:57 AM

## 2023-08-17 NOTE — PLAN OF CARE
Problem: Discharge Planning  Goal: Discharge to home or other facility with appropriate resources  Outcome: Progressing     Problem: Chronic Conditions and Co-morbidities  Goal: Patient's chronic conditions and co-morbidity symptoms are monitored and maintained or improved  Outcome: Progressing     Problem: Safety - Adult  Goal: Free from fall injury  8/17/2023 1003 by Uziel Oden RN  Outcome: Progressing  8/17/2023 0415 by Micah Leger RN  Outcome: Progressing     Problem: Pain  Goal: Verbalizes/displays adequate comfort level or baseline comfort level  Outcome: Progressing     Problem: Skin/Tissue Integrity  Goal: Absence of new skin breakdown  Description: 1. Monitor for areas of redness and/or skin breakdown  2. Assess vascular access sites hourly  3. Every 4-6 hours minimum:  Change oxygen saturation probe site  4. Every 4-6 hours:  If on nasal continuous positive airway pressure, respiratory therapy assess nares and determine need for appliance change or resting period.   Outcome: Progressing     Problem: ABCDS Injury Assessment  Goal: Absence of physical injury  Outcome: Progressing     Problem: Neurosensory - Adult  Goal: Achieves stable or improved neurological status  Outcome: Progressing  Goal: Achieves maximal functionality and self care  Outcome: Progressing     Problem: Respiratory - Adult  Goal: Achieves optimal ventilation and oxygenation  Outcome: Progressing     Problem: Cardiovascular - Adult  Goal: Maintains optimal cardiac output and hemodynamic stability  Outcome: Progressing  Goal: Absence of cardiac dysrhythmias or at baseline  Outcome: Progressing     Problem: Skin/Tissue Integrity - Adult  Goal: Incisions, wounds, or drain sites healing without S/S of infection  Outcome: Progressing     Problem: Musculoskeletal - Adult  Goal: Return mobility to safest level of function  8/17/2023 1003 by Uziel Oden RN  Outcome: Progressing  8/17/2023 0415 by Micah Leger

## 2023-08-17 NOTE — DISCHARGE INSTR - COC
Left;Posterior;Proximal (Active)   Dressing Status Clean;Dry; Intact 08/17/23 0730   Number of days: 4       Wound 08/13/23 Tibial Posterior;Proximal;Right (Active)   Dressing Status Clean;Dry; Intact 08/17/23 0730   Number of days: 4        Elimination:  Continence: Bowel: Yes  Bladder: Yes  Urinary Catheter: Insertion Date: 8/17/23    Colostomy/Ileostomy/Ileal Conduit: No       Date of Last BM: 8/16/23    Intake/Output Summary (Last 24 hours) at 8/17/2023 1258  Last data filed at 8/17/2023 0858  Gross per 24 hour   Intake 370 ml   Output 500 ml   Net -130 ml     I/O last 3 completed shifts:   In: 900 [P.O.:900]  Out: 80 [Urine:1100]    Safety Concerns:     Sundowners Sundrome and At Risk for Falls    Impairments/Disabilities:      Vision    Patient's personal belongings (please select all that are sent with patient):  Glasses    RN SIGNATURE:  Electronically signed by Hiren Fernandez RN on 8/17/23 at 4:34 PM EDT    CASE MANAGEMENT/SOCIAL WORK SECTION    Inpatient Status Date: 8/8/2023    Readmission Risk Assessment Score:  Readmission Risk              Risk of Unplanned Readmission:  18           Discharging to Facility/ Agency   Name:   Address:  Phone:  Fax:    Dialysis Facility (if applicable)   Name:  Address:  Dialysis Schedule:  Phone:  Fax:    / signature: Electronically signed by Yasmin Lozada RN on 8/17/23 at 2:49 PM EDT    PHYSICIAN SECTION    Prognosis: {Prognosis:0302406585}    Condition at Discharge: [address] Patient Condition:[id number]}    Rehab Potential (if transferring to Rehab): {Prognosis:[id number]}      Nutrition Therapy:  Current Nutrition Therapy:   - Oral Diet:  General    Routes of Feeding: Oral  Liquids: No Restrictions  Daily Fluid Restriction: no  Last Modified Barium Swallow with Video (Video Swallowing Test): not done    Treatments at the Time of Hospital Discharge:   Respiratory Treatments: prn   Oxygen Therapy:  is on oxygen at 1-2 L/min per nasal

## 2023-08-17 NOTE — PROGRESS NOTES
Resource RN rounding on pt with DI score of 70. Pt is alert and oriented x4 with intermittent confusion. She answers questions appropriately but then states things like \"do you have to make food for all those kids. \" VSS are stable on 1L Nc. Pt reports feeling like her pacemaker is \"going fast\". Pts Hr ranging from . She states \"ill be all right\" the cardiologist knows about this. Review of notes , cardiology is following. Pt c/o discomfort in her lower abdomen that she contributes to her lehman catheter and request tylenol .

## 2023-08-17 NOTE — CONSULTS
PICC Consult complete. Indication for line: Long term IV antibiotics. Education regarding PICC insertion discussed and risks and benefits assessed with daughter. Datom verbalized understanding. Telephone consent, witnessed by 2 RN's. Time out done @ 1440. PICC inserted in ANTHONY Basilci Vessel without difficulty per protocol. Placement verified via VPSG4 monitoring system not possible d/t AV paced rhythm. STAT portable chest xray ordered. Good blood return and flushes easily on both ports. Patient tolerated well. Education pamphlets left in chart  Ultrasound photos of vein diameter and doppler signature placed in chart. Please consult IV/PICC team if patient's needs change. Do not use PICC until released by vascular access nurse.

## 2023-08-17 NOTE — FLOWSHEET NOTE
MANASA/AVS, face sheet, dc summary, Rx faxed to [facility] [phone]    Report called to Lili Mar [phone]    Patient discharged per private vehicle with daughter to [facility]. Packet with daughter to give to [facility] staff.

## 2023-08-17 NOTE — FLOWSHEET NOTE
Patient placed on room air to evaluate O2 need. Currently 96% on room air. Will continue to monitor.

## 2023-08-17 NOTE — CONSULTS
[address] [facility] [place] [address], [address]   Consult Note  [facility] 1 2 3 4 5    Date: 2023   Patient: Todd Nunez   : 5/15/1929   DOA: 2023   MRN: [id number]   ROOM#: -[id number]     Reason for Consult: Urinary retention  Requesting Physician: Dr. Cornelia Mclean  Collaborating Urologist on Call at time of admission: Dr. Alessia Calixto: AMS    History Obtained From:  patient, patient's daughter, electronic medical record    HISTORY OF PRESENT ILLNESS:                The patient is a 80 y.o. female with significant past medical history of CHF, AS s/p TAVR, CVA, and aphasia who presented 23 with AMS and inability to perform normal activities of daily living. Her blood cultures since admission grew Enterococcus faecalis, suspected to be secondary to prosthetic valve endocarditis and/or pacemaker. Urine culture is negative. The pt had a lehman which was removed 8/15/23. She did not void and bladder scan revealed 330cc. Documentation unclear the following evening/day but she did have a catheter reinserted last night. She was reportedly able to void once during the day with unknown urine output. Pt reports feeling well this morning with no pain. States she is normally able to void well without difficulty. I contacted her daughter by phone and she also reports that the patient is fairly independent at baseline and has had not voiding issues in the last 5.5yrs that they have lived together. ED Provider's HPI 23: Todd Nunez is a 80 y.o. female that presents with her daughter. History of stroke and aphasia as well as congestive heart failure. Patient has had confusion.   Last known well was before bed last evening at around 9:30 PM.  Presented today to the emergency department at around 5 PM.  Daughter states that aphasia is baseline however the confusion and inability to perform her normal activities of daily living such as cooking or self breakfast, and taking care of herself

## 2023-08-17 NOTE — PLAN OF CARE
Problem: Safety - Adult  Goal: Free from fall injury  Outcome: Progressing       Problem: Musculoskeletal - Adult  Goal: Return mobility to safest level of function  Outcome: Progressing

## 2023-08-18 LAB
ALBUMIN SERPL-MCNC: 3.6 G/DL
ALP BLD-CCNC: 143 U/L
ALT SERPL-CCNC: 58 U/L
ANION GAP SERPL CALCULATED.3IONS-SCNC: 17 MMOL/L
AST SERPL-CCNC: 69 U/L
BASOPHILS ABSOLUTE: 0.07 /ΜL
BASOPHILS RELATIVE PERCENT: 0.6 %
BILIRUB SERPL-MCNC: 0.38 MG/DL (ref 0.1–1.4)
BUN BLDV-MCNC: 27 MG/DL
C-REACTIVE PROTEIN: 54.3
CALCIUM SERPL-MCNC: 9.4 MG/DL
CHLORIDE BLD-SCNC: 104 MMOL/L
CO2: 26 MMOL/L
CREAT SERPL-MCNC: 1.1 MG/DL
EGFR: 48
EOSINOPHILS ABSOLUTE: 0.19 /ΜL
EOSINOPHILS RELATIVE PERCENT: 1.5 %
GLUCOSE BLD-MCNC: 132 MG/DL
HCT VFR BLD CALC: 32.9 % (ref 36–46)
HEMOGLOBIN: 10.3 G/DL (ref 12–16)
LYMPHOCYTES ABSOLUTE: 1.17 /ΜL
LYMPHOCYTES RELATIVE PERCENT: 9.4 %
MCH RBC QN AUTO: 29.8 PG
MCHC RBC AUTO-ENTMCNC: 31.3 G/DL
MCV RBC AUTO: 95.1 FL
MONOCYTES ABSOLUTE: 0.94 /ΜL
MONOCYTES RELATIVE PERCENT: 7.6 %
NEUTROPHILS ABSOLUTE: 9.52 /ΜL
NEUTROPHILS RELATIVE PERCENT: 76.9 %
PDW BLD-RTO: 16.2 %
PLATELET # BLD: 293 K/ΜL
PMV BLD AUTO: 10.6 FL
POTASSIUM SERPL-SCNC: 4.4 MMOL/L
RBC # BLD: 3.46 10^6/ΜL
SEDIMENTATION RATE, ERYTHROCYTE: 19
SODIUM BLD-SCNC: 142 MMOL/L
TOTAL PROTEIN: 6.2
WBC # BLD: 12.39 10^3/ML

## 2023-08-20 LAB
CULTURE: ABNORMAL
CULTURE: ABNORMAL
CULTURE: NORMAL
Lab: ABNORMAL
Lab: NORMAL
SPECIMEN: ABNORMAL
SPECIMEN: NORMAL

## 2023-08-21 DIAGNOSIS — I10 ESSENTIAL HYPERTENSION: ICD-10-CM

## 2023-08-22 RX ORDER — METOPROLOL SUCCINATE 25 MG/1
TABLET, EXTENDED RELEASE ORAL
Qty: 90 TABLET | Refills: 3 | OUTPATIENT
Start: 2023-08-22

## 2023-08-25 LAB
ALBUMIN SERPL-MCNC: 3 G/DL
ALP BLD-CCNC: 217 U/L
ALT SERPL-CCNC: 30 U/L
ANION GAP SERPL CALCULATED.3IONS-SCNC: 16 MMOL/L
AST SERPL-CCNC: 40 U/L
BASOPHILS ABSOLUTE: 0.06 /ΜL
BASOPHILS RELATIVE PERCENT: 0.8 %
BILIRUB SERPL-MCNC: 0.22 MG/DL (ref 0.1–1.4)
BUN BLDV-MCNC: 32 MG/DL
C-REACTIVE PROTEIN: 47.1
CALCIUM SERPL-MCNC: 9.1 MG/DL
CHLORIDE BLD-SCNC: 102 MMOL/L
CO2: 25 MMOL/L
CREAT SERPL-MCNC: 1.1 MG/DL
EGFR: 48
EOSINOPHILS ABSOLUTE: 0.17 /ΜL
EOSINOPHILS RELATIVE PERCENT: 2.3 %
GLUCOSE BLD-MCNC: 105 MG/DL
HCT VFR BLD CALC: 27.2 % (ref 36–46)
HEMOGLOBIN: 8.6 G/DL (ref 12–16)
LYMPHOCYTES ABSOLUTE: 0.61 /ΜL
LYMPHOCYTES RELATIVE PERCENT: 8.3 %
MCH RBC QN AUTO: 30.2 PG
MCHC RBC AUTO-ENTMCNC: 31.6 G/DL
MCV RBC AUTO: 95.4 FL
MONOCYTES ABSOLUTE: 0.82 /ΜL
MONOCYTES RELATIVE PERCENT: 11.1 %
NEUTROPHILS ABSOLUTE: 5.68 /ΜL
NEUTROPHILS RELATIVE PERCENT: 77.1 %
PDW BLD-RTO: 16.7 %
PLATELET # BLD: 234 K/ΜL
PMV BLD AUTO: 10.4 FL
POTASSIUM SERPL-SCNC: 4.7 MMOL/L
RBC # BLD: 2.85 10^6/ΜL
SEDIMENTATION RATE, ERYTHROCYTE: 38
SODIUM BLD-SCNC: 138 MMOL/L
TOTAL PROTEIN: 5.5
WBC # BLD: 7.37 10^3/ML

## 2023-08-31 LAB
BILIRUBIN, URINE: NEGATIVE
BLOOD, URINE: NEGATIVE
CLARITY: CLEAR
COLOR: YELLOW
GLUCOSE URINE: ABNORMAL
KETONES, URINE: NEGATIVE
LEUKOCYTE ESTERASE, URINE: NEGATIVE
NITRITE, URINE: NEGATIVE
PH UA: 6 (ref 4.5–8)
PROTEIN UA: ABNORMAL
SPECIFIC GRAVITY, URINE: 1.01
UROBILINOGEN, URINE: ABNORMAL

## 2023-09-01 LAB
ALBUMIN SERPL-MCNC: 3.1 G/DL
ALP BLD-CCNC: 218 U/L
ALT SERPL-CCNC: 26 U/L
ANION GAP SERPL CALCULATED.3IONS-SCNC: 17 MMOL/L
AST SERPL-CCNC: 33 U/L
BASOPHILS ABSOLUTE: 0.05 /ΜL
BASOPHILS RELATIVE PERCENT: 0.8 %
BILIRUB SERPL-MCNC: 0.13 MG/DL (ref 0.1–1.4)
BUN BLDV-MCNC: 39 MG/DL
C-REACTIVE PROTEIN: 34.3
CALCIUM SERPL-MCNC: 9.1 MG/DL
CHLORIDE BLD-SCNC: 106 MMOL/L
CO2: 24 MMOL/L
CREAT SERPL-MCNC: 1.3 MG/DL
EGFR: 40
EOSINOPHILS ABSOLUTE: 0.37 /ΜL
EOSINOPHILS RELATIVE PERCENT: 5.9 %
GLUCOSE BLD-MCNC: 156 MG/DL
HCT VFR BLD CALC: 26.6 % (ref 36–46)
HEMOGLOBIN: 8.4 G/DL (ref 12–16)
LYMPHOCYTES ABSOLUTE: 0.66 /ΜL
LYMPHOCYTES RELATIVE PERCENT: 10.5 %
MCH RBC QN AUTO: 30.1 PG
MCHC RBC AUTO-ENTMCNC: 31.6 G/DL
MCV RBC AUTO: 95.3 FL
MONOCYTES ABSOLUTE: 0.79 /ΜL
MONOCYTES RELATIVE PERCENT: 12.5 %
NEUTROPHILS ABSOLUTE: 4.41 /ΜL
NEUTROPHILS RELATIVE PERCENT: 70 %
PDW BLD-RTO: 16.6 %
PLATELET # BLD: 265 K/ΜL
PMV BLD AUTO: 9.9 FL
POTASSIUM SERPL-SCNC: 4.6 MMOL/L
RBC # BLD: 2.79 10^6/ΜL
SEDIMENTATION RATE, ERYTHROCYTE: 35
SODIUM BLD-SCNC: 142 MMOL/L
TOTAL PROTEIN: 5.7
WBC # BLD: 6.3 10^3/ML

## 2023-09-06 ENCOUNTER — OFFICE VISIT (OUTPATIENT)
Dept: CARDIOLOGY CLINIC | Age: 88
End: 2023-09-06
Payer: MEDICARE

## 2023-09-06 VITALS
WEIGHT: 140 LBS | HEIGHT: 60 IN | OXYGEN SATURATION: 92 % | HEART RATE: 80 BPM | DIASTOLIC BLOOD PRESSURE: 60 MMHG | BODY MASS INDEX: 27.48 KG/M2 | SYSTOLIC BLOOD PRESSURE: 102 MMHG

## 2023-09-06 DIAGNOSIS — I44.2 CHB (COMPLETE HEART BLOCK) (HCC): ICD-10-CM

## 2023-09-06 DIAGNOSIS — I10 ESSENTIAL HYPERTENSION: ICD-10-CM

## 2023-09-06 DIAGNOSIS — E78.49 OTHER HYPERLIPIDEMIA: ICD-10-CM

## 2023-09-06 DIAGNOSIS — N18.32 STAGE 3B CHRONIC KIDNEY DISEASE (HCC): ICD-10-CM

## 2023-09-06 DIAGNOSIS — I50.9 CONGESTIVE HEART FAILURE, UNSPECIFIED HF CHRONICITY, UNSPECIFIED HEART FAILURE TYPE (HCC): ICD-10-CM

## 2023-09-06 DIAGNOSIS — I63.529 CEREBROVASCULAR ACCIDENT (CVA) DUE TO STENOSIS OF ANTERIOR CEREBRAL ARTERY, UNSPECIFIED BLOOD VESSEL LATERALITY (HCC): ICD-10-CM

## 2023-09-06 DIAGNOSIS — Z09 HOSPITAL DISCHARGE FOLLOW-UP: Primary | ICD-10-CM

## 2023-09-06 PROCEDURE — 99214 OFFICE O/P EST MOD 30 MIN: CPT | Performed by: NURSE PRACTITIONER

## 2023-09-06 PROCEDURE — 1090F PRES/ABSN URINE INCON ASSESS: CPT | Performed by: NURSE PRACTITIONER

## 2023-09-06 PROCEDURE — 1111F DSCHRG MED/CURRENT MED MERGE: CPT | Performed by: NURSE PRACTITIONER

## 2023-09-06 PROCEDURE — G8417 CALC BMI ABV UP PARAM F/U: HCPCS | Performed by: NURSE PRACTITIONER

## 2023-09-06 PROCEDURE — 1036F TOBACCO NON-USER: CPT | Performed by: NURSE PRACTITIONER

## 2023-09-06 PROCEDURE — 1123F ACP DISCUSS/DSCN MKR DOCD: CPT | Performed by: NURSE PRACTITIONER

## 2023-09-06 PROCEDURE — G8427 DOCREV CUR MEDS BY ELIG CLIN: HCPCS | Performed by: NURSE PRACTITIONER

## 2023-09-06 RX ORDER — SPIRONOLACTONE 25 MG/1
25 TABLET ORAL DAILY
COMMUNITY

## 2023-09-06 RX ORDER — LEVOFLOXACIN 500 MG/1
500 TABLET, FILM COATED ORAL DAILY
COMMUNITY

## 2023-09-06 RX ORDER — ACETAMINOPHEN 325 MG/1
650 TABLET ORAL EVERY 4 HOURS PRN
COMMUNITY

## 2023-09-06 RX ORDER — GUAIFENESIN 600 MG/1
600 TABLET, EXTENDED RELEASE ORAL 2 TIMES DAILY
COMMUNITY

## 2023-09-06 RX ORDER — IPRATROPIUM BROMIDE AND ALBUTEROL SULFATE 2.5; .5 MG/3ML; MG/3ML
1 SOLUTION RESPIRATORY (INHALATION) 3 TIMES DAILY
COMMUNITY

## 2023-09-06 RX ORDER — FUROSEMIDE 20 MG/1
TABLET ORAL
Qty: 60 TABLET | Refills: 5 | Status: SHIPPED | OUTPATIENT
Start: 2023-09-06

## 2023-09-06 RX ORDER — TAMSULOSIN HYDROCHLORIDE 0.4 MG/1
0.4 CAPSULE ORAL NIGHTLY
COMMUNITY

## 2023-09-06 RX ORDER — LISINOPRIL 2.5 MG/1
2.5 TABLET ORAL DAILY
COMMUNITY

## 2023-09-06 ASSESSMENT — ENCOUNTER SYMPTOMS
COUGH: 0
SHORTNESS OF BREATH: 0

## 2023-09-06 NOTE — PROGRESS NOTES
CARDIOLOGY  NOTE    2023    Amol Hartman (:  5/15/1929) is a 80 y.o. female,an established patient with Dr. Marva Acuna, here for evaluation of the following chief complaint(s):  Follow-Up from Hospital (Shortness of breath, chest pain and fluttering, swelling bilat legs, has gone down significantly. Needs to reiterate low salt diet. )        SUBJECTIVE/OBJECTIVE:    CYNTHIA Stern has a past history as listed below. She has never been seen outpatient in our office. In the hospital we have seen her since 2021, as she follows up at [facility] with Cardiology. She recently was in hospital for infective endocarditis. EPS evaluated and medical management was recommended. Daughter reports [facility] cardiology verbally reported question as to if pacer wire was involved in infection. Since being at [address] for rehab Jared Oquendo has had a significant weight gain and has gross pitting edema. Her daughter expresses frustration with diet at facility due to high sodium content. Jared Oquendo had TAVR placed due to aortic stenosis s/p Watson sapient valve with perivalvular leak placed in 2017 at [facility] with Dr. Akila Anand. She does have history of complete heart block s/p pacemaker and paroxysmal atrial fibrillation     Follows up with cardiology Dr. Lukasz Renae at [address] [place] [address]   Constitutional:  Negative for fatigue and fever. HENT:  Positive for hearing loss. Respiratory:  Negative for cough and shortness of breath. Cardiovascular:  Positive for leg swelling. Negative for chest pain and palpitations. Musculoskeletal:  Positive for gait problem. Negative for arthralgias. Neurological:  Negative for dizziness, syncope, weakness, light-headedness and headaches.      Vitals:    23 1052   BP: 102/60   Site: Left Upper Arm   Position: Sitting   Cuff Size: Medium Adult   Pulse: 80   SpO2: 92%   Weight: 140 lb (63.5 kg)   Height: 5' (1.524 m)       Wt Readings from Last 3 Encounters:   23 140

## 2023-09-07 ENCOUNTER — OFFICE VISIT (OUTPATIENT)
Dept: INFECTIOUS DISEASES | Age: 88
End: 2023-09-07
Payer: MEDICARE

## 2023-09-07 VITALS — DIASTOLIC BLOOD PRESSURE: 50 MMHG | RESPIRATION RATE: 18 BRPM | HEART RATE: 69 BPM | SYSTOLIC BLOOD PRESSURE: 103 MMHG

## 2023-09-07 DIAGNOSIS — T82.7XXD INFECTION AND INFLAMMATORY REACTION DUE TO CARDIAC DEVICE, IMPLANT, AND GRAFT, SUBSEQUENT ENCOUNTER: ICD-10-CM

## 2023-09-07 DIAGNOSIS — Z79.2 RECEIVING INTRAVENOUS ANTIBIOTIC TREATMENT AS OUTPATIENT: ICD-10-CM

## 2023-09-07 DIAGNOSIS — R78.81 BACTEREMIA DUE TO ENTEROCOCCUS: Primary | ICD-10-CM

## 2023-09-07 DIAGNOSIS — B95.2 BACTEREMIA DUE TO ENTEROCOCCUS: Primary | ICD-10-CM

## 2023-09-07 PROBLEM — T82.7XXA INFECTION AND INFLAMMATORY REACTION DUE TO CARDIAC DEVICE, IMPLANT, AND GRAFT (HCC): Status: ACTIVE | Noted: 2023-09-07

## 2023-09-07 PROCEDURE — 99214 OFFICE O/P EST MOD 30 MIN: CPT | Performed by: INTERNAL MEDICINE

## 2023-09-07 PROCEDURE — 1111F DSCHRG MED/CURRENT MED MERGE: CPT | Performed by: INTERNAL MEDICINE

## 2023-09-07 PROCEDURE — 1036F TOBACCO NON-USER: CPT | Performed by: INTERNAL MEDICINE

## 2023-09-07 PROCEDURE — 1090F PRES/ABSN URINE INCON ASSESS: CPT | Performed by: INTERNAL MEDICINE

## 2023-09-07 PROCEDURE — G8427 DOCREV CUR MEDS BY ELIG CLIN: HCPCS | Performed by: INTERNAL MEDICINE

## 2023-09-07 PROCEDURE — G8417 CALC BMI ABV UP PARAM F/U: HCPCS | Performed by: INTERNAL MEDICINE

## 2023-09-07 PROCEDURE — 1123F ACP DISCUSS/DSCN MKR DOCD: CPT | Performed by: INTERNAL MEDICINE

## 2023-09-07 NOTE — PROGRESS NOTES
MG tablet Take 1 tablet by mouth daily 30 tablet 5    ampicillin (OMNIPEN) infusion Infuse 2,000 mg intravenously every 8 (eight) hours Compound per protocol. 252 g 0    cefTRIAXone (ROCEPHIN) infusion Infuse 2,000 mg intravenously in the morning and 2,000 mg in the evening. Compound per protocol. 168 g 0    metoprolol succinate (TOPROL XL) 25 MG extended release tablet TAKE 1 TABLET BY MOUTH ONCE  DAILY 90 tablet 0    potassium chloride (KLOR-CON M) 20 MEQ extended release tablet TAKE 1 TABLET BY MOUTH  DAILY 90 tablet 3    lovastatin (MEVACOR) 40 MG tablet TAKE 1 TABLET BY MOUTH  DAILY 90 tablet 3    fluticasone (FLONASE) 50 MCG/ACT nasal spray USE 2 SPRAYS IN EACH  NOSTRIL DAILY 3 each 1    SENNA PO Take by mouth Takes a quarter of a tablet. Calcium Carb-Cholecalciferol (OYSTER SHELL CALCIUM) 500-400 MG-UNIT TABS Calcium 500 + D 500 mg (1,250 mg)-400 unit tablet   Take by oral route. aspirin 81 MG tablet Take 1 tablet by mouth daily      ferrous sulfate 325 (65 Fe) MG tablet Take 1 tablet by mouth daily (with breakfast)      Multiple Vitamins-Minerals (MULTIVITAMIN ADULT PO) Take 1 tablet by mouth daily      Cholecalciferol (VITAMIN D3) 5000 units TABS Take 1 tablet by mouth daily       No current facility-administered medications for this visit. Past Medical History:   Diagnosis Date    Aphasia        Past Surgical History:   Procedure Laterality Date    AORTIC VALVE SURGERY  06/29/2017    BRAIN SURGERY  11/27/2017    bleed from stroke    PACEMAKER PLACEMENT  06/29/2017       Social History     Socioeconomic History    Marital status:       Spouse name: Not on file    Number of children: Not on file    Years of education: Not on file    Highest education level: Not on file   Occupational History    Not on file   Tobacco Use    Smoking status: Never    Smokeless tobacco: Never   Substance and Sexual Activity    Alcohol use: Not Currently    Drug use: Not on file    Sexual activity: Not on

## 2023-09-10 LAB
ALBUMIN SERPL-MCNC: 3.3 G/DL
ALP BLD-CCNC: 200 U/L
ALT SERPL-CCNC: 31 U/L
ANION GAP SERPL CALCULATED.3IONS-SCNC: 20 MMOL/L
AST SERPL-CCNC: 39 U/L
BASOPHILS ABSOLUTE: 0.06 /ΜL
BASOPHILS RELATIVE PERCENT: 0.8 %
BILIRUB SERPL-MCNC: 0.12 MG/DL (ref 0.1–1.4)
BUN BLDV-MCNC: 47 MG/DL
C-REACTIVE PROTEIN: 29.7
CALCIUM SERPL-MCNC: 9 MG/DL
CHLORIDE BLD-SCNC: 100 MMOL/L
CO2: 24 MMOL/L
CREAT SERPL-MCNC: 2 MG/DL
EGFR: 24
EOSINOPHILS ABSOLUTE: 0.33 /ΜL
EOSINOPHILS RELATIVE PERCENT: 4.2 %
GLUCOSE BLD-MCNC: 86 MG/DL
HCT VFR BLD CALC: 26.8 % (ref 36–46)
HEMOGLOBIN: 8.4 G/DL (ref 12–16)
LYMPHOCYTES ABSOLUTE: 0.73 /ΜL
LYMPHOCYTES RELATIVE PERCENT: 9.3 %
MCH RBC QN AUTO: 29.8 PG
MCHC RBC AUTO-ENTMCNC: 31.3 G/DL
MCV RBC AUTO: 95 FL
MONOCYTES ABSOLUTE: 1.04 /ΜL
MONOCYTES RELATIVE PERCENT: 13.3 %
NEUTROPHILS ABSOLUTE: 5.64 /ΜL
NEUTROPHILS RELATIVE PERCENT: 71.9 %
PDW BLD-RTO: 16.4 %
PLATELET # BLD: 276 K/ΜL
PMV BLD AUTO: 9.9 FL
POTASSIUM SERPL-SCNC: 5 MMOL/L
RBC # BLD: 2.82 10^6/ΜL
SEDIMENTATION RATE, ERYTHROCYTE: 44
SODIUM BLD-SCNC: 139 MMOL/L
TOTAL PROTEIN: 5.7
WBC # BLD: 7.84 10^3/ML

## 2023-09-11 PROBLEM — Z79.2 RECEIVING INTRAVENOUS ANTIBIOTIC TREATMENT AS OUTPATIENT: Status: ACTIVE | Noted: 2023-09-11

## 2023-09-15 LAB
ALBUMIN SERPL-MCNC: 3.3 G/DL
ALP BLD-CCNC: 188 U/L
ALT SERPL-CCNC: 23 U/L
ANION GAP SERPL CALCULATED.3IONS-SCNC: 17 MMOL/L
AST SERPL-CCNC: 29 U/L
BASOPHILS ABSOLUTE: 0.08 /ΜL
BASOPHILS RELATIVE PERCENT: 0.9 %
BILIRUB SERPL-MCNC: 0.15 MG/DL (ref 0.1–1.4)
BUN BLDV-MCNC: 48 MG/DL
C-REACTIVE PROTEIN: 42.6
CALCIUM SERPL-MCNC: 9.5 MG/DL
CHLORIDE BLD-SCNC: 101 MMOL/L
CO2: 26 MMOL/L
CREAT SERPL-MCNC: 1.8 MG/DL
EGFR: 27
EOSINOPHILS ABSOLUTE: 0.42 /ΜL
EOSINOPHILS RELATIVE PERCENT: 4.8 %
GLUCOSE BLD-MCNC: 94 MG/DL
HCT VFR BLD CALC: 27.5 % (ref 36–46)
HEMOGLOBIN: 8.5 G/DL (ref 12–16)
LYMPHOCYTES ABSOLUTE: 0.82 /ΜL
LYMPHOCYTES RELATIVE PERCENT: 9.3 %
MCH RBC QN AUTO: 29.4 PG
MCHC RBC AUTO-ENTMCNC: 30.9 G/DL
MCV RBC AUTO: 95.2 FL
MONOCYTES ABSOLUTE: 1.29 /ΜL
MONOCYTES RELATIVE PERCENT: 14.6 %
NEUTROPHILS ABSOLUTE: 6.19 /ΜL
NEUTROPHILS RELATIVE PERCENT: 69.9 %
PDW BLD-RTO: 16.4 %
PLATELET # BLD: 270 K/ΜL
PMV BLD AUTO: 9.7 FL
POTASSIUM SERPL-SCNC: 4.7 MMOL/L
RBC # BLD: 2.89 10^6/ΜL
SEDIMENTATION RATE, ERYTHROCYTE: 55
SODIUM BLD-SCNC: 140 MMOL/L
TOTAL PROTEIN: 6
WBC # BLD: 8.84 10^3/ML

## 2023-09-21 ENCOUNTER — OFFICE VISIT (OUTPATIENT)
Dept: CARDIOLOGY CLINIC | Age: 88
End: 2023-09-21
Payer: MEDICARE

## 2023-09-21 ENCOUNTER — OFFICE VISIT (OUTPATIENT)
Dept: INFECTIOUS DISEASES | Age: 88
End: 2023-09-21

## 2023-09-21 VITALS — SYSTOLIC BLOOD PRESSURE: 98 MMHG | HEART RATE: 64 BPM | DIASTOLIC BLOOD PRESSURE: 50 MMHG

## 2023-09-21 VITALS — SYSTOLIC BLOOD PRESSURE: 95 MMHG | DIASTOLIC BLOOD PRESSURE: 32 MMHG | RESPIRATION RATE: 18 BRPM | HEART RATE: 68 BPM

## 2023-09-21 DIAGNOSIS — B95.5 BACTEREMIA DUE TO STREPTOCOCCUS: ICD-10-CM

## 2023-09-21 DIAGNOSIS — R78.81 BACTEREMIA DUE TO STREPTOCOCCUS: ICD-10-CM

## 2023-09-21 DIAGNOSIS — I50.32 CHRONIC DIASTOLIC CONGESTIVE HEART FAILURE (HCC): Primary | ICD-10-CM

## 2023-09-21 DIAGNOSIS — E11.59 TYPE 2 DIABETES MELLITUS WITH OTHER CIRCULATORY COMPLICATION, WITHOUT LONG-TERM CURRENT USE OF INSULIN (HCC): ICD-10-CM

## 2023-09-21 DIAGNOSIS — N18.32 STAGE 3B CHRONIC KIDNEY DISEASE (HCC): ICD-10-CM

## 2023-09-21 DIAGNOSIS — I10 ESSENTIAL HYPERTENSION: ICD-10-CM

## 2023-09-21 DIAGNOSIS — I44.2 CHB (COMPLETE HEART BLOCK) (HCC): ICD-10-CM

## 2023-09-21 DIAGNOSIS — Z79.2 RECEIVING INTRAVENOUS ANTIBIOTIC TREATMENT AS OUTPATIENT: ICD-10-CM

## 2023-09-21 DIAGNOSIS — R53.1 WEAKNESS: ICD-10-CM

## 2023-09-21 DIAGNOSIS — Z95.2 S/P AVR (AORTIC VALVE REPLACEMENT): Primary | ICD-10-CM

## 2023-09-21 DIAGNOSIS — E78.49 OTHER HYPERLIPIDEMIA: ICD-10-CM

## 2023-09-21 DIAGNOSIS — B02.30 HERPES ZOSTER WITH OPHTHALMIC COMPLICATION, UNSPECIFIED HERPES ZOSTER EYE DISEASE: ICD-10-CM

## 2023-09-21 DIAGNOSIS — T82.7XXD INFECTION AND INFLAMMATORY REACTION DUE TO CARDIAC DEVICE, IMPLANT, AND GRAFT, SUBSEQUENT ENCOUNTER: ICD-10-CM

## 2023-09-21 PROBLEM — I63.9 CEREBROVASCULAR ACCIDENT (CVA) DUE TO VASCULAR STENOSIS (HCC): Status: RESOLVED | Noted: 2019-11-20 | Resolved: 2023-09-21

## 2023-09-21 PROBLEM — E11.9 TYPE 2 DIABETES MELLITUS (HCC): Status: ACTIVE | Noted: 2023-09-21

## 2023-09-21 PROCEDURE — G8427 DOCREV CUR MEDS BY ELIG CLIN: HCPCS | Performed by: NURSE PRACTITIONER

## 2023-09-21 PROCEDURE — 1036F TOBACCO NON-USER: CPT | Performed by: NURSE PRACTITIONER

## 2023-09-21 PROCEDURE — 99214 OFFICE O/P EST MOD 30 MIN: CPT | Performed by: NURSE PRACTITIONER

## 2023-09-21 PROCEDURE — G8417 CALC BMI ABV UP PARAM F/U: HCPCS | Performed by: NURSE PRACTITIONER

## 2023-09-21 PROCEDURE — 1123F ACP DISCUSS/DSCN MKR DOCD: CPT | Performed by: NURSE PRACTITIONER

## 2023-09-21 PROCEDURE — 1090F PRES/ABSN URINE INCON ASSESS: CPT | Performed by: NURSE PRACTITIONER

## 2023-09-21 RX ORDER — TORSEMIDE 20 MG/1
20 TABLET ORAL DAILY
COMMUNITY

## 2023-09-21 RX ORDER — ACYCLOVIR 400 MG/1
400 TABLET ORAL 3 TIMES DAILY
Qty: 30 TABLET | Refills: 0 | Status: SHIPPED | OUTPATIENT
Start: 2023-09-21 | End: 2023-10-01

## 2023-09-21 RX ORDER — AMOXICILLIN 500 MG/1
500 CAPSULE ORAL 3 TIMES DAILY
Qty: 270 CAPSULE | Refills: 0 | Status: SHIPPED | OUTPATIENT
Start: 2023-09-21 | End: 2023-12-20

## 2023-09-21 ASSESSMENT — ENCOUNTER SYMPTOMS
COUGH: 0
SHORTNESS OF BREATH: 0

## 2023-09-21 NOTE — PROGRESS NOTES
Echocardiogram: 8/10/2023   Summary   Left ventricular systolic function is normal.   Ejection fraction is visually estimated at 55-60%. Slight apical ballooning noted. Mildly dilated left atrium. PPM wiring visualized in right heart. S/p TAVR (#29 Watson Nancy 3) with moderate perivalvular leak. Moderate to severe mitral regurgitation. Moderate tricuspid regurgitation; RVSP: 52 mmHg. No evidence of any pericardial effusion. Atherosclerotic plaque noted in the abdominal aorta. VIKTORIA: 8/2023  Summary   Left ventricular systolic function is normal.   Ejection fraction is visually estimated at 55-60%   PPM wiring visualized within the right heart. S/p TAVR (#29 Watson Nancy 3) with moderate perivalvular leak. New finding suspicious for endocarditis found near aortic valve annulus. Valvular dehiscence and likely small abscess noted. Moderate mitral regurgitation. Moderate tricuspid regurgitation is present. ASSESSMENT/PLAN:  Acute on chronic CHF  Appears improved but still slightly overloaded. Low sodium diet  Restrict fluids to approximately 2 L   Continue jardiance 10 mg Daily and torsimide 20 mg Daily. CHB (complete heart block) (HCC) SP PPM  Stable per hospital interrogation. Continue to monitor and send transmissions to [facility]. Will check in office here if needed  Essential hypertension  Controlled. Reviewed risk of hypotension increased risk of falls and safety with daughter. Will continue current medications at this time. Other hyperlipidemia  Stable   Continue lovastatin   Cerebrovascular accident (CVA) due to stenosis of anterior cerebral artery, unspecified blood vessel laterality (HCC)  Stable. Continue lovastatin and ASA. Stage 3b chronic kidney disease ([address])  Likely cardiorenal   We discussed balance. If continues to be elevated after diuresis will send to nephrology, daughter is agreeable. Return in about 1 month (around 10/21/2023).       An

## 2023-09-21 NOTE — PROGRESS NOTES
9/24/2023         Referring Physician: No ref. provider found  Primary Care Physician: Yoshi Smith MD    Impression/Plan:   Diagnosis Orders   1. S/P AVR (aortic valve replacement)  amoxicillin (AMOXIL) 500 MG capsule      2. Bacteremia due to Streptococcus        3. Infection and inflammatory reaction due to cardiac device, implant, and graft, subsequent encounter  C-Reactive Protein    CBC    Basic Metabolic Panel    Hepatic Function Panel    Sedimentation Rate      4. Receiving intravenous antibiotic treatment as outpatient        5. Herpes zoster with ophthalmic complication, unspecified herpes zoster eye disease  acyclovir (ZOVIRAX) 400 MG tablet          Discussion:  Infection and inflammatory reaction due to cardiac device, implant, and graft (HCC)  CRP on a dwt, yet to be within normal limits. HZO simultaneously present. Completed ceftriaxone and ampicillin. Start chronic suppression with amoxicillin. Check labs. Herpes zoster with ophthalmic complication  Left forehead. Start acyclovir. Recommend ophthalmologic examination. Return in about 2 weeks (around 10/5/2023). 38 minutes was spent in the encounter; more than 50% of the face-to-face time was spent with the patient providing counseling and coordination of care. History: Yolanda Winston is a 80 y.o.  female presenting today for follow up. 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 Treated with intravenous cefazolin but did not follow-up as an outpatient. She was seen during 8/8/2023 admission for severe sepsis secondary to Enterococcus faecalis bacteremia. She had presented with altered mental status, her blood cultures were positive for Enterococcus faecalis. She had persistent Enterococcus faecalis bacteremia as cultures done on 2 occasions after the initial blood culture was positive for Enterococcus faecalis.   She

## 2023-09-24 PROBLEM — B02.30 HERPES ZOSTER WITH OPHTHALMIC COMPLICATION: Status: ACTIVE | Noted: 2023-09-24

## 2023-09-25 ENCOUNTER — APPOINTMENT (OUTPATIENT)
Dept: CT IMAGING | Age: 88
DRG: 377 | End: 2023-09-25
Payer: MEDICARE

## 2023-09-25 ENCOUNTER — APPOINTMENT (OUTPATIENT)
Dept: GENERAL RADIOLOGY | Age: 88
DRG: 377 | End: 2023-09-25
Payer: MEDICARE

## 2023-09-25 ENCOUNTER — HOSPITAL ENCOUNTER (INPATIENT)
Age: 88
LOS: 7 days | Discharge: NURSING, REHAB OR LONG-TERM CARE | DRG: 377 | End: 2023-10-02
Attending: STUDENT IN AN ORGANIZED HEALTH CARE EDUCATION/TRAINING PROGRAM | Admitting: STUDENT IN AN ORGANIZED HEALTH CARE EDUCATION/TRAINING PROGRAM
Payer: MEDICARE

## 2023-09-25 DIAGNOSIS — R79.89 TROPONIN I ABOVE REFERENCE RANGE: ICD-10-CM

## 2023-09-25 DIAGNOSIS — E87.5 HYPERKALEMIA: ICD-10-CM

## 2023-09-25 DIAGNOSIS — B02.30 HERPES ZOSTER WITH OPHTHALMIC COMPLICATION, UNSPECIFIED HERPES ZOSTER EYE DISEASE: ICD-10-CM

## 2023-09-25 DIAGNOSIS — R41.82 ALTERED MENTAL STATUS, UNSPECIFIED ALTERED MENTAL STATUS TYPE: Primary | ICD-10-CM

## 2023-09-25 DIAGNOSIS — R79.89 ELEVATED BRAIN NATRIURETIC PEPTIDE (BNP) LEVEL: ICD-10-CM

## 2023-09-25 DIAGNOSIS — K92.2 GASTROINTESTINAL HEMORRHAGE, UNSPECIFIED GASTROINTESTINAL HEMORRHAGE TYPE: ICD-10-CM

## 2023-09-25 LAB
ALBUMIN SERPL-MCNC: 3.1 GM/DL (ref 3.4–5)
ALP BLD-CCNC: 147 IU/L (ref 40–129)
ALT SERPL-CCNC: 13 U/L (ref 10–40)
ANION GAP SERPL CALCULATED.3IONS-SCNC: 10 MMOL/L (ref 4–16)
APTT: 29.2 SECONDS (ref 25.1–37.1)
AST SERPL-CCNC: 25 IU/L (ref 15–37)
BASOPHILS ABSOLUTE: 0.1 K/CU MM
BASOPHILS RELATIVE PERCENT: 0.5 % (ref 0–1)
BILIRUB SERPL-MCNC: 0.4 MG/DL (ref 0–1)
BUN SERPL-MCNC: 89 MG/DL (ref 6–23)
CALCIUM SERPL-MCNC: 9.5 MG/DL (ref 8.3–10.6)
CHLORIDE BLD-SCNC: 99 MMOL/L (ref 99–110)
CHP ED QC CHECK: YES
CHP ED QC CHECK: YES
CO2: 25 MMOL/L (ref 21–32)
CREAT SERPL-MCNC: 1.7 MG/DL (ref 0.6–1.1)
DIFFERENTIAL TYPE: ABNORMAL
EOSINOPHILS ABSOLUTE: 0.2 K/CU MM
EOSINOPHILS RELATIVE PERCENT: 1.5 % (ref 0–3)
GFR SERPL CREATININE-BSD FRML MDRD: 28 ML/MIN/1.73M2
GLUCOSE BLD-MCNC: 103 MG/DL
GLUCOSE BLD-MCNC: 103 MG/DL (ref 70–99)
GLUCOSE BLD-MCNC: 105 MG/DL (ref 70–99)
GLUCOSE BLD-MCNC: 136 MG/DL
GLUCOSE BLD-MCNC: 136 MG/DL (ref 70–99)
GLUCOSE SERPL-MCNC: 101 MG/DL (ref 70–99)
HCT VFR BLD CALC: 26.5 % (ref 37–47)
HEMOGLOBIN: 8.1 GM/DL (ref 12.5–16)
IMMATURE NEUTROPHIL %: 0.6 % (ref 0–0.43)
INR BLD: 1.3 INDEX
LACTIC ACID, SEPSIS: 1.2 MMOL/L (ref 0.5–1.9)
LACTIC ACID, SEPSIS: 1.2 MMOL/L (ref 0.5–1.9)
LIPASE: 51 IU/L (ref 13–60)
LYMPHOCYTES ABSOLUTE: 1.1 K/CU MM
LYMPHOCYTES RELATIVE PERCENT: 9.5 % (ref 24–44)
MCH RBC QN AUTO: 29.9 PG (ref 27–31)
MCHC RBC AUTO-ENTMCNC: 30.6 % (ref 32–36)
MCV RBC AUTO: 97.8 FL (ref 78–100)
MONOCYTES ABSOLUTE: 1.4 K/CU MM
MONOCYTES RELATIVE PERCENT: 12.5 % (ref 0–4)
NUCLEATED RBC %: 0 %
PDW BLD-RTO: 16.9 % (ref 11.7–14.9)
PLATELET # BLD: 284 K/CU MM (ref 140–440)
PMV BLD AUTO: 9 FL (ref 7.5–11.1)
POTASSIUM SERPL-SCNC: 6.1 MMOL/L (ref 3.5–5.1)
PRO-BNP: ABNORMAL PG/ML
PROTHROMBIN TIME: 16 SECONDS (ref 11.7–14.5)
RBC # BLD: 2.71 M/CU MM (ref 4.2–5.4)
SEGMENTED NEUTROPHILS ABSOLUTE COUNT: 8.7 K/CU MM
SEGMENTED NEUTROPHILS RELATIVE PERCENT: 75.4 % (ref 36–66)
SODIUM BLD-SCNC: 134 MMOL/L (ref 135–145)
TOTAL CK: 217 IU/L (ref 26–140)
TOTAL IMMATURE NEUTOROPHIL: 0.07 K/CU MM
TOTAL NUCLEATED RBC: 0 K/CU MM
TOTAL PROTEIN: 6.7 GM/DL (ref 6.4–8.2)
TROPONIN T: 0.03 NG/ML
WBC # BLD: 11.5 K/CU MM (ref 4–10.5)

## 2023-09-25 PROCEDURE — 2500000003 HC RX 250 WO HCPCS: Performed by: PHYSICIAN ASSISTANT

## 2023-09-25 PROCEDURE — 2580000003 HC RX 258: Performed by: STUDENT IN AN ORGANIZED HEALTH CARE EDUCATION/TRAINING PROGRAM

## 2023-09-25 PROCEDURE — 6360000002 HC RX W HCPCS: Performed by: STUDENT IN AN ORGANIZED HEALTH CARE EDUCATION/TRAINING PROGRAM

## 2023-09-25 PROCEDURE — 36415 COLL VENOUS BLD VENIPUNCTURE: CPT

## 2023-09-25 PROCEDURE — 87040 BLOOD CULTURE FOR BACTERIA: CPT

## 2023-09-25 PROCEDURE — 96376 TX/PRO/DX INJ SAME DRUG ADON: CPT

## 2023-09-25 PROCEDURE — 72125 CT NECK SPINE W/O DYE: CPT

## 2023-09-25 PROCEDURE — 83036 HEMOGLOBIN GLYCOSYLATED A1C: CPT

## 2023-09-25 PROCEDURE — 86901 BLOOD TYPING SEROLOGIC RH(D): CPT

## 2023-09-25 PROCEDURE — 85730 THROMBOPLASTIN TIME PARTIAL: CPT

## 2023-09-25 PROCEDURE — 84484 ASSAY OF TROPONIN QUANT: CPT

## 2023-09-25 PROCEDURE — C9113 INJ PANTOPRAZOLE SODIUM, VIA: HCPCS | Performed by: STUDENT IN AN ORGANIZED HEALTH CARE EDUCATION/TRAINING PROGRAM

## 2023-09-25 PROCEDURE — 86850 RBC ANTIBODY SCREEN: CPT

## 2023-09-25 PROCEDURE — 71045 X-RAY EXAM CHEST 1 VIEW: CPT

## 2023-09-25 PROCEDURE — 74176 CT ABD & PELVIS W/O CONTRAST: CPT

## 2023-09-25 PROCEDURE — 83880 ASSAY OF NATRIURETIC PEPTIDE: CPT

## 2023-09-25 PROCEDURE — 82550 ASSAY OF CK (CPK): CPT

## 2023-09-25 PROCEDURE — 83605 ASSAY OF LACTIC ACID: CPT

## 2023-09-25 PROCEDURE — 6360000002 HC RX W HCPCS: Performed by: PHYSICIAN ASSISTANT

## 2023-09-25 PROCEDURE — 93005 ELECTROCARDIOGRAM TRACING: CPT | Performed by: PHYSICIAN ASSISTANT

## 2023-09-25 PROCEDURE — 96375 TX/PRO/DX INJ NEW DRUG ADDON: CPT

## 2023-09-25 PROCEDURE — 2580000003 HC RX 258: Performed by: PHYSICIAN ASSISTANT

## 2023-09-25 PROCEDURE — 86900 BLOOD TYPING SEROLOGIC ABO: CPT

## 2023-09-25 PROCEDURE — 80053 COMPREHEN METABOLIC PANEL: CPT

## 2023-09-25 PROCEDURE — 6370000000 HC RX 637 (ALT 250 FOR IP): Performed by: PHYSICIAN ASSISTANT

## 2023-09-25 PROCEDURE — 1200000000 HC SEMI PRIVATE

## 2023-09-25 PROCEDURE — 87086 URINE CULTURE/COLONY COUNT: CPT

## 2023-09-25 PROCEDURE — 6370000000 HC RX 637 (ALT 250 FOR IP): Performed by: STUDENT IN AN ORGANIZED HEALTH CARE EDUCATION/TRAINING PROGRAM

## 2023-09-25 PROCEDURE — 96374 THER/PROPH/DIAG INJ IV PUSH: CPT

## 2023-09-25 PROCEDURE — 82962 GLUCOSE BLOOD TEST: CPT

## 2023-09-25 PROCEDURE — C9113 INJ PANTOPRAZOLE SODIUM, VIA: HCPCS | Performed by: PHYSICIAN ASSISTANT

## 2023-09-25 PROCEDURE — 85610 PROTHROMBIN TIME: CPT

## 2023-09-25 PROCEDURE — 81003 URINALYSIS AUTO W/O SCOPE: CPT

## 2023-09-25 PROCEDURE — 99285 EMERGENCY DEPT VISIT HI MDM: CPT

## 2023-09-25 PROCEDURE — 70450 CT HEAD/BRAIN W/O DYE: CPT

## 2023-09-25 PROCEDURE — 83690 ASSAY OF LIPASE: CPT

## 2023-09-25 PROCEDURE — 85025 COMPLETE CBC W/AUTO DIFF WBC: CPT

## 2023-09-25 PROCEDURE — 86922 COMPATIBILITY TEST ANTIGLOB: CPT

## 2023-09-25 RX ORDER — LISINOPRIL 5 MG/1
2.5 TABLET ORAL DAILY
Status: DISCONTINUED | OUTPATIENT
Start: 2023-09-25 | End: 2023-10-02 | Stop reason: HOSPADM

## 2023-09-25 RX ORDER — SODIUM CHLORIDE, SODIUM LACTATE, POTASSIUM CHLORIDE, CALCIUM CHLORIDE 600; 310; 30; 20 MG/100ML; MG/100ML; MG/100ML; MG/100ML
INJECTION, SOLUTION INTRAVENOUS CONTINUOUS
Status: DISCONTINUED | OUTPATIENT
Start: 2023-09-25 | End: 2023-10-02 | Stop reason: HOSPADM

## 2023-09-25 RX ORDER — DEXTROSE MONOHYDRATE 100 MG/ML
INJECTION, SOLUTION INTRAVENOUS CONTINUOUS PRN
Status: DISCONTINUED | OUTPATIENT
Start: 2023-09-25 | End: 2023-10-02 | Stop reason: HOSPADM

## 2023-09-25 RX ORDER — SODIUM CHLORIDE 0.9 % (FLUSH) 0.9 %
5-40 SYRINGE (ML) INJECTION EVERY 12 HOURS SCHEDULED
Status: DISCONTINUED | OUTPATIENT
Start: 2023-09-25 | End: 2023-10-02 | Stop reason: HOSPADM

## 2023-09-25 RX ORDER — DIPHENHYDRAMINE HYDROCHLORIDE 50 MG/ML
25 INJECTION INTRAMUSCULAR; INTRAVENOUS ONCE
Status: COMPLETED | OUTPATIENT
Start: 2023-09-25 | End: 2023-09-25

## 2023-09-25 RX ORDER — GLUCAGON 1 MG/ML
1 KIT INJECTION PRN
Status: DISCONTINUED | OUTPATIENT
Start: 2023-09-25 | End: 2023-09-25 | Stop reason: SDUPTHER

## 2023-09-25 RX ORDER — TAMSULOSIN HYDROCHLORIDE 0.4 MG/1
0.4 CAPSULE ORAL NIGHTLY
Status: DISCONTINUED | OUTPATIENT
Start: 2023-09-25 | End: 2023-10-02 | Stop reason: HOSPADM

## 2023-09-25 RX ORDER — DEXTROSE MONOHYDRATE 25 G/50ML
25 INJECTION, SOLUTION INTRAVENOUS ONCE
Status: COMPLETED | OUTPATIENT
Start: 2023-09-25 | End: 2023-09-25

## 2023-09-25 RX ORDER — TORSEMIDE 20 MG/1
20 TABLET ORAL DAILY
Status: DISCONTINUED | OUTPATIENT
Start: 2023-09-25 | End: 2023-10-02 | Stop reason: HOSPADM

## 2023-09-25 RX ORDER — ONDANSETRON 4 MG/1
4 TABLET, ORALLY DISINTEGRATING ORAL EVERY 8 HOURS PRN
Status: DISCONTINUED | OUTPATIENT
Start: 2023-09-25 | End: 2023-10-02 | Stop reason: HOSPADM

## 2023-09-25 RX ORDER — SPIRONOLACTONE 50 MG/1
25 TABLET, FILM COATED ORAL DAILY
Status: DISCONTINUED | OUTPATIENT
Start: 2023-09-25 | End: 2023-10-02 | Stop reason: HOSPADM

## 2023-09-25 RX ORDER — LORAZEPAM 2 MG/ML
0.5 INJECTION INTRAMUSCULAR ONCE
Status: COMPLETED | OUTPATIENT
Start: 2023-09-25 | End: 2023-09-25

## 2023-09-25 RX ORDER — INSULIN LISPRO 100 [IU]/ML
0-4 INJECTION, SOLUTION INTRAVENOUS; SUBCUTANEOUS
Status: DISCONTINUED | OUTPATIENT
Start: 2023-09-26 | End: 2023-10-02 | Stop reason: HOSPADM

## 2023-09-25 RX ORDER — DEXTROSE MONOHYDRATE 100 MG/ML
INJECTION, SOLUTION INTRAVENOUS CONTINUOUS PRN
Status: DISCONTINUED | OUTPATIENT
Start: 2023-09-25 | End: 2023-09-25 | Stop reason: SDUPTHER

## 2023-09-25 RX ORDER — FUROSEMIDE 10 MG/ML
40 INJECTION INTRAMUSCULAR; INTRAVENOUS ONCE
Status: COMPLETED | OUTPATIENT
Start: 2023-09-25 | End: 2023-09-25

## 2023-09-25 RX ORDER — ACETAMINOPHEN 650 MG/1
650 SUPPOSITORY RECTAL EVERY 6 HOURS PRN
Status: DISCONTINUED | OUTPATIENT
Start: 2023-09-25 | End: 2023-10-02 | Stop reason: HOSPADM

## 2023-09-25 RX ORDER — SODIUM CHLORIDE 0.9 % (FLUSH) 0.9 %
5-40 SYRINGE (ML) INJECTION PRN
Status: DISCONTINUED | OUTPATIENT
Start: 2023-09-25 | End: 2023-10-02 | Stop reason: HOSPADM

## 2023-09-25 RX ORDER — ACETAMINOPHEN 325 MG/1
650 TABLET ORAL EVERY 4 HOURS PRN
Status: DISCONTINUED | OUTPATIENT
Start: 2023-09-25 | End: 2023-09-25 | Stop reason: SDUPTHER

## 2023-09-25 RX ORDER — GLUCAGON 1 MG/ML
1 KIT INJECTION PRN
Status: DISCONTINUED | OUTPATIENT
Start: 2023-09-25 | End: 2023-10-02 | Stop reason: HOSPADM

## 2023-09-25 RX ORDER — ONDANSETRON 2 MG/ML
4 INJECTION INTRAMUSCULAR; INTRAVENOUS EVERY 6 HOURS PRN
Status: DISCONTINUED | OUTPATIENT
Start: 2023-09-25 | End: 2023-10-02 | Stop reason: HOSPADM

## 2023-09-25 RX ORDER — INSULIN LISPRO 100 [IU]/ML
0-4 INJECTION, SOLUTION INTRAVENOUS; SUBCUTANEOUS NIGHTLY
Status: DISCONTINUED | OUTPATIENT
Start: 2023-09-25 | End: 2023-10-02 | Stop reason: HOSPADM

## 2023-09-25 RX ORDER — POLYETHYLENE GLYCOL 3350 17 G/17G
17 POWDER, FOR SOLUTION ORAL DAILY PRN
Status: DISCONTINUED | OUTPATIENT
Start: 2023-09-25 | End: 2023-10-02 | Stop reason: HOSPADM

## 2023-09-25 RX ORDER — ACETAMINOPHEN 325 MG/1
650 TABLET ORAL EVERY 6 HOURS PRN
Status: DISCONTINUED | OUTPATIENT
Start: 2023-09-25 | End: 2023-10-02 | Stop reason: HOSPADM

## 2023-09-25 RX ORDER — PANTOPRAZOLE SODIUM 40 MG/10ML
40 INJECTION, POWDER, LYOPHILIZED, FOR SOLUTION INTRAVENOUS ONCE
Status: COMPLETED | OUTPATIENT
Start: 2023-09-25 | End: 2023-09-25

## 2023-09-25 RX ORDER — SODIUM CHLORIDE 9 MG/ML
INJECTION, SOLUTION INTRAVENOUS PRN
Status: DISCONTINUED | OUTPATIENT
Start: 2023-09-25 | End: 2023-10-02 | Stop reason: HOSPADM

## 2023-09-25 RX ORDER — 0.9 % SODIUM CHLORIDE 0.9 %
1000 INTRAVENOUS SOLUTION INTRAVENOUS ONCE
Status: COMPLETED | OUTPATIENT
Start: 2023-09-25 | End: 2023-09-25

## 2023-09-25 RX ORDER — IPRATROPIUM BROMIDE AND ALBUTEROL SULFATE 2.5; .5 MG/3ML; MG/3ML
1 SOLUTION RESPIRATORY (INHALATION) 3 TIMES DAILY
Status: DISCONTINUED | OUTPATIENT
Start: 2023-09-25 | End: 2023-09-26

## 2023-09-25 RX ORDER — PANTOPRAZOLE SODIUM 40 MG/10ML
40 INJECTION, POWDER, LYOPHILIZED, FOR SOLUTION INTRAVENOUS DAILY
Status: DISCONTINUED | OUTPATIENT
Start: 2023-09-25 | End: 2023-09-30

## 2023-09-25 RX ORDER — METOPROLOL SUCCINATE 25 MG/1
25 TABLET, EXTENDED RELEASE ORAL DAILY
Status: DISCONTINUED | OUTPATIENT
Start: 2023-09-25 | End: 2023-10-02 | Stop reason: HOSPADM

## 2023-09-25 RX ADMIN — FUROSEMIDE 40 MG: 10 INJECTION, SOLUTION INTRAMUSCULAR; INTRAVENOUS at 16:33

## 2023-09-25 RX ADMIN — INSULIN HUMAN 10 UNITS: 100 INJECTION, SOLUTION PARENTERAL at 16:33

## 2023-09-25 RX ADMIN — INSULIN HUMAN 10 UNITS: 100 INJECTION, SOLUTION PARENTERAL at 22:37

## 2023-09-25 RX ADMIN — SODIUM BICARBONATE 50 MEQ: 84 INJECTION, SOLUTION INTRAVENOUS at 16:33

## 2023-09-25 RX ADMIN — PANTOPRAZOLE SODIUM 40 MG: 40 INJECTION, POWDER, FOR SOLUTION INTRAVENOUS at 15:05

## 2023-09-25 RX ADMIN — DEXTROSE MONOHYDRATE 25 G: 25 INJECTION, SOLUTION INTRAVENOUS at 16:32

## 2023-09-25 RX ADMIN — DEXTROSE MONOHYDRATE 250 ML: 100 INJECTION, SOLUTION INTRAVENOUS at 22:43

## 2023-09-25 RX ADMIN — DIPHENHYDRAMINE HYDROCHLORIDE 25 MG: 50 INJECTION, SOLUTION INTRAMUSCULAR; INTRAVENOUS at 15:05

## 2023-09-25 RX ADMIN — SODIUM CHLORIDE 1000 ML: 9 INJECTION, SOLUTION INTRAVENOUS at 15:06

## 2023-09-25 RX ADMIN — SODIUM CHLORIDE, POTASSIUM CHLORIDE, SODIUM LACTATE AND CALCIUM CHLORIDE: 600; 310; 30; 20 INJECTION, SOLUTION INTRAVENOUS at 21:11

## 2023-09-25 RX ADMIN — SODIUM CHLORIDE, PRESERVATIVE FREE 10 ML: 5 INJECTION INTRAVENOUS at 22:47

## 2023-09-25 RX ADMIN — LORAZEPAM 0.5 MG: 2 INJECTION INTRAMUSCULAR; INTRAVENOUS at 17:05

## 2023-09-25 RX ADMIN — LORAZEPAM 0.5 MG: 2 INJECTION INTRAMUSCULAR; INTRAVENOUS at 15:36

## 2023-09-25 RX ADMIN — PANTOPRAZOLE SODIUM 40 MG: 40 INJECTION, POWDER, FOR SOLUTION INTRAVENOUS at 22:47

## 2023-09-25 ASSESSMENT — PAIN DESCRIPTION - LOCATION: LOCATION: ABDOMEN

## 2023-09-25 ASSESSMENT — PAIN SCALES - WONG BAKER: WONGBAKER_NUMERICALRESPONSE: 4

## 2023-09-25 ASSESSMENT — PAIN - FUNCTIONAL ASSESSMENT: PAIN_FUNCTIONAL_ASSESSMENT: WONG-BAKER FACES

## 2023-09-25 NOTE — ED PROVIDER NOTES
EMERGENCY DEPARTMENT ENCOUNTER        Pt Name: Irma Torres  MRN: [id number]  [address] [facility] 5/15/1929  Date of evaluation: 9/25/2023  Provider: JOSÉ LUIS Wilson  PCP: Anila Lassiter MD    MIKEY. I have evaluated this patient. Triage CHIEF COMPLAINT       Chief Complaint   Patient presents with    Fall     Apparent head injury. Wrapped at this time. 2 falls this morning     Rectal Bleeding     Reports tarry stool     Abdominal Pain         HISTORY OF PRESENT ILLNESS      Chief Complaint: Altered mental status, generalized weakness, fall, rectal bleeding, generalized abdominal pain    Irma Torres is a 80 y.o.  female who presents to the emergency department today from [facility] for concern of altered mental status, falls, dark, melena stools, abdominal pain, weakness. Majority of history coming from daughter, states that [facility] patient had sustained a fall this morning. She has had 2 falls to their knowledge. Has felt more generally weak, they feel that she is more confused than her baseline. They have also noticed that she has had dark-colored stools and is complaining of abdominal pain and discomfort. Patient has had a complex medical history of late, has had history of sepsis, has a pacemaker, is on Plavix. Has had urinary tract infection, has also had a recent shingles infection to the forehead, seen infectious disease, has been on acyclovir. Patient is only alert to name but not time place and or situation. Has stable vital signs. No active pain complaints    Nursing Notes were all reviewed and agreed with or any disagreements were addressed in the HPI. REVIEW OF SYSTEMS     At least 10 systems reviewed and otherwise acutely negative except as in the [address]      PAST MEDICAL HISTORY     Past Medical History:   Diagnosis Date    Aphasia        SURGICAL HISTORY     Past Surgical History:   Procedure

## 2023-09-25 NOTE — ED NOTES
3732 ready bed      Tina Schmitt Minus  09/25/23 1936
Unable to reach charge nurse to notify [facility] is in     [address]  09/25/23 1372
Administration: no  If Yes, please provide details:     Recommendation    Incomplete orders   Additional Comments:    If any further questions, please call Sending RN at [phone]    Electronically signed by: Electronically signed by Gris Haider RN on 9/25/2023 at [address] [place] [address], RN  09/25/23 8135

## 2023-09-25 NOTE — H&P
tablet by mouth daily    Historical Provider, MD   ferrous sulfate 325 (65 Fe) MG tablet Take 1 tablet by mouth daily (with breakfast)    Historical Provider, MD   Multiple Vitamins-Minerals (MULTIVITAMIN ADULT PO) Take 1 tablet by mouth daily    Historical Provider, MD   Cholecalciferol (VITAMIN D3) 5000 units TABS Take 1 tablet by mouth daily    Historical Provider, MD       Physical Exam: Need 8 Elements   Physical Exam  Vitals reviewed. Constitutional:       Appearance: Normal appearance. She is normal weight. HENT:      Head: Normocephalic. Right Ear: Tympanic membrane normal.      Left Ear: Tympanic membrane normal.      Nose: Nose normal.      Mouth/Throat:      Mouth: Mucous membranes are dry. Eyes:      Conjunctiva/sclera: Conjunctivae normal.      Pupils: Pupils are equal, round, and reactive to light. Cardiovascular:      Rate and Rhythm: Normal rate and regular rhythm. Pulses: Normal pulses. Heart sounds: Normal heart sounds. No murmur heard. Pulmonary:      Effort: Pulmonary effort is normal.      Breath sounds: Normal breath sounds. No wheezing, rhonchi or rales. Abdominal:      General: Abdomen is flat. Bowel sounds are normal. There is no distension. Palpations: Abdomen is soft. Tenderness: There is abdominal tenderness. Musculoskeletal:         General: No deformity. Normal range of motion. Cervical back: Normal range of motion and neck supple. Right lower leg: No edema. Left lower leg: No edema. Skin:     General: Skin is dry. Coloration: Skin is pale. Skin is not jaundiced. Findings: Bruising, erythema and rash present. Neurological:      General: No focal deficit present. Mental Status: She is alert. Mental status is at baseline. She is disoriented. Motor: Weakness present.    Psychiatric:         Mood and Affect: Mood normal.         Behavior: Behavior normal.            Past History:   PMHx   Past Medical History:

## 2023-09-26 LAB
ANION GAP SERPL CALCULATED.3IONS-SCNC: 13 MMOL/L (ref 4–16)
BUN SERPL-MCNC: 106 MG/DL (ref 6–23)
CALCIUM SERPL-MCNC: 8.5 MG/DL (ref 8.3–10.6)
CHLORIDE BLD-SCNC: 104 MMOL/L (ref 99–110)
CO2: 22 MMOL/L (ref 21–32)
CREAT SERPL-MCNC: 1.8 MG/DL (ref 0.6–1.1)
EKG ATRIAL RATE: 80 BPM
EKG DIAGNOSIS: NORMAL
EKG P AXIS: 48 DEGREES
EKG P-R INTERVAL: 106 MS
EKG Q-T INTERVAL: 410 MS
EKG QRS DURATION: 130 MS
EKG QTC CALCULATION (BAZETT): 472 MS
EKG R AXIS: -67 DEGREES
EKG T AXIS: 64 DEGREES
EKG VENTRICULAR RATE: 80 BPM
GFR SERPL CREATININE-BSD FRML MDRD: 26 ML/MIN/1.73M2
GLUCOSE BLD-MCNC: 106 MG/DL (ref 70–99)
GLUCOSE BLD-MCNC: 107 MG/DL (ref 70–99)
GLUCOSE BLD-MCNC: 109 MG/DL (ref 70–99)
GLUCOSE BLD-MCNC: 154 MG/DL (ref 70–99)
GLUCOSE BLD-MCNC: 99 MG/DL (ref 70–99)
GLUCOSE SERPL-MCNC: 149 MG/DL (ref 70–99)
HCT VFR BLD CALC: 18.6 % (ref 37–47)
HCT VFR BLD CALC: 21.9 % (ref 37–47)
HCT VFR BLD CALC: 23.2 % (ref 37–47)
HEMOGLOBIN: 5.5 GM/DL (ref 12.5–16)
HEMOGLOBIN: 7 GM/DL (ref 12.5–16)
HEMOGLOBIN: 7.3 GM/DL (ref 12.5–16)
LACTIC ACID, SEPSIS: 1.1 MMOL/L (ref 0.5–1.9)
MAGNESIUM: 2.3 MG/DL (ref 1.8–2.4)
MCH RBC QN AUTO: 29.7 PG (ref 27–31)
MCH RBC QN AUTO: 30.2 PG (ref 27–31)
MCH RBC QN AUTO: 30.2 PG (ref 27–31)
MCHC RBC AUTO-ENTMCNC: 29.6 % (ref 32–36)
MCHC RBC AUTO-ENTMCNC: 31.5 % (ref 32–36)
MCHC RBC AUTO-ENTMCNC: 32 % (ref 32–36)
MCV RBC AUTO: 100.5 FL (ref 78–100)
MCV RBC AUTO: 94.4 FL (ref 78–100)
MCV RBC AUTO: 95.9 FL (ref 78–100)
PDW BLD-RTO: 16.5 % (ref 11.7–14.9)
PDW BLD-RTO: 17.2 % (ref 11.7–14.9)
PDW BLD-RTO: 17.7 % (ref 11.7–14.9)
PHOSPHORUS: 4 MG/DL (ref 2.5–4.9)
PLATELET # BLD: 197 K/CU MM (ref 140–440)
PLATELET # BLD: 199 K/CU MM (ref 140–440)
PLATELET # BLD: 209 K/CU MM (ref 140–440)
PMV BLD AUTO: 8.6 FL (ref 7.5–11.1)
PMV BLD AUTO: 9 FL (ref 7.5–11.1)
PMV BLD AUTO: 9.2 FL (ref 7.5–11.1)
POTASSIUM SERPL-SCNC: 4.8 MMOL/L (ref 3.5–5.1)
RBC # BLD: 1.85 M/CU MM (ref 4.2–5.4)
RBC # BLD: 2.32 M/CU MM (ref 4.2–5.4)
RBC # BLD: 2.42 M/CU MM (ref 4.2–5.4)
SODIUM BLD-SCNC: 139 MMOL/L (ref 135–145)
WBC # BLD: 11.1 K/CU MM (ref 4–10.5)
WBC # BLD: 12.7 K/CU MM (ref 4–10.5)
WBC # BLD: 12.9 K/CU MM (ref 4–10.5)

## 2023-09-26 PROCEDURE — 6370000000 HC RX 637 (ALT 250 FOR IP): Performed by: STUDENT IN AN ORGANIZED HEALTH CARE EDUCATION/TRAINING PROGRAM

## 2023-09-26 PROCEDURE — 85014 HEMATOCRIT: CPT

## 2023-09-26 PROCEDURE — C9113 INJ PANTOPRAZOLE SODIUM, VIA: HCPCS | Performed by: STUDENT IN AN ORGANIZED HEALTH CARE EDUCATION/TRAINING PROGRAM

## 2023-09-26 PROCEDURE — 94640 AIRWAY INHALATION TREATMENT: CPT

## 2023-09-26 PROCEDURE — 85027 COMPLETE CBC AUTOMATED: CPT

## 2023-09-26 PROCEDURE — P9016 RBC LEUKOCYTES REDUCED: HCPCS

## 2023-09-26 PROCEDURE — 83735 ASSAY OF MAGNESIUM: CPT

## 2023-09-26 PROCEDURE — 94761 N-INVAS EAR/PLS OXIMETRY MLT: CPT

## 2023-09-26 PROCEDURE — 99223 1ST HOSP IP/OBS HIGH 75: CPT | Performed by: INTERNAL MEDICINE

## 2023-09-26 PROCEDURE — 83605 ASSAY OF LACTIC ACID: CPT

## 2023-09-26 PROCEDURE — 36415 COLL VENOUS BLD VENIPUNCTURE: CPT

## 2023-09-26 PROCEDURE — 80048 BASIC METABOLIC PNL TOTAL CA: CPT

## 2023-09-26 PROCEDURE — 97530 THERAPEUTIC ACTIVITIES: CPT

## 2023-09-26 PROCEDURE — A4216 STERILE WATER/SALINE, 10 ML: HCPCS

## 2023-09-26 PROCEDURE — 93010 ELECTROCARDIOGRAM REPORT: CPT | Performed by: INTERNAL MEDICINE

## 2023-09-26 PROCEDURE — 36430 TRANSFUSION BLD/BLD COMPNT: CPT

## 2023-09-26 PROCEDURE — 2580000003 HC RX 258

## 2023-09-26 PROCEDURE — 82962 GLUCOSE BLOOD TEST: CPT

## 2023-09-26 PROCEDURE — 6360000002 HC RX W HCPCS: Performed by: STUDENT IN AN ORGANIZED HEALTH CARE EDUCATION/TRAINING PROGRAM

## 2023-09-26 PROCEDURE — 84100 ASSAY OF PHOSPHORUS: CPT

## 2023-09-26 PROCEDURE — 1200000000 HC SEMI PRIVATE

## 2023-09-26 PROCEDURE — 2580000003 HC RX 258: Performed by: STUDENT IN AN ORGANIZED HEALTH CARE EDUCATION/TRAINING PROGRAM

## 2023-09-26 PROCEDURE — 97162 PT EVAL MOD COMPLEX 30 MIN: CPT

## 2023-09-26 PROCEDURE — 97112 NEUROMUSCULAR REEDUCATION: CPT

## 2023-09-26 PROCEDURE — 85018 HEMOGLOBIN: CPT

## 2023-09-26 RX ORDER — IPRATROPIUM BROMIDE AND ALBUTEROL SULFATE 2.5; .5 MG/3ML; MG/3ML
1 SOLUTION RESPIRATORY (INHALATION)
Status: DISCONTINUED | OUTPATIENT
Start: 2023-09-26 | End: 2023-09-29

## 2023-09-26 RX ORDER — IPRATROPIUM BROMIDE AND ALBUTEROL SULFATE 2.5; .5 MG/3ML; MG/3ML
1 SOLUTION RESPIRATORY (INHALATION) EVERY 4 HOURS PRN
Status: DISCONTINUED | OUTPATIENT
Start: 2023-09-26 | End: 2023-10-02 | Stop reason: HOSPADM

## 2023-09-26 RX ORDER — SODIUM CHLORIDE 9 MG/ML
INJECTION INTRAVENOUS
Status: COMPLETED
Start: 2023-09-26 | End: 2023-09-26

## 2023-09-26 RX ORDER — SODIUM CHLORIDE 9 MG/ML
INJECTION, SOLUTION INTRAVENOUS PRN
Status: DISCONTINUED | OUTPATIENT
Start: 2023-09-26 | End: 2023-10-02 | Stop reason: HOSPADM

## 2023-09-26 RX ADMIN — TAMSULOSIN HYDROCHLORIDE 0.4 MG: 0.4 CAPSULE ORAL at 22:51

## 2023-09-26 RX ADMIN — METOPROLOL SUCCINATE 25 MG: 25 TABLET, EXTENDED RELEASE ORAL at 09:50

## 2023-09-26 RX ADMIN — PANTOPRAZOLE SODIUM 40 MG: 40 INJECTION, POWDER, FOR SOLUTION INTRAVENOUS at 14:31

## 2023-09-26 RX ADMIN — SODIUM CHLORIDE 10 ML: 9 INJECTION INTRAMUSCULAR; INTRAVENOUS; SUBCUTANEOUS at 14:32

## 2023-09-26 RX ADMIN — LISINOPRIL 2.5 MG: 5 TABLET ORAL at 09:50

## 2023-09-26 RX ADMIN — SODIUM CHLORIDE, PRESERVATIVE FREE 10 ML: 5 INJECTION INTRAVENOUS at 08:25

## 2023-09-26 RX ADMIN — TORSEMIDE 20 MG: 20 TABLET ORAL at 09:50

## 2023-09-26 RX ADMIN — IPRATROPIUM BROMIDE AND ALBUTEROL SULFATE 1 DOSE: 2.5; .5 SOLUTION RESPIRATORY (INHALATION) at 15:22

## 2023-09-26 NOTE — CONSENT
RN has discussed with the patient the rationale for blood component transfusion; its benefits in treating or preventing fatigue, organ damage, or death; and its risk which includes mild transfusion reactions, rare risk of blood borne infection, or more serious but rare reactions. RN has discussed the alternatives to transfusion, including the risk and consequences of not receiving transfusion. The patient had an opportunity to ask questions and had agreed to proceed with transfusion of blood components. ALEXANDR Bullock at bedside

## 2023-09-26 NOTE — PLAN OF CARE
Problem: Discharge Planning  Goal: Discharge to home or other facility with appropriate resources  9/26/2023 0215 by Maggy Roa RN  Outcome: Progressing     Problem: Safety - Adult  Goal: Free from fall injury  9/26/2023 1145 by Jarvis Salmeron  Outcome: Trdallas Murphy  9/26/2023 0215 by Maggy Roa RN  Outcome: Progressing     Problem: Pain  Goal: Verbalizes/displays adequate comfort level or baseline comfort level  9/26/2023 1145 by Jarvis Salmeron  Outcome: Trudell Murphy  9/26/2023 0215 by Maggy Roa RN  Outcome: Progressing     Problem: Skin/Tissue Integrity  Goal: Absence of new skin breakdown  Description: 1. Monitor for areas of redness and/or skin breakdown  2. Assess vascular access sites hourly  3. Every 4-6 hours minimum:  Change oxygen saturation probe site  4. Every 4-6 hours:  If on nasal continuous positive airway pressure, respiratory therapy assess nares and determine need for appliance change or resting period.   Outcome: Progressing

## 2023-09-26 NOTE — DISCHARGE INSTR - COC
Continuity of Care Form    Patient Name: Rose Delcid   :  5/15/1929  MRN:  [id number]    Admit date:  2023  Discharge date:  10/2/2023    Code Status Order: Limited   Advance Directives:     Admitting Physician:  Mardene Koyanagi, MD  PCP: Ambrosio Bailey MD    Discharging Nurse: CIELO [facility]ERS [facility] Unit/Room#: 3279/6641-G  Discharging Unit Phone Number: [phone]    Emergency Contact:   Extended Emergency Contact Information  Primary Emergency Contact: Rylee Swann ([address])  Home Phone: [phone]  Work Phone: [employer] Phone: [phone]  Relation: Child   needed? No  Secondary Emergency Contact: [address] Phone: [phone]  Work Phone: [phone]  Mobile Phone: [phone]  Relation: Child   needed?  No    Past Surgical History:  Past Surgical History:   Procedure Laterality Date    AORTIC VALVE SURGERY  2017    BRAIN SURGERY  2017    bleed from stroke    PACEMAKER PLACEMENT  2017       Immunization History:   Immunization History   Administered Date(s) Administered    Influenza A (I2J3-45) Vaccine PF IM 2010    Influenza Vaccine, unspecified formulation 2009, 10/01/2012, 10/10/2014    Influenza Virus Vaccine 2009, 10/01/2012, 10/10/2014, 2015    Influenza, FLUAD, (age 72 y+), Adjuvanted, 0.5mL 10/13/2020    Influenza, High Dose (Fluzone 65 yrs and older) 10/18/2016, 2017, 10/10/2018    Influenza, Triv, inactivated, subunit, adjuvanted, IM (Fluad 65 yrs and older) 2019    Pneumococcal, PCV-13, PREVNAR 13, (age 6w+), IM, 0.5mL 2014    Pneumococcal, PPSV23, PNEUMOVAX 23, (age 2y+), SC/IM, 0.5mL 2003, 2017    TDaP, ADACEL (age 6y-58y), BOOSTRIX (age 10y+), IM, 0.5mL 2012, 2017, 2017    Zoster Live (Zostavax) 2007    Zoster Recombinant (Shingrix) 2019       Active Problems:  Patient Active Problem List   Diagnosis Code

## 2023-09-26 NOTE — CARE COORDINATION
CM into see to pt. Pt is sleeping. CM called the MPOA who is dtrKecia to discuss the hospice consult. Dtr in agreement with [facility] hospice. CM called [facility] hospice with the referral. CM spoke  with April. CM called Shemar Powers at [facility] to discuss discharge planning. Per notes pt is from [facility]. LH  1500 CM informed per Shemar Powers that pt is skilled at [facility]. Pt can return whenever medically cleared. Pt will need a Covid only if SxS. When discharged pt will be going to SNF at [facility]  Notify family of discharge  Place Scripts, AVS, and Completed MANASA in envelope  Please call report to [phone]  Please call  Amrik Guidryer [phone]  Pt will need a covid only if SxS.  [address]

## 2023-09-27 LAB
ANION GAP SERPL CALCULATED.3IONS-SCNC: 14 MMOL/L (ref 4–16)
BACTERIA: NEGATIVE /HPF
BILIRUBIN URINE: NEGATIVE MG/DL
BLOOD, URINE: ABNORMAL
BUN SERPL-MCNC: 74 MG/DL (ref 6–23)
CALCIUM SERPL-MCNC: 9.3 MG/DL (ref 8.3–10.6)
CHLORIDE BLD-SCNC: 110 MMOL/L (ref 99–110)
CLARITY: CLEAR
CO2: 23 MMOL/L (ref 21–32)
COLOR: YELLOW
CREAT SERPL-MCNC: 1.5 MG/DL (ref 0.6–1.1)
ESTIMATED AVERAGE GLUCOSE: 105 MG/DL
GFR SERPL CREATININE-BSD FRML MDRD: 32 ML/MIN/1.73M2
GLUCOSE BLD-MCNC: 105 MG/DL (ref 70–99)
GLUCOSE BLD-MCNC: 115 MG/DL (ref 70–99)
GLUCOSE BLD-MCNC: 144 MG/DL (ref 70–99)
GLUCOSE SERPL-MCNC: 88 MG/DL (ref 70–99)
GLUCOSE, URINE: NEGATIVE MG/DL
HBA1C MFR BLD: 5.3 % (ref 4.2–6.3)
HCT VFR BLD CALC: 22.9 % (ref 37–47)
HCT VFR BLD CALC: 24.7 % (ref 37–47)
HCT VFR BLD CALC: 25.5 % (ref 37–47)
HEMOGLOBIN: 6.8 GM/DL (ref 12.5–16)
HEMOGLOBIN: 7.5 GM/DL (ref 12.5–16)
HEMOGLOBIN: 7.7 GM/DL (ref 12.5–16)
KETONES, URINE: NEGATIVE MG/DL
LEUKOCYTE ESTERASE, URINE: ABNORMAL
MAGNESIUM: 2.6 MG/DL (ref 1.8–2.4)
MCH RBC QN AUTO: 29.1 PG (ref 27–31)
MCH RBC QN AUTO: 29.6 PG (ref 27–31)
MCHC RBC AUTO-ENTMCNC: 29.7 % (ref 32–36)
MCHC RBC AUTO-ENTMCNC: 30.4 % (ref 32–36)
MCV RBC AUTO: 95.7 FL (ref 78–100)
MCV RBC AUTO: 99.6 FL (ref 78–100)
NITRITE URINE, QUANTITATIVE: NEGATIVE
PDW BLD-RTO: 18 % (ref 11.7–14.9)
PDW BLD-RTO: 19.1 % (ref 11.7–14.9)
PH, URINE: 7.5 (ref 5–8)
PHOSPHORUS: 4.2 MG/DL (ref 2.5–4.9)
PLATELET # BLD: 195 K/CU MM (ref 140–440)
PLATELET # BLD: 212 K/CU MM (ref 140–440)
PMV BLD AUTO: 8.6 FL (ref 7.5–11.1)
PMV BLD AUTO: 9.1 FL (ref 7.5–11.1)
POTASSIUM SERPL-SCNC: 4.3 MMOL/L (ref 3.5–5.1)
PROTEIN UA: ABNORMAL MG/DL
RBC # BLD: 2.3 M/CU MM (ref 4.2–5.4)
RBC # BLD: 2.58 M/CU MM (ref 4.2–5.4)
RBC URINE: 1 /HPF (ref 0–6)
SODIUM BLD-SCNC: 147 MMOL/L (ref 135–145)
SPECIFIC GRAVITY UA: 1.01 (ref 1–1.03)
TRICHOMONAS: NORMAL /HPF
UROBILINOGEN, URINE: 0.2 MG/DL (ref 0.2–1)
WBC # BLD: 12.1 K/CU MM (ref 4–10.5)
WBC # BLD: 12.2 K/CU MM (ref 4–10.5)
WBC UA: 1 /HPF (ref 0–5)

## 2023-09-27 PROCEDURE — 85014 HEMATOCRIT: CPT

## 2023-09-27 PROCEDURE — 2580000003 HC RX 258: Performed by: STUDENT IN AN ORGANIZED HEALTH CARE EDUCATION/TRAINING PROGRAM

## 2023-09-27 PROCEDURE — 80048 BASIC METABOLIC PNL TOTAL CA: CPT

## 2023-09-27 PROCEDURE — 85018 HEMOGLOBIN: CPT

## 2023-09-27 PROCEDURE — P9016 RBC LEUKOCYTES REDUCED: HCPCS

## 2023-09-27 PROCEDURE — 36430 TRANSFUSION BLD/BLD COMPNT: CPT

## 2023-09-27 PROCEDURE — 81001 URINALYSIS AUTO W/SCOPE: CPT

## 2023-09-27 PROCEDURE — 05HB33Z INSERTION OF INFUSION DEVICE INTO RIGHT BASILIC VEIN, PERCUTANEOUS APPROACH: ICD-10-PCS | Performed by: STUDENT IN AN ORGANIZED HEALTH CARE EDUCATION/TRAINING PROGRAM

## 2023-09-27 PROCEDURE — 84100 ASSAY OF PHOSPHORUS: CPT

## 2023-09-27 PROCEDURE — 82962 GLUCOSE BLOOD TEST: CPT

## 2023-09-27 PROCEDURE — 83735 ASSAY OF MAGNESIUM: CPT

## 2023-09-27 PROCEDURE — 6370000000 HC RX 637 (ALT 250 FOR IP): Performed by: STUDENT IN AN ORGANIZED HEALTH CARE EDUCATION/TRAINING PROGRAM

## 2023-09-27 PROCEDURE — 36410 VNPNXR 3YR/> PHY/QHP DX/THER: CPT

## 2023-09-27 PROCEDURE — 76937 US GUIDE VASCULAR ACCESS: CPT

## 2023-09-27 PROCEDURE — 94761 N-INVAS EAR/PLS OXIMETRY MLT: CPT

## 2023-09-27 PROCEDURE — 99232 SBSQ HOSP IP/OBS MODERATE 35: CPT | Performed by: INTERNAL MEDICINE

## 2023-09-27 PROCEDURE — 6370000000 HC RX 637 (ALT 250 FOR IP): Performed by: FAMILY MEDICINE

## 2023-09-27 PROCEDURE — 85027 COMPLETE CBC AUTOMATED: CPT

## 2023-09-27 PROCEDURE — 1200000000 HC SEMI PRIVATE

## 2023-09-27 PROCEDURE — 94640 AIRWAY INHALATION TREATMENT: CPT

## 2023-09-27 PROCEDURE — 36415 COLL VENOUS BLD VENIPUNCTURE: CPT

## 2023-09-27 RX ORDER — SODIUM CHLORIDE 9 MG/ML
INJECTION, SOLUTION INTRAVENOUS PRN
Status: DISCONTINUED | OUTPATIENT
Start: 2023-09-27 | End: 2023-10-02 | Stop reason: HOSPADM

## 2023-09-27 RX ORDER — HYDROXYZINE HYDROCHLORIDE 10 MG/1
10 TABLET, FILM COATED ORAL ONCE
Status: COMPLETED | OUTPATIENT
Start: 2023-09-27 | End: 2023-09-27

## 2023-09-27 RX ORDER — SODIUM CHLORIDE, SODIUM LACTATE, POTASSIUM CHLORIDE, AND CALCIUM CHLORIDE .6; .31; .03; .02 G/100ML; G/100ML; G/100ML; G/100ML
1000 INJECTION, SOLUTION INTRAVENOUS ONCE
Status: COMPLETED | OUTPATIENT
Start: 2023-09-27 | End: 2023-09-27

## 2023-09-27 RX ADMIN — SODIUM CHLORIDE, POTASSIUM CHLORIDE, SODIUM LACTATE AND CALCIUM CHLORIDE: 600; 310; 30; 20 INJECTION, SOLUTION INTRAVENOUS at 22:49

## 2023-09-27 RX ADMIN — SODIUM CHLORIDE, POTASSIUM CHLORIDE, SODIUM LACTATE AND CALCIUM CHLORIDE 1000 ML: 600; 310; 30; 20 INJECTION, SOLUTION INTRAVENOUS at 20:26

## 2023-09-27 RX ADMIN — HYDROXYZINE HYDROCHLORIDE 10 MG: 10 TABLET ORAL at 04:49

## 2023-09-27 RX ADMIN — IPRATROPIUM BROMIDE AND ALBUTEROL SULFATE 1 DOSE: 2.5; .5 SOLUTION RESPIRATORY (INHALATION) at 20:48

## 2023-09-27 RX ADMIN — IPRATROPIUM BROMIDE AND ALBUTEROL SULFATE 1 DOSE: 2.5; .5 SOLUTION RESPIRATORY (INHALATION) at 16:39

## 2023-09-27 RX ADMIN — IPRATROPIUM BROMIDE AND ALBUTEROL SULFATE 1 DOSE: 2.5; .5 SOLUTION RESPIRATORY (INHALATION) at 11:50

## 2023-09-27 RX ADMIN — TAMSULOSIN HYDROCHLORIDE 0.4 MG: 0.4 CAPSULE ORAL at 20:20

## 2023-09-27 RX ADMIN — SODIUM CHLORIDE, PRESERVATIVE FREE 10 ML: 5 INJECTION INTRAVENOUS at 20:20

## 2023-09-27 RX ADMIN — SODIUM CHLORIDE, POTASSIUM CHLORIDE, SODIUM LACTATE AND CALCIUM CHLORIDE: 600; 310; 30; 20 INJECTION, SOLUTION INTRAVENOUS at 04:58

## 2023-09-27 NOTE — CARE COORDINATION
Pt's daughter stopped by CM desk. Informed this CM that she is wanting to postpone hospice. Davonte Navarro CM informed her that hospice can be informational and if wanted in a few weeks if the pt is not improving, then they can call hospice again at that time. [address]. Pt's insurance has not covered pt's transport in the past. Family will provide private transport to SNF.

## 2023-09-28 ENCOUNTER — APPOINTMENT (OUTPATIENT)
Dept: ULTRASOUND IMAGING | Age: 88
DRG: 377 | End: 2023-09-28
Payer: MEDICARE

## 2023-09-28 LAB
ABO/RH: NORMAL
ANION GAP SERPL CALCULATED.3IONS-SCNC: 11 MMOL/L (ref 4–16)
ANTIBODY SCREEN: NEGATIVE
BUN SERPL-MCNC: 47 MG/DL (ref 6–23)
CALCIUM SERPL-MCNC: 8.9 MG/DL (ref 8.3–10.6)
CHLORIDE BLD-SCNC: 111 MMOL/L (ref 99–110)
CO2: 23 MMOL/L (ref 21–32)
COMPONENT: NORMAL
CREAT SERPL-MCNC: 1.1 MG/DL (ref 0.6–1.1)
CROSSMATCH RESULT: NORMAL
GFR SERPL CREATININE-BSD FRML MDRD: 47 ML/MIN/1.73M2
GLUCOSE BLD-MCNC: 102 MG/DL (ref 70–99)
GLUCOSE BLD-MCNC: 102 MG/DL (ref 70–99)
GLUCOSE BLD-MCNC: 108 MG/DL (ref 70–99)
GLUCOSE SERPL-MCNC: 100 MG/DL (ref 70–99)
HCT VFR BLD CALC: 26.3 % (ref 37–47)
HEMOGLOBIN: 8.2 GM/DL (ref 12.5–16)
MAGNESIUM: 2.5 MG/DL (ref 1.8–2.4)
MCH RBC QN AUTO: 29.8 PG (ref 27–31)
MCHC RBC AUTO-ENTMCNC: 31.2 % (ref 32–36)
MCV RBC AUTO: 95.6 FL (ref 78–100)
PDW BLD-RTO: 19.6 % (ref 11.7–14.9)
PHOSPHORUS: 3.5 MG/DL (ref 2.5–4.9)
PLATELET # BLD: 194 K/CU MM (ref 140–440)
PMV BLD AUTO: 8.7 FL (ref 7.5–11.1)
POTASSIUM SERPL-SCNC: 4 MMOL/L (ref 3.5–5.1)
RBC # BLD: 2.75 M/CU MM (ref 4.2–5.4)
SODIUM BLD-SCNC: 145 MMOL/L (ref 135–145)
STATUS: NORMAL
TRANSFUSION STATUS: NORMAL
UNIT DIVISION: 0
UNIT NUMBER: NORMAL
WBC # BLD: 11.3 K/CU MM (ref 4–10.5)

## 2023-09-28 PROCEDURE — 6360000002 HC RX W HCPCS: Performed by: INTERNAL MEDICINE

## 2023-09-28 PROCEDURE — 6370000000 HC RX 637 (ALT 250 FOR IP): Performed by: STUDENT IN AN ORGANIZED HEALTH CARE EDUCATION/TRAINING PROGRAM

## 2023-09-28 PROCEDURE — 82962 GLUCOSE BLOOD TEST: CPT

## 2023-09-28 PROCEDURE — 94640 AIRWAY INHALATION TREATMENT: CPT

## 2023-09-28 PROCEDURE — 2580000003 HC RX 258: Performed by: STUDENT IN AN ORGANIZED HEALTH CARE EDUCATION/TRAINING PROGRAM

## 2023-09-28 PROCEDURE — 99232 SBSQ HOSP IP/OBS MODERATE 35: CPT | Performed by: INTERNAL MEDICINE

## 2023-09-28 PROCEDURE — A4216 STERILE WATER/SALINE, 10 ML: HCPCS

## 2023-09-28 PROCEDURE — 94761 N-INVAS EAR/PLS OXIMETRY MLT: CPT

## 2023-09-28 PROCEDURE — 83735 ASSAY OF MAGNESIUM: CPT

## 2023-09-28 PROCEDURE — 6360000002 HC RX W HCPCS: Performed by: STUDENT IN AN ORGANIZED HEALTH CARE EDUCATION/TRAINING PROGRAM

## 2023-09-28 PROCEDURE — 1200000000 HC SEMI PRIVATE

## 2023-09-28 PROCEDURE — C9113 INJ PANTOPRAZOLE SODIUM, VIA: HCPCS | Performed by: STUDENT IN AN ORGANIZED HEALTH CARE EDUCATION/TRAINING PROGRAM

## 2023-09-28 PROCEDURE — 84100 ASSAY OF PHOSPHORUS: CPT

## 2023-09-28 PROCEDURE — 80048 BASIC METABOLIC PNL TOTAL CA: CPT

## 2023-09-28 PROCEDURE — 6370000000 HC RX 637 (ALT 250 FOR IP): Performed by: INTERNAL MEDICINE

## 2023-09-28 PROCEDURE — 36415 COLL VENOUS BLD VENIPUNCTURE: CPT

## 2023-09-28 PROCEDURE — 2580000003 HC RX 258

## 2023-09-28 PROCEDURE — 85027 COMPLETE CBC AUTOMATED: CPT

## 2023-09-28 RX ORDER — AMOXICILLIN 500 MG/1
500 CAPSULE ORAL 3 TIMES DAILY
Status: DISCONTINUED | OUTPATIENT
Start: 2023-09-28 | End: 2023-10-02 | Stop reason: HOSPADM

## 2023-09-28 RX ORDER — SODIUM CHLORIDE 9 MG/ML
INJECTION INTRAVENOUS
Status: COMPLETED
Start: 2023-09-28 | End: 2023-09-28

## 2023-09-28 RX ADMIN — HYDROMORPHONE HYDROCHLORIDE 0.5 MG: 1 INJECTION, SOLUTION INTRAMUSCULAR; INTRAVENOUS; SUBCUTANEOUS at 21:05

## 2023-09-28 RX ADMIN — IPRATROPIUM BROMIDE AND ALBUTEROL SULFATE 1 DOSE: 2.5; .5 SOLUTION RESPIRATORY (INHALATION) at 15:27

## 2023-09-28 RX ADMIN — IPRATROPIUM BROMIDE AND ALBUTEROL SULFATE 1 DOSE: 2.5; .5 SOLUTION RESPIRATORY (INHALATION) at 11:16

## 2023-09-28 RX ADMIN — AMOXICILLIN 500 MG: 500 CAPSULE ORAL at 21:06

## 2023-09-28 RX ADMIN — METOPROLOL SUCCINATE 25 MG: 25 TABLET, EXTENDED RELEASE ORAL at 08:25

## 2023-09-28 RX ADMIN — SODIUM CHLORIDE, PRESERVATIVE FREE 10 ML: 5 INJECTION INTRAVENOUS at 08:27

## 2023-09-28 RX ADMIN — HYDROMORPHONE HYDROCHLORIDE 0.5 MG: 1 INJECTION, SOLUTION INTRAMUSCULAR; INTRAVENOUS; SUBCUTANEOUS at 09:28

## 2023-09-28 RX ADMIN — SODIUM CHLORIDE: 9 INJECTION, SOLUTION INTRAMUSCULAR; INTRAVENOUS; SUBCUTANEOUS at 08:59

## 2023-09-28 RX ADMIN — TORSEMIDE 20 MG: 20 TABLET ORAL at 08:25

## 2023-09-28 RX ADMIN — IPRATROPIUM BROMIDE AND ALBUTEROL SULFATE 1 DOSE: 2.5; .5 SOLUTION RESPIRATORY (INHALATION) at 20:31

## 2023-09-28 RX ADMIN — SODIUM CHLORIDE, PRESERVATIVE FREE 10 ML: 5 INJECTION INTRAVENOUS at 21:25

## 2023-09-28 RX ADMIN — AMOXICILLIN 500 MG: 500 CAPSULE ORAL at 08:29

## 2023-09-28 RX ADMIN — TAMSULOSIN HYDROCHLORIDE 0.4 MG: 0.4 CAPSULE ORAL at 21:06

## 2023-09-28 RX ADMIN — SPIRONOLACTONE 25 MG: 50 TABLET ORAL at 08:24

## 2023-09-28 RX ADMIN — AMOXICILLIN 500 MG: 500 CAPSULE ORAL at 14:07

## 2023-09-28 RX ADMIN — PANTOPRAZOLE SODIUM 40 MG: 40 INJECTION, POWDER, FOR SOLUTION INTRAVENOUS at 08:56

## 2023-09-28 RX ADMIN — LISINOPRIL 2.5 MG: 5 TABLET ORAL at 08:25

## 2023-09-28 ASSESSMENT — PAIN DESCRIPTION - LOCATION
LOCATION: HEAD
LOCATION: FACE

## 2023-09-28 ASSESSMENT — PAIN DESCRIPTION - ORIENTATION
ORIENTATION: LEFT;UPPER
ORIENTATION: LEFT

## 2023-09-28 ASSESSMENT — PAIN SCALES - GENERAL
PAINLEVEL_OUTOF10: 10
PAINLEVEL_OUTOF10: 3
PAINLEVEL_OUTOF10: 3
PAINLEVEL_OUTOF10: 6
PAINLEVEL_OUTOF10: 0

## 2023-09-28 ASSESSMENT — PAIN DESCRIPTION - DESCRIPTORS
DESCRIPTORS: THROBBING
DESCRIPTORS: ACHING

## 2023-09-28 ASSESSMENT — PAIN SCALES - WONG BAKER
WONGBAKER_NUMERICALRESPONSE: 0
WONGBAKER_NUMERICALRESPONSE: 0

## 2023-09-28 NOTE — PLAN OF CARE
Problem: Discharge Planning  Goal: Discharge to home or other facility with appropriate resources  9/28/2023 1004 by Ruby Eduardo LPN  Outcome: Progressing  9/27/2023 2210 by Omer Palomares RN  Outcome: Progressing     Problem: Safety - Adult  Goal: Free from fall injury  9/28/2023 1004 by Ruby Eduardo LPN  Outcome: Progressing  9/27/2023 2210 by Omer Palomares RN  Outcome: Progressing     Problem: Pain  Goal: Verbalizes/displays adequate comfort level or baseline comfort level  9/28/2023 1004 by Ruby Eduardo LPN  Outcome: Progressing  9/27/2023 2210 by Omer Palomares RN  Outcome: Progressing

## 2023-09-29 ENCOUNTER — APPOINTMENT (OUTPATIENT)
Dept: ULTRASOUND IMAGING | Age: 88
DRG: 377 | End: 2023-09-29
Payer: MEDICARE

## 2023-09-29 LAB
ANION GAP SERPL CALCULATED.3IONS-SCNC: 13 MMOL/L (ref 4–16)
BUN SERPL-MCNC: 44 MG/DL (ref 6–23)
CALCIUM SERPL-MCNC: 8.8 MG/DL (ref 8.3–10.6)
CHLORIDE BLD-SCNC: 110 MMOL/L (ref 99–110)
CO2: 22 MMOL/L (ref 21–32)
CREAT SERPL-MCNC: 1.3 MG/DL (ref 0.6–1.1)
GFR SERPL CREATININE-BSD FRML MDRD: 38 ML/MIN/1.73M2
GLUCOSE BLD-MCNC: 111 MG/DL (ref 70–99)
GLUCOSE BLD-MCNC: 122 MG/DL (ref 70–99)
GLUCOSE BLD-MCNC: 88 MG/DL (ref 70–99)
GLUCOSE BLD-MCNC: 89 MG/DL (ref 70–99)
GLUCOSE SERPL-MCNC: 82 MG/DL (ref 70–99)
MAGNESIUM: 2.4 MG/DL (ref 1.8–2.4)
PHOSPHORUS: 3.7 MG/DL (ref 2.5–4.9)
POTASSIUM SERPL-SCNC: 4.3 MMOL/L (ref 3.5–5.1)
SODIUM BLD-SCNC: 145 MMOL/L (ref 135–145)

## 2023-09-29 PROCEDURE — 6370000000 HC RX 637 (ALT 250 FOR IP): Performed by: INTERNAL MEDICINE

## 2023-09-29 PROCEDURE — 93971 EXTREMITY STUDY: CPT

## 2023-09-29 PROCEDURE — 80048 BASIC METABOLIC PNL TOTAL CA: CPT

## 2023-09-29 PROCEDURE — 97530 THERAPEUTIC ACTIVITIES: CPT

## 2023-09-29 PROCEDURE — 82962 GLUCOSE BLOOD TEST: CPT

## 2023-09-29 PROCEDURE — 6360000002 HC RX W HCPCS: Performed by: INTERNAL MEDICINE

## 2023-09-29 PROCEDURE — 36415 COLL VENOUS BLD VENIPUNCTURE: CPT

## 2023-09-29 PROCEDURE — 83735 ASSAY OF MAGNESIUM: CPT

## 2023-09-29 PROCEDURE — 1200000000 HC SEMI PRIVATE

## 2023-09-29 PROCEDURE — 85027 COMPLETE CBC AUTOMATED: CPT

## 2023-09-29 PROCEDURE — 94761 N-INVAS EAR/PLS OXIMETRY MLT: CPT

## 2023-09-29 PROCEDURE — 6370000000 HC RX 637 (ALT 250 FOR IP): Performed by: STUDENT IN AN ORGANIZED HEALTH CARE EDUCATION/TRAINING PROGRAM

## 2023-09-29 PROCEDURE — 84100 ASSAY OF PHOSPHORUS: CPT

## 2023-09-29 RX ADMIN — HYDROMORPHONE HYDROCHLORIDE 0.5 MG: 1 INJECTION, SOLUTION INTRAMUSCULAR; INTRAVENOUS; SUBCUTANEOUS at 03:52

## 2023-09-29 RX ADMIN — LISINOPRIL 2.5 MG: 5 TABLET ORAL at 10:34

## 2023-09-29 RX ADMIN — TAMSULOSIN HYDROCHLORIDE 0.4 MG: 0.4 CAPSULE ORAL at 21:11

## 2023-09-29 RX ADMIN — AMOXICILLIN 500 MG: 500 CAPSULE ORAL at 21:11

## 2023-09-29 RX ADMIN — METOPROLOL SUCCINATE 25 MG: 25 TABLET, EXTENDED RELEASE ORAL at 10:34

## 2023-09-29 RX ADMIN — AMOXICILLIN 500 MG: 500 CAPSULE ORAL at 10:34

## 2023-09-29 ASSESSMENT — PAIN SCALES - GENERAL
PAINLEVEL_OUTOF10: 0
PAINLEVEL_OUTOF10: 0
PAINLEVEL_OUTOF10: 5

## 2023-09-29 ASSESSMENT — PAIN DESCRIPTION - LOCATION: LOCATION: ARM

## 2023-09-29 ASSESSMENT — PAIN DESCRIPTION - ORIENTATION: ORIENTATION: LEFT

## 2023-09-29 ASSESSMENT — PAIN DESCRIPTION - DESCRIPTORS: DESCRIPTORS: DISCOMFORT

## 2023-09-29 NOTE — CONSENT
Informed Consent for Blood Component Transfusion Note    I have discussed with the patient the rationale for blood component transfusion; its benefits in treating or preventing fatigue, organ damage, or death; and its risk which includes mild transfusion reactions, rare risk of blood borne infection, or more serious but rare reactions. I have discussed the alternatives to transfusion, including the risk and consequences of not receiving transfusion. The patient had an opportunity to ask questions and had agreed to proceed with transfusion of blood components.     Electronically signed by Angela Ramos MD on 9/29/23 at 8:28 AM EDT

## 2023-09-29 NOTE — CARE COORDINATION
Reviewed chart, discussed in IDR, plan is for d/c to [address] [place] [address] once medically ready. Family would prefer to provide transportation back to [address] [place] [address]. CM will continue to follow.

## 2023-09-29 NOTE — PLAN OF CARE
Problem: Discharge Planning  Goal: Discharge to home or other facility with appropriate resources  9/28/2023 2232 by Javad Hoyt RN  Outcome: Progressing  9/28/2023 1004 by Kelly Reid LPN  Outcome: Progressing     Problem: Safety - Adult  Goal: Free from fall injury  9/28/2023 2232 by Javad Hoyt RN  Outcome: Progressing  9/28/2023 1004 by Kelly Reid LPN  Outcome: Progressing     Problem: Pain  Goal: Verbalizes/displays adequate comfort level or baseline comfort level  9/28/2023 2232 by Javad Hoyt RN  Outcome: Progressing  9/28/2023 1004 by Kelly Reid LPN  Outcome: Progressing     Problem: Skin/Tissue Integrity  Goal: Absence of new skin breakdown  Description: 1. Monitor for areas of redness and/or skin breakdown  2. Assess vascular access sites hourly  3. Every 4-6 hours minimum:  Change oxygen saturation probe site  4. Every 4-6 hours:  If on nasal continuous positive airway pressure, respiratory therapy assess nares and determine need for appliance change or resting period.   9/28/2023 2232 by Javad Hoyt RN  Outcome: Progressing  9/28/2023 1004 by Kelly Reid LPN  Outcome: Progressing     Problem: Chronic Conditions and Co-morbidities  Goal: Patient's chronic conditions and co-morbidity symptoms are monitored and maintained or improved  9/28/2023 2232 by Javad Hoyt RN  Outcome: Progressing  9/28/2023 1004 by Kelly Reid LPN  Outcome: Progressing

## 2023-09-29 NOTE — CONSULTS
[address] [facility] [address], 5/15/1929, 4126/4126-A, 9/26/2023    History  [place]:  The primary encounter diagnosis was Altered mental status, unspecified altered mental status type. Diagnoses of Gastrointestinal hemorrhage, unspecified gastrointestinal hemorrhage type, Hyperkalemia, Troponin I above reference range, Elevated brain natriuretic peptide (BNP) level, and Herpes zoster with ophthalmic complication, unspecified herpes zoster eye disease were also pertinent to this visit. Patient  has a past medical history of Aphasia. Patient  has a past surgical history that includes Aortic valve surgery (06/29/2017); pacemaker placement (06/29/2017); and brain surgery (11/27/2017). Subjective:    Patient states:  \"can I get up now? \"      Pain:  states pain in head at bruising site. Communication with other providers:  Handoff to RN, tech    Restrictions: general precautions, fall risk    Home Setup/Prior level of function   Pt poor historian this date and unable to obtain PLOF    Examination of body systems (includes body structures/functions, activity/participation limitations):  Observation:  pt supine in bed upon arrival and agreeable to therapy  Vision:  wears glasses  Hearing:  Inupiat  Cardiopulmonary:  no O2 needs  Cognition: oriented to self only, see OT/SLP note for further evaluation. Musculoskeletal  ROM R/L:  WFL. Strength R/L:  4-/5, moderate impairment in function and endurance. Neuro:  WFL      Mobility:  Rolling L/R:  SBA  Supine to sit:  mod A with assist at hips and trunk. Cues provided for maintaining focus on task  Transfers: pt completed STS to/from EOB x3 trials mod A with cues for hand placement. Pt completed stand step transfer from EOB to chair with RW min A with significantly increased time to complete and max cues for sequencing  Sitting balance:  fair+.     Standing balance:  fair, pt stood at [employer] x2 bouts ~4 minutes each
Consult canceled. Pt has patent IV access and therapeutic needs met.
Consult cancelled. Patient is verbally and physically aggressive with primary LPN Florence Sharma when attempting to reposition to exam arms for IV. Patient IV antibiotic changed to PO. Patient has received numerous IV's from vascular access team, on 9/27 noted pt may need central line placement if patient's daughter agreeable. If patient needs change, please consult again for evaluation. Please consult IV/PICC team for questions, concerns, or patient's needs change.
IV Consult complete. Nexiva 20g 1.75\" PIV Inserted in Right Proximal Forearm  x 2 attempt using sterile ultrasound guided technique. Brisk blood return, flushes easy.
IV Consult. Previous USG PIV infiltrated already. No suitable vessels in either forearm. Introcan Extended Dwell PIV inserted in ANTHONY Basilic Vessel x 1 attempt with ultrasound guidance. Brisk blood return, flushes easy.
While unit, nurse asks me to check EDC midline as pump keeps alarming. Line found to be newly infiltrated, removed at this time. Attempted new IV in left FA without success. Nexiva 20g 1.75 placed in JOI Brachial vessel with ultrasound. Ana M Lecher 1.75\" inserted in ANTHONY proximal to [facility]. Dr. Senthil Martin notified of patient's poor vascular access status today/limited vasculature. He gave telephone order for central line placement if daughter in agreement and unable to maintain PIV through the night. I spoke to [facility] via telephone and explained current status, she does want to proceed with CVC placement if it becomes necessary to meet therapeutic needs. Night Shift vascular access nurse La Garcia made aware. Will continue to monitor.
dentition. NECK:  Supple, symmetrical, trachea midline, no adenopathy, thyroid symmetric, not enlarged and no tenderness, skin normal  HEMATOLOGIC/LYMPHATICS:  no cervical lymphadenopathy and no supraclavicular lymphadenopathy  LUNGS:  No increased work of breathing, good air exchange, clear to auscultation bilaterally, no crackles or wheezing  CARDIOVASCULAR:  Normal apical impulse, regular rate and rhythm, normal S1 and S2, no S3 or S4, and no murmur noted  ABDOMEN:  normal bowel sounds, soft, non-distended, non-tender, and no masses palpated, no hepatosplenomegally. Rectal exam deferred due to cog. status  NEUROLOGIC: no focal deficit detected  SKIN:  large healing bruise to the left side of the face, shingles noted on face, dressings/socks covered forearms to keep from scratching  EXTREMITIES: no clubbing, cyanosis, or edema    Labs and Studies:  CT ABDOMEN PELVIS WO CONTRAST Additional Contrast? None    Result Date: 9/25/2023  EXAMINATION: CT OF THE ABDOMEN AND PELVIS WITHOUT CONTRAST 9/25/2023 4:34 pm TECHNIQUE: CT of the abdomen and pelvis was performed without the administration of intravenous contrast. Multiplanar reformatted images are provided for review. Automated exposure control, iterative reconstruction, and/or weight based adjustment of the mA/kV was utilized to reduce the radiation dose to as low as reasonably achievable. COMPARISON: CT abdomen pelvis July 9, 2021 HISTORY: ORDERING SYSTEM PROVIDED HISTORY: abdominal pain TECHNOLOGIST PROVIDED HISTORY: Reason for exam:->abdominal pain Additional Contrast?->None Decision Support Exception - unselect if not a suspected or confirmed emergency medical condition->Emergency Medical Condition (MA) Reason for Exam: abdominal pain FINDINGS: Lower Chest: Small left and partially imaged moderate-sized right pleural effusion and associated atelectasis. Postoperative changes of TAVR. Partially imaged cardiac pacer leads in place. The heart is enlarged.  Organs:

## 2023-09-30 LAB
ANION GAP SERPL CALCULATED.3IONS-SCNC: 10 MMOL/L (ref 4–16)
BASOPHILS ABSOLUTE: 0.1 K/CU MM
BASOPHILS RELATIVE PERCENT: 0.5 % (ref 0–1)
BUN SERPL-MCNC: 36 MG/DL (ref 6–23)
CALCIUM SERPL-MCNC: 9.1 MG/DL (ref 8.3–10.6)
CHLORIDE BLD-SCNC: 114 MMOL/L (ref 99–110)
CO2: 23 MMOL/L (ref 21–32)
CREAT SERPL-MCNC: 1 MG/DL (ref 0.6–1.1)
CULTURE: NORMAL
CULTURE: NORMAL
DIFFERENTIAL TYPE: ABNORMAL
EOSINOPHILS ABSOLUTE: 0.4 K/CU MM
EOSINOPHILS RELATIVE PERCENT: 3.2 % (ref 0–3)
GFR SERPL CREATININE-BSD FRML MDRD: 52 ML/MIN/1.73M2
GLUCOSE BLD-MCNC: 117 MG/DL (ref 70–99)
GLUCOSE BLD-MCNC: 118 MG/DL (ref 70–99)
GLUCOSE BLD-MCNC: 144 MG/DL (ref 70–99)
GLUCOSE BLD-MCNC: 98 MG/DL (ref 70–99)
GLUCOSE SERPL-MCNC: 115 MG/DL (ref 70–99)
HCT VFR BLD CALC: 30.6 % (ref 37–47)
HEMOGLOBIN: 9.1 GM/DL (ref 12.5–16)
IMMATURE NEUTROPHIL %: 0.4 % (ref 0–0.43)
LYMPHOCYTES ABSOLUTE: 0.8 K/CU MM
LYMPHOCYTES RELATIVE PERCENT: 7 % (ref 24–44)
Lab: NORMAL
Lab: NORMAL
MAGNESIUM: 2.3 MG/DL (ref 1.8–2.4)
MCH RBC QN AUTO: 29.4 PG (ref 27–31)
MCHC RBC AUTO-ENTMCNC: 29.7 % (ref 32–36)
MCV RBC AUTO: 98.7 FL (ref 78–100)
MONOCYTES ABSOLUTE: 1.1 K/CU MM
MONOCYTES RELATIVE PERCENT: 9.9 % (ref 0–4)
NUCLEATED RBC %: 0 %
PDW BLD-RTO: 19.5 % (ref 11.7–14.9)
PHOSPHORUS: 3.3 MG/DL (ref 2.5–4.9)
PLATELET # BLD: 221 K/CU MM (ref 140–440)
PMV BLD AUTO: 8.5 FL (ref 7.5–11.1)
POTASSIUM SERPL-SCNC: 4.1 MMOL/L (ref 3.5–5.1)
RBC # BLD: 3.1 M/CU MM (ref 4.2–5.4)
SEGMENTED NEUTROPHILS ABSOLUTE COUNT: 9.1 K/CU MM
SEGMENTED NEUTROPHILS RELATIVE PERCENT: 79 % (ref 36–66)
SODIUM BLD-SCNC: 147 MMOL/L (ref 135–145)
SPECIMEN: NORMAL
SPECIMEN: NORMAL
TOTAL IMMATURE NEUTOROPHIL: 0.05 K/CU MM
TOTAL NUCLEATED RBC: 0 K/CU MM
WBC # BLD: 11.5 K/CU MM (ref 4–10.5)

## 2023-09-30 PROCEDURE — 2580000003 HC RX 258: Performed by: STUDENT IN AN ORGANIZED HEALTH CARE EDUCATION/TRAINING PROGRAM

## 2023-09-30 PROCEDURE — 6370000000 HC RX 637 (ALT 250 FOR IP): Performed by: INTERNAL MEDICINE

## 2023-09-30 PROCEDURE — 97535 SELF CARE MNGMENT TRAINING: CPT

## 2023-09-30 PROCEDURE — 82962 GLUCOSE BLOOD TEST: CPT

## 2023-09-30 PROCEDURE — 97530 THERAPEUTIC ACTIVITIES: CPT

## 2023-09-30 PROCEDURE — 83735 ASSAY OF MAGNESIUM: CPT

## 2023-09-30 PROCEDURE — 97166 OT EVAL MOD COMPLEX 45 MIN: CPT

## 2023-09-30 PROCEDURE — 94761 N-INVAS EAR/PLS OXIMETRY MLT: CPT

## 2023-09-30 PROCEDURE — 36415 COLL VENOUS BLD VENIPUNCTURE: CPT

## 2023-09-30 PROCEDURE — 85025 COMPLETE CBC W/AUTO DIFF WBC: CPT

## 2023-09-30 PROCEDURE — 1200000000 HC SEMI PRIVATE

## 2023-09-30 PROCEDURE — 80048 BASIC METABOLIC PNL TOTAL CA: CPT

## 2023-09-30 PROCEDURE — 84100 ASSAY OF PHOSPHORUS: CPT

## 2023-09-30 PROCEDURE — 6370000000 HC RX 637 (ALT 250 FOR IP): Performed by: STUDENT IN AN ORGANIZED HEALTH CARE EDUCATION/TRAINING PROGRAM

## 2023-09-30 RX ORDER — PANTOPRAZOLE SODIUM 40 MG/1
40 TABLET, DELAYED RELEASE ORAL
Status: DISCONTINUED | OUTPATIENT
Start: 2023-10-01 | End: 2023-10-02 | Stop reason: HOSPADM

## 2023-09-30 RX ADMIN — TAMSULOSIN HYDROCHLORIDE 0.4 MG: 0.4 CAPSULE ORAL at 21:55

## 2023-09-30 RX ADMIN — AMOXICILLIN 500 MG: 500 CAPSULE ORAL at 10:05

## 2023-09-30 RX ADMIN — SODIUM CHLORIDE, PRESERVATIVE FREE 10 ML: 5 INJECTION INTRAVENOUS at 21:56

## 2023-09-30 RX ADMIN — AMOXICILLIN 500 MG: 500 CAPSULE ORAL at 14:00

## 2023-09-30 RX ADMIN — METOPROLOL SUCCINATE 25 MG: 25 TABLET, EXTENDED RELEASE ORAL at 10:05

## 2023-09-30 RX ADMIN — LISINOPRIL 2.5 MG: 5 TABLET ORAL at 10:05

## 2023-09-30 RX ADMIN — AMOXICILLIN 500 MG: 500 CAPSULE ORAL at 21:55

## 2023-10-01 LAB
ALBUMIN SERPL-MCNC: 2.6 GM/DL (ref 3.4–5)
ALP BLD-CCNC: 75 IU/L (ref 40–129)
ALT SERPL-CCNC: 12 U/L (ref 10–40)
ANION GAP SERPL CALCULATED.3IONS-SCNC: 8 MMOL/L (ref 4–16)
ANION GAP SERPL CALCULATED.3IONS-SCNC: 8 MMOL/L (ref 4–16)
AST SERPL-CCNC: 24 IU/L (ref 15–37)
BASOPHILS ABSOLUTE: 0.1 K/CU MM
BASOPHILS RELATIVE PERCENT: 0.7 % (ref 0–1)
BILIRUB SERPL-MCNC: 0.8 MG/DL (ref 0–1)
BUN SERPL-MCNC: 33 MG/DL (ref 6–23)
BUN SERPL-MCNC: 34 MG/DL (ref 6–23)
CALCIUM SERPL-MCNC: 8.5 MG/DL (ref 8.3–10.6)
CALCIUM SERPL-MCNC: 8.5 MG/DL (ref 8.3–10.6)
CHLORIDE BLD-SCNC: 111 MMOL/L (ref 99–110)
CHLORIDE BLD-SCNC: 113 MMOL/L (ref 99–110)
CO2: 24 MMOL/L (ref 21–32)
CO2: 25 MMOL/L (ref 21–32)
CREAT SERPL-MCNC: 0.9 MG/DL (ref 0.6–1.1)
CREAT SERPL-MCNC: 0.9 MG/DL (ref 0.6–1.1)
DIFFERENTIAL TYPE: ABNORMAL
EOSINOPHILS ABSOLUTE: 0.7 K/CU MM
EOSINOPHILS RELATIVE PERCENT: 6.4 % (ref 0–3)
GFR SERPL CREATININE-BSD FRML MDRD: 59 ML/MIN/1.73M2
GFR SERPL CREATININE-BSD FRML MDRD: 59 ML/MIN/1.73M2
GLUCOSE BLD-MCNC: 106 MG/DL (ref 70–99)
GLUCOSE BLD-MCNC: 112 MG/DL (ref 70–99)
GLUCOSE BLD-MCNC: 137 MG/DL (ref 70–99)
GLUCOSE BLD-MCNC: 92 MG/DL (ref 70–99)
GLUCOSE SERPL-MCNC: 89 MG/DL (ref 70–99)
GLUCOSE SERPL-MCNC: 90 MG/DL (ref 70–99)
HCT VFR BLD CALC: 26.1 % (ref 37–47)
HEMOGLOBIN: 7.9 GM/DL (ref 12.5–16)
IMMATURE NEUTROPHIL %: 0.5 % (ref 0–0.43)
LYMPHOCYTES ABSOLUTE: 0.9 K/CU MM
LYMPHOCYTES RELATIVE PERCENT: 8.3 % (ref 24–44)
MAGNESIUM: 2.4 MG/DL (ref 1.8–2.4)
MCH RBC QN AUTO: 29.5 PG (ref 27–31)
MCHC RBC AUTO-ENTMCNC: 30.3 % (ref 32–36)
MCV RBC AUTO: 97.4 FL (ref 78–100)
MONOCYTES ABSOLUTE: 1.3 K/CU MM
MONOCYTES RELATIVE PERCENT: 11.7 % (ref 0–4)
NUCLEATED RBC %: 0 %
PDW BLD-RTO: 18.6 % (ref 11.7–14.9)
PHOSPHORUS: 3.1 MG/DL (ref 2.5–4.9)
PLATELET # BLD: 207 K/CU MM (ref 140–440)
PMV BLD AUTO: 9 FL (ref 7.5–11.1)
POTASSIUM SERPL-SCNC: 3.7 MMOL/L (ref 3.5–5.1)
POTASSIUM SERPL-SCNC: 3.7 MMOL/L (ref 3.5–5.1)
RBC # BLD: 2.68 M/CU MM (ref 4.2–5.4)
SEGMENTED NEUTROPHILS ABSOLUTE COUNT: 7.9 K/CU MM
SEGMENTED NEUTROPHILS RELATIVE PERCENT: 72.4 % (ref 36–66)
SODIUM BLD-SCNC: 144 MMOL/L (ref 135–145)
SODIUM BLD-SCNC: 145 MMOL/L (ref 135–145)
TOTAL IMMATURE NEUTOROPHIL: 0.05 K/CU MM
TOTAL NUCLEATED RBC: 0 K/CU MM
TOTAL PROTEIN: 5.1 GM/DL (ref 6.4–8.2)
WBC # BLD: 10.9 K/CU MM (ref 4–10.5)

## 2023-10-01 PROCEDURE — 6370000000 HC RX 637 (ALT 250 FOR IP): Performed by: INTERNAL MEDICINE

## 2023-10-01 PROCEDURE — 2580000003 HC RX 258: Performed by: STUDENT IN AN ORGANIZED HEALTH CARE EDUCATION/TRAINING PROGRAM

## 2023-10-01 PROCEDURE — 82962 GLUCOSE BLOOD TEST: CPT

## 2023-10-01 PROCEDURE — 36415 COLL VENOUS BLD VENIPUNCTURE: CPT

## 2023-10-01 PROCEDURE — 80053 COMPREHEN METABOLIC PANEL: CPT

## 2023-10-01 PROCEDURE — 80048 BASIC METABOLIC PNL TOTAL CA: CPT

## 2023-10-01 PROCEDURE — 6370000000 HC RX 637 (ALT 250 FOR IP): Performed by: STUDENT IN AN ORGANIZED HEALTH CARE EDUCATION/TRAINING PROGRAM

## 2023-10-01 PROCEDURE — 1200000000 HC SEMI PRIVATE

## 2023-10-01 PROCEDURE — 85025 COMPLETE CBC W/AUTO DIFF WBC: CPT

## 2023-10-01 PROCEDURE — 83735 ASSAY OF MAGNESIUM: CPT

## 2023-10-01 PROCEDURE — 94761 N-INVAS EAR/PLS OXIMETRY MLT: CPT

## 2023-10-01 PROCEDURE — 84100 ASSAY OF PHOSPHORUS: CPT

## 2023-10-01 RX ADMIN — AMOXICILLIN 500 MG: 500 CAPSULE ORAL at 14:58

## 2023-10-01 RX ADMIN — LISINOPRIL 2.5 MG: 5 TABLET ORAL at 10:40

## 2023-10-01 RX ADMIN — PANTOPRAZOLE SODIUM 40 MG: 40 TABLET, DELAYED RELEASE ORAL at 05:12

## 2023-10-01 RX ADMIN — METOPROLOL SUCCINATE 25 MG: 25 TABLET, EXTENDED RELEASE ORAL at 10:40

## 2023-10-01 RX ADMIN — AMOXICILLIN 500 MG: 500 CAPSULE ORAL at 10:39

## 2023-10-01 RX ADMIN — AMOXICILLIN 500 MG: 500 CAPSULE ORAL at 22:19

## 2023-10-01 RX ADMIN — TAMSULOSIN HYDROCHLORIDE 0.4 MG: 0.4 CAPSULE ORAL at 22:19

## 2023-10-01 NOTE — CARE COORDINATION
Pt plan is to return to [facility] at discharge. Pt and family postponing Hospice at this time. Pt will not need a rapid COVID test as long as the pt is not showing signs. CM will need MANASA to be completed by RN and doctor. If pt is discharged after hours please complete the following. ... Call report to [phone]  Fax completed AVS with both MANASA on the AVS and any written Rx [phone]. CM past note stated family wants to transport pt back to [facility] at discharge.

## 2023-10-01 NOTE — PLAN OF CARE
Problem: Discharge Planning  Goal: Discharge to home or other facility with appropriate resources  10/1/2023 0107 by Blank Acuna RN  Outcome: Progressing  9/30/2023 1337 by Cathy Siemens, LPN  Outcome: Progressing     Problem: Safety - Adult  Goal: Free from fall injury  10/1/2023 0107 by Blank Acuna RN  Outcome: Progressing  9/30/2023 1337 by Cathy Siemens, LPN  Outcome: Progressing     Problem: Pain  Goal: Verbalizes/displays adequate comfort level or baseline comfort level  10/1/2023 0107 by Blank Acuna RN  Outcome: Progressing  9/30/2023 1337 by Cathy Siemens, LPN  Outcome: Progressing     Problem: Skin/Tissue Integrity  Goal: Absence of new skin breakdown  Description: 1. Monitor for areas of redness and/or skin breakdown  2. Assess vascular access sites hourly  3. Every 4-6 hours minimum:  Change oxygen saturation probe site  4. Every 4-6 hours:  If on nasal continuous positive airway pressure, respiratory therapy assess nares and determine need for appliance change or resting period.   10/1/2023 0107 by Blank Acuna RN  Outcome: Progressing  9/30/2023 1337 by Cathy Siemens, LPN  Outcome: Progressing     Problem: Chronic Conditions and Co-morbidities  Goal: Patient's chronic conditions and co-morbidity symptoms are monitored and maintained or improved  10/1/2023 0107 by Blank Acuna RN  Outcome: Progressing  9/30/2023 1337 by Cathy Siemens, LPN  Outcome: Progressing

## 2023-10-02 VITALS
WEIGHT: 148.59 LBS | BODY MASS INDEX: 29.02 KG/M2 | HEART RATE: 76 BPM | TEMPERATURE: 98.2 F | SYSTOLIC BLOOD PRESSURE: 96 MMHG | OXYGEN SATURATION: 96 % | DIASTOLIC BLOOD PRESSURE: 35 MMHG | RESPIRATION RATE: 17 BRPM

## 2023-10-02 LAB
GLUCOSE BLD-MCNC: 119 MG/DL (ref 70–99)
GLUCOSE BLD-MCNC: 124 MG/DL (ref 70–99)

## 2023-10-02 PROCEDURE — 82962 GLUCOSE BLOOD TEST: CPT

## 2023-10-02 PROCEDURE — 6370000000 HC RX 637 (ALT 250 FOR IP): Performed by: STUDENT IN AN ORGANIZED HEALTH CARE EDUCATION/TRAINING PROGRAM

## 2023-10-02 PROCEDURE — 94761 N-INVAS EAR/PLS OXIMETRY MLT: CPT

## 2023-10-02 PROCEDURE — 6370000000 HC RX 637 (ALT 250 FOR IP): Performed by: INTERNAL MEDICINE

## 2023-10-02 RX ORDER — FERROUS SULFATE 325(65) MG
325 TABLET ORAL EVERY OTHER DAY
Qty: 30 TABLET | Refills: 3 | Status: SHIPPED | OUTPATIENT
Start: 2023-10-02

## 2023-10-02 RX ADMIN — METOPROLOL SUCCINATE 25 MG: 25 TABLET, EXTENDED RELEASE ORAL at 10:06

## 2023-10-02 RX ADMIN — PANTOPRAZOLE SODIUM 40 MG: 40 TABLET, DELAYED RELEASE ORAL at 05:49

## 2023-10-02 RX ADMIN — AMOXICILLIN 500 MG: 500 CAPSULE ORAL at 10:06

## 2023-10-02 RX ADMIN — LISINOPRIL 2.5 MG: 5 TABLET ORAL at 10:07

## 2023-10-02 NOTE — CARE COORDINATION
Pt on discharge, set up with [facility] at 330pm.  Notified Jorge L Bridges at [facility] of discharge and transport time,   Nursing to update pt's family. Per chart review pt's family wanted to transport pt back to [facility], cancelled transport with [facility].

## 2023-10-02 NOTE — PROGRESS NOTES
09/27/23 1505   Encounter Summary   Encounter Overview/Reason  Initial Encounter   Service Provided For: Patient   Referral/Consult From: [facility]   Support System Children   Last Encounter  09/27/23   Complexity of Encounter Low   Begin Time 1502   End Time  1510   Total Time Calculated 8 min   Assessment/Intervention/Outcome   Assessment Coping   Intervention Active listening;Prayer (assurance of)/[place];Sustaining Presence/Ministry of presence   Outcome Coping   Plan and Referrals   Plan/Referrals Continue to visit, (comment)  (follow-up as needed)
4 Eyes Skin Assessment     NAME:  Dante Carroll  YOB: 1929  MEDICAL RECORD NUMBER:  [id number]    The patient is being assessed for  Admission    I agree that at least one RN has performed a thorough Head to Toe Skin Assessment on the patient. ALL assessment sites listed below have been assessed. Areas assessed by both nurses:    Head, Face, Ears, Shoulders, Back, Chest, Arms, Elbows, Hands, Sacrum. Buttock, Coccyx, Ischium, Legs. Feet and Heels, and Under Medical Devices         Does the Patient have a Wound?  No noted wound(s) Patient has shingles on the forehead and some bruises on both upper and lower extrimities       Hunter Prevention initiated by RN: Yes  Wound Care Orders initiated by RN: No    Pressure Injury (Stage 3,4, Unstageable, DTI, NWPT, and Complex wounds) if present, place Wound referral order by RN under : No    New Ostomies, if present place, Ostomy referral order under : No     Nurse 1 eSignature: Electronically signed by Jorge Luis Espinoza RN on 9/26/23 at 5:39 AM EDT    **SHARE this note so that the co-signing nurse can place an eSignature**    Nurse 2 eSignature: Electronically signed by Ashish Fan LPN on 6/76/77 at 5:21 AM EDT
[facility]             MD Alissa Hernandez MD Blythe Crochet, APRN-CNP       Mik Valles, APRN-CNP             [address] [facility] [address] [facility], [address] [facility]             [phone] fax [phone]      Gastroenterology Progress note . 9/27/2023  Reason for consult:   GI bleed          Interval H/P  The patient is a 80 y.o. female with a past medical history 3, seasonal allergies, hypertension, hyperlipidemia, pacemaker, congestive heart failure, CHB, status post AVR, weakness, confusion, and type 2 diabetes. Complex history as of late. Recently had a bout of sepsis, urinary tract infection and currently has a shingles infection to forehead. She is currently being treated with Acyclovir and has been seen by infectious disease. Patient is a resident from [facility]. She was referred to the ED following a sustained fall with altered ental status, dark melena stools, abdominal pain and weakness. It was reported to ED staff that she had 2 falls prior per facilyt. 9/26/23-Reviewed medical chart. Patient was examined at bedside. History and exam was difficult due to cognitive status. Only alert to name. Per nursing staff she has had no additional overt signs of bleeding, additional abdominal pain, or worsening of mental status. Daughter Sandhya Condon is medical power of . Telephone conference with her occurred this morning amongst this writer  and Dr. Fabiana Alvarez. History and plan were discussed. Dr. Fabiana Alvarez and I discussed at great lengths with her daughter the risks/benefits of EGD, Colonoscopy and CTA with contrast. We were very clear with her that additional imaging studies/procedures would put her at risk due to her her current state.  We  explained she is not a good candidate for colonoscopy due to an inability to tolerate colon prep related to her altered mental status, risks of
Critical lab for hemoglobin: 6.8. Night hospitalist notified.
NP notified of low bp, awaiting response, will continue to monitor.
Occupational Therapy    [facility] ACUTE [facility]ARE OCCUPATIONAL THERAPY EVALUATION  Beatriz Rosario, 5/15/1929, 4126/4126-A, 9/30/2023    History  [place]:  The primary encounter diagnosis was Altered mental status, unspecified altered mental status type. Diagnoses of Gastrointestinal hemorrhage, unspecified gastrointestinal hemorrhage type, Hyperkalemia, Troponin I above reference range, Elevated brain natriuretic peptide (BNP) level, and Herpes zoster with ophthalmic complication, unspecified herpes zoster eye disease were also pertinent to this visit. Patient  has a past medical history of Aphasia. Patient  has a past surgical history that includes Aortic valve surgery (06/29/2017); pacemaker placement (06/29/2017); and brain surgery (11/27/2017). Subjective:  Patient states:  Pt required mod re-direction to conversation. Requesting frequently to return to supine  Pain:  Denies. Communication with other providers:  Handoff to RN  Restrictions: General Precautions, Fall Risk    Home Setup/Prior level of function    Pt poor historian this date and unable to obtain PLOF       Examination of body systems (includes body structures/functions, activity/participation limitations):  Observation:  Supine in bed upon arrival, agreeable to therapy, large laceration above L eye, per discussion w/ RN due to shingles which have been treated  Vision:  Glasses  Hearing:  min [place]  Cardiopulmonary:  No 02 needs      Body Systems and functions:  ROM R/L:  WFL.     Strength R/L:  4-/5,   Sensation: Unable to assess due to cognition  Tone: Normal  Coordination: min decreased gross/fine motor coordination w/ hand over hand to overcome  Perception: mod decreased initiation/sequencing w/ hand over hand to overcome    Activities of Daily Living (ADLs):  Feeding: Setup/Jg (pt feeding sitting EOB w/ assistance to open containers, cut food and Jg for hand over hand sequencing and problem solving)  Grooming: Jg
Patient iv line leaking and some swelling on the insertion site. Stopped the ivf and canunar remeved. Dressing applied to the area to prevent any bleeding. IV team consulted . 0139 Received a call from lab for critical value of Hb 5. 5. AKN babb notified who ordered blood transfusion.
Patient not able to give consent for blood transfusion,Called the daughter who is the Erica Crespo and she gave a verbal consent via the phone to proceed with the transfusion. Consent form cosigned by the charge nurse CHRIS.
Patient scratching face, one time atarax ordered by night shift hospitalist.
Patient vitals Bp 87/36 Hr 87,Temp 98.6,spo2 93 and blood sugar 105. patient had  a bowel movement darkish in colour. Scheduled to get Iv insulin humulin R,Novolin R 10 UNITS and 250ml of 10% dextrose same. Np notified and said continue and keep monitoring blood sugar. Patient drowsy and a sleep,cant manage to swallow her oral medications. NP skinny notified.
Physical Therapy  Name: Ronna Miles MRN: [id number] :   5/15/1929   Date:  2023   Admission Date: 2023 Room:  [address] [place] [address]   Restrictions/Precautions:        General Precautions, Fall Risk    Communication with other providers:  Nurse Kristofer Blackburnist states pt is ok to see for therapy. Nurse assists with safe transfer as patient is confused and not oriented     Subjective:  Patient states:  \"I can't make apple pie right now! \" Patient makes this statement when prompted to transfer from EOB to recliner   Pain:   Location, Type, Intensity (0/10 to 10/10):  Demo's moderate pain with wound on Lt side of forehead. Objective:    Observation:  Patient is supine in bed, pleasantly confused. She needs assist to orient herself, patient unable to become oriented. Needs constant redirection during session and manual assist to help patient avoid touching/prying at wound area     Treatment, including education/measures:    Transfers with line management of pocket tele    Supine to sit :Mod A for BLE and trunk mobility to EOB  Seated balance: Patient needs intermittent Max A on EOB as she is confused and attempts to return bed without warning. She progresses to CGA-Min A with redirection to task   Scooting :Seated scooting Mod A to EOb    SPT:Stand pivot from Eob to recliner. Patient initially needs Min A for standing from EOB. La Place between EOB and recliner the patient becomes confused and needs redirection and Max A to re-initiate pivot. Min A to sit safety to recliner       Safety  Patient left safely in the recliner, with call light/phone in reach with alarm applied. Gait belt and mask were used for transfers and gait. Assessment / Impression:   Patient haylee's fair tolerance with activities. She needs constant redirection but is pleasant throughout session.    Patient's tolerance of treatment:  Fair   Adverse Reaction: confusion  Significant change in status and impact:  none  Barriers to improvement:
Physician Progress Note      PATIENT:               Nichole Simmons  CSN #:                  [id number]  :                       5/15/1929  ADMIT DATE:       2023 1:45 PM  [address] [facility] DATE:  RESPONDING  PROVIDER #:        Sudha Tellez MD          QUERY TEXT:    Patient admitted with GI bleeding, noted to have diverticulosis on CT scan. If possible, please document in progress notes and discharge summary the cause   of the GI bleeding: The medical record reflects the following:  Risk Factors: advanced age  Clinical Indicators: Pt admitted with melena. Per H&P, \"Diverticular bleed is   suspected based on previous colonoscopy. \" PN dated , by Lukas Cintron NP, GI consultant, \"rectal bleeding-likely lower GI bleed from diverticula. \"    CT ABD/PELVIS revealed extensive colonic diverticulosis. Treatment: no invasive intervention per family at this time, IV Protonix,   labs, hospice consult    Thank you,  Ale Lynch RN  Options provided:  -- GI bleeding due to diverticulosis  -- Other - I will add my own diagnosis  -- Disagree - Not applicable / Not valid  -- Disagree - Clinically unable to determine / Unknown  -- Refer to Clinical Documentation Reviewer    PROVIDER RESPONSE TEXT:    This patient has GI bleeding due to diverticulosis. Query created by: Rica Mohr on 2023 12:49 PM      QUERY TEXT:    Pt admitted with melena and had anemia documented. If possible, please   document in progress notes and discharge summary further specificity regarding   the acuity and type of anemia:    The medical record reflects the following:  Risk Factors: melena  Clinical Indicators: Pt c/o melena on admission. Hgb  was 8.1, dropped to   5.5 on . Most recent Hgb 6.8 as of . Pt being transfused.   Treatment: monitor H/H, PRBC transfusions    Thank you,  Ale Lynch RN  Options provided:  -- Anemia due to acute blood loss  -- Anemia due to acute on chronic blood loss  -- Other - I will add my own
This LPN called to [facility] and spoke to nurse Bullock and report was given. Patient's daughter Pal Boyce came to transport patient to [facility].
This nurse was not able to complete the admission procedure as the patient is drowsy and asleep not able to communicate and answer any question.
V2.0  [facility] Hospitalist Progress Note      Name:  Neema Shook /Age/Sex: 5/15/1929  (80 y.o. female)   MRN & CSN:  [id number] & [id number] Encounter Date/Time: 2023 12:25 PM EDT    Location:  3439/6599-B PCP: Masood Almaraz MD       Hospital Day: 2    Assessment and Plan:   Neema Shook is a 80 y.o. female with a pmh of history of shingles on the face, chronic diastolic heart failure with preserved ejection fraction, hypertension, hyperlipidemia,  who presents with GIB (gastrointestinal bleeding). Hospital Problems               Last Modified POA     * (Principal) GIB (gastrointestinal bleeding) 2023 Yes      Generalized weakness with severe debility in the setting of failure to thrive  Rectal bleeding with suspected lower GI bleed: Initially family reported that there was perforation on the previous colonoscopy? No report evidence identified. Other family members came who reported that it was actually a rebleeding after a polyp removal.  Keep the patient n.p.o. over midnight. CODE STATUS is limited now. Guarded prognosis. GI to discuss in detail with the patient in the morning. Diverticular bleed is suspected based on previous colonoscopy. .  Continue to monitor. GI has been consulted. IV Protonix have been started. Monitor hemoglobin. Keep hemoglobin above 7. Goals of care have been discussed in detail and the patient CODE STATUS has been changed to limited code. All question concerns were answered. GI went in the room and discussed in detail regarding CTA versus EGD and colonoscopy we will hold off on the idea of colonoscopy and EGD and will consider CTA if need to be if hemodynamically becomes unstable. All questions and concerns were addressed. Acute severe symptomatic anemia in the setting of GI bleed: Hemoglobin of 7 today will be transfusing 1 pack of RBC today  Hyperkalemia: In the setting of poor intake also in setting of lisinopril and spironolactone use.
V2.0  [facility] [place]      Name:  Sultana Cardenas /Age/Sex: 5/15/1929  (80 y.o. female)   MRN & CSN:  [id number] & [id number] Encounter Date/Time: 2023 12:25 PM EDT    Location:  [address] [place] [address] PCP: Candelario Gray MD       Hospital Day: 5    Assessment and Plan:   Sultana Cardenas is a 80 y.o. female with a pmh of history of shingles on the face, chronic diastolic heart failure with preserved ejection fraction, hypertension, hyperlipidemia,  who presents with GIB (gastrointestinal bleeding). Hospital Problems               Last Modified POA     * (Principal) GIB (gastrointestinal bleeding) 2023 Yes      Generalized weakness with severe debility in the setting of failure to thrive  Rectal bleeding with suspected lower GI bleed 2/2 ? Diverticular bleed vs post polyp removal.  Diverticular bleed is suspected based on previous colonoscopy. .  Continue to monitor. Monitor hemoglobin. Keep hemoglobin above 7. Hold off on colonoscopy and EGD - patient is a poor candidate for procedures. Consider CTA if need to be if hemodynamically becomes unstable. Hemoglobin did drop to as low as 5.5 this admission. May give blood. Acute severe symptomatic anemia in the setting of GI bleed: Hemoglobin of 8.2 today. Continue to monitor. Hyperkalemia: In the setting of lisinopril and spironolactone use. Hold both meds. Now K stable. .  Chronic diastolic heart failure with preserved ejection fraction  Shingles on the forehead completed the course of treatment with acyclovir  SABA on CKD III. Baseline Cr 1-1.3. Peaked at 1.8. Now down to 1.5. Aldactone and torsemide 20mg daily on hold. Continue lisinopril 2.5mg daily. On LR at 50cc/hr - can likely d/c LR tomorrow provided Cr stable. Lt. Arm swelling.  Obtain DVT US   Benign essential hypertension  Hyperlipidemia  Dementia     Comment: Please note this report has been produced using speech recognition software and may contain errors related to
V2.0  [facility] [place] Hospitalist Progress Note      Name:  Laisha Carlton /Age/Sex: 5/15/1929  (80 y.o. female)   MRN & CSN:  [id number] & [id number] Encounter Date/Time: 2023 12:25 PM EDT    Location:  [address] [place], [place] [address]-K PCP: Puja Jalloh MD       Hospital Day: 3    Assessment and Plan:   Laisha Carlton is a 80 y.o. female with a pmh of history of shingles on the face, chronic diastolic heart failure with preserved ejection fraction, hypertension, hyperlipidemia,  who presents with GIB (gastrointestinal bleeding). Hospital Problems               Last Modified POA     * (Principal) GIB (gastrointestinal bleeding) 2023 Yes      Generalized weakness with severe debility in the setting of failure to thrive  Rectal bleeding with suspected lower GI bleed: Initially family reported that there was perforation on the previous colonoscopy? No report evidence identified. Other family members came who reported that it was actually a rebleeding after a polyp removal. CODE STATUS is limited now. Guarded prognosis. GI to discuss in detail with the patient in the morning. Diverticular bleed is suspected based on previous colonoscopy. .  Continue to monitor. GI has been consulted. IV Protonix have been started. Monitor hemoglobin. Keep hemoglobin above 7. Goals of care have been discussed in detail and the patient CODE STATUS has been changed to limited code. All question concerns were answered. GI went in the room and discussed in detail regarding CTA versus EGD and colonoscopy we will hold off on the idea of colonoscopy and EGD and will consider CTA if need to be if hemodynamically becomes unstable. All questions and concerns were addressed. Hemoglobin of 6.7 transfuse 1 pack of or 2 packs of RBCs monitor hemoglobin to keep above 7. Continue to monitor. Likely will not benefit from EGD/colonoscopies because of age and comorbidities.   Limited code  Acute severe symptomatic anemia in the setting of
V2.0  [facility] [place]      Name:  Sourav Hurtado /Age/Sex: 5/15/1929  (80 y.o. female)   MRN & CSN:  [id number] & [id number] Encounter Date/Time: 2023 12:25 PM EDT    Location:  [address] [place], [place] [id number] PCP: Yoni Witt MD       Hospital Day: 4    Assessment and Plan:   Sourav Hurtado is a 80 y.o. female with a pmh of history of shingles on the face, chronic diastolic heart failure with preserved ejection fraction, hypertension, hyperlipidemia,  who presents with GIB (gastrointestinal bleeding). Hospital Problems               Last Modified POA     * (Principal) GIB (gastrointestinal bleeding) 2023 Yes      Generalized weakness with severe debility in the setting of failure to thrive  Rectal bleeding with suspected lower GI bleed 2/2 ? Diverticular bleed vs post polyp removal.  Diverticular bleed is suspected based on previous colonoscopy. .  Continue to monitor. Monitor hemoglobin. Keep hemoglobin above 7. Hold off on colonoscopy and EGD - patient is a poor candidate for procedures. Consider CTA if need to be if hemodynamically becomes unstable. Hemoglobin did drop to as low as 5.5 this admission. Currently 8.2. Acute severe symptomatic anemia in the setting of GI bleed: Hemoglobin of 8.2 today. Continue to monitor. Hyperkalemia: In the setting of lisinopril and spironolactone use. Hold both meds. Now K stable. .  Chronic diastolic heart failure with preserved ejection fraction  Shingles on the forehead completed the course of treatment with acyclovir  SABA on CKD III. Baseline Cr 1-1.3. Peaked at 1.8. Now down to 1.5. Aldactone and torsemide 20mg daily on hold. Continue lisinopril 2.5mg daily. On LR at 50cc/hr - can likely d/c LR tomorrow provided Cr stable. Lt. Arm swelling.  Obtain DVT US   Benign essential hypertension  Hyperlipidemia  Dementia     Comment: Please note this report has been produced using speech recognition software and may contain errors related to
V2.0  [facility] [place] Hospitalist Progress Note      Name:  Narinder Sprague /Age/Sex: 5/15/1929  (80 y.o. female)   MRN & CSN:  [id number] & [id number] Encounter Date/Time: 2023 12:25 PM EDT    Location:  [address] [place] [address] PCP: Jenelle Lewis MD       Hospital Day: 6    Assessment and Plan:   Narinder Sprague is a 80 y.o. female with a pmh of history of shingles on the face, chronic diastolic heart failure with preserved ejection fraction, hypertension, hyperlipidemia,  who presents with GIB (gastrointestinal bleeding). Hospital Problems               Last Modified POA     * (Principal) GIB (gastrointestinal bleeding) 2023 Yes      Generalized weakness with severe debility in the setting of failure to thrive  Rectal bleeding with suspected lower GI bleed 2/2 ? Diverticular bleed vs post polyp removal.  Diverticular bleed is suspected based on previous colonoscopy. .  Continue to monitor. Monitor hemoglobin. Keep hemoglobin above 7. Hold off on colonoscopy and EGD - patient is a poor candidate for procedures. Consider CTA if need to be if hemodynamically becomes unstable. Hemoglobin did drop to as low as 5.5 this admission. May give blood if Hb less than 7  Acute severe symptomatic anemia in the setting of GI bleed: Hemoglobin of 8.2 today. Continue to monitor. Hyperkalemia: In the setting of lisinopril and spironolactone use. Hold both meds. Now K stable. .  Chronic diastolic heart failure with preserved ejection fraction  Shingles on the forehead completed the course of treatment with acyclovir  SABA on CKD III. Baseline Cr 1-1.3. Peaked at 1.8. Now down to 1.5. Aldactone and torsemide 20mg daily on hold. Continue lisinopril 2.5mg daily. On LR at 50cc/hr - can likely d/c LR tomorrow provided Cr stable. Lt. Arm swelling.  Obtain DVT US   Benign essential hypertension  Hyperlipidemia  Dementia     Comment: Please note this report has been produced using speech recognition software and may contain
she becomes hemodynamically stable perform stat CTA  -Currently no plan for endoscopic evaluation  -Continued supportive management with recheck of hemoglobin and transfusion as needed     #2 shingles on the forehead  #3 multiple falls with generalized weakness and severe debility  #4 heart failure, status post TAVR    My documented MDM is a substantive portion of the supervisory visit. We will sign off    Comment: Please note this report has been produced using speech recognition software and may contain errors related to that system including errors in grammar, punctuation, and spelling, as well as words and phrases that may be inappropriate. If there are any questions or concerns please feel free to contact the dictating provider for clarification.
of the left skull. 1. No acute intracranial abnormality. 2. Chronic microvascular ischemic changes and global cerebral atrophy. CT CERVICAL SPINE WO CONTRAST    Result Date: 9/25/2023  EXAMINATION: CT OF THE CERVICAL SPINE WITHOUT CONTRAST 9/25/2023 4:30 pm TECHNIQUE: CT of the cervical spine was performed without the administration of intravenous contrast. Multiplanar reformatted images are provided for review. Automated exposure control, iterative reconstruction, and/or weight based adjustment of the mA/kV was utilized to reduce the radiation dose to as low as reasonably achievable. COMPARISON: None. HISTORY: ORDERING SYSTEM PROVIDED HISTORY: fall TECHNOLOGIST PROVIDED HISTORY: Reason for exam:->fall Decision Support Exception - unselect if not a suspected or confirmed emergency medical condition->Emergency Medical Condition (MA) Reason for Exam: fall FINDINGS: BONES/ALIGNMENT: There is no acute fracture or traumatic malalignment. DEGENERATIVE CHANGES: Moderate to severe degenerative changes. SOFT TISSUES: There is no prevertebral soft tissue swelling. No acute abnormality of the cervical spine. XR CHEST PORTABLE    Result Date: 9/25/2023  EXAMINATION: ONE XRAY VIEW OF THE CHEST 9/25/2023 2:41 pm COMPARISON: 08/17/2023 chest radiograph HISTORY: ORDERING SYSTEM PROVIDED HISTORY: AMS TECHNOLOGIST PROVIDED HISTORY: Reason for exam:->AMS Reason for Exam: AMS Additional signs and symptoms: unknown Relevant Medical/Surgical History: none FINDINGS: Left chest pacemaker leads overlie the right atrium, right ventricle, and coronary vein. Prior TAVR. The cardiomediastinal silhouette is enlarged, similar to prior. Similar appearance of a moderate right pleural effusion and adjacent atelectasis. Mild pulmonary edema. No pneumothorax. Osseous structures are unchanged. Stable moderate right pleural effusion and mild pulmonary edema.           Electronically signed by Rasheeda Escobar MD on 10/1/2023 at

## 2023-10-02 NOTE — PLAN OF CARE
Problem: Discharge Planning  Goal: Discharge to home or other facility with appropriate resources  Outcome: Progressing     Problem: Safety - Adult  Goal: Free from fall injury  Outcome: Progressing     Problem: Pain  Goal: Verbalizes/displays adequate comfort level or baseline comfort level  Outcome: Progressing     Problem: Skin/Tissue Integrity  Goal: Absence of new skin breakdown  Description: 1. Monitor for areas of redness and/or skin breakdown  2. Assess vascular access sites hourly  3. Every 4-6 hours minimum:  Change oxygen saturation probe site  4. Every 4-6 hours:  If on nasal continuous positive airway pressure, respiratory therapy assess nares and determine need for appliance change or resting period.   Outcome: Progressing     Problem: Chronic Conditions and Co-morbidities  Goal: Patient's chronic conditions and co-morbidity symptoms are monitored and maintained or improved  Outcome: Progressing     Problem: ABCDS Injury Assessment  Goal: Absence of physical injury  Outcome: Progressing

## 2023-10-02 NOTE — CONSENT
Informed Consent for Blood Component Transfusion Note    I have discussed with the patient the rationale for blood component transfusion; its benefits in treating or preventing fatigue, organ damage, or death; and its risk which includes mild transfusion reactions, rare risk of blood borne infection, or more serious but rare reactions. I have discussed the alternatives to transfusion, including the risk and consequences of not receiving transfusion. The patient had an opportunity to ask questions and had agreed to proceed with transfusion of blood components.     Electronically signed by Derek Dawkins MD on 10/2/23 at 11:17 AM EDT

## 2023-10-02 NOTE — DISCHARGE INSTR - DIET

## 2023-10-05 NOTE — DISCHARGE SUMMARY
TECHNOLOGIST PROVIDED HISTORY: Reason for exam:->AMS Reason for Exam: AMS Additional signs and symptoms: unknown Relevant Medical/Surgical History: none FINDINGS: Left chest pacemaker leads overlie the right atrium, right ventricle, and coronary vein. Prior TAVR. The cardiomediastinal silhouette is enlarged, similar to prior. Similar appearance of a moderate right pleural effusion and adjacent atelectasis. Mild pulmonary edema. No pneumothorax. Osseous structures are unchanged. Stable moderate right pleural effusion and mild pulmonary edema. CBC: No results for input(s): \"WBC\", \"HGB\", \"PLT\" in the last 72 hours. BMP:  No results for input(s): \"NA\", \"K\", \"CL\", \"CO2\", \"BUN\", \"CREATININE\", \"GLUCOSE\" in the last 72 hours. Hepatic: No results for input(s): \"AST\", \"ALT\", \"ALB\", \"BILITOT\", \"ALKPHOS\" in the last 72 hours. Lipids:   Lab Results   Component Value Date/Time    CHOL 142 10/13/2020 01:50 PM    HDL 64 10/13/2020 01:50 PM    TRIG 70 10/13/2020 01:50 PM     Hemoglobin A1C:   Lab Results   Component Value Date/Time    LABA1C 5.3 09/25/2023 09:20 PM     TSH:   Lab Results   Component Value Date/Time    TSH 1.77 11/20/2019 11:48 AM     Troponin:   Lab Results   Component Value Date/Time    TROPONINT 0.029 09/25/2023 02:15 PM    TROPONINT 0.017 08/08/2023 10:40 PM    TROPONINT 0.017 08/08/2023 05:12 PM     Lactic Acid: No results for input(s): \"LACTA\" in the last 72 hours. BNP: No results for input(s): \"PROBNP\" in the last 72 hours.   UA:  Lab Results   Component Value Date/Time    NITRU NEGATIVE 09/27/2023 06:20 AM    NITRU Negative 08/31/2023 12:00 AM    COLORU YELLOW 09/27/2023 06:20 AM    PHUR 6.0 08/31/2023 12:00 AM    WBCUA 1 09/27/2023 06:20 AM    RBCUA 1 09/27/2023 06:20 AM    MUCUS RARE 07/18/2021 04:20 AM    TRICHOMONAS NONE SEEN 09/27/2023 06:20 AM    YEAST RARE 07/18/2021 04:20 AM    BACTERIA NEGATIVE 09/27/2023 06:20 AM    CLARITYU CLEAR 09/27/2023 06:20 AM    SPECGRAV 1.010 09/27/2023

## 2023-10-05 NOTE — CONSENT
Informed Consent for Blood Component Transfusion Note    I have discussed with the patient the rationale for blood component transfusion; its benefits in treating or preventing fatigue, organ damage, or death; and its risk which includes mild transfusion reactions, rare risk of blood borne infection, or more serious but rare reactions. I have discussed the alternatives to transfusion, including the risk and consequences of not receiving transfusion. The patient had an opportunity to ask questions and had agreed to proceed with transfusion of blood components.     Electronically signed by Maryanne Browne MD on 10/5/23 at 6:32 PM EDT

## 2023-10-14 DIAGNOSIS — E78.2 MIXED HYPERLIPIDEMIA: ICD-10-CM

## 2023-10-16 RX ORDER — POTASSIUM CHLORIDE 20 MEQ/1
20 TABLET, EXTENDED RELEASE ORAL DAILY
Qty: 90 TABLET | Refills: 3 | Status: SHIPPED | OUTPATIENT
Start: 2023-10-16

## 2023-10-16 RX ORDER — LOVASTATIN 40 MG/1
TABLET ORAL
Qty: 90 TABLET | Refills: 3 | Status: SHIPPED | OUTPATIENT
Start: 2023-10-16

## 2023-10-20 ENCOUNTER — OFFICE VISIT (OUTPATIENT)
Dept: CARDIOLOGY CLINIC | Age: 88
End: 2023-10-20
Payer: MEDICARE

## 2023-10-20 ENCOUNTER — PROCEDURE VISIT (OUTPATIENT)
Dept: CARDIOLOGY CLINIC | Age: 88
End: 2023-10-20
Payer: MEDICARE

## 2023-10-20 VITALS
OXYGEN SATURATION: 99 % | DIASTOLIC BLOOD PRESSURE: 60 MMHG | SYSTOLIC BLOOD PRESSURE: 112 MMHG | WEIGHT: 135 LBS | HEART RATE: 61 BPM | BODY MASS INDEX: 26.5 KG/M2 | HEIGHT: 60 IN

## 2023-10-20 DIAGNOSIS — B95.2 BACTEREMIA DUE TO ENTEROCOCCUS: ICD-10-CM

## 2023-10-20 DIAGNOSIS — N18.32 STAGE 3B CHRONIC KIDNEY DISEASE (HCC): ICD-10-CM

## 2023-10-20 DIAGNOSIS — E11.59 TYPE 2 DIABETES MELLITUS WITH OTHER CIRCULATORY COMPLICATION, WITHOUT LONG-TERM CURRENT USE OF INSULIN (HCC): ICD-10-CM

## 2023-10-20 DIAGNOSIS — Z95.0 CARDIAC PACEMAKER: ICD-10-CM

## 2023-10-20 DIAGNOSIS — Z95.2 S/P AVR (AORTIC VALVE REPLACEMENT): Primary | ICD-10-CM

## 2023-10-20 DIAGNOSIS — Z95.2 S/P AVR (AORTIC VALVE REPLACEMENT): ICD-10-CM

## 2023-10-20 DIAGNOSIS — I38 VHD (VALVULAR HEART DISEASE): Primary | ICD-10-CM

## 2023-10-20 DIAGNOSIS — R78.81 BACTEREMIA DUE TO ENTEROCOCCUS: ICD-10-CM

## 2023-10-20 DIAGNOSIS — I44.2 CHB (COMPLETE HEART BLOCK) (HCC): ICD-10-CM

## 2023-10-20 DIAGNOSIS — E78.49 OTHER HYPERLIPIDEMIA: ICD-10-CM

## 2023-10-20 DIAGNOSIS — I10 ESSENTIAL HYPERTENSION: ICD-10-CM

## 2023-10-20 DIAGNOSIS — I50.32 CHRONIC DIASTOLIC CONGESTIVE HEART FAILURE (HCC): ICD-10-CM

## 2023-10-20 PROCEDURE — G8417 CALC BMI ABV UP PARAM F/U: HCPCS | Performed by: NURSE PRACTITIONER

## 2023-10-20 PROCEDURE — 3044F HG A1C LEVEL LT 7.0%: CPT | Performed by: NURSE PRACTITIONER

## 2023-10-20 PROCEDURE — 1123F ACP DISCUSS/DSCN MKR DOCD: CPT | Performed by: NURSE PRACTITIONER

## 2023-10-20 PROCEDURE — 93308 TTE F-UP OR LMTD: CPT | Performed by: INTERNAL MEDICINE

## 2023-10-20 PROCEDURE — G8484 FLU IMMUNIZE NO ADMIN: HCPCS | Performed by: NURSE PRACTITIONER

## 2023-10-20 PROCEDURE — 1036F TOBACCO NON-USER: CPT | Performed by: NURSE PRACTITIONER

## 2023-10-20 PROCEDURE — 99214 OFFICE O/P EST MOD 30 MIN: CPT | Performed by: NURSE PRACTITIONER

## 2023-10-20 PROCEDURE — 1090F PRES/ABSN URINE INCON ASSESS: CPT | Performed by: NURSE PRACTITIONER

## 2023-10-20 PROCEDURE — G8427 DOCREV CUR MEDS BY ELIG CLIN: HCPCS | Performed by: NURSE PRACTITIONER

## 2023-10-20 PROCEDURE — 1111F DSCHRG MED/CURRENT MED MERGE: CPT | Performed by: NURSE PRACTITIONER

## 2023-10-20 RX ORDER — AMOXICILLIN 500 MG/1
500 CAPSULE ORAL 3 TIMES DAILY
COMMUNITY

## 2023-10-20 RX ORDER — GABAPENTIN 100 MG/1
100 CAPSULE ORAL 3 TIMES DAILY
COMMUNITY

## 2023-10-20 RX ORDER — ASPIRIN 81 MG/1
81 TABLET ORAL DAILY
COMMUNITY

## 2023-10-20 ASSESSMENT — ENCOUNTER SYMPTOMS
COUGH: 0
SHORTNESS OF BREATH: 0

## 2023-10-20 NOTE — PROGRESS NOTES
MD Ganga   Calcium Carb-Cholecalciferol (OYSTER SHELL CALCIUM) 500-400 MG-UNIT TABS Calcium 500 + D 500 mg (1,250 mg)-400 unit tablet   Take by oral route. Yes ProviderGanga MD   Multiple Vitamins-Minerals (MULTIVITAMIN ADULT PO) Take 1 tablet by mouth daily   Yes Ganga Mejia MD   Cholecalciferol (VITAMIN D3) 5000 units TABS Take 1 tablet by mouth daily   Yes ProviderGanga MD       Physical Exam  Vitals reviewed. Constitutional:       General: She is not in acute distress. Appearance: Normal appearance. She is not ill-appearing. HENT:      Head: Atraumatic. Neck:      Vascular: No carotid bruit or JVD. Cardiovascular:      Rate and Rhythm: Normal rate and regular rhythm. Pulses: Normal pulses. Heart sounds: Normal heart sounds. No murmur heard. Pulmonary:      Effort: Pulmonary effort is normal. No respiratory distress. Breath sounds: Normal breath sounds. No rales. Musculoskeletal:         General: No deformity. Cervical back: Neck supple. No muscular tenderness. Right lower le+ Edema present. Left lower le+ Edema present. Neurological:      Mental Status: She is alert. Lab Review   Lab Results   Component Value Date/Time    CHOL 142 10/13/2020 01:50 PM    TRIG 70 10/13/2020 01:50 PM    HDL 64 10/13/2020 01:50 PM          Echocardiogram: 8/10/2023   Summary   Left ventricular systolic function is normal.   Ejection fraction is visually estimated at 55-60%. Slight apical ballooning noted. Mildly dilated left atrium. PPM wiring visualized in right heart. S/p TAVR (#29 [place] Nancy 3) with moderate perivalvular leak. Moderate to severe mitral regurgitation. Moderate tricuspid regurgitation; RVSP: 52 mmHg. No evidence of any pericardial effusion. Atherosclerotic plaque noted in the abdominal aorta.        VIKTORIA: 2023  Summary   Left ventricular systolic function is normal.   Ejection fraction is visually

## 2023-10-24 ENCOUNTER — TELEPHONE (OUTPATIENT)
Dept: CARDIOLOGY CLINIC | Age: 88
End: 2023-10-24

## 2023-10-24 NOTE — TELEPHONE ENCOUNTER
Summary   Normal left ventricle structure and systolic function with an ejection   fraction of 55-60%. Mildly dilated right atrium. Bioprosthetic aortic valve replacement with moderate to severe   regurgitation(281 msec). Possible abcess noted in subcostal view similar to   previous VIKTORIA. Calcified mitral chordae tendonae. Moderate tricuspid regurgitation. Moderate pulmonary hypertension at 60 mmHg. Spoke with daughter regarding results , pt voiced understanding.

## 2023-11-07 ENCOUNTER — APPOINTMENT (OUTPATIENT)
Dept: GENERAL RADIOLOGY | Age: 88
DRG: 683 | End: 2023-11-07
Attending: EMERGENCY MEDICINE
Payer: MEDICARE

## 2023-11-07 ENCOUNTER — HOSPITAL ENCOUNTER (INPATIENT)
Age: 88
LOS: 2 days | Discharge: NURSING, REHAB OR LONG-TERM CARE | DRG: 683 | End: 2023-11-15
Attending: EMERGENCY MEDICINE | Admitting: INTERNAL MEDICINE
Payer: MEDICARE

## 2023-11-07 ENCOUNTER — APPOINTMENT (OUTPATIENT)
Dept: CT IMAGING | Age: 88
DRG: 683 | End: 2023-11-07
Payer: MEDICARE

## 2023-11-07 ENCOUNTER — TELEPHONE (OUTPATIENT)
Dept: FAMILY MEDICINE CLINIC | Age: 88
End: 2023-11-07

## 2023-11-07 DIAGNOSIS — F03.918 DEMENTIA WITH OTHER BEHAVIORAL DISTURBANCE, UNSPECIFIED DEMENTIA SEVERITY, UNSPECIFIED DEMENTIA TYPE (HCC): ICD-10-CM

## 2023-11-07 DIAGNOSIS — N28.9 ACUTE RENAL INSUFFICIENCY: Primary | ICD-10-CM

## 2023-11-07 DIAGNOSIS — I10 ESSENTIAL HYPERTENSION: ICD-10-CM

## 2023-11-07 PROBLEM — R53.1 GENERALIZED WEAKNESS: Status: ACTIVE | Noted: 2023-11-07

## 2023-11-07 LAB
ALBUMIN SERPL-MCNC: 3.6 GM/DL (ref 3.4–5)
ALP BLD-CCNC: 251 IU/L (ref 40–129)
ALT SERPL-CCNC: 10 U/L (ref 10–40)
ANION GAP SERPL CALCULATED.3IONS-SCNC: 11 MMOL/L (ref 4–16)
AST SERPL-CCNC: 20 IU/L (ref 15–37)
BASOPHILS ABSOLUTE: 0.1 K/CU MM
BASOPHILS RELATIVE PERCENT: 0.9 % (ref 0–1)
BILIRUB SERPL-MCNC: 0.3 MG/DL (ref 0–1)
BILIRUBIN URINE: NEGATIVE MG/DL
BLOOD, URINE: NEGATIVE
BUN SERPL-MCNC: 53 MG/DL (ref 6–23)
CALCIUM SERPL-MCNC: 9.9 MG/DL (ref 8.3–10.6)
CHLORIDE BLD-SCNC: 104 MMOL/L (ref 99–110)
CLARITY: CLEAR
CO2: 23 MMOL/L (ref 21–32)
COLOR: YELLOW
COMMENT UA: NORMAL
CREAT SERPL-MCNC: 1.5 MG/DL (ref 0.6–1.1)
DIFFERENTIAL TYPE: ABNORMAL
EOSINOPHILS ABSOLUTE: 0.3 K/CU MM
EOSINOPHILS RELATIVE PERCENT: 3.6 % (ref 0–3)
GFR SERPL CREATININE-BSD FRML MDRD: 32 ML/MIN/1.73M2
GLUCOSE SERPL-MCNC: 126 MG/DL (ref 70–99)
GLUCOSE, URINE: NEGATIVE MG/DL
HCT VFR BLD CALC: 34 % (ref 37–47)
HEMOGLOBIN: 10.1 GM/DL (ref 12.5–16)
IMMATURE NEUTROPHIL %: 0.5 % (ref 0–0.43)
KETONES, URINE: NEGATIVE MG/DL
LEUKOCYTE ESTERASE, URINE: NEGATIVE
LYMPHOCYTES ABSOLUTE: 1 K/CU MM
LYMPHOCYTES RELATIVE PERCENT: 13 % (ref 24–44)
MCH RBC QN AUTO: 29.4 PG (ref 27–31)
MCHC RBC AUTO-ENTMCNC: 29.7 % (ref 32–36)
MCV RBC AUTO: 98.8 FL (ref 78–100)
MONOCYTES ABSOLUTE: 0.9 K/CU MM
MONOCYTES RELATIVE PERCENT: 11.6 % (ref 0–4)
NITRITE URINE, QUANTITATIVE: NEGATIVE
NUCLEATED RBC %: 0 %
PDW BLD-RTO: 16.9 % (ref 11.7–14.9)
PH, URINE: 5.5 (ref 5–8)
PLATELET # BLD: 237 K/CU MM (ref 140–440)
PMV BLD AUTO: 9.3 FL (ref 7.5–11.1)
POTASSIUM SERPL-SCNC: 4.6 MMOL/L (ref 3.5–5.1)
PROTEIN UA: NEGATIVE MG/DL
RBC # BLD: 3.44 M/CU MM (ref 4.2–5.4)
SEGMENTED NEUTROPHILS ABSOLUTE COUNT: 5.6 K/CU MM
SEGMENTED NEUTROPHILS RELATIVE PERCENT: 70.4 % (ref 36–66)
SODIUM BLD-SCNC: 138 MMOL/L (ref 135–145)
SPECIFIC GRAVITY UA: 1.01 (ref 1–1.03)
TOTAL IMMATURE NEUTOROPHIL: 0.04 K/CU MM
TOTAL NUCLEATED RBC: 0 K/CU MM
TOTAL PROTEIN: 7.6 GM/DL (ref 6.4–8.2)
UROBILINOGEN, URINE: 0.2 MG/DL (ref 0.2–1)
WBC # BLD: 8 K/CU MM (ref 4–10.5)

## 2023-11-07 PROCEDURE — 99285 EMERGENCY DEPT VISIT HI MDM: CPT

## 2023-11-07 PROCEDURE — 74176 CT ABD & PELVIS W/O CONTRAST: CPT

## 2023-11-07 PROCEDURE — 80053 COMPREHEN METABOLIC PANEL: CPT

## 2023-11-07 PROCEDURE — 85025 COMPLETE CBC W/AUTO DIFF WBC: CPT

## 2023-11-07 PROCEDURE — 71045 X-RAY EXAM CHEST 1 VIEW: CPT

## 2023-11-07 PROCEDURE — 81003 URINALYSIS AUTO W/O SCOPE: CPT

## 2023-11-07 PROCEDURE — 70450 CT HEAD/BRAIN W/O DYE: CPT

## 2023-11-07 PROCEDURE — G0378 HOSPITAL OBSERVATION PER HR: HCPCS

## 2023-11-07 RX ORDER — POLYETHYLENE GLYCOL 3350 17 G/17G
17 POWDER, FOR SOLUTION ORAL DAILY PRN
Status: DISCONTINUED | OUTPATIENT
Start: 2023-11-07 | End: 2023-11-15 | Stop reason: HOSPADM

## 2023-11-07 RX ORDER — SODIUM CHLORIDE 0.9 % (FLUSH) 0.9 %
5-40 SYRINGE (ML) INJECTION PRN
Status: DISCONTINUED | OUTPATIENT
Start: 2023-11-07 | End: 2023-11-15 | Stop reason: HOSPADM

## 2023-11-07 RX ORDER — POTASSIUM CHLORIDE 7.45 MG/ML
10 INJECTION INTRAVENOUS PRN
Status: DISCONTINUED | OUTPATIENT
Start: 2023-11-07 | End: 2023-11-15 | Stop reason: HOSPADM

## 2023-11-07 RX ORDER — GABAPENTIN 100 MG/1
100 CAPSULE ORAL 3 TIMES DAILY
Status: DISCONTINUED | OUTPATIENT
Start: 2023-11-08 | End: 2023-11-10

## 2023-11-07 RX ORDER — ASPIRIN 81 MG/1
81 TABLET, CHEWABLE ORAL DAILY
Status: DISCONTINUED | OUTPATIENT
Start: 2023-11-08 | End: 2023-11-15 | Stop reason: HOSPADM

## 2023-11-07 RX ORDER — ACETAMINOPHEN 325 MG/1
650 TABLET ORAL EVERY 6 HOURS PRN
Status: DISCONTINUED | OUTPATIENT
Start: 2023-11-07 | End: 2023-11-12 | Stop reason: SDUPTHER

## 2023-11-07 RX ORDER — TAMSULOSIN HYDROCHLORIDE 0.4 MG/1
0.4 CAPSULE ORAL NIGHTLY
Status: DISCONTINUED | OUTPATIENT
Start: 2023-11-08 | End: 2023-11-15 | Stop reason: HOSPADM

## 2023-11-07 RX ORDER — SODIUM CHLORIDE 9 MG/ML
INJECTION, SOLUTION INTRAVENOUS PRN
Status: DISCONTINUED | OUTPATIENT
Start: 2023-11-07 | End: 2023-11-15 | Stop reason: HOSPADM

## 2023-11-07 RX ORDER — SODIUM CHLORIDE, SODIUM LACTATE, POTASSIUM CHLORIDE, CALCIUM CHLORIDE 600; 310; 30; 20 MG/100ML; MG/100ML; MG/100ML; MG/100ML
INJECTION, SOLUTION INTRAVENOUS CONTINUOUS
Status: ACTIVE | OUTPATIENT
Start: 2023-11-08 | End: 2023-11-08

## 2023-11-07 RX ORDER — ENOXAPARIN SODIUM 100 MG/ML
30 INJECTION SUBCUTANEOUS EVERY EVENING
Status: DISCONTINUED | OUTPATIENT
Start: 2023-11-08 | End: 2023-11-15 | Stop reason: HOSPADM

## 2023-11-07 RX ORDER — FERROUS SULFATE 325(65) MG
325 TABLET ORAL EVERY OTHER DAY
Status: DISCONTINUED | OUTPATIENT
Start: 2023-11-08 | End: 2023-11-15 | Stop reason: HOSPADM

## 2023-11-07 RX ORDER — METOPROLOL SUCCINATE 25 MG/1
25 TABLET, EXTENDED RELEASE ORAL DAILY
Status: DISCONTINUED | OUTPATIENT
Start: 2023-11-08 | End: 2023-11-15 | Stop reason: HOSPADM

## 2023-11-07 RX ORDER — ATORVASTATIN CALCIUM 10 MG/1
20 TABLET, FILM COATED ORAL NIGHTLY
Status: DISCONTINUED | OUTPATIENT
Start: 2023-11-08 | End: 2023-11-15 | Stop reason: HOSPADM

## 2023-11-07 RX ORDER — SODIUM CHLORIDE 0.9 % (FLUSH) 0.9 %
5-40 SYRINGE (ML) INJECTION 2 TIMES DAILY
Status: DISCONTINUED | OUTPATIENT
Start: 2023-11-08 | End: 2023-11-15 | Stop reason: HOSPADM

## 2023-11-07 RX ORDER — POTASSIUM CHLORIDE 20 MEQ/1
20 TABLET, EXTENDED RELEASE ORAL DAILY
Status: DISCONTINUED | OUTPATIENT
Start: 2023-11-08 | End: 2023-11-08

## 2023-11-07 RX ORDER — IPRATROPIUM BROMIDE AND ALBUTEROL SULFATE 2.5; .5 MG/3ML; MG/3ML
1 SOLUTION RESPIRATORY (INHALATION) 3 TIMES DAILY
Status: DISCONTINUED | OUTPATIENT
Start: 2023-11-07 | End: 2023-11-08

## 2023-11-07 RX ORDER — MAGNESIUM SULFATE IN WATER 40 MG/ML
2000 INJECTION, SOLUTION INTRAVENOUS PRN
Status: DISCONTINUED | OUTPATIENT
Start: 2023-11-07 | End: 2023-11-15 | Stop reason: HOSPADM

## 2023-11-07 RX ORDER — SPIRONOLACTONE 50 MG/1
25 TABLET, FILM COATED ORAL DAILY
Status: DISCONTINUED | OUTPATIENT
Start: 2023-11-08 | End: 2023-11-15 | Stop reason: HOSPADM

## 2023-11-07 RX ORDER — VITAMIN B COMPLEX
5000 TABLET ORAL DAILY
Status: DISCONTINUED | OUTPATIENT
Start: 2023-11-08 | End: 2023-11-15 | Stop reason: HOSPADM

## 2023-11-07 RX ORDER — ACETAMINOPHEN 650 MG/1
650 SUPPOSITORY RECTAL EVERY 6 HOURS PRN
Status: DISCONTINUED | OUTPATIENT
Start: 2023-11-07 | End: 2023-11-12 | Stop reason: SDUPTHER

## 2023-11-07 RX ORDER — ONDANSETRON 2 MG/ML
4 INJECTION INTRAMUSCULAR; INTRAVENOUS EVERY 6 HOURS PRN
Status: DISCONTINUED | OUTPATIENT
Start: 2023-11-07 | End: 2023-11-15 | Stop reason: HOSPADM

## 2023-11-07 RX ORDER — ONDANSETRON 4 MG/1
4 TABLET, ORALLY DISINTEGRATING ORAL EVERY 8 HOURS PRN
Status: DISCONTINUED | OUTPATIENT
Start: 2023-11-07 | End: 2023-11-15 | Stop reason: HOSPADM

## 2023-11-07 NOTE — CARE COORDINATION
CM consulted for d/c planning. CM reviewed chart. Pt is from home with daughter, has insurance and PCP. Pt was recently d/c to [facility] for skilled rehab. CM went and spoke pt and daughter. Daughter Cathie JOSHUA. Pt has lived with daughter for last five years. Pt had a brain bleed in 2017 and has memory issues since then. Daughter states they are the worst they have ever been at this time. Pt was at [facility] and returned four days ago. Pt has been wondering day and night, be incontinent and not redirectable. Daughter has started [facility] app and is mostly completed. Daughter would like to try for skilled to LTC placement. Pt is from [facility] and daughter would like pt to go to [facility]. [facility]  [address] [facility], [address] [place] [address]  [phone]    Daughter can transport pt from here to nursing placement. CM called and left vm for admissions at the above number. Daughter has also called and left message. Handoff note left for inpt CM. CM printed off Medicare approved list of nursing homes and SNF for [facility]. Gave to daughter for review of other options.

## 2023-11-07 NOTE — ED PROVIDER NOTES
Triage Chief Complaint:   Dementia (Daughter reports patient was released from [facility] last week and she is not able to care for herself at home. Reports patient has dementia and is incontinent. States patient walks around the home looking for things that are not there.  )    [place]:  Amol Hartman is a 80 y.o. female that presents with increasing confusion. History is primarily obtained from patient's daughter who is at bedside and is a good historian. Daughter reports that patient has been just released from [facility] where she was being evaluated for confusion. Patient has been home for the past 4 days. Daughter reports that she is unable to care for at home. The patient is wandering around the house and rummaging through things looking for things that are not there. Patient is up at all hours of the night. Patient is very hard to redirect. Daughter reports \"I cannot restrain her like you can here\". Patient has been having problems with confusion and mental status changes for the past several years since her traumatic head bleed. Patient does use a cane and walker at baseline. Daughter reports primary somatic change that she has noticed is increased urinary incontinence issues. No known fevers. No coughing. No vomiting or diarrhea. Patient intermittently does complain of abdominal pain. Patient at this time denies any pain although history is obviously very limited given her mental status. ROS:  Limited as above. Past Medical History:   Diagnosis Date    Aphasia      Past Surgical History:   Procedure Laterality Date    AORTIC VALVE SURGERY  06/29/2017    BRAIN SURGERY  11/27/2017    bleed from stroke    PACEMAKER PLACEMENT  06/29/2017     Family History   Problem Relation Age of Onset    Heart Disease Father     Heart Attack Father     Cancer Other      Social History     Socioeconomic History    Marital status:       Spouse name: Not on file    Number of children: Not

## 2023-11-08 LAB
25(OH)D3 SERPL-MCNC: 70.48 NG/ML
AMMONIA: 28 UMOL/L (ref 11–51)
ANION GAP SERPL CALCULATED.3IONS-SCNC: 10 MMOL/L (ref 4–16)
BASE EXCESS: 0 (ref 0–2.4)
BUN SERPL-MCNC: 46 MG/DL (ref 6–23)
CALCIUM SERPL-MCNC: 9.5 MG/DL (ref 8.3–10.6)
CHLORIDE BLD-SCNC: 105 MMOL/L (ref 99–110)
CO2: 24 MMOL/L (ref 21–32)
COMMENT: ABNORMAL
CREAT SERPL-MCNC: 1.3 MG/DL (ref 0.6–1.1)
FOLATE SERPL-MCNC: 18.4 NG/ML (ref 3.1–17.5)
GFR SERPL CREATININE-BSD FRML MDRD: 38 ML/MIN/1.73M2
GLUCOSE SERPL-MCNC: 125 MG/DL (ref 70–99)
HCO3 VENOUS: 21.2 MMOL/L (ref 19–25)
O2 SAT, VEN: 92.9 % (ref 50–70)
PCO2, VEN: 26 MMHG (ref 38–52)
PH VENOUS: 7.52 (ref 7.32–7.42)
PO2, VEN: 252 MMHG (ref 28–48)
POTASSIUM SERPL-SCNC: 4.7 MMOL/L (ref 3.5–5.1)
SODIUM BLD-SCNC: 139 MMOL/L (ref 135–145)
TSH SERPL DL<=0.005 MIU/L-ACNC: 2.19 UIU/ML (ref 0.27–4.2)
VITAMIN B-12: 715.9 PG/ML (ref 211–911)

## 2023-11-08 PROCEDURE — 96372 THER/PROPH/DIAG INJ SC/IM: CPT

## 2023-11-08 PROCEDURE — 6370000000 HC RX 637 (ALT 250 FOR IP): Performed by: STUDENT IN AN ORGANIZED HEALTH CARE EDUCATION/TRAINING PROGRAM

## 2023-11-08 PROCEDURE — G0378 HOSPITAL OBSERVATION PER HR: HCPCS

## 2023-11-08 PROCEDURE — 82805 BLOOD GASES W/O2 SATURATION: CPT

## 2023-11-08 PROCEDURE — 2580000003 HC RX 258: Performed by: NURSE PRACTITIONER

## 2023-11-08 PROCEDURE — 80048 BASIC METABOLIC PNL TOTAL CA: CPT

## 2023-11-08 PROCEDURE — 6360000002 HC RX W HCPCS: Performed by: STUDENT IN AN ORGANIZED HEALTH CARE EDUCATION/TRAINING PROGRAM

## 2023-11-08 PROCEDURE — 36415 COLL VENOUS BLD VENIPUNCTURE: CPT

## 2023-11-08 PROCEDURE — 99211 OFF/OP EST MAY X REQ PHY/QHP: CPT

## 2023-11-08 PROCEDURE — 6370000000 HC RX 637 (ALT 250 FOR IP): Performed by: NURSE PRACTITIONER

## 2023-11-08 PROCEDURE — 96360 HYDRATION IV INFUSION INIT: CPT

## 2023-11-08 PROCEDURE — 2580000003 HC RX 258: Performed by: STUDENT IN AN ORGANIZED HEALTH CARE EDUCATION/TRAINING PROGRAM

## 2023-11-08 PROCEDURE — 82306 VITAMIN D 25 HYDROXY: CPT

## 2023-11-08 PROCEDURE — 82746 ASSAY OF FOLIC ACID SERUM: CPT

## 2023-11-08 PROCEDURE — 94761 N-INVAS EAR/PLS OXIMETRY MLT: CPT

## 2023-11-08 PROCEDURE — 82140 ASSAY OF AMMONIA: CPT

## 2023-11-08 PROCEDURE — 96361 HYDRATE IV INFUSION ADD-ON: CPT

## 2023-11-08 PROCEDURE — 82607 VITAMIN B-12: CPT

## 2023-11-08 PROCEDURE — 84443 ASSAY THYROID STIM HORMONE: CPT

## 2023-11-08 RX ORDER — SODIUM CHLORIDE 9 MG/ML
INJECTION, SOLUTION INTRAVENOUS CONTINUOUS
Status: DISPENSED | OUTPATIENT
Start: 2023-11-08 | End: 2023-11-08

## 2023-11-08 RX ORDER — IPRATROPIUM BROMIDE AND ALBUTEROL SULFATE 2.5; .5 MG/3ML; MG/3ML
1 SOLUTION RESPIRATORY (INHALATION) EVERY 4 HOURS PRN
Status: DISCONTINUED | OUTPATIENT
Start: 2023-11-08 | End: 2023-11-15 | Stop reason: HOSPADM

## 2023-11-08 RX ORDER — TRAMADOL HYDROCHLORIDE 50 MG/1
25 TABLET ORAL ONCE
Status: COMPLETED | OUTPATIENT
Start: 2023-11-08 | End: 2023-11-08

## 2023-11-08 RX ADMIN — ENOXAPARIN SODIUM 30 MG: 100 INJECTION SUBCUTANEOUS at 18:00

## 2023-11-08 RX ADMIN — Medication 5000 UNITS: at 11:08

## 2023-11-08 RX ADMIN — TAMSULOSIN HYDROCHLORIDE 0.4 MG: 0.4 CAPSULE ORAL at 20:31

## 2023-11-08 RX ADMIN — SODIUM CHLORIDE, POTASSIUM CHLORIDE, SODIUM LACTATE AND CALCIUM CHLORIDE: 600; 310; 30; 20 INJECTION, SOLUTION INTRAVENOUS at 02:04

## 2023-11-08 RX ADMIN — GABAPENTIN 100 MG: 100 CAPSULE ORAL at 15:03

## 2023-11-08 RX ADMIN — SODIUM CHLORIDE: 9 INJECTION, SOLUTION INTRAVENOUS at 12:25

## 2023-11-08 RX ADMIN — FERROUS SULFATE TAB 325 MG (65 MG ELEMENTAL FE) 325 MG: 325 (65 FE) TAB at 11:08

## 2023-11-08 RX ADMIN — TRAMADOL HYDROCHLORIDE 25 MG: 50 TABLET, COATED ORAL at 17:59

## 2023-11-08 RX ADMIN — ASPIRIN 81 MG: 81 TABLET, CHEWABLE ORAL at 11:08

## 2023-11-08 RX ADMIN — ATORVASTATIN CALCIUM 20 MG: 10 TABLET, FILM COATED ORAL at 20:31

## 2023-11-08 RX ADMIN — GABAPENTIN 100 MG: 100 CAPSULE ORAL at 11:08

## 2023-11-08 RX ADMIN — ACETAMINOPHEN 650 MG: 325 TABLET ORAL at 11:08

## 2023-11-08 RX ADMIN — POTASSIUM CHLORIDE 20 MEQ: 1500 TABLET, EXTENDED RELEASE ORAL at 11:08

## 2023-11-08 RX ADMIN — SODIUM CHLORIDE, PRESERVATIVE FREE 10 ML: 5 INJECTION INTRAVENOUS at 20:32

## 2023-11-08 ASSESSMENT — PAIN DESCRIPTION - ORIENTATION
ORIENTATION: RIGHT;LEFT
ORIENTATION: RIGHT;LEFT

## 2023-11-08 ASSESSMENT — PAIN SCALES - GENERAL
PAINLEVEL_OUTOF10: 5
PAINLEVEL_OUTOF10: 9

## 2023-11-08 ASSESSMENT — PAIN DESCRIPTION - LOCATION
LOCATION: HEAD
LOCATION: HEAD

## 2023-11-08 ASSESSMENT — PAIN DESCRIPTION - FREQUENCY
FREQUENCY: INTERMITTENT
FREQUENCY: INTERMITTENT

## 2023-11-08 ASSESSMENT — PAIN DESCRIPTION - DESCRIPTORS
DESCRIPTORS: ACHING;DISCOMFORT
DESCRIPTORS: ACHING;DISCOMFORT

## 2023-11-08 ASSESSMENT — PAIN - FUNCTIONAL ASSESSMENT
PAIN_FUNCTIONAL_ASSESSMENT: ACTIVITIES ARE NOT PREVENTED
PAIN_FUNCTIONAL_ASSESSMENT: ACTIVITIES ARE NOT PREVENTED

## 2023-11-08 ASSESSMENT — PAIN DESCRIPTION - ONSET
ONSET: ON-GOING
ONSET: ON-GOING

## 2023-11-08 ASSESSMENT — PAIN DESCRIPTION - PAIN TYPE
TYPE: ACUTE PAIN
TYPE: ACUTE PAIN

## 2023-11-08 NOTE — ED NOTES
Pt straight cath for urine. Urine sent to lab. Pt placed back on purewick.       Emily Pelletier RN  11/07/23 1921

## 2023-11-08 NOTE — PLAN OF CARE
Problem: Discharge Planning  Goal: Discharge to home or other facility with appropriate resources  11/8/2023 1414 by Alejandra Rivers LPN  Outcome: Progressing  11/8/2023 0518 by Jorge Luis Espinoza RN  Outcome: Progressing     Problem: Safety - Adult  Goal: Free from fall injury  11/8/2023 1414 by Alejandra Rivers LPN  Outcome: Progressing  11/8/2023 0518 by Jorge Luis Espinoza RN  Outcome: Progressing     Problem: Skin/Tissue Integrity  Goal: Absence of new skin breakdown  Description: 1. Monitor for areas of redness and/or skin breakdown  2. Assess vascular access sites hourly  3. Every 4-6 hours minimum:  Change oxygen saturation probe site  4. Every 4-6 hours:  If on nasal continuous positive airway pressure, respiratory therapy assess nares and determine need for appliance change or resting period.   Outcome: Progressing     Problem: Chronic Conditions and Co-morbidities  Goal: Patient's chronic conditions and co-morbidity symptoms are monitored and maintained or improved  Outcome: Progressing     Problem: Pain  Goal: Verbalizes/displays adequate comfort level or baseline comfort level  Outcome: Progressing

## 2023-11-08 NOTE — CONSULTS
[facility] Continence Nurse  Consult Note       Sanchez West  AGE: 80 y.o. GENDER: female  : 5/15/1929  TODAY'S DATE:  2023    Subjective:     Reason for CWOCN Evaluation and Assessment: wound assessment      Sanchez West is a 80 y.o. female referred by:   [x] Physician  [] Nursing  [] Other:     Wound Identification:  Wound Type: traumatic  Contributing Factors: diabetes, chronic pressure, decreased mobility, and incontinence of urine        PAST MEDICAL HISTORY        Diagnosis Date    Aphasia        PAST SURGICAL HISTORY    Past Surgical History:   Procedure Laterality Date    AORTIC VALVE SURGERY  2017    BRAIN SURGERY  2017    bleed from stroke    PACEMAKER PLACEMENT  2017       FAMILY HISTORY    Family History   Problem Relation Age of Onset    Heart Disease Father     Heart Attack Father     Cancer Other        SOCIAL HISTORY    Social History     Tobacco Use    Smoking status: Never    Smokeless tobacco: Never   Substance Use Topics    Alcohol use: Not Currently     Comment: Caffiene: 1 soda daily, 1-2 cups of coffee daily       ALLERGIES    Allergies   Allergen Reactions    Alendronate Other (See Comments)    Alendronate Sodium Other (See Comments)    Ibandronic Acid Other (See Comments)    Milk-Related Compounds        MEDICATIONS    No current facility-administered medications on file prior to encounter. Current Outpatient Medications on File Prior to Encounter   Medication Sig Dispense Refill    amoxicillin (AMOXIL) 500 MG capsule Take 1 capsule by mouth 3 times daily      aspirin 81 MG EC tablet Take 1 tablet by mouth daily      gabapentin (NEURONTIN) 100 MG capsule Take 1 capsule by mouth 3 times daily.       lovastatin (MEVACOR) 40 MG tablet TAKE 1 TABLET BY MOUTH DAILY 90 tablet 3    potassium chloride (KLOR-CON M) 20 MEQ extended release tablet TAKE 1 TABLET BY MOUTH DAILY 90 tablet 3    ferrous sulfate (IRON 325) 325 (65 Fe) MG tablet Take 1 Dressing Status New dressing applied 11/08/23 1000   Wound Cleansed Cleansed with saline 11/08/23 1000   Dressing/Treatment Hydrating gel with Ag;Silicone border 12/97/79 1000   Wound Length (cm) 2 cm 11/08/23 1000   Wound Width (cm) 3 cm 11/08/23 1000   Wound Depth (cm) 0.1 cm 11/08/23 1000   Wound Surface Area (cm^2) 6 cm^2 11/08/23 1000   Wound Volume (cm^3) 0.6 cm^3 11/08/23 1000   Distance Tunneling (cm) 0 cm 11/08/23 1000   Tunneling Position ___ O'Clock 0 11/08/23 1000   Undermining Starts ___ O'Clock 0 11/08/23 1000   Undermining Ends___ O'Clock 0 11/08/23 1000   Undermining Maxium Distance (cm) 0 11/08/23 1000   Wound Assessment Pink/red 11/08/23 1000   Drainage Amount Scant (moist but unmeasurable) 11/08/23 1000   Drainage Description Serous 11/08/23 1000   Odor None 11/08/23 1000   Linda-wound Assessment Intact 11/08/23 1000   Margins Defined edges 11/08/23 1000   Wound Thickness Description not for Pressure Injury Partial thickness 11/08/23 1000   Number of days: 0       Response to treatment:  Well tolerated by patient. Pain Assessment:  Severity:  none  Quality of pain: na  Wound Pain Timing/Severity: na  Premedicated: no    Plan:     Plan of Care: Wound 11/08/23 Pretibial Right cluster-Dressing/Treatment: Hydrating gel, Silicone border  Wound 11/08/23 Pretibial Left cluster-Dressing/Treatment: Open to air  Wound 11/08/23 Face Left;Upper-Dressing/Treatment: Hydrating gel with Ag, Silicone border    Pt in bed. Agreeable to wound assessment. Cognition deficits noted and poor historian. Possible traumatic wounds to right leg and left upper face above eyebrow. Recommend silvasorb gel and silicone foam border. Applied. Left leg with dry eschar. Heels and sacrum intact. Updated nurse. Atmos air pump applied to mattress. Pt is a moderate risk for skin breakdown AEB Hunter. Follow Hunter orders.      Specialty Bed Required : yes  [] Low Air Loss   [x] Pressure Redistribution  [] Fluid Immersion  []

## 2023-11-08 NOTE — ED NOTES
[facility] notified SBAR placed for patient.      Delmi Polanco RN  11/07/23 2253

## 2023-11-08 NOTE — CARE COORDINATION
Reviewed chart, discussed in IDR and VM left for admissions at [address] [place] [address] and faxed info to 21 [phone] 2nd vm and fax sent to [facility] , spoke with Radha and she states admission is gone but will be back in am.  Also confirmed they received fax on pt.   CM will recall in am

## 2023-11-08 NOTE — PROGRESS NOTES
This nurse was not able to complete the admission procedure bacause the patient has some confusion and not able to answer the question.

## 2023-11-08 NOTE — PROGRESS NOTES
4 Eyes Skin Assessment     NAME:  Rose Delcid  YOB: 1929  MEDICAL RECORD NUMBER:  [id number]    The patient is being assess for  Admission    I agree that 2 RN's have performed a thorough Head to Toe Skin Assessment on the patient. ALL assessment sites listed below have been assessed. Areas assessed by both nurses:    Head, Face, Ears, Shoulders, Back, Chest, Arms, Elbows, Hands, Sacrum. Buttock, Coccyx, Ischium, Legs. Feet and Heels, and Under Medical Devices         Does the Patient have a Wound? Yes wound(s) were present on assessment. LDA wound assessment was Initiated and completed by RN      Skin tear left eyebrow, skin tear on right lower leg, a scab on left leg; Redness on Perineum area.     Hunter Prevention initiated:  Yes   Wound Care Orders initiated:  Yes    Pressure Injury (Stage 3,4, Unstageable, DTI, NWPT, and Complex wounds) if present place consult order under [de-identified] No    New and Established Ostomies if present place consult order under : No      Nurse 1 eSignature: Electronically signed by Prem Florian RN on 11/8/23 at 3:12 AM EST    **SHARE this note so that the co-signing nurse is able to place an eSignature**    Nurse 2 eSignature: Electronically signed by Marilee Galvin RN on 11/8/23 at 4:21 AM EST

## 2023-11-08 NOTE — ED NOTES
ED TO INPATIENT SBAR HANDOFF    Patient Name: Mikala Price   :  5/15/1929  80 y.o. Preferred Name  Becca Puri  Family/Caregiver Present no   Restraints no   C-SSRS: Risk of Suicide: No Risk  Sitter no   Sepsis Risk Score Sepsis Risk Score: 2.06      Situation  Chief Complaint   Patient presents with    Dementia     Daughter reports patient was released from [facility] last week and she is not able to care for herself at home. Reports patient has dementia and is incontinent. States patient walks around the home looking for things that are not there. Brief Description of Patient's Condition: Pt to ED from home with c/o worsening dementia. Patient was inpatient at a SNF until last week when was discharged home with daughter. Patient's daughter was told that patient was able to independently perform ADLs but patient is not. Patient is incontinent and will pull depends off and urinate in the floor. Daughter states that she wants patient placed in SNF as she cannot care for her at home. Mental Status: alert  Arrived from: nursing home    Imaging:   CT ABDOMEN PELVIS WO CONTRAST Additional Contrast? None   Preliminary Result   1. No acute findings within the abdomen or pelvis. Extensive colonic   diverticulosis with no acute features. No evidence of obstructive uropathy. 2. Small right and trace left pleural effusion with interval improvement. Findings consistent with mild interstitial edema at the lung bases. 3. Cholelithiasis with no acute features. CT HEAD WO CONTRAST   Final Result   No acute intracranial abnormality. Stable generalized cerebral atrophy and chronic small vessel white matter   ischemic change. XR CHEST PORTABLE   Final Result   No acute cardiopulmonary disease. Cardiomegaly without overt failure.            Abnormal labs:   Abnormal Labs Reviewed   CBC WITH AUTO DIFFERENTIAL - Abnormal; Notable for the following components:       Result Value    RBC 3.44 (*)

## 2023-11-08 NOTE — PROGRESS NOTES
Physical/Occupational Therapy  Attempted to see pt x2 this date, on first attempt pt eating lunch, on second attempt pt had just returned to bed and too fatigued/with headache and requesting to return tomorrow. Will continue to attempt as schedule allows.

## 2023-11-08 NOTE — H&P
History and Physical      Name:  Eduarda Feldman /Age/Sex: 5/15/1929  (80 y.o. female)   MRN & CSN:  [id number] & [id number] Encounter Date/Time: 2023 8:42 PM EST   Location:  ED30/ED-30 PCP: Dong Lopez MD       Hospital Day: 1    Assessment and Plan:     Patient is a 60-year-old female who presented with confusion. # Advanced dementia without behavioral disturbances  - Per daughter, reported to have increased confusion and inability to care for self at home, requiring assistance with most ADLs. Recently released from [facility]. Uses cane and walker, no recent falls. - A&Ox self only, inattentive. Labs, UA, CTH and c-spine unremarkable. - CM consulted to evaluate for LTC/memory unit placement. Continue delirium precautions, very high risk. # SABA on CKD3a  - Clinically hypovolemic. Cr 1.5, baseline ~ 0.9. UA with elevated SG.  - Held home Aldactone. Will challenge with IVF. Strict I/O's. Bladder scan if decreased voiding. # Essential hypretension  - Continue home Toprol-XL, hold Aldactone in setting of SABA. # Normocytic anemia  - No evidence of active bleeding, not on anticoagulants. - Labs stable. Checklist:  Advanced directive: full  Diet: cardiac  DVT ppx: Lovenox    Disposition: place in observation. Estimated discharge: 1-2 day(s). Current living situation: nursing home. Expected disposition: nursing home. Spoke with ED provider who recommended admission for the patient and I agree with that plan. Personally reviewed lab studies and imaging. EKG interpreted personally and results as stated above. Imaging that was interpreted personally and results as stated above. History of Present Illness:     Chief Complaint: Confusion    Patient is a 60-year-old female with a PMHx of advanced dementia, HTN, HLD, CKD3b and chronic anemia who presented to the ED with increased confusion at home.  Per daughter, reported to have increased confusion and inability to Determinants of Health     Financial Resource Strain: Low Risk  (1/18/2022)    Overall Financial Resource Strain (CARDIA)     Difficulty of Paying Living Expenses: Not hard at all   Food Insecurity: No Food Insecurity (1/18/2022)    Hunger Vital Sign     Worried About Running Out of Food in the Last Year: Never true     Ran Out of Food in the Last Year: Never true   Physical Activity: Sufficiently Active (6/7/2022)    Exercise Vital Sign     Days of Exercise per Week: 7 days     Minutes of Exercise per Session: 30 min       Medications Prior to Admission     Prior to Admission medications    Medication Sig Start Date End Date Taking? Authorizing Provider   amoxicillin (AMOXIL) 500 MG capsule Take 1 capsule by mouth 3 times daily    Ganga Mejia MD   aspirin 81 MG EC tablet Take 1 tablet by mouth daily    Ganga Mejia MD   gabapentin (NEURONTIN) 100 MG capsule Take 1 capsule by mouth 3 times daily.     Ganga Mejia MD   lovastatin (MEVACOR) 40 MG tablet TAKE 1 TABLET BY MOUTH DAILY 10/16/23   Anam Mandel MD   potassium chloride (KLOR-CON M) 20 MEQ extended release tablet TAKE 1 TABLET BY MOUTH DAILY 10/16/23   Anam Mandel MD   ferrous sulfate (IRON 325) 325 (65 Fe) MG tablet Take 1 tablet by mouth every other day 10/2/23   Sujit Philippe MD   torsemide (DEMADEX) 20 MG tablet Take 1 tablet by mouth daily    Ganga Mejia MD   acetaminophen (TYLENOL) 325 MG tablet Take 2 tablets by mouth every 4 hours as needed for Pain    Ganga Mejia MD   spironolactone (ALDACTONE) 25 MG tablet Take 1 tablet by mouth daily    Ganga Mejia MD   tamsulosin (FLOMAX) 0.4 MG capsule Take 1 capsule by mouth nightly    Ganga Mejia MD   ipratropium 0.5 mg-albuterol 2.5 mg (DUONEB) 0.5-2.5 (3) MG/3ML SOLN nebulizer solution Inhale 3 mLs into the lungs 3 times daily    Ganga Mejia MD   empagliflozin (JARDIANCE) 10 MG tablet Take 1 tablet by mouth daily

## 2023-11-08 NOTE — PROGRESS NOTES
V2.0  [facility] Progress Note      Name:  Rose Delcid /Age/Sex: 5/15/1929  (80 y.o. female)   MRN & CSN:  [id number] & [id number] Encounter Date/Time: 2023 12:39 PM EST    Location:  [address] [place] [address] PCP: Ambrosio Bailey MD       Hospital Day: 2    Assessment and Plan:   Rose Delcid is a 80 y.o. female  who presents with Generalized weakness    Advanced dementia with reported increased confusion and inability to care for self at home  -Pt requiring assistance with most ADLs, recently released from [facility]. Patient uses cane and walker at home with reported no recent falls. CT of the head cervical spine nonacute. No infectious source. Ammonia, TSH vitamin B12 within normal limits VBG's showing mild alkalosis. Case management consulted to evaluate for placement. Continue delirium precautions, fall precautions, PT/OT    Acute kidney injury on CKD 3, GFR 38 -creat 1.5 on admission, improved currently 1.3, prior creat 0.9 10/1/2023, creat had been 1.0-1.3 range prior. Suspect prerenal as improving with IV hydration, continue to hold pts oral diuretics, continue IV hydration, will check postvoid residual, avoid NSAIDs, caution with IV contrast studies, keep MAP greater than 65, renal dose medications for current GFR    Essential hypertension-BP lower trending we will hold patient's Toprol, holding diuretics as noted, telemetry monitoring    Chronic anemia\history of GI bleed-hemoglobin 7.9 10/1/2023 currently 10.1, monitor no signs of active bleeding at this time    Other chronic medical conditions-resume home medications less contraindicated  Status post AVR  History of IE, blood culture positive for Enterococcus faecalis completed 6-week course of IV antibiotics and date 2023-monitor outpatient per Cardiology  History of CVA-on aspirin statin  CHB status post pacemaker  HLD on statin    Diet ADULT DIET;  Regular   DVT Prophylaxis [x] Lovenox, []  Heparin, [] SCDs, [] CONTRAST Additional Contrast? None    Result Date: 11/7/2023  EXAMINATION: CT OF THE ABDOMEN AND PELVIS WITHOUT CONTRAST, 11/7/2023 6:25 pm TECHNIQUE: CT of the abdomen and pelvis was performed without the administration of intravenous contrast. Multiplanar reformatted images are provided for review. Automated exposure control, iterative reconstruction, and/or weight based adjustment of the mA/kV was utilized to reduce the radiation dose to as low as reasonably achievable. COMPARISON: 09/25/2023. HISTORY: ORDERING SYSTEM PROVIDED HISTORY:  AMS TECHNOLOGIST PROVIDED HISTORY: Reason for Exam:  AMS Additional Contrast?  None Reason for Exam:  AMS FINDINGS: Lower Chest: Small right and trace left pleural effusion, improved compared to the previous study. Mild interlobular septal thickening at the lung bases with scattered ground-glass opacification most consistent with mild edema. Cardiomegaly with coronary atherosclerosis and previous TAVR. Organs: The unenhanced liver, spleen and adrenal glands are unremarkable. The pancreas is atrophic. Cholelithiasis with no acute features. There is no evidence of obstructive uropathy. 4.5 cm right renal cyst with no imaging follow-up indicated. GI/Bowel: The stomach and duodenal sweep are unremarkable. No small bowel distension. There is no evidence of appendicitis. Extensive colonic diverticulosis with no evidence of diverticulitis. Pelvis: No pelvic mass or free pelvic fluid. Previous hysterectomy. Mild distention of the urinary bladder. Peritoneum/Retroperitoneum: The abdominal aorta is normal in caliber with calcified atherosclerotic plaque. No significant retroperitoneal adenopathy or upper abdominal ascites. Bones/Soft Tissues: No acute osseous or soft tissue abnormality. There is a stable compression fracture of the T12 vertebra. Multilevel degenerative changes in the thoracolumbar spine. 1. No acute findings within the abdomen or pelvis.   Extensive colonic generalized cerebral atrophy and chronic small vessel white matter ischemic change. XR CHEST PORTABLE    Result Date: 11/7/2023  EXAMINATION: ONE XRAY VIEW OF THE CHEST 11/7/2023 5:15 pm COMPARISON: 09/25/2023. 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 Aortic vascular calcification. Mild prominence of the central pulmonary vascularity. There is no evidence of overt failure. There is near complete resolution of the right pleural effusion with mild dependent atelectasis. Remote calcified granulomatous disease. Left-sided transvenous pacer. No acute cardiopulmonary disease. Cardiomegaly without overt failure.        Electronically signed by FELICIA Harris CNP on 11/8/2023 at 12:39 PM

## 2023-11-09 LAB
AMPHETAMINES: NEGATIVE
BARBITURATE SCREEN URINE: NEGATIVE
BENZODIAZEPINE SCREEN, URINE: NEGATIVE
CANNABINOID SCREEN URINE: NEGATIVE
COCAINE METABOLITE: NEGATIVE
FENTANYL URINE: NEGATIVE
OPIATES, URINE: NEGATIVE
OXYCODONE: NEGATIVE

## 2023-11-09 PROCEDURE — 96372 THER/PROPH/DIAG INJ SC/IM: CPT

## 2023-11-09 PROCEDURE — 6370000000 HC RX 637 (ALT 250 FOR IP): Performed by: STUDENT IN AN ORGANIZED HEALTH CARE EDUCATION/TRAINING PROGRAM

## 2023-11-09 PROCEDURE — 80307 DRUG TEST PRSMV CHEM ANLYZR: CPT

## 2023-11-09 PROCEDURE — 94761 N-INVAS EAR/PLS OXIMETRY MLT: CPT

## 2023-11-09 PROCEDURE — 6360000002 HC RX W HCPCS: Performed by: STUDENT IN AN ORGANIZED HEALTH CARE EDUCATION/TRAINING PROGRAM

## 2023-11-09 PROCEDURE — 2580000003 HC RX 258: Performed by: STUDENT IN AN ORGANIZED HEALTH CARE EDUCATION/TRAINING PROGRAM

## 2023-11-09 PROCEDURE — 97166 OT EVAL MOD COMPLEX 45 MIN: CPT

## 2023-11-09 PROCEDURE — 97162 PT EVAL MOD COMPLEX 30 MIN: CPT

## 2023-11-09 PROCEDURE — G0378 HOSPITAL OBSERVATION PER HR: HCPCS

## 2023-11-09 RX ADMIN — SODIUM CHLORIDE, PRESERVATIVE FREE 10 ML: 5 INJECTION INTRAVENOUS at 09:40

## 2023-11-09 RX ADMIN — ATORVASTATIN CALCIUM 20 MG: 10 TABLET, FILM COATED ORAL at 20:33

## 2023-11-09 RX ADMIN — ENOXAPARIN SODIUM 30 MG: 100 INJECTION SUBCUTANEOUS at 18:27

## 2023-11-09 RX ADMIN — TAMSULOSIN HYDROCHLORIDE 0.4 MG: 0.4 CAPSULE ORAL at 20:32

## 2023-11-09 RX ADMIN — ACETAMINOPHEN 650 MG: 325 TABLET ORAL at 12:56

## 2023-11-09 RX ADMIN — SODIUM CHLORIDE, PRESERVATIVE FREE 10 ML: 5 INJECTION INTRAVENOUS at 20:33

## 2023-11-09 RX ADMIN — Medication 5000 UNITS: at 09:38

## 2023-11-09 RX ADMIN — ASPIRIN 81 MG: 81 TABLET, CHEWABLE ORAL at 09:37

## 2023-11-09 ASSESSMENT — PAIN SCALES - GENERAL
PAINLEVEL_OUTOF10: 4
PAINLEVEL_OUTOF10: 2
PAINLEVEL_OUTOF10: 4
PAINLEVEL_OUTOF10: 3
PAINLEVEL_OUTOF10: 4
PAINLEVEL_OUTOF10: 2
PAINLEVEL_OUTOF10: 0

## 2023-11-09 ASSESSMENT — PAIN DESCRIPTION - DESCRIPTORS: DESCRIPTORS: ACHING

## 2023-11-09 ASSESSMENT — PAIN DESCRIPTION - ORIENTATION: ORIENTATION: LEFT

## 2023-11-09 ASSESSMENT — PAIN - FUNCTIONAL ASSESSMENT: PAIN_FUNCTIONAL_ASSESSMENT: ACTIVITIES ARE NOT PREVENTED

## 2023-11-09 ASSESSMENT — PAIN DESCRIPTION - LOCATION: LOCATION: HEAD

## 2023-11-09 NOTE — CONSULTS
[facility] 5/15/1929, 4117/4117-A, 11/9/2023    History  [place]:  The primary encounter diagnosis was Acute renal insufficiency. A diagnosis of Dementia with other behavioral disturbance, unspecified dementia severity, unspecified dementia type ([address]) was also pertinent to this visit. Patient  has a past medical history of Aphasia. Patient  has a past surgical history that includes Aortic valve surgery (06/29/2017); pacemaker placement (06/29/2017); and brain surgery (11/27/2017). Discharge Recommendation: 24/7 supervision    Subjective:    Patient states: \"Oh sure, I'll get to the chair. \"      Pain:  states headache, unable to numerically rate when asked. Communication with other providers:  Handoff to RN, OT    Restrictions: general precautions, fall risk    Home Setup/Prior level of function   Pt poor historian this date, unable to obtain social functional.    Examination of body systems (includes body structures/functions, activity/participation limitations):  Observation:  pt supine in bed upon arrival and agreeable to therapy  Vision:  wears glasses, R eye swollen shut  Hearing:  [facility]  Cardiopulmonary:  no O2 needs  Cognition: impaired, hx dementia, see OT/SLP note for further evaluation. Musculoskeletal  ROM R/L:  WFL. Strength R/L:  4/5, minimal impairment in function and endurance. Neuro:  WFL      Mobility:  Rolling L/R:  SBA  Supine to sit:  SBA with cues for sequencing  Transfers: pt completed STS from EOB, to/from commode, and to chair CGA with cues for sequencing and hand placement  Sitting balance:  good. Standing balance:  fair+. Gait: pt ambulated 10' + 10' with RW CGA with decreased sherwin but no LOB. Distance limited due to pt requesting to rest secondary to headache.      [facility] 6 Clicks Inpatient Mobility:  AM-PAC Inpatient Mobility Raw Score : 18    Safety: patient left in chair with alarm on, call light within reach, RN notified, gait belt used. Assessment:  Pt is  a 80 y.o. female admitted to the hospital for generalized weakness. Pt's PLOF is unsure as pt poor historian. Pt is currently performing bed mobility SBA, transferring CGA, and ambulating with RW CGA. Pt is presenting with decreased endurance, impaired transfers, impaired gait, impaired cognition. Pt would benefit from continued acute care PT as well as 24/7 supervision upon discharge to continue to address impairments. Complexity: moderate    Prognosis: Good, no significant barriers to participation at this time.      General Plan: 3-5 times per week       Equipment: pt requires RW if does not already own    Goals:  Short Term Goals  Time Frame for Short Term Goals: 1 week  Short Term Goal 1: pt to complete all bed mobility with supervision  Short Term Goal 2: pt to complete all STS transfers to/from bed, commode, and chair with supervision  Short Term Goal 3: pt to ambulate 48' with LRAD SBA       Treatment plan:  Bed mobility, transfers, balance, gait, TA, TX    Recommendations for NURSING mobility: amb with RW and gait belt    Time:   Time in: 1036  Time out: 1047  Timed treatment minutes: 0  Total time: 11    Electronically signed by:    Sharron Brewer PT  11/9/2023, 1:48 PM

## 2023-11-09 NOTE — CARE COORDINATION
Reviewed chart , discussed in IDR and left for admissions at [facility]. [address] no answer from [facility]. VM left for pt's Daughter Pipo Dupont about need for backup plan. Also spoke with pt's son Hawa Bunch who is the Alt POA. He will attempted to talk with family and come up with another facility. 65 Son called back , spoke with CAMDEN Roper and states family has concerns about Pt's daughter Jory Pride and plans that are being made,  This CM will f/u in am with son      [id number] called back, She did not realize that [facility] was rated so poorly. She is open to any Facility in the [facility] area that has a rate of average or above.   CM will call facilities in am.

## 2023-11-09 NOTE — CONSULTS
[address] [facility], 5/15/1929, 4117/4117-A, 11/9/2023      Discharge Recommendation: LTC with memory care support     History:  [place]:  The primary encounter diagnosis was Acute renal insufficiency. A diagnosis of Dementia with other behavioral disturbance, unspecified dementia severity, unspecified dementia type ([address]) was also pertinent to this visit. Past Medical History:   Diagnosis Date    Aphasia          Subjective:  Patient states: \"You have to just tell me what to do\"  Pain: denied   Communication with other providers: RN, PT   Restrictions: general precautions, fall risk  No one at bedside    Home Setup/Prior level of function:   Per charting, pt's daughter having inc difficulty caring for pt at home and is setting up LTC. Examination:  Observation: Pt was in bed upon arrival, agreeable to session  Vision: glasses   Hearing: [place]   Objective Measures: stable vitals on RA      Body Systems and functions:  ROM: WFL in BUE  Strength: 4/5 in BUE  Sensation: WFL  Tone: normotonic in BUE  Coordination: movements fluid and coordinated  Posture: normal posture  Activity Tolerance: Good     Activities of Daily Living (ADLs):  Feeding: SetupA   Grooming: CGA (standing sinkside)   Toileting: CGA (over standard commode)   UB dressing: SetupA   LB dressing: SetupA (seated over commode with cues for depends and STS for hike)   UB bathing: SetupA   LB bathing: SetupA       *pt ADL function inferred from gross functional assessment of mobility, balance, posture, safety awareness, activity tolerance (unless otherwise indicated)    Cognitive and Psychosocial Functioning:  Overall cognitive status: pt with hx of dementia. Follows commands well.  Overall pleasantly confused this eval.   Affect: normal     Balance:   Sitting: good   Standing: fair+     Functional Mobility:  Rolling Laterally in Bed: Steve   Supine to Sit: Steve   Scooting: SBA   Sit to lines managed, needs met     Assessment:  Pt is a 80 yr old female from home who presents with generalized weakness. Prior to admission, pt was ambulating with FWW and assist from daughter. Pt currently performed bed mobility Steve, transfers CGA, ambulated CGA with FWW, and up to [facility] for ADLs. Pt would benefit from continued acute care OT services during their stay, and discharge to LTC with memory care support.     Complexity: Moderate   Prognosis: good   Barriers: endurance, cog     Plan:  Plan: 3+/week    Treatment to include: Strengthening, ROM, Balance Training, Functional Mobility Training, Endurance Training, Gait Training, Pain Management, Safety Education and Training, Patient+Caregiver Education and Training, Equipment Evaluation Education and Procurement, Positioning, Self Care Training, Home Management Training, Coordination Training    Pt would benefit from continued edu on: orientation and safety   Adaptive Equipment Recommendations: defer to next LOC     Goals:  Time frame for goals: 2 weeks    Pt will complete grooming tasks with Supervision at standing level   Pt will complete toileting tasks with Supervision  using standard commode   Pt will complete UB dressing tasks with Supervision   Pt will complete LB dressing tasks with Supervision   Pt will complete UB bathing tasks with Supervision   Pt will complete LB bathing tasks with Supervision   Pt will complete therapeutic exercise/activity to increase independence in ADL/IADL function  Pt will practice functional transfers and mobility with AD for increased safety and independence    Time:   Time in: 1035  Time out: 1048  Treatment Minutes: 0  Evaluation Minutes: 13  Total time: 13    Electronically signed by:    JONG Vuong/L   License: [id number]  24/9/2673, 1:30 PM

## 2023-11-09 NOTE — PLAN OF CARE
Problem: Discharge Planning  Goal: Discharge to home or other facility with appropriate resources  11/8/2023 2344 by Caleb Bermeo RN  Outcome: Progressing  11/8/2023 1414 by Alcon Cobian LPN  Outcome: Progressing     Problem: Safety - Adult  Goal: Free from fall injury  11/8/2023 2344 by Caleb Bermeo RN  Outcome: Progressing  11/8/2023 1414 by Alcon Cobian LPN  Outcome: Progressing     Problem: Skin/Tissue Integrity  Goal: Absence of new skin breakdown  Description: 1. Monitor for areas of redness and/or skin breakdown  2. Assess vascular access sites hourly  3. Every 4-6 hours minimum:  Change oxygen saturation probe site  4. Every 4-6 hours:  If on nasal continuous positive airway pressure, respiratory therapy assess nares and determine need for appliance change or resting period.   11/8/2023 2344 by Caleb Bermeo RN  Outcome: Progressing  11/8/2023 1414 by Alcon Cobian LPN  Outcome: Progressing     Problem: Chronic Conditions and Co-morbidities  Goal: Patient's chronic conditions and co-morbidity symptoms are monitored and maintained or improved  11/8/2023 2344 by Caleb Bermeo RN  Outcome: Progressing  11/8/2023 1414 by Alcon Cobian LPN  Outcome: Progressing     Problem: Pain  Goal: Verbalizes/displays adequate comfort level or baseline comfort level  11/8/2023 2344 by Caleb Bermeo RN  Outcome: Progressing  11/8/2023 1414 by Alcon Cobian LPN  Outcome: Progressing

## 2023-11-10 LAB
ALBUMIN SERPL-MCNC: 3.2 GM/DL (ref 3.4–5)
ALP BLD-CCNC: 178 IU/L (ref 40–128)
ALT SERPL-CCNC: 9 U/L (ref 10–40)
ANION GAP SERPL CALCULATED.3IONS-SCNC: 9 MMOL/L (ref 4–16)
AST SERPL-CCNC: 20 IU/L (ref 15–37)
BILIRUB SERPL-MCNC: 0.4 MG/DL (ref 0–1)
BUN SERPL-MCNC: 37 MG/DL (ref 6–23)
CALCIUM SERPL-MCNC: 9.6 MG/DL (ref 8.3–10.6)
CHLORIDE BLD-SCNC: 104 MMOL/L (ref 99–110)
CO2: 22 MMOL/L (ref 21–32)
CREAT SERPL-MCNC: 1.2 MG/DL (ref 0.6–1.1)
GFR SERPL CREATININE-BSD FRML MDRD: 42 ML/MIN/1.73M2
GLUCOSE SERPL-MCNC: 77 MG/DL (ref 70–99)
HCT VFR BLD CALC: 32 % (ref 37–47)
HEMOGLOBIN: 9.5 GM/DL (ref 12.5–16)
MCH RBC QN AUTO: 29.1 PG (ref 27–31)
MCHC RBC AUTO-ENTMCNC: 29.7 % (ref 32–36)
MCV RBC AUTO: 97.9 FL (ref 78–100)
PDW BLD-RTO: 16.4 % (ref 11.7–14.9)
PLATELET # BLD: 207 K/CU MM (ref 140–440)
PMV BLD AUTO: 8.9 FL (ref 7.5–11.1)
POTASSIUM SERPL-SCNC: 5 MMOL/L (ref 3.5–5.1)
RBC # BLD: 3.27 M/CU MM (ref 4.2–5.4)
SODIUM BLD-SCNC: 135 MMOL/L (ref 135–145)
TOTAL PROTEIN: 6.1 GM/DL (ref 6.4–8.2)
WBC # BLD: 6.3 K/CU MM (ref 4–10.5)

## 2023-11-10 PROCEDURE — 6370000000 HC RX 637 (ALT 250 FOR IP): Performed by: STUDENT IN AN ORGANIZED HEALTH CARE EDUCATION/TRAINING PROGRAM

## 2023-11-10 PROCEDURE — 96372 THER/PROPH/DIAG INJ SC/IM: CPT

## 2023-11-10 PROCEDURE — G0378 HOSPITAL OBSERVATION PER HR: HCPCS

## 2023-11-10 PROCEDURE — 85027 COMPLETE CBC AUTOMATED: CPT

## 2023-11-10 PROCEDURE — 2580000003 HC RX 258: Performed by: STUDENT IN AN ORGANIZED HEALTH CARE EDUCATION/TRAINING PROGRAM

## 2023-11-10 PROCEDURE — 6370000000 HC RX 637 (ALT 250 FOR IP): Performed by: PHYSICIAN ASSISTANT

## 2023-11-10 PROCEDURE — 97530 THERAPEUTIC ACTIVITIES: CPT

## 2023-11-10 PROCEDURE — 6360000002 HC RX W HCPCS: Performed by: STUDENT IN AN ORGANIZED HEALTH CARE EDUCATION/TRAINING PROGRAM

## 2023-11-10 PROCEDURE — 36415 COLL VENOUS BLD VENIPUNCTURE: CPT

## 2023-11-10 PROCEDURE — 80053 COMPREHEN METABOLIC PANEL: CPT

## 2023-11-10 RX ORDER — GABAPENTIN 100 MG/1
100 CAPSULE ORAL NIGHTLY
Status: DISCONTINUED | OUTPATIENT
Start: 2023-11-10 | End: 2023-11-15 | Stop reason: HOSPADM

## 2023-11-10 RX ADMIN — SODIUM CHLORIDE, PRESERVATIVE FREE 10 ML: 5 INJECTION INTRAVENOUS at 13:19

## 2023-11-10 RX ADMIN — ASPIRIN 81 MG: 81 TABLET, CHEWABLE ORAL at 10:01

## 2023-11-10 RX ADMIN — Medication 5000 UNITS: at 10:01

## 2023-11-10 RX ADMIN — TAMSULOSIN HYDROCHLORIDE 0.4 MG: 0.4 CAPSULE ORAL at 20:48

## 2023-11-10 RX ADMIN — FERROUS SULFATE TAB 325 MG (65 MG ELEMENTAL FE) 325 MG: 325 (65 FE) TAB at 10:01

## 2023-11-10 RX ADMIN — ATORVASTATIN CALCIUM 20 MG: 10 TABLET, FILM COATED ORAL at 20:48

## 2023-11-10 RX ADMIN — ENOXAPARIN SODIUM 30 MG: 100 INJECTION SUBCUTANEOUS at 18:44

## 2023-11-10 RX ADMIN — GABAPENTIN 100 MG: 100 CAPSULE ORAL at 20:48

## 2023-11-10 NOTE — PLAN OF CARE
Problem: Discharge Planning  Goal: Discharge to home or other facility with appropriate resources  Outcome: Progressing     Problem: Safety - Adult  Goal: Free from fall injury  Outcome: Progressing     Problem: Skin/Tissue Integrity  Goal: Absence of new skin breakdown  Description: 1. Monitor for areas of redness and/or skin breakdown  2. Assess vascular access sites hourly  3. Every 4-6 hours minimum:  Change oxygen saturation probe site  4. Every 4-6 hours:  If on nasal continuous positive airway pressure, respiratory therapy assess nares and determine need for appliance change or resting period.   Outcome: Progressing     Problem: Chronic Conditions and Co-morbidities  Goal: Patient's chronic conditions and co-morbidity symptoms are monitored and maintained or improved  Outcome: Progressing     Problem: Pain  Goal: Verbalizes/displays adequate comfort level or baseline comfort level  Outcome: Progressing  Flowsheets (Taken 11/9/2023 0930 by Aguilar Orozco LPN)  Verbalizes/displays adequate comfort level or baseline comfort level:   Encourage patient to monitor pain and request assistance   Assess pain using appropriate pain scale   Administer analgesics based on type and severity of pain and evaluate response   Implement non-pharmacological measures as appropriate and evaluate response   Consider cultural and social influences on pain and pain management   Notify Licensed Independent Practitioner if interventions unsuccessful or patient reports new pain

## 2023-11-10 NOTE — PROGRESS NOTES
[facility] Progress Note      Name:  Katie Hughes /Age/Sex: 5/15/1929  (80 y.o. female)   MRN & CSN:  [id number] & [id number] Encounter Date/Time: 11/10/2023 12:39 PM EST    Location:  [address] [place] [address] PCP: Prakash Noland MD       Hospital Day: 4    Assessment and Plan:   Katie Hughes is a 80 y.o. female  who presents with Generalized weakness    Advanced dementia with reported increased confusion and inability to care for self at home  - SABA likely contributing to above. Pt requiring increased assistance with most ADLs, recently released from [facility].    - Patient uses cane and walker at home with reported no recent falls. - CT of the head cervical spine nonacute. - No apparent s/s of infectious source. - Ammonia, TSH vitamin B12 within normal limits, VBG's showing mild alkalosis. - Case management consulted to evaluate for placement. - Continue delirium precautions, fall precautions,  - PT/OT consulted  - Disp: SNF TBD  - Medically stable for discharge    Acute kidney injury on CKD 3, improving  - creat 1.5 on admission, improved currently 1.3, prior creat 0.9 (10/1/2023) baseline appears to be 1.0-1.3 range   - Suspect prerenal as improving s/p IV hydration/holding diuresis,   - Home Torsemide 20 mg daily and spironolactone are on hold. Consider decreasing dose at discharge  - Resumed home gabapentin at 100 mg dose tonight, was previously on 100 mg TID    Congestive heart failure, not in exacerbation   - Home torsemide, spironolactone, jardiance on hold give above. Likely was overdiuresed, will consider decreasing dose at discharge  - Last echo 10/23 EF normal    Essential hypertension  - Continue metoprolol  - Spironolactone and torsemide on hold given SABA.  Resume spironolactone in AM pending improvement in renal functions, will likely need to deescalate diuretic therapy at discharge    Chronic anemia\history of GI bleed-hemoglobin 7.9 10/1/2023   -Stable features. No evidence of obstructive uropathy. 2. Small right and trace left pleural effusion with interval improvement. Findings consistent with mild interstitial edema at the lung bases. 3. Cholelithiasis with no acute features. CT HEAD WO CONTRAST    Result Date: 11/7/2023  EXAMINATION: CT OF THE HEAD WITHOUT CONTRAST  11/7/2023 6:25 pm TECHNIQUE: CT of the head was performed without the administration of intravenous contrast. Automated exposure control, iterative reconstruction, and/or weight based adjustment of the mA/kV was utilized to reduce the radiation dose to as low as reasonably achievable. COMPARISON: 09/25/2023. HISTORY: ORDERING SYSTEM PROVIDED HISTORY: acute on chronic confusion TECHNOLOGIST PROVIDED HISTORY: Reason for exam:->acute on chronic confusion Has a \"code stroke\" or \"stroke alert\" been called? ->No Decision Support Exception - unselect if not a suspected or confirmed emergency medical condition->Emergency Medical Condition (MA) Reason for Exam: acute on chronic confusion FINDINGS: BRAIN/VENTRICLES: There is no acute intracranial hemorrhage, mass effect or midline shift. No abnormal extra-axial fluid collection. The gray-white differentiation is maintained without evidence of an acute infarct. There is no evidence of hydrocephalus. There is stable mild generalized prominence of the ventricular and cisternal spaces. Decreased attenuation in the periventricular and subcortical white matter most consistent with small vessel ischemic change. Prominent symmetric calcifications in the basal ganglia. Intracranial atherosclerotic vascular calcification. ORBITS: The visualized portion of the orbits demonstrate no acute abnormality. SINUSES: The visualized paranasal sinuses and mastoid air cells demonstrate no acute abnormality. SOFT TISSUES/SKULL:  No acute abnormality of the visualized skull or soft tissues. No acute intracranial abnormality.  Stable generalized cerebral atrophy and chronic small vessel white matter ischemic change. XR CHEST PORTABLE    Result Date: 11/7/2023  EXAMINATION: ONE XRAY VIEW OF THE CHEST 11/7/2023 5:15 pm COMPARISON: 09/25/2023. 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 Aortic vascular calcification. Mild prominence of the central pulmonary vascularity. There is no evidence of overt failure. There is near complete resolution of the right pleural effusion with mild dependent atelectasis. Remote calcified granulomatous disease. Left-sided transvenous pacer. No acute cardiopulmonary disease. Cardiomegaly without overt failure.      Jocelyn Lopez PA-C  Hospitalist  11/10/2023, 1:56 PM

## 2023-11-10 NOTE — PROGRESS NOTES
Occupational Therapy Treatment Note  Name: Rose Delcid MRN: [id number] :   5/15/1929   Date:  11/10/2023   Admission Date: 2023 Room:  [address] [place] [address]-A     Primary Problem:  The primary encounter diagnosis was Acute renal insufficiency. A diagnosis of Dementia with other behavioral disturbance, unspecified dementia severity, unspecified dementia type ([address]) was also pertinent to this visit. Past Medical History:   Diagnosis Date    Aphasia          Communication with other providers:  RN     Subjective:  Patient states:  \"I would like to get out of this chair\"  Pain: denied   Restrictions: general, fall   No one at bedside    Objective:    Observation:  pt was seated in recliner upon arrival, agreeable to session    Treatment, including education:    Therapeutic Activity Training:   Therapeutic activity training was instructed today. Cues were given for safety, sequence, UE/LE placement, visual cues, and balance. Activities performed today included:    STS:  Pt completed from recliner CGA to [facility]. Standing balance:  Pt tolerated standing at recliner fair+ CGA with FWW. Ambulation:  Pt ambulated ~50' CGA with FWW. Pt tolerated well, no LOB noted. Bed mobility:  Pt completed sit to sup with Carolyn. Pt declined need for ADLs at this time. Activities completed in prep for improved occupational performance.        Education: Role of OT, OT POC, safety, benefits of EOB/OOB activity, rationale for treatment  Safety Measures: Gait belt used for safety of pt and therapist, Left in bed, Alarm in place, call light and phone within reach, lines managed, needs met    Assessment / Impression:      Patient's tolerance of treatment: Good   Adverse Reaction: none   Significant change in status and impact:  none   Barriers to improvement: endurance, cog     Plan for Next Session:    Continue OT POC    Time in:  1355  Time out:  1405  Timed treatment minutes:  10  Total treatment time:  10    Electronically signed by:    JONG Thomas/L  License: [id number]  25/46/9575, 2:10 PM

## 2023-11-10 NOTE — CARE COORDINATION
Reviewed chart , discussed in IDR and spoke Filiberto Pimentel from [address] reviewed case and fell pt is not appropriate for their facility but recommended [address] on [facility], and [address]. [place] miles from [facility] and had ave rating. Called left vm for [place] in admission and fax info to [phone] Also spoke with pt's son alt POA and he wanted CM to check in to [address],Fourth Floor [facility])  [phone], spoke with Loyda Juan They are an [address] and [address] [facility] facility. Also [place] at [facility] and [facility] both are [address] facilities. [address] [place] [address]  [phone] no beds available till end of next week. CM will continue to follow. [address] with Haig Shadow , she does not have a bed a [facility] but does at [facility] in [facility]. They has a 1 star rating, left vm for daughter and [place] will need to do a Finical check on Monday. So would not take pt till then.

## 2023-11-11 LAB
ANION GAP SERPL CALCULATED.3IONS-SCNC: 9 MMOL/L (ref 4–16)
BASOPHILS ABSOLUTE: 0.1 K/CU MM
BASOPHILS RELATIVE PERCENT: 0.8 % (ref 0–1)
BUN SERPL-MCNC: 26 MG/DL (ref 6–23)
CALCIUM SERPL-MCNC: 9.6 MG/DL (ref 8.3–10.6)
CHLORIDE BLD-SCNC: 106 MMOL/L (ref 99–110)
CO2: 21 MMOL/L (ref 21–32)
CREAT SERPL-MCNC: 1.1 MG/DL (ref 0.6–1.1)
DIFFERENTIAL TYPE: ABNORMAL
EOSINOPHILS ABSOLUTE: 0.2 K/CU MM
EOSINOPHILS RELATIVE PERCENT: 3.3 % (ref 0–3)
GFR SERPL CREATININE-BSD FRML MDRD: 47 ML/MIN/1.73M2
GLUCOSE SERPL-MCNC: 141 MG/DL (ref 70–99)
HCT VFR BLD CALC: 34.6 % (ref 37–47)
HEMOGLOBIN: 10.2 GM/DL (ref 12.5–16)
IMMATURE NEUTROPHIL %: 0.7 % (ref 0–0.43)
LYMPHOCYTES ABSOLUTE: 0.9 K/CU MM
LYMPHOCYTES RELATIVE PERCENT: 14.5 % (ref 24–44)
MCH RBC QN AUTO: 29.3 PG (ref 27–31)
MCHC RBC AUTO-ENTMCNC: 29.5 % (ref 32–36)
MCV RBC AUTO: 99.4 FL (ref 78–100)
MONOCYTES ABSOLUTE: 0.7 K/CU MM
MONOCYTES RELATIVE PERCENT: 11.6 % (ref 0–4)
NUCLEATED RBC %: 0 %
PDW BLD-RTO: 16.7 % (ref 11.7–14.9)
PLATELET # BLD: 194 K/CU MM (ref 140–440)
PMV BLD AUTO: 8.9 FL (ref 7.5–11.1)
POTASSIUM SERPL-SCNC: 4.8 MMOL/L (ref 3.5–5.1)
RBC # BLD: 3.48 M/CU MM (ref 4.2–5.4)
SEGMENTED NEUTROPHILS ABSOLUTE COUNT: 4.2 K/CU MM
SEGMENTED NEUTROPHILS RELATIVE PERCENT: 69.1 % (ref 36–66)
SODIUM BLD-SCNC: 136 MMOL/L (ref 135–145)
TOTAL IMMATURE NEUTOROPHIL: 0.04 K/CU MM
TOTAL NUCLEATED RBC: 0 K/CU MM
WBC # BLD: 6.1 K/CU MM (ref 4–10.5)

## 2023-11-11 PROCEDURE — 94761 N-INVAS EAR/PLS OXIMETRY MLT: CPT

## 2023-11-11 PROCEDURE — 2580000003 HC RX 258: Performed by: STUDENT IN AN ORGANIZED HEALTH CARE EDUCATION/TRAINING PROGRAM

## 2023-11-11 PROCEDURE — G0378 HOSPITAL OBSERVATION PER HR: HCPCS

## 2023-11-11 PROCEDURE — 6370000000 HC RX 637 (ALT 250 FOR IP): Performed by: STUDENT IN AN ORGANIZED HEALTH CARE EDUCATION/TRAINING PROGRAM

## 2023-11-11 PROCEDURE — 6370000000 HC RX 637 (ALT 250 FOR IP): Performed by: NURSE PRACTITIONER

## 2023-11-11 PROCEDURE — 80048 BASIC METABOLIC PNL TOTAL CA: CPT

## 2023-11-11 PROCEDURE — 6360000002 HC RX W HCPCS: Performed by: STUDENT IN AN ORGANIZED HEALTH CARE EDUCATION/TRAINING PROGRAM

## 2023-11-11 PROCEDURE — 96372 THER/PROPH/DIAG INJ SC/IM: CPT

## 2023-11-11 PROCEDURE — 6370000000 HC RX 637 (ALT 250 FOR IP): Performed by: PHYSICIAN ASSISTANT

## 2023-11-11 PROCEDURE — 85025 COMPLETE CBC W/AUTO DIFF WBC: CPT

## 2023-11-11 PROCEDURE — 36415 COLL VENOUS BLD VENIPUNCTURE: CPT

## 2023-11-11 RX ORDER — FOLIC ACID 1 MG/1
1 TABLET ORAL DAILY
Status: DISCONTINUED | OUTPATIENT
Start: 2023-11-11 | End: 2023-11-15 | Stop reason: HOSPADM

## 2023-11-11 RX ORDER — TORSEMIDE 20 MG/1
10 TABLET ORAL DAILY
Status: DISCONTINUED | OUTPATIENT
Start: 2023-11-11 | End: 2023-11-15 | Stop reason: HOSPADM

## 2023-11-11 RX ADMIN — SODIUM CHLORIDE, PRESERVATIVE FREE 5 ML: 5 INJECTION INTRAVENOUS at 20:53

## 2023-11-11 RX ADMIN — TORSEMIDE 10 MG: 20 TABLET ORAL at 18:05

## 2023-11-11 RX ADMIN — ACETAMINOPHEN 650 MG: 325 TABLET ORAL at 20:53

## 2023-11-11 RX ADMIN — ACETAMINOPHEN 650 MG: 325 TABLET ORAL at 13:50

## 2023-11-11 RX ADMIN — SODIUM CHLORIDE, PRESERVATIVE FREE 10 ML: 5 INJECTION INTRAVENOUS at 09:47

## 2023-11-11 RX ADMIN — GABAPENTIN 100 MG: 100 CAPSULE ORAL at 20:53

## 2023-11-11 RX ADMIN — ENOXAPARIN SODIUM 30 MG: 100 INJECTION SUBCUTANEOUS at 17:57

## 2023-11-11 RX ADMIN — FOLIC ACID 1 MG: 1 TABLET ORAL at 09:46

## 2023-11-11 RX ADMIN — Medication 5000 UNITS: at 09:45

## 2023-11-11 RX ADMIN — ATORVASTATIN CALCIUM 20 MG: 10 TABLET, FILM COATED ORAL at 20:53

## 2023-11-11 RX ADMIN — TAMSULOSIN HYDROCHLORIDE 0.4 MG: 0.4 CAPSULE ORAL at 20:52

## 2023-11-11 RX ADMIN — ASPIRIN 81 MG: 81 TABLET, CHEWABLE ORAL at 09:46

## 2023-11-11 ASSESSMENT — PAIN DESCRIPTION - DESCRIPTORS
DESCRIPTORS: ACHING;DISCOMFORT;NAGGING
DESCRIPTORS: ACHING;DISCOMFORT
DESCRIPTORS: ACHING;DISCOMFORT

## 2023-11-11 ASSESSMENT — PAIN DESCRIPTION - PAIN TYPE: TYPE: ACUTE PAIN

## 2023-11-11 ASSESSMENT — PAIN SCALES - GENERAL
PAINLEVEL_OUTOF10: 10
PAINLEVEL_OUTOF10: 4
PAINLEVEL_OUTOF10: 6

## 2023-11-11 ASSESSMENT — PAIN DESCRIPTION - FREQUENCY: FREQUENCY: CONTINUOUS

## 2023-11-11 ASSESSMENT — PAIN - FUNCTIONAL ASSESSMENT: PAIN_FUNCTIONAL_ASSESSMENT: ACTIVITIES ARE NOT PREVENTED

## 2023-11-11 ASSESSMENT — PAIN DESCRIPTION - LOCATION
LOCATION: HEAD
LOCATION: GENERALIZED
LOCATION: BACK;HIP

## 2023-11-11 ASSESSMENT — PAIN DESCRIPTION - ORIENTATION: ORIENTATION: MID

## 2023-11-11 NOTE — PROGRESS NOTES
[facility] [place]      Name:  Irma Torres /Age/Sex: 5/15/1929  (80 y.o. female)   MRN & CSN:  [id number] & [id number] Encounter Date/Time: 2023 12:39 PM EST    Location:  [id number]- PCP: Anila Lassiter MD       Hospital Day: 5    Assessment and Plan:   Irma Torres is a 80 y.o. female  who presents with Generalized weakness    Advanced dementia with reported increased confusion and inability to care for self at home  - SABA likely contributing to above. Pt requiring increased assistance with most ADLs, recently released from [facility], awaiting Precert for Long Term Memory Unit    - Patient uses cane and walker at home with reported no recent falls. - CT of the head cervical spine nonacute. - No apparent s/s of infectious source. - Ammonia, TSH vitamin B12 within normal limits, VBG's showing mild alkalosis. - Case management consulted to evaluate for placement. - Continue delirium precautions, fall precautions,  - PT/OT consulted  - Disp: SNF TBD  - Medically stable for discharge    Folic Acid Deficiency  -Folate 49.1 starting Folic Acid 1 mg PO QD     Acute kidney injury on CKD 3, resolved   - creat 1.5 on admission, improved currently 1.1 and GFR 47, prior creat 0.9 (10/1/2023) baseline appears to be 1.0-1.3 range   - Suspect prerenal as improving s/p IV hydration/holding diuresis,   - Home Torsemide 20 mg, Jardiance and aldactone on hold  - restarting aldactone and Jardiance , torsemide at 10 mg lower dose, as her Cr is back to normal   - Resumed home gabapentin at 100 mg dose tonight, was previously on 100 mg TID    Congestive heart failure, not in exacerbation   - Home torsemide, spironolactone, jardiance on hold give SABA. Likely was overdiuresed, will restart but lower dose of torsemide from 20 to 10 mg qd   - Last echo 10/23 EF normal    Essential hypertension  - Continue metoprolol  - Spironolactone and torsemide on hold given SABA.  Resume

## 2023-11-12 LAB
ANION GAP SERPL CALCULATED.3IONS-SCNC: 9 MMOL/L (ref 4–16)
BASOPHILS ABSOLUTE: 0.1 K/CU MM
BASOPHILS RELATIVE PERCENT: 1.1 % (ref 0–1)
BUN SERPL-MCNC: 37 MG/DL (ref 6–23)
CALCIUM SERPL-MCNC: 9.3 MG/DL (ref 8.3–10.6)
CHLORIDE BLD-SCNC: 106 MMOL/L (ref 99–110)
CO2: 22 MMOL/L (ref 21–32)
CREAT SERPL-MCNC: 1.2 MG/DL (ref 0.6–1.1)
DIFFERENTIAL TYPE: ABNORMAL
EOSINOPHILS ABSOLUTE: 0.3 K/CU MM
EOSINOPHILS RELATIVE PERCENT: 4.9 % (ref 0–3)
GFR SERPL CREATININE-BSD FRML MDRD: 42 ML/MIN/1.73M2
GLUCOSE SERPL-MCNC: 86 MG/DL (ref 70–99)
HCT VFR BLD CALC: 32 % (ref 37–47)
HEMOGLOBIN: 9.6 GM/DL (ref 12.5–16)
IMMATURE NEUTROPHIL %: 0.8 % (ref 0–0.43)
LYMPHOCYTES ABSOLUTE: 1.5 K/CU MM
LYMPHOCYTES RELATIVE PERCENT: 22.9 % (ref 24–44)
MCH RBC QN AUTO: 29.3 PG (ref 27–31)
MCHC RBC AUTO-ENTMCNC: 30 % (ref 32–36)
MCV RBC AUTO: 97.6 FL (ref 78–100)
MONOCYTES ABSOLUTE: 0.8 K/CU MM
MONOCYTES RELATIVE PERCENT: 13 % (ref 0–4)
NUCLEATED RBC %: 0 %
PDW BLD-RTO: 16.3 % (ref 11.7–14.9)
PLATELET # BLD: 182 K/CU MM (ref 140–440)
PMV BLD AUTO: 9 FL (ref 7.5–11.1)
POTASSIUM SERPL-SCNC: 4.9 MMOL/L (ref 3.5–5.1)
RBC # BLD: 3.28 M/CU MM (ref 4.2–5.4)
SEGMENTED NEUTROPHILS ABSOLUTE COUNT: 3.7 K/CU MM
SEGMENTED NEUTROPHILS RELATIVE PERCENT: 57.3 % (ref 36–66)
SODIUM BLD-SCNC: 137 MMOL/L (ref 135–145)
TOTAL IMMATURE NEUTOROPHIL: 0.05 K/CU MM
TOTAL NUCLEATED RBC: 0 K/CU MM
WBC # BLD: 6.4 K/CU MM (ref 4–10.5)

## 2023-11-12 PROCEDURE — G0378 HOSPITAL OBSERVATION PER HR: HCPCS

## 2023-11-12 PROCEDURE — 2580000003 HC RX 258: Performed by: STUDENT IN AN ORGANIZED HEALTH CARE EDUCATION/TRAINING PROGRAM

## 2023-11-12 PROCEDURE — 6370000000 HC RX 637 (ALT 250 FOR IP): Performed by: STUDENT IN AN ORGANIZED HEALTH CARE EDUCATION/TRAINING PROGRAM

## 2023-11-12 PROCEDURE — 6370000000 HC RX 637 (ALT 250 FOR IP): Performed by: NURSE PRACTITIONER

## 2023-11-12 PROCEDURE — 80048 BASIC METABOLIC PNL TOTAL CA: CPT

## 2023-11-12 PROCEDURE — 6370000000 HC RX 637 (ALT 250 FOR IP)

## 2023-11-12 PROCEDURE — 6370000000 HC RX 637 (ALT 250 FOR IP): Performed by: PHYSICIAN ASSISTANT

## 2023-11-12 PROCEDURE — 36415 COLL VENOUS BLD VENIPUNCTURE: CPT

## 2023-11-12 PROCEDURE — 94761 N-INVAS EAR/PLS OXIMETRY MLT: CPT

## 2023-11-12 PROCEDURE — 85025 COMPLETE CBC W/AUTO DIFF WBC: CPT

## 2023-11-12 RX ORDER — ACETAMINOPHEN 325 MG/1
650 TABLET ORAL EVERY 4 HOURS PRN
Status: DISCONTINUED | OUTPATIENT
Start: 2023-11-12 | End: 2023-11-15 | Stop reason: HOSPADM

## 2023-11-12 RX ADMIN — TAMSULOSIN HYDROCHLORIDE 0.4 MG: 0.4 CAPSULE ORAL at 20:27

## 2023-11-12 RX ADMIN — EMPAGLIFLOZIN 10 MG: 10 TABLET, FILM COATED ORAL at 08:59

## 2023-11-12 RX ADMIN — TORSEMIDE 10 MG: 20 TABLET ORAL at 08:59

## 2023-11-12 RX ADMIN — Medication 5000 UNITS: at 08:59

## 2023-11-12 RX ADMIN — ATORVASTATIN CALCIUM 20 MG: 10 TABLET, FILM COATED ORAL at 20:27

## 2023-11-12 RX ADMIN — SPIRONOLACTONE 25 MG: 50 TABLET ORAL at 08:59

## 2023-11-12 RX ADMIN — ASPIRIN 81 MG: 81 TABLET, CHEWABLE ORAL at 08:59

## 2023-11-12 RX ADMIN — ACETAMINOPHEN 650 MG: 325 TABLET ORAL at 08:58

## 2023-11-12 RX ADMIN — GABAPENTIN 100 MG: 100 CAPSULE ORAL at 20:27

## 2023-11-12 RX ADMIN — FOLIC ACID 1 MG: 1 TABLET ORAL at 08:59

## 2023-11-12 RX ADMIN — SODIUM CHLORIDE, PRESERVATIVE FREE 5 ML: 5 INJECTION INTRAVENOUS at 20:27

## 2023-11-12 RX ADMIN — ACETAMINOPHEN 650 MG: 325 TABLET ORAL at 20:27

## 2023-11-12 RX ADMIN — FERROUS SULFATE TAB 325 MG (65 MG ELEMENTAL FE) 325 MG: 325 (65 FE) TAB at 08:59

## 2023-11-12 RX ADMIN — METOPROLOL SUCCINATE 25 MG: 25 TABLET, EXTENDED RELEASE ORAL at 08:59

## 2023-11-12 ASSESSMENT — PAIN DESCRIPTION - ORIENTATION: ORIENTATION: LEFT

## 2023-11-12 ASSESSMENT — PAIN SCALES - GENERAL
PAINLEVEL_OUTOF10: 10
PAINLEVEL_OUTOF10: 10

## 2023-11-12 ASSESSMENT — PAIN - FUNCTIONAL ASSESSMENT: PAIN_FUNCTIONAL_ASSESSMENT: ACTIVITIES ARE NOT PREVENTED

## 2023-11-12 ASSESSMENT — PAIN DESCRIPTION - LOCATION
LOCATION: HEAD
LOCATION: HEAD

## 2023-11-12 ASSESSMENT — PAIN DESCRIPTION - PAIN TYPE: TYPE: ACUTE PAIN

## 2023-11-12 ASSESSMENT — PAIN DESCRIPTION - DESCRIPTORS: DESCRIPTORS: ACHING;SORE

## 2023-11-12 NOTE — PROGRESS NOTES
[facility] [place]      Name:  Wilfrido Polanco /Age/Sex: 5/15/1929  (80 y.o. female)   MRN & CSN:  [id number] & [id number] Encounter Date/Time: 2023 12:39 PM EST    Location:  [address] [place] [address] PCP: Naa Mistry MD       Hospital Day: 6    Assessment and Plan:   Wilfrido Polanco is a 80 y.o. female  who presents with Generalized weakness    Advanced dementia with reported increased confusion and inability to care for self at home  - SABA likely contributing to above. Pt requiring increased assistance with most ADLs, recently released from [facility], awaiting Precert for Long Term Memory Unit    - Patient uses cane and walker at home with reported no recent falls. - CT of the head cervical spine nonacute. - No apparent s/s of infectious source. - Ammonia, TSH vitamin B12 within normal limits, VBG's showing mild alkalosis. - Case management consulted to evaluate for placement. - Continue delirium precautions, fall precautions,  - PT/OT consulted  - Disp: SNF TBD  - Medically stable for discharge, once she is approved for Long Term Memory Unit     Folic Acid Deficiency  -Folate 77.4 starting Folic Acid 1 mg PO QD     Acute kidney injury on CKD 3, resolved   - creat 1.5 on admission, improved currently 1.2 and GFR 42, prior creat 0.9 (10/1/2023) baseline appears to be 1.0-1.3 range   - Suspect prerenal as improving s/p IV hydration/holding diuresis,   - Home Torsemide 20 mg, Jardiance and aldactone on hold  - restarting aldactone and Jardiance , torsemide at 10 mg lower dose, as her Cr is back to normal   - Resumed home gabapentin at 100 mg dose tonight, was previously on 100 mg TID    Congestive heart failure, not in exacerbation   - Home torsemide, spironolactone, jardiance on hold give SABA.  Likely was overdiuresed, will restart but lower dose of torsemide from 20 to 10 mg qd   - Last echo 10/23 EF normal    Essential hypertension  - Continue metoprolol  - hydrocephalus. There is stable mild generalized prominence of the ventricular and cisternal spaces. Decreased attenuation in the periventricular and subcortical white matter most consistent with small vessel ischemic change. Prominent symmetric calcifications in the basal ganglia. Intracranial atherosclerotic vascular calcification. ORBITS: The visualized portion of the orbits demonstrate no acute abnormality. SINUSES: The visualized paranasal sinuses and mastoid air cells demonstrate no acute abnormality. SOFT TISSUES/SKULL:  No acute abnormality of the visualized skull or soft tissues. No acute intracranial abnormality. Stable generalized cerebral atrophy and chronic small vessel white matter ischemic change. XR CHEST PORTABLE    Result Date: 11/7/2023  EXAMINATION: ONE XRAY VIEW OF THE CHEST 11/7/2023 5:15 pm COMPARISON: 09/25/2023. 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 Aortic vascular calcification. Mild prominence of the central pulmonary vascularity. There is no evidence of overt failure. There is near complete resolution of the right pleural effusion with mild dependent atelectasis. Remote calcified granulomatous disease. Left-sided transvenous pacer. No acute cardiopulmonary disease. Cardiomegaly without overt failure.      FELICIA Palomares   Hospitalist  11/12/2023, 2:22 PM

## 2023-11-13 PROBLEM — F03.90 ADVANCING DEMENTIA (HCC): Status: ACTIVE | Noted: 2023-11-13

## 2023-11-13 LAB
ANION GAP SERPL CALCULATED.3IONS-SCNC: 10 MMOL/L (ref 4–16)
BASOPHILS ABSOLUTE: 0.1 K/CU MM
BASOPHILS RELATIVE PERCENT: 1.2 % (ref 0–1)
BUN SERPL-MCNC: 44 MG/DL (ref 6–23)
CALCIUM SERPL-MCNC: 9.5 MG/DL (ref 8.3–10.6)
CHLORIDE BLD-SCNC: 105 MMOL/L (ref 99–110)
CO2: 22 MMOL/L (ref 21–32)
CREAT SERPL-MCNC: 1.3 MG/DL (ref 0.6–1.1)
DIFFERENTIAL TYPE: ABNORMAL
EOSINOPHILS ABSOLUTE: 0.2 K/CU MM
EOSINOPHILS RELATIVE PERCENT: 3.7 % (ref 0–3)
GFR SERPL CREATININE-BSD FRML MDRD: 38 ML/MIN/1.73M2
GLUCOSE SERPL-MCNC: 85 MG/DL (ref 70–99)
HCT VFR BLD CALC: 33.5 % (ref 37–47)
HEMOGLOBIN: 9.3 GM/DL (ref 12.5–16)
IMMATURE NEUTROPHIL %: 0.5 % (ref 0–0.43)
LYMPHOCYTES ABSOLUTE: 1.3 K/CU MM
LYMPHOCYTES RELATIVE PERCENT: 19.4 % (ref 24–44)
MCH RBC QN AUTO: 29.7 PG (ref 27–31)
MCHC RBC AUTO-ENTMCNC: 27.8 % (ref 32–36)
MCV RBC AUTO: 107 FL (ref 78–100)
MONOCYTES ABSOLUTE: 0.7 K/CU MM
MONOCYTES RELATIVE PERCENT: 11.5 % (ref 0–4)
NUCLEATED RBC %: 0 %
PDW BLD-RTO: 16.2 % (ref 11.7–14.9)
PLATELET # BLD: 169 K/CU MM (ref 140–440)
PMV BLD AUTO: 9 FL (ref 7.5–11.1)
POTASSIUM SERPL-SCNC: 4.7 MMOL/L (ref 3.5–5.1)
RBC # BLD: 3.13 M/CU MM (ref 4.2–5.4)
SEGMENTED NEUTROPHILS ABSOLUTE COUNT: 4.1 K/CU MM
SEGMENTED NEUTROPHILS RELATIVE PERCENT: 63.7 % (ref 36–66)
SODIUM BLD-SCNC: 137 MMOL/L (ref 135–145)
TOTAL IMMATURE NEUTOROPHIL: 0.03 K/CU MM
TOTAL NUCLEATED RBC: 0 K/CU MM
WBC # BLD: 6.4 K/CU MM (ref 4–10.5)

## 2023-11-13 PROCEDURE — 6370000000 HC RX 637 (ALT 250 FOR IP): Performed by: STUDENT IN AN ORGANIZED HEALTH CARE EDUCATION/TRAINING PROGRAM

## 2023-11-13 PROCEDURE — 85025 COMPLETE CBC W/AUTO DIFF WBC: CPT

## 2023-11-13 PROCEDURE — 1200000000 HC SEMI PRIVATE

## 2023-11-13 PROCEDURE — 6370000000 HC RX 637 (ALT 250 FOR IP): Performed by: NURSE PRACTITIONER

## 2023-11-13 PROCEDURE — 6370000000 HC RX 637 (ALT 250 FOR IP): Performed by: PHYSICIAN ASSISTANT

## 2023-11-13 PROCEDURE — 6370000000 HC RX 637 (ALT 250 FOR IP)

## 2023-11-13 PROCEDURE — G0378 HOSPITAL OBSERVATION PER HR: HCPCS

## 2023-11-13 PROCEDURE — 36415 COLL VENOUS BLD VENIPUNCTURE: CPT

## 2023-11-13 PROCEDURE — 94761 N-INVAS EAR/PLS OXIMETRY MLT: CPT

## 2023-11-13 PROCEDURE — 80048 BASIC METABOLIC PNL TOTAL CA: CPT

## 2023-11-13 PROCEDURE — 2580000003 HC RX 258: Performed by: STUDENT IN AN ORGANIZED HEALTH CARE EDUCATION/TRAINING PROGRAM

## 2023-11-13 RX ADMIN — ASPIRIN 81 MG: 81 TABLET, CHEWABLE ORAL at 08:38

## 2023-11-13 RX ADMIN — FOLIC ACID 1 MG: 1 TABLET ORAL at 08:38

## 2023-11-13 RX ADMIN — TORSEMIDE 10 MG: 20 TABLET ORAL at 08:38

## 2023-11-13 RX ADMIN — Medication 5000 UNITS: at 08:39

## 2023-11-13 RX ADMIN — EMPAGLIFLOZIN 10 MG: 10 TABLET, FILM COATED ORAL at 08:38

## 2023-11-13 RX ADMIN — ATORVASTATIN CALCIUM 20 MG: 10 TABLET, FILM COATED ORAL at 20:40

## 2023-11-13 RX ADMIN — GABAPENTIN 100 MG: 100 CAPSULE ORAL at 20:40

## 2023-11-13 RX ADMIN — SODIUM CHLORIDE, PRESERVATIVE FREE 10 ML: 5 INJECTION INTRAVENOUS at 08:37

## 2023-11-13 RX ADMIN — TAMSULOSIN HYDROCHLORIDE 0.4 MG: 0.4 CAPSULE ORAL at 20:40

## 2023-11-13 RX ADMIN — SODIUM CHLORIDE, PRESERVATIVE FREE 10 ML: 5 INJECTION INTRAVENOUS at 20:41

## 2023-11-13 RX ADMIN — SPIRONOLACTONE 25 MG: 50 TABLET ORAL at 08:38

## 2023-11-13 RX ADMIN — ACETAMINOPHEN 650 MG: 325 TABLET ORAL at 08:38

## 2023-11-13 ASSESSMENT — PAIN DESCRIPTION - PAIN TYPE: TYPE: ACUTE PAIN

## 2023-11-13 ASSESSMENT — PAIN SCALES - GENERAL: PAINLEVEL_OUTOF10: 7

## 2023-11-13 ASSESSMENT — PAIN DESCRIPTION - LOCATION: LOCATION: HEAD

## 2023-11-13 ASSESSMENT — PAIN DESCRIPTION - FREQUENCY: FREQUENCY: INTERMITTENT

## 2023-11-13 ASSESSMENT — PAIN DESCRIPTION - DESCRIPTORS: DESCRIPTORS: ACHING

## 2023-11-13 NOTE — CARE COORDINATION
Reviewed chart discussed in IDR, and then spoke with pt's daughter Anam Phillips . She things [facility] is too far,  She is intersted in [employer], called /faxed referral to [facility] in admission  ,   [phone],/[phone].   Next 2 choices are [facility] and rehab and [address] at [phone]    1630 VM left for Yajaira to check on referral.

## 2023-11-13 NOTE — PROGRESS NOTES
Resource RN asked to assess eye per [facility] NP. Upon assessment left eye noted to be red, swollen, dry, and sclera reddened. Pt c/o itchiness and pain. Dressing noted above eye with open area noted. Will notify NP of findings.

## 2023-11-13 NOTE — PROGRESS NOTES
This nurse received in report from dayshift nurse pt left eye red, swollen, painful and pt has been complaining of headache all throughout day shift hours. Day nurse also told this nurse pt's son here to visit today and told daytime nurse that pt had shingles and \"that's why her eye looked like that. \" [address] Miller notified day time physician of this with no response from physician. This nurse then received phone call from [employer] pt had 11 beats of V tach at this time. Pt alert and sitting up in reclining chair in room. This nurse notified NP Ashley Gonzalez about patient's eye, the fact that daytime physician was notified and did not respond, and NP notified about 11 beats of V tach. KAN Gonzalez told this nurse \"please get rapid response nurse to come and check. \" This nurse notified charge RN and charge RN notified rapid resource RN. Pt given Tylenol at this time for increased pain in eye and head.

## 2023-11-14 PROCEDURE — 6370000000 HC RX 637 (ALT 250 FOR IP): Performed by: STUDENT IN AN ORGANIZED HEALTH CARE EDUCATION/TRAINING PROGRAM

## 2023-11-14 PROCEDURE — 2580000003 HC RX 258: Performed by: STUDENT IN AN ORGANIZED HEALTH CARE EDUCATION/TRAINING PROGRAM

## 2023-11-14 PROCEDURE — 1200000000 HC SEMI PRIVATE

## 2023-11-14 PROCEDURE — 6370000000 HC RX 637 (ALT 250 FOR IP): Performed by: NURSE PRACTITIONER

## 2023-11-14 PROCEDURE — 94761 N-INVAS EAR/PLS OXIMETRY MLT: CPT

## 2023-11-14 PROCEDURE — 97530 THERAPEUTIC ACTIVITIES: CPT

## 2023-11-14 PROCEDURE — 6360000002 HC RX W HCPCS: Performed by: STUDENT IN AN ORGANIZED HEALTH CARE EDUCATION/TRAINING PROGRAM

## 2023-11-14 PROCEDURE — 6370000000 HC RX 637 (ALT 250 FOR IP): Performed by: PHYSICIAN ASSISTANT

## 2023-11-14 PROCEDURE — 97535 SELF CARE MNGMENT TRAINING: CPT

## 2023-11-14 RX ADMIN — ASPIRIN 81 MG: 81 TABLET, CHEWABLE ORAL at 09:05

## 2023-11-14 RX ADMIN — FERROUS SULFATE TAB 325 MG (65 MG ELEMENTAL FE) 325 MG: 325 (65 FE) TAB at 09:05

## 2023-11-14 RX ADMIN — ATORVASTATIN CALCIUM 20 MG: 10 TABLET, FILM COATED ORAL at 21:33

## 2023-11-14 RX ADMIN — SODIUM CHLORIDE, PRESERVATIVE FREE 10 ML: 5 INJECTION INTRAVENOUS at 09:05

## 2023-11-14 RX ADMIN — GABAPENTIN 100 MG: 100 CAPSULE ORAL at 21:33

## 2023-11-14 RX ADMIN — Medication 5000 UNITS: at 09:05

## 2023-11-14 RX ADMIN — FOLIC ACID 1 MG: 1 TABLET ORAL at 09:05

## 2023-11-14 RX ADMIN — METOPROLOL SUCCINATE 25 MG: 25 TABLET, EXTENDED RELEASE ORAL at 09:05

## 2023-11-14 RX ADMIN — ENOXAPARIN SODIUM 30 MG: 100 INJECTION SUBCUTANEOUS at 18:26

## 2023-11-14 RX ADMIN — TAMSULOSIN HYDROCHLORIDE 0.4 MG: 0.4 CAPSULE ORAL at 21:33

## 2023-11-14 RX ADMIN — SODIUM CHLORIDE, PRESERVATIVE FREE 10 ML: 5 INJECTION INTRAVENOUS at 21:33

## 2023-11-14 NOTE — CARE COORDINATION
ESTUARDO called Yajaira at [facility] in admission  ,   [phone],/[phone]. to discuss pending determination. CM informed that she will call this CM when determination is made. [address] [place] [address]  [phone]  ESTUARDO called [facility] in admission  ,  [phone],/[phone] to discuss pending determination. CM informed that they are unable to accept. ESTUARDO called [facility] and rehab  [phone]. Frederick is referral coordinator. ESTUARDO provided [facility] cell [phone]. CM called and left her a .  7715 [email or web address] Dafter

## 2023-11-14 NOTE — PROGRESS NOTES
Occupational Therapy Treatment Note  Name: Sada Cortes MRN: [id number] :   5/15/1929   Date:  2023   Admission Date: 2023 Room:  [address] [place] [address]A     Primary Problem:  The primary encounter diagnosis was Acute renal insufficiency. A diagnosis of Dementia with other behavioral disturbance, unspecified dementia severity, unspecified dementia type ([address]) was also pertinent to this visit. Past Medical History:   Diagnosis Date    Aphasia        Subjective:  Patient states: \"Does my face look bad?\"  Pain: denied   Restrictions: general, fall   No one at bedside    Objective:    Observation:  pt was in bed upon arrival, agreeable to session    Treatment, including education:    Therapeutic Activity Training:   Therapeutic activity training was instructed today. Cues were given for safety, sequence, UE/LE placement, visual cues, and balance. Activities performed today included:    Bed mobility:  Pt completed sup to sit with SBA. Scooting:  SBA to EOB. Seated balance:  SBA at EOB    STS:  Pt completed from EOB to FWW CGA with inc time/effort. Pt attempted and unsuccessful from standard commode. Pt successfully completed with Carolyn to [facility] with inc time/effort. Standing balance:  CGA at sink for ADLs. Pt demo 1 minor LOB and self-corrected. Ambulation:  Pt ambulated CGA with FWW 8' + 8' with cues for pathway. Self Care Training:   Self care training was performed today. Cues were given for safety, sequence, UE/LE placement, visual cues, and balance. Activities performed today included: Toileting:  Pt completed over standard commode with Carolyn for transfers. Pt completed shy care and depends mgmt. LB dressing:  Pt doffed/donned new depends with Carolyn over standard commode and STS for hike. Hand hygiene:  Pt completed standing sinkside CGA. Oral hygiene:  Pt completed seated with SetupA.        Education: Role of OT, OT POC, safety, benefits of EOB/OOB activity,

## 2023-11-14 NOTE — PROGRESS NOTES
Comprehensive Nutrition Assessment    Type and Reason for Visit:  Initial (LOS)    Nutrition Recommendations/Plan:   Continue regular diet   Encourage and record PO intake TID      Malnutrition Assessment:  Malnutrition Status: At risk for malnutrition (Comment) (11/14/23 1400)    Context:  Acute Illness       Nutrition Assessment:    Admitted with headache and unable to care for self at home. Pt with advanced dementia and plans for memory care unit on d/c. PO intake this admission has been greater than 50%. weight hx per chart is variable, no significant overall weight loss in the last three months. 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 Nutrition Related Findings:    on oral iron, folic acid and vitamin D Wound Type:  (trumatic wounds noted, no pressure wounds noted.)       Current Nutrition Intake & Therapies:    Average Meal Intake: %  Average Supplements Intake: None Ordered  ADULT DIET; Regular    Anthropometric Measures:  Height: 152.4 cm (5')  Ideal Body Weight (IBW): 100 lbs (45 kg)    Admission Body Weight: 56.3 kg (124 lb 1.9 oz)  Current Body Weight: 60.5 kg (133 lb 6.1 oz), 133.4 % IBW. Weight Source: Bed Scale  Current BMI (kg/m2): 26  Usual Body Weight: 61.5 kg (135 lb 9.3 oz) (August 2023)  % Weight Change (Calculated): -1.6                    BMI Categories: Overweight (BMI 25.0-29. 9)    Estimated Daily Nutrient Needs:  Energy Requirements Based On: Kcal/kg  Weight Used for Energy Requirements: Current  Energy (kcal/day): 4025-8430  Weight Used for Protein Requirements: Current  Protein (g/day): 60-70  Method Used for Fluid Requirements: 1 ml/kcal  Fluid (ml/day): 4873-3465    Nutrition Diagnosis:   Predicted inadequate energy intake related to cognitive or neurological impairment as evidenced by  (possible weight loss over several months, decreased ability to care for self/eat meals)    Nutrition Interventions:   Food and/or Nutrient Delivery: Continue Current Diet  Nutrition

## 2023-11-15 VITALS
HEART RATE: 70 BPM | BODY MASS INDEX: 25.32 KG/M2 | TEMPERATURE: 98 F | DIASTOLIC BLOOD PRESSURE: 41 MMHG | SYSTOLIC BLOOD PRESSURE: 125 MMHG | HEIGHT: 60 IN | WEIGHT: 128.97 LBS | OXYGEN SATURATION: 97 % | RESPIRATION RATE: 18 BRPM

## 2023-11-15 PROCEDURE — 2580000003 HC RX 258: Performed by: STUDENT IN AN ORGANIZED HEALTH CARE EDUCATION/TRAINING PROGRAM

## 2023-11-15 PROCEDURE — 94761 N-INVAS EAR/PLS OXIMETRY MLT: CPT

## 2023-11-15 PROCEDURE — 6370000000 HC RX 637 (ALT 250 FOR IP): Performed by: STUDENT IN AN ORGANIZED HEALTH CARE EDUCATION/TRAINING PROGRAM

## 2023-11-15 PROCEDURE — 6370000000 HC RX 637 (ALT 250 FOR IP): Performed by: NURSE PRACTITIONER

## 2023-11-15 PROCEDURE — 6370000000 HC RX 637 (ALT 250 FOR IP)

## 2023-11-15 RX ORDER — SPIRONOLACTONE 25 MG/1
25 TABLET ORAL DAILY
Qty: 30 TABLET | Refills: 3
Start: 2023-11-15

## 2023-11-15 RX ORDER — FOLIC ACID 1 MG/1
1 TABLET ORAL DAILY
Qty: 30 TABLET | Refills: 3
Start: 2023-11-16

## 2023-11-15 RX ORDER — POLYETHYLENE GLYCOL 3350 17 G/17G
17 POWDER, FOR SOLUTION ORAL DAILY PRN
Qty: 527 G | Refills: 1
Start: 2023-11-15

## 2023-11-15 RX ORDER — METOPROLOL SUCCINATE 25 MG/1
25 TABLET, EXTENDED RELEASE ORAL DAILY
Qty: 90 TABLET | Refills: 0
Start: 2023-11-15

## 2023-11-15 RX ADMIN — FOLIC ACID 1 MG: 1 TABLET ORAL at 07:54

## 2023-11-15 RX ADMIN — ACETAMINOPHEN 650 MG: 325 TABLET ORAL at 08:00

## 2023-11-15 RX ADMIN — Medication 5000 UNITS: at 07:54

## 2023-11-15 RX ADMIN — ASPIRIN 81 MG: 81 TABLET, CHEWABLE ORAL at 07:54

## 2023-11-15 RX ADMIN — SODIUM CHLORIDE, PRESERVATIVE FREE 10 ML: 5 INJECTION INTRAVENOUS at 07:54

## 2023-11-15 ASSESSMENT — PAIN DESCRIPTION - DESCRIPTORS: DESCRIPTORS: ACHING

## 2023-11-15 ASSESSMENT — PAIN - FUNCTIONAL ASSESSMENT: PAIN_FUNCTIONAL_ASSESSMENT: ACTIVITIES ARE NOT PREVENTED

## 2023-11-15 ASSESSMENT — PAIN DESCRIPTION - ONSET: ONSET: ON-GOING

## 2023-11-15 ASSESSMENT — PAIN DESCRIPTION - FREQUENCY: FREQUENCY: INTERMITTENT

## 2023-11-15 ASSESSMENT — PAIN DESCRIPTION - ORIENTATION: ORIENTATION: RIGHT;LEFT

## 2023-11-15 ASSESSMENT — PAIN DESCRIPTION - PAIN TYPE: TYPE: ACUTE PAIN

## 2023-11-15 ASSESSMENT — PAIN DESCRIPTION - LOCATION: LOCATION: HEAD

## 2023-11-15 ASSESSMENT — PAIN SCALES - GENERAL: PAINLEVEL_OUTOF10: 5

## 2023-11-15 NOTE — CARE COORDINATION
[facility] EST[facility]ARDO provided referral for [facility] and rehab. ESTUARDO requested that LIAM follow up with Olga Parmar with the determination.  [address] [facility] [address]

## 2023-11-15 NOTE — PLAN OF CARE
Problem: Discharge Planning  Goal: Discharge to home or other facility with appropriate resources  Outcome: Progressing     Problem: Safety - Adult  Goal: Free from fall injury  Outcome: Progressing     Problem: Skin/Tissue Integrity  Goal: Absence of new skin breakdown  Description: 1. Monitor for areas of redness and/or skin breakdown  2. Assess vascular access sites hourly  3. Every 4-6 hours minimum:  Change oxygen saturation probe site  4. Every 4-6 hours:  If on nasal continuous positive airway pressure, respiratory therapy assess nares and determine need for appliance change or resting period.   Outcome: Progressing     Problem: Chronic Conditions and Co-morbidities  Goal: Patient's chronic conditions and co-morbidity symptoms are monitored and maintained or improved  Outcome: Progressing     Problem: Pain  Goal: Verbalizes/displays adequate comfort level or baseline comfort level  Outcome: Progressing     Problem: Nutrition Deficit:  Goal: Optimize nutritional status  11/15/2023 0349 by Herman Ferreira RN  Outcome: Progressing  11/14/2023 1401 by Holley Ambriz RD, LD  Outcome: Progressing  Flowsheets (Taken 11/14/2023 1401)  Nutrient intake appropriate for improving, restoring, or maintaining nutritional needs:   Assess nutritional status and recommend course of action   Monitor oral intake, labs, and treatment plans   Recommend appropriate diets, oral nutritional supplements, and vitamin/mineral supplements

## 2023-11-15 NOTE — DISCHARGE INSTR - COC
Continuity of Care Form    Patient Name: Donna Lechuga   :  5/15/1929  MRN:  [id number]    Admit date:  2023  Discharge date:  ***    Code Status Order: Limited   Advance Directives:     Admitting Physician:  Brisa Quintero MD  PCP: Pradip Lebron MD    Discharging Nurse: [facility] Unit/Room#: 6822/6642-G  Discharging Unit Phone Number: ***    Emergency Contact:   Extended Emergency Contact Information  Primary Emergency Contact: Becca Puenter ([address])  Home Phone: [phone]  Work Phone: [phone]  Mobile Phone: [phone]  Relation: Child   needed? No  Secondary Emergency Contact: [address] Phone: [phone]  Work Phone: [phone]  Mobile Phone: [phone]  Relation: Child   needed?  No    Past Surgical History:  Past Surgical History:   Procedure Laterality Date    AORTIC VALVE SURGERY  2017    BRAIN SURGERY  2017    bleed from stroke    PACEMAKER PLACEMENT  2017       Immunization History:   Immunization History   Administered Date(s) Administered    Influenza A (X2R2-17) Vaccine PF IM 2010    Influenza Vaccine, unspecified formulation 2009, 10/01/2012, 10/10/2014    Influenza Virus Vaccine 2009, 10/01/2012, 10/10/2014, 2015    Influenza, FLUAD, (age 72 y+), Adjuvanted, 0.5mL 10/13/2020    Influenza, High Dose (Fluzone 65 yrs and older) 10/18/2016, 2017, 10/10/2018    Influenza, Triv, inactivated, subunit, adjuvanted, IM (Fluad 65 yrs and older) 2019    Pneumococcal, PCV-13, PREVNAR 13, (age 6w+), IM, 0.5mL 2014    Pneumococcal, PPSV23, PNEUMOVAX 23, (age 2y+), SC/IM, 0.5mL 2003, 2017    TDaP, ADACEL (age 6y-58y), BOOSTRIX (age 10y+), IM, 0.5mL 2012, 2017, 2017    Zoster Live (Zostavax) 2007    Zoster Recombinant (Shingrix) 2019       Active Problems:  Patient Active Problem List   Diagnosis Code    Essential with patient):  {CHP DME Belongings:192426241}    RN SIGNATURE:  {Esignature:059607295}    CASE MANAGEMENT/SOCIAL WORK SECTION    Inpatient Status Date: ***    Readmission Risk Assessment Score:  Readmission Risk              Risk of Unplanned Readmission:  21           Discharging to Facility/ Agency   Name:   Address:  Phone:  Fax:    Dialysis Facility (if applicable)   Name:  Address:  Dialysis Schedule:  Phone:  Fax:    / signature: {Esignature:321249284}    PHYSICIAN SECTION    Nutrition Therapy:  Current Nutrition Therapy:   [address] [place] Diet List:385153217}    Routes of Feeding: {CHP DME Other Feedings:519774564}  Liquids: {Slp liquid thickness:83452}  Daily Fluid Restriction: {CHP DME Yes amt example:947015061}  Last Modified Barium Swallow with Video (Video Swallowing Test): {Done Not Done TOSP:465304224}    Treatments at the Time of Hospital Discharge:   Respiratory Treatments: N/A   Oxygen Therapy:  is not on home oxygen therapy. Ventilator:    - No ventilator support    Rehab Therapies: Physical Therapy and Occupational Therapy  Weight Bearing Status/Restrictions: No weight bearing restrictions  Other Medical Equipment (for information only, NOT a DME order):  N/A   Other Treatments: N/A     Prognosis: Fair    Condition at Discharge: Stable    Rehab Potential (if transferring to Rehab): Fair    Recommended Labs or Other Treatments After Discharge: check BP 2-3 times daily, her BP meds may cause Hypotension     Physician Certification: I certify the above information and transfer of Leoncio Cotto  is necessary for the continuing treatment of the diagnosis listed and that she requires [address] for greater 30 days.      Update Admission H&P: No change in H&P    PHYSICIAN SIGNATURE:  Electronically signed by FELICIA Villatoro CNP on 11/15/23 at 1:05 PM EST

## 2023-11-15 NOTE — CARE COORDINATION
Spoke with Kari Wells from [address] and rehab. They can take pt today. Pt has a 3 days stay from 9/25-10/2  then went to [facility] from 10/2 till 11/3? Harman Diehl Will update MD in IDR.        1145 VM left for Kari Wells to check is they can 5take pt today with above info. [address] able to take pt today. Pt's daughter Karsten Bhakta will come and pick pt up around 1430 -1500. Pts nurse [facility] and NP Chucky agreeable with plan. 1430 EPASSR completed and placed in packet along with copy of POA paperwork.

## 2023-11-27 NOTE — PROGRESS NOTES
Physician Progress Note      PATIENT:               Aisha Lemus  CSN #:                  [id number]  :                       5/15/1929  ADMIT DATE:       2023 3:40 PM  [address] [facility] DATE:        11/15/2023 3:30 PM  RESPONDING  PROVIDER #:        Rasta Armijo MD          QUERY TEXT:    Pt admitted with weakness and noted documented functional decline by PT and   reported reduced physical activity/mobility by daughter. If possible, please   document in the progress notes and discharge summary if you are evaluating and   / or treating any of the following: The medical record reflects the following:  Risk Factors: 80 yr old female  Clinical Indicators: Per daughter, reported to have increased confusion and   inability to care for self at home, requiring assistance with most ADLs. PT   consult, dated 23, \"Pt is presenting with decreased endurance, impaired   transfers, impaired gait, impaired cognition. \"  Treatment: PT/OT, assistance with ADL's    Thank you,  Alberto Gramajo, RN  Options provided:  -- Age Related Physical Debility  -- Weakness due to other, Please document other cause. -- Other - I will add my own diagnosis  -- Disagree - Not applicable / Not valid  -- Disagree - Clinically unable to determine / Unknown  -- Refer to Clinical Documentation Reviewer    PROVIDER RESPONSE TEXT:    This patient has age related physical debility.     Query created by: Haseeb Ramos on 2023 7:13 AM      Electronically signed by:  Rasta Armijo MD 2023 9:45 AM